# Patient Record
Sex: FEMALE | Race: WHITE | Employment: OTHER | URBAN - METROPOLITAN AREA
[De-identification: names, ages, dates, MRNs, and addresses within clinical notes are randomized per-mention and may not be internally consistent; named-entity substitution may affect disease eponyms.]

---

## 2018-09-25 ENCOUNTER — HOSPITAL ENCOUNTER (EMERGENCY)
Age: 78
Discharge: HOME | End: 2018-09-25
Attending: EMERGENCY MEDICINE
Payer: MEDICARE

## 2018-09-25 ENCOUNTER — APPOINTMENT (OUTPATIENT)
Dept: GENERAL RADIOLOGY | Age: 78
End: 2018-09-25
Payer: MEDICARE

## 2018-09-25 VITALS
HEART RATE: 81 BPM | DIASTOLIC BLOOD PRESSURE: 86 MMHG | OXYGEN SATURATION: 98 % | TEMPERATURE: 97.9 F | SYSTOLIC BLOOD PRESSURE: 164 MMHG | HEIGHT: 57 IN | BODY MASS INDEX: 18.34 KG/M2 | RESPIRATION RATE: 18 BRPM | WEIGHT: 85 LBS

## 2018-09-25 DIAGNOSIS — J06.9 ACUTE UPPER RESPIRATORY INFECTION: Primary | ICD-10-CM

## 2018-09-25 PROCEDURE — 71046 X-RAY EXAM CHEST 2 VIEWS: CPT

## 2018-09-25 PROCEDURE — 99283 EMERGENCY DEPT VISIT LOW MDM: CPT

## 2018-09-25 PROCEDURE — 94640 AIRWAY INHALATION TREATMENT: CPT

## 2018-09-25 PROCEDURE — 6360000002 HC RX W HCPCS: Performed by: EMERGENCY MEDICINE

## 2018-09-25 RX ORDER — BUTALBITAL, ACETAMINOPHEN AND CAFFEINE 50; 325; 40 MG/1; MG/1; MG/1
1 TABLET ORAL EVERY 4 HOURS PRN
COMMUNITY

## 2018-09-25 RX ORDER — LEVOFLOXACIN 500 MG/1
500 TABLET, FILM COATED ORAL DAILY
COMMUNITY
Start: 2018-09-24 | End: 2018-10-03

## 2018-09-25 RX ORDER — ALBUTEROL SULFATE 2.5 MG/3ML
2.5 SOLUTION RESPIRATORY (INHALATION) ONCE
Status: COMPLETED | OUTPATIENT
Start: 2018-09-25 | End: 2018-09-25

## 2018-09-25 RX ADMIN — ALBUTEROL SULFATE 2.5 MG: 2.5 SOLUTION RESPIRATORY (INHALATION) at 08:33

## 2018-09-25 ASSESSMENT — ENCOUNTER SYMPTOMS
EYE PAIN: 0
SHORTNESS OF BREATH: 0
COUGH: 1
SORE THROAT: 0
HEMOPTYSIS: 0
SPUTUM PRODUCTION: 0
NAUSEA: 0
VOMITING: 0
BACK PAIN: 0
ABDOMINAL PAIN: 0
EYE REDNESS: 0

## 2018-09-25 ASSESSMENT — PAIN DESCRIPTION - DESCRIPTORS: DESCRIPTORS: ACHING

## 2018-09-25 ASSESSMENT — PAIN DESCRIPTION - LOCATION: LOCATION: BACK;CHEST

## 2018-09-25 ASSESSMENT — PAIN DESCRIPTION - PAIN TYPE: TYPE: ACUTE PAIN

## 2018-09-25 NOTE — ED PROVIDER NOTES
HPI:  9/25/18,   Time: 8:32 AM         Frederick Caruso is a 68 y.o. female presenting to the ED for evaluation of URI symptoms kissing of cough, congestion, and runny nose, beginning several days ago. The complaint has been persistent, moderate in severity, and worsened by nothing. Patient was seen by PCP yesterday and prescribed antibiotics. She is not sure what it is. States she started to take one daily. Denies chest pain or shortness of breath. Cough is nonproductive. Subjective fever and chills. Patient has only taken one dose of her antibiotic. ROS: Review of Systems   Constitutional: Positive for chills, fever (subjective) and malaise/fatigue. Negative for diaphoresis. HENT: Positive for congestion. Negative for ear pain and sore throat. Eyes: Negative for pain and redness. Respiratory: Positive for cough. Negative for hemoptysis, sputum production and shortness of breath. Cardiovascular: Negative for chest pain. Gastrointestinal: Negative for abdominal pain, nausea and vomiting. Genitourinary: Negative for dysuria, frequency and urgency. Musculoskeletal: Negative for back pain and neck pain. Skin: Negative for rash. Neurological: Negative for dizziness and headaches. Endo/Heme/Allergies: Does not bruise/bleed easily. All other systems reviewed and are negative. Pertinent positives and negatives are stated within HPI, all other systems reviewed and are negative.  --------------------------------------------- PAST HISTORY ---------------------------------------------  Past Medical History:  has a past medical history of Arthritis; Back problem; CAD (coronary artery disease); Hyperlipidemia; Hypertension; Nausea & vomiting; Reflux; RLD (ruptured lumbar disc); Scoliosis; and Spinal stenosis. Past Surgical History:  has a past surgical history that includes Coronary angioplasty with stent; Cataract removal; fracture surgery; Shoulder arthroscopy (08 25 2011);  Diagnostic

## 2018-10-12 ENCOUNTER — OFFICE VISIT (OUTPATIENT)
Dept: CARDIOLOGY CLINIC | Age: 78
End: 2018-10-12
Payer: MEDICARE

## 2018-10-12 VITALS
WEIGHT: 86 LBS | HEIGHT: 58 IN | SYSTOLIC BLOOD PRESSURE: 130 MMHG | DIASTOLIC BLOOD PRESSURE: 80 MMHG | BODY MASS INDEX: 18.05 KG/M2 | HEART RATE: 74 BPM

## 2018-10-12 DIAGNOSIS — I10 ESSENTIAL HYPERTENSION: ICD-10-CM

## 2018-10-12 DIAGNOSIS — E78.00 PURE HYPERCHOLESTEROLEMIA: ICD-10-CM

## 2018-10-12 DIAGNOSIS — I25.10 CORONARY ARTERY DISEASE INVOLVING NATIVE CORONARY ARTERY OF NATIVE HEART WITHOUT ANGINA PECTORIS: Primary | ICD-10-CM

## 2018-10-12 PROCEDURE — 99214 OFFICE O/P EST MOD 30 MIN: CPT | Performed by: INTERNAL MEDICINE

## 2018-10-12 PROCEDURE — G8400 PT W/DXA NO RESULTS DOC: HCPCS | Performed by: INTERNAL MEDICINE

## 2018-10-12 PROCEDURE — 1090F PRES/ABSN URINE INCON ASSESS: CPT | Performed by: INTERNAL MEDICINE

## 2018-10-12 PROCEDURE — 93000 ELECTROCARDIOGRAM COMPLETE: CPT | Performed by: INTERNAL MEDICINE

## 2018-10-12 PROCEDURE — 1036F TOBACCO NON-USER: CPT | Performed by: INTERNAL MEDICINE

## 2018-10-12 PROCEDURE — G8427 DOCREV CUR MEDS BY ELIG CLIN: HCPCS | Performed by: INTERNAL MEDICINE

## 2018-10-12 PROCEDURE — G8598 ASA/ANTIPLAT THER USED: HCPCS | Performed by: INTERNAL MEDICINE

## 2018-10-12 PROCEDURE — 1123F ACP DISCUSS/DSCN MKR DOCD: CPT | Performed by: INTERNAL MEDICINE

## 2018-10-12 PROCEDURE — 1101F PT FALLS ASSESS-DOCD LE1/YR: CPT | Performed by: INTERNAL MEDICINE

## 2018-10-12 PROCEDURE — G8419 CALC BMI OUT NRM PARAM NOF/U: HCPCS | Performed by: INTERNAL MEDICINE

## 2018-10-12 PROCEDURE — G8484 FLU IMMUNIZE NO ADMIN: HCPCS | Performed by: INTERNAL MEDICINE

## 2018-10-12 PROCEDURE — 4040F PNEUMOC VAC/ADMIN/RCVD: CPT | Performed by: INTERNAL MEDICINE

## 2018-10-12 NOTE — PROGRESS NOTES
kg)   BMI 17.97 kg/m²   Wt Readings from Last 3 Encounters:   10/12/18 86 lb (39 kg)   09/25/18 85 lb (38.6 kg)   12/05/17 80 lb (36.3 kg)     The patient is awake, alert and in no discomfort or distress. No gross musculoskeletal deformity is present. No significant skin or nail changes are present. Gross examination of head, eyes, nose and throat are negative with the exception of poor dentition. Jugular venous pressure is normal and no carotid bruits are present. Normal respiratory effort is noted with no accessory muscle usage present. Lung fields are clear to ascultation. Cardiac examination is notable for a regular rate and rhythm with no palpable thrill. No gallop rhythm or cardiac murmur are identified. A benign abdominal examination is present with no masses or organomegaly. Intact pulses are present throughout all extremities and no peripheral edema is present. No focal neurologic deficits are present. Laboratory Tests:  Lab Results   Component Value Date    CREATININE 0.6 12/05/2017    BUN 12 12/05/2017     12/05/2017    K 4.3 12/05/2017    CL 98 12/05/2017    CO2 27 12/05/2017     No results found for: BNP  Lab Results   Component Value Date    WBC 7.8 12/05/2017    RBC 4.19 12/05/2017    HGB 13.1 12/05/2017    HCT 39.3 12/05/2017    MCV 93.8 12/05/2017    MCH 31.3 12/05/2017    MCHC 33.3 12/05/2017    RDW 12.3 12/05/2017     12/05/2017    MPV 8.7 12/05/2017     No results for input(s): ALKPHOS, ALT, AST, PROT, BILITOT, BILIDIR, LABALBU in the last 72 hours.   No results found for: MG  No results found for: PROTIME, INR  No results found for: TSH  No components found for: CHLPL  No results found for: TRIG  No results found for: HDL  No results found for: [address]    Cardiac Tests:  ECG: A resting electrocardiogram demonstrates evidence of sinus rhythm with nonspecific ST changes      ASSESSMENT / PLAN:  On a clinical basis, the patient appears stable from a cardiovascular standpoint in the face of her known coronary atherosclerosis. I have not altered her existing medical regimen will recommend consideration review of the contraindications of the use of either atorvastatin with reported hepatic transaminase abnormalities listed and her allergy with that of rosuvastatin for that of her present combinations simvastatin dosage based on its excess of recommended 40 mg daily dosage,especially in conjunction with ezetimibe. I presently plan her clinical reevaluation in one year and would happily reassess her in the interim should additional cardiovascular difficulties or concerns arise. The patient's current medication list, allergies, problem list and results of all previously ordered testing were reviewed at today's visit. Follow-up office visit in 1 year      Note: This report was completed using computerized voice recognition software. Every effort has been made to ensure accuracy, however; and invert and computerized transcription errors may be present. Kylie Bone.  Nuah Crawford, [address] [place] [address]    An electronic copy of this follow-up progress note was forwarded to Dr. Petr Meza

## 2019-05-24 ENCOUNTER — APPOINTMENT (OUTPATIENT)
Dept: MRI IMAGING | Age: 79
DRG: 313 | End: 2019-05-24
Payer: MEDICARE

## 2019-05-24 ENCOUNTER — APPOINTMENT (OUTPATIENT)
Dept: GENERAL RADIOLOGY | Age: 79
DRG: 313 | End: 2019-05-24
Payer: MEDICARE

## 2019-05-24 ENCOUNTER — APPOINTMENT (OUTPATIENT)
Dept: NUCLEAR MEDICINE | Age: 79
DRG: 313 | End: 2019-05-24
Payer: MEDICARE

## 2019-05-24 ENCOUNTER — HOSPITAL ENCOUNTER (INPATIENT)
Age: 79
LOS: 3 days | Discharge: NURSING, REHAB OR LONG-TERM CARE | DRG: 065 | End: 2019-05-28
Attending: STUDENT IN AN ORGANIZED HEALTH CARE EDUCATION/TRAINING PROGRAM | Admitting: HOSPITALIST
Payer: MEDICARE

## 2019-05-24 ENCOUNTER — HOSPITAL ENCOUNTER (OUTPATIENT)
Age: 79
Discharge: HOME | End: 2019-05-24
Payer: MEDICARE

## 2019-05-24 ENCOUNTER — APPOINTMENT (OUTPATIENT)
Dept: CT IMAGING | Age: 79
DRG: 313 | End: 2019-05-24
Payer: MEDICARE

## 2019-05-24 ENCOUNTER — HOSPITAL ENCOUNTER (EMERGENCY)
Age: 79
Discharge: ANOTHER HOSPITAL | DRG: 313 | End: 2019-05-24
Attending: EMERGENCY MEDICINE
Payer: MEDICARE

## 2019-05-24 VITALS
DIASTOLIC BLOOD PRESSURE: 80 MMHG | OXYGEN SATURATION: 97 % | HEIGHT: 57 IN | TEMPERATURE: 98.2 F | WEIGHT: 87 LBS | SYSTOLIC BLOOD PRESSURE: 169 MMHG | BODY MASS INDEX: 18.77 KG/M2 | HEART RATE: 75 BPM | RESPIRATION RATE: 18 BRPM

## 2019-05-24 DIAGNOSIS — R07.9 CHEST PAIN, UNSPECIFIED TYPE: Primary | ICD-10-CM

## 2019-05-24 PROBLEM — I63.9 STROKE (HCC): Status: ACTIVE | Noted: 2019-05-24

## 2019-05-24 LAB
ALBUMIN SERPL-MCNC: 4.3 G/DL (ref 3.5–5.2)
ALP BLD-CCNC: 99 U/L (ref 35–104)
ALT SERPL-CCNC: 18 U/L (ref 0–32)
ANION GAP SERPL CALCULATED.3IONS-SCNC: 13 MMOL/L (ref 7–16)
AST SERPL-CCNC: 31 U/L (ref 0–31)
BASOPHILS ABSOLUTE: 0.02 E9/L (ref 0–0.2)
BASOPHILS RELATIVE PERCENT: 0.2 % (ref 0–2)
BILIRUB SERPL-MCNC: 0.2 MG/DL (ref 0–1.2)
BUN BLDV-MCNC: 9 MG/DL (ref 8–23)
CALCIUM SERPL-MCNC: 9.2 MG/DL (ref 8.6–10.2)
CHLORIDE BLD-SCNC: 100 MMOL/L (ref 98–107)
CO2: 24 MMOL/L (ref 22–29)
CREAT SERPL-MCNC: 0.6 MG/DL (ref 0.5–1)
EKG ATRIAL RATE: 71 BPM
EKG P AXIS: 63 DEGREES
EKG P-R INTERVAL: 150 MS
EKG Q-T INTERVAL: 402 MS
EKG QRS DURATION: 74 MS
EKG QTC CALCULATION (BAZETT): 436 MS
EKG R AXIS: 36 DEGREES
EKG T AXIS: 49 DEGREES
EKG VENTRICULAR RATE: 71 BPM
EOSINOPHILS ABSOLUTE: 0.26 E9/L (ref 0.05–0.5)
EOSINOPHILS RELATIVE PERCENT: 2.8 % (ref 0–6)
GFR AFRICAN AMERICAN: >60
GFR NON-AFRICAN AMERICAN: >60 ML/MIN/1.73
GLUCOSE BLD-MCNC: 182 MG/DL (ref 74–99)
HBA1C MFR BLD: 7.1 % (ref 4–5.6)
HCT VFR BLD CALC: 37.6 % (ref 34–48)
HEMOGLOBIN: 12.5 G/DL (ref 11.5–15.5)
IMMATURE GRANULOCYTES #: 0.08 E9/L
IMMATURE GRANULOCYTES %: 0.9 % (ref 0–5)
LYMPHOCYTES ABSOLUTE: 2.43 E9/L (ref 1.5–4)
LYMPHOCYTES RELATIVE PERCENT: 26.5 % (ref 20–42)
MCH RBC QN AUTO: 31.6 PG (ref 26–35)
MCHC RBC AUTO-ENTMCNC: 33.2 % (ref 32–34.5)
MCV RBC AUTO: 95.2 FL (ref 80–99.9)
MONOCYTES ABSOLUTE: 0.89 E9/L (ref 0.1–0.95)
MONOCYTES RELATIVE PERCENT: 9.7 % (ref 2–12)
NEUTROPHILS ABSOLUTE: 5.49 E9/L (ref 1.8–7.3)
NEUTROPHILS RELATIVE PERCENT: 59.9 % (ref 43–80)
PDW BLD-RTO: 11.8 FL (ref 11.5–15)
PLATELET # BLD: 230 E9/L (ref 130–450)
PMV BLD AUTO: 9.2 FL (ref 7–12)
POTASSIUM SERPL-SCNC: 4 MMOL/L (ref 3.5–5)
RBC # BLD: 3.95 E12/L (ref 3.5–5.5)
SODIUM BLD-SCNC: 137 MMOL/L (ref 132–146)
TOTAL PROTEIN: 7.1 G/DL (ref 6.4–8.3)
TROPONIN: <0.01 NG/ML (ref 0–0.03)
WBC # BLD: 9.2 E9/L (ref 4.5–11.5)

## 2019-05-24 PROCEDURE — 85025 COMPLETE CBC W/AUTO DIFF WBC: CPT

## 2019-05-24 PROCEDURE — 70450 CT HEAD/BRAIN W/O DYE: CPT

## 2019-05-24 PROCEDURE — 84484 ASSAY OF TROPONIN QUANT: CPT

## 2019-05-24 PROCEDURE — A0426 ALS 1: HCPCS

## 2019-05-24 PROCEDURE — 99219 PR INITIAL OBSERVATION CARE/DAY 50 MINUTES: CPT | Performed by: INTERNAL MEDICINE

## 2019-05-24 PROCEDURE — 99285 EMERGENCY DEPT VISIT HI MDM: CPT

## 2019-05-24 PROCEDURE — 36415 COLL VENOUS BLD VENIPUNCTURE: CPT

## 2019-05-24 PROCEDURE — 96372 THER/PROPH/DIAG INJ SC/IM: CPT

## 2019-05-24 PROCEDURE — 70551 MRI BRAIN STEM W/O DYE: CPT

## 2019-05-24 PROCEDURE — 71045 X-RAY EXAM CHEST 1 VIEW: CPT

## 2019-05-24 PROCEDURE — 6360000002 HC RX W HCPCS: Performed by: CLINICAL NURSE SPECIALIST

## 2019-05-24 PROCEDURE — A0425 GROUND MILEAGE: HCPCS

## 2019-05-24 PROCEDURE — 93010 ELECTROCARDIOGRAM REPORT: CPT | Performed by: INTERNAL MEDICINE

## 2019-05-24 PROCEDURE — 6370000000 HC RX 637 (ALT 250 FOR IP): Performed by: INTERNAL MEDICINE

## 2019-05-24 PROCEDURE — 6370000000 HC RX 637 (ALT 250 FOR IP): Performed by: CLINICAL NURSE SPECIALIST

## 2019-05-24 PROCEDURE — 6370000000 HC RX 637 (ALT 250 FOR IP): Performed by: HOSPITALIST

## 2019-05-24 PROCEDURE — G0378 HOSPITAL OBSERVATION PER HR: HCPCS

## 2019-05-24 PROCEDURE — 6370000000 HC RX 637 (ALT 250 FOR IP): Performed by: FAMILY MEDICINE

## 2019-05-24 PROCEDURE — 93005 ELECTROCARDIOGRAM TRACING: CPT | Performed by: PHYSICIAN ASSISTANT

## 2019-05-24 PROCEDURE — 2580000003 HC RX 258: Performed by: CLINICAL NURSE SPECIALIST

## 2019-05-24 PROCEDURE — 83036 HEMOGLOBIN GLYCOSYLATED A1C: CPT

## 2019-05-24 PROCEDURE — 80053 COMPREHEN METABOLIC PANEL: CPT

## 2019-05-24 RX ORDER — ACETAMINOPHEN 325 MG/1
650 TABLET ORAL EVERY 4 HOURS PRN
Status: DISCONTINUED | OUTPATIENT
Start: 2019-05-24 | End: 2019-05-28 | Stop reason: HOSPADM

## 2019-05-24 RX ORDER — SIMVASTATIN 40 MG
40 TABLET ORAL NIGHTLY
Status: DISCONTINUED | OUTPATIENT
Start: 2019-05-24 | End: 2019-05-24 | Stop reason: HOSPADM

## 2019-05-24 RX ORDER — ONDANSETRON 2 MG/ML
4 INJECTION INTRAMUSCULAR; INTRAVENOUS EVERY 6 HOURS PRN
Status: DISCONTINUED | OUTPATIENT
Start: 2019-05-24 | End: 2019-05-28 | Stop reason: HOSPADM

## 2019-05-24 RX ORDER — SODIUM CHLORIDE 0.9 % (FLUSH) 0.9 %
10 SYRINGE (ML) INJECTION PRN
Status: DISCONTINUED | OUTPATIENT
Start: 2019-05-24 | End: 2019-05-28 | Stop reason: HOSPADM

## 2019-05-24 RX ORDER — SODIUM CHLORIDE 0.9 % (FLUSH) 0.9 %
10 SYRINGE (ML) INJECTION EVERY 12 HOURS SCHEDULED
Status: DISCONTINUED | OUTPATIENT
Start: 2019-05-24 | End: 2019-05-28 | Stop reason: HOSPADM

## 2019-05-24 RX ORDER — CARVEDILOL 25 MG/1
25 TABLET ORAL 2 TIMES DAILY WITH MEALS
Status: DISCONTINUED | OUTPATIENT
Start: 2019-05-24 | End: 2019-05-24 | Stop reason: HOSPADM

## 2019-05-24 RX ORDER — NITROGLYCERIN 0.4 MG/1
0.4 TABLET SUBLINGUAL EVERY 5 MIN PRN
Status: DISCONTINUED | OUTPATIENT
Start: 2019-05-24 | End: 2019-05-24 | Stop reason: HOSPADM

## 2019-05-24 RX ORDER — SIMVASTATIN 40 MG
40 TABLET ORAL NIGHTLY
Status: DISCONTINUED | OUTPATIENT
Start: 2019-05-24 | End: 2019-05-24

## 2019-05-24 RX ORDER — CARVEDILOL 25 MG/1
25 TABLET ORAL 2 TIMES DAILY WITH MEALS
Status: DISCONTINUED | OUTPATIENT
Start: 2019-05-24 | End: 2019-05-28 | Stop reason: HOSPADM

## 2019-05-24 RX ORDER — LISINOPRIL 10 MG/1
TABLET ORAL
Status: DISCONTINUED
Start: 2019-05-24 | End: 2019-05-24 | Stop reason: HOSPADM

## 2019-05-24 RX ORDER — CLOPIDOGREL BISULFATE 75 MG/1
75 TABLET ORAL DAILY
Status: DISCONTINUED | OUTPATIENT
Start: 2019-05-24 | End: 2019-05-24 | Stop reason: HOSPADM

## 2019-05-24 RX ORDER — ASPIRIN 325 MG
325 TABLET ORAL DAILY
Status: ON HOLD | COMMUNITY
End: 2019-05-26 | Stop reason: HOSPADM

## 2019-05-24 RX ORDER — LISINOPRIL 10 MG/1
40 TABLET ORAL DAILY
Status: DISCONTINUED | OUTPATIENT
Start: 2019-05-24 | End: 2019-05-24 | Stop reason: HOSPADM

## 2019-05-24 RX ORDER — SIMVASTATIN 40 MG
40 TABLET ORAL NIGHTLY
Status: DISCONTINUED | OUTPATIENT
Start: 2019-05-24 | End: 2019-05-28 | Stop reason: HOSPADM

## 2019-05-24 RX ORDER — ASPIRIN 325 MG
325 TABLET ORAL DAILY
Status: DISCONTINUED | OUTPATIENT
Start: 2019-05-24 | End: 2019-05-25

## 2019-05-24 RX ORDER — LISINOPRIL 20 MG/1
40 TABLET ORAL DAILY
Status: DISCONTINUED | OUTPATIENT
Start: 2019-05-24 | End: 2019-05-28 | Stop reason: HOSPADM

## 2019-05-24 RX ADMIN — CARVEDILOL 25 MG: 25 TABLET, FILM COATED ORAL at 17:47

## 2019-05-24 RX ADMIN — SIMVASTATIN 40 MG: 40 TABLET, FILM COATED ORAL at 20:57

## 2019-05-24 RX ADMIN — Medication 10 ML: at 20:57

## 2019-05-24 RX ADMIN — ACETAMINOPHEN 650 MG: 325 TABLET, FILM COATED ORAL at 17:52

## 2019-05-24 RX ADMIN — CLOPIDOGREL BISULFATE 75 MG: 75 TABLET ORAL at 10:35

## 2019-05-24 RX ADMIN — ENOXAPARIN SODIUM 40 MG: 40 INJECTION SUBCUTANEOUS at 17:47

## 2019-05-24 RX ADMIN — CARVEDILOL 25 MG: 25 TABLET, FILM COATED ORAL at 10:35

## 2019-05-24 RX ADMIN — LISINOPRIL 40 MG: 10 TABLET ORAL at 09:11

## 2019-05-24 ASSESSMENT — PAIN DESCRIPTION - ORIENTATION: ORIENTATION: RIGHT;LEFT

## 2019-05-24 ASSESSMENT — PAIN - FUNCTIONAL ASSESSMENT: PAIN_FUNCTIONAL_ASSESSMENT: ACTIVITIES ARE NOT PREVENTED

## 2019-05-24 ASSESSMENT — PAIN SCALES - GENERAL
PAINLEVEL_OUTOF10: 0
PAINLEVEL_OUTOF10: 3
PAINLEVEL_OUTOF10: 0

## 2019-05-24 ASSESSMENT — PAIN DESCRIPTION - DESCRIPTORS: DESCRIPTORS: ACHING;DULL;HEADACHE

## 2019-05-24 ASSESSMENT — PAIN DESCRIPTION - PAIN TYPE: TYPE: ACUTE PAIN

## 2019-05-24 ASSESSMENT — PAIN DESCRIPTION - ONSET: ONSET: ON-GOING

## 2019-05-24 ASSESSMENT — PAIN DESCRIPTION - PROGRESSION: CLINICAL_PROGRESSION: NOT CHANGED

## 2019-05-24 ASSESSMENT — PAIN DESCRIPTION - FREQUENCY: FREQUENCY: INTERMITTENT

## 2019-05-24 ASSESSMENT — PAIN DESCRIPTION - LOCATION: LOCATION: HEAD

## 2019-05-24 NOTE — ED NOTES
While giving the pt a medication, she was unable to hold the cup in her left hand and when she attempted to grab the pill with her left hand, her hand shook badly but once she got the pill she was able to lift her arm up and put it in her mouth.      Mary Jane Randhawa RN  05/24/19 [id number]

## 2019-05-24 NOTE — ED PROVIDER NOTES
ED Attending  CC: Juanita       Department of Emergency Medicine   ED  Provider Note  Admit Date/RoomTime: 5/24/2019  6:54 AM  ED Room: 04/04  MRN: [id number]  Chief Complaint:   Chest Pain (Pressure. Started around 0500) and Extremity Weakness (Left.)       History of Present Illness   Source of history provided by:  patient. History/Exam Limitations: none. Varun Mijares is a 66 y.o. female who has a past medical history of:   Past Medical History:   Diagnosis Date    Arthritis     Back problem     CAD (coronary artery disease) 2010    PTCA with DAMON to prox LAD, PTCA ADMON to ostial lesion of RCA and BMS to prox lesion RCA DAMON to RCA ostial     Hyperlipidemia     Hypertension     Nausea & vomiting     Reflux     RLD (ruptured lumbar disc)     Scoliosis     Spinal stenosis     presents to the ED by ambulance for chest pain and left sided extremity weakness. Patient states that her chest pain started at 0500 this morning. Patient states this woke her up out of sleep. She describes it as a left sided chest pressure. It does not radiate. It was a 9/10 this morning when she woke up. She took 325 mg of aspirin at 5:30 this morning. She states that her pain is now down to a 6 out of 10. She denies feeling short of breath. She denies any coughing or fevers. She reports that the pain made her feel diaphoretic and nauseated. She has not been vomiting. She states that she has been having intermittent chest pain like this for the past 2 weeks but today was the worst. She has a history of 3 cardiac stents in 2010, she has not had a cardiac cath since then. She did have a stress test in October 2017 and she follows with Dr. Jose Potts. Patient also reports that she noticed that her left upper extremity has been feeling weaker than the right side upon waking this morning as well. She's never had anything like this before. She denies any fall or head injury. She denies being on any blood thinners.  Patient reports that both of her lower extremities feel weak as well. She took 325mg of ASA PTA. ROS   Pertinent positives and negatives are stated within HPI, all other systems reviewed and are negative. Past Surgical History:   Procedure Laterality Date    CATARACT REMOVAL      bilateral    CORONARY ANGIOPLASTY  2/25/2010    PTCA with DAMON in the proximal left anterior descending    CORONARY ANGIOPLASTY  3/11/2010    PTCA and deployment of 2 stents in the ostium and the proximal segment of RCA    CORONARY ANGIOPLASTY  5/19/2010    PTCA DAMON to ostial RCA patent LAD and RCA BMS  restenosis of RCA    CORONARY ANGIOPLASTY WITH STENT PLACEMENT      times 3    DIAGNOSTIC CARDIAC CATH LAB PROCEDURE  2/25/2010    Dr. Jonathan Veronica: Cardiac cath with angioplasty follow up    [address] CATH LAB PROCEDURE  3/11/2010    Cardiac cath and stent placement    DIAGNOSTIC CARDIAC CATH LAB PROCEDURE  5/19/2010    Cardiac cath heart lab and subsequent angiopilsaty    FRACTURE SURGERY      tight wrist    SHOULDER ARTHROSCOPY  08 25 2011    left shoulder arthroscopy ,acromioplasty,busectomy, release of adhesions   Social History:  reports that she has never smoked. She has never used smokeless tobacco. She reports that she does not drink alcohol or use drugs. Family History: family history includes Cancer (age of onset: 79) in her sister; Heart Attack (age of onset: 72) in her father; Heart Attack (age of onset: 68) in her mother. Allergies: Aspirin and Lipitor    Physical Exam Section   Oxygen Saturation Interpretation: Normal.   ED Triage Vitals   BP Temp Temp src Pulse Resp SpO2 Height Weight   -- -- -- -- -- -- -- --       Physical Exam  · Constitutional/General: Alert and oriented x3, well appearing, non toxic. · HEENT:  NC/NT. PERRLA,  Airway patent.   · Neck: Supple, full ROM, non tender to palpation in the midline, no stridor, no crepitus, no meningeal signs  · Respiratory: Lungs clear to auscultation bilaterally, no wheezes, rales, or rhonchi. Not in respiratory distress  · CV:  Regular rate. Regular rhythm. No murmurs, gallops, or rubs. 2+ distal pulses  · Chest: No chest wall tenderness  · GI:  Abdomen Soft, Non tender, Non distended. +BS. No rebound, guarding, or rigidity. No pulsatile masses. · Musculoskeletal: Moves all extremities x 4. Warm and well perfused, no clubbing, cyanosis, or edema. Capillary refill <3 seconds  · Integument: skin warm and dry. No rashes. · Lymphatic: no lymphadenopathy noted  · Neurologic: GCS 15. NIH Stroke Scale at time of initial evaluation:  1A: Level of Consciousness 0 - alert; keenly responsive   1B: Ask Month and Age 0 - answers both questions correctly   1C:  Tell Patient To Open and Close Eyes, then Hand  Squeeze 0 - performs both tasks correctly   2: Test Horizontal Extraocular Movements 0 - normal   3: Test Visual Fields 0 - no visual loss   4: Test Facial Palsy 0 - normal symmetric movement   5A: Test Left Arm Motor Drift 0 - no drift, limb holds 90 (or 45) degrees for full 10 seconds   5B: Test Right Arm Motor Drift 0 - no drift, limb holds 90 (or 45) degrees for full 10 seconds   6A: Test Left Leg Motor Drift 0 - no drift; leg holds 30 degree position for full 5 seconds   6B: Test Right Leg Motor Drift 0 - no drift; leg holds 30 degree position for full 5 seconds   7: Test Limb Ataxia   (FNF/Heel-Shin) 1 - present in one limb   8: Test Sensation 0 - normal; no sensory loss   9: Test Language/Aphasia 0 - no aphasia, normal   10: Test Dysarthria 0 - normal   11: Test Extinction/Inattention 0 - no abnormality   Total 1     · Psychiatric: Normal Affect      Lab / Imaging Results   (All laboratory and radiology results have been personally reviewed by myself)  Labs:  Results for orders placed or performed during the hospital encounter of 05/24/19   CBC Auto Differential   Result Value Ref Range    WBC 9.2 4.5 - 11.5 E9/L    RBC 3.95 3.50 - 5.50 E12/L    Hemoglobin 12.5 11.5 - 15.5 g/dL    Hematocrit 37.6 34.0 - 48.0 %    MCV 95.2 80.0 - 99.9 fL    MCH 31.6 26.0 - 35.0 pg    MCHC 33.2 32.0 - 34.5 %    RDW 11.8 11.5 - 15.0 fL    Platelets 470 545 - 756 E9/L    MPV 9.2 7.0 - 12.0 fL    Neutrophils % 59.9 43.0 - 80.0 %    Immature Granulocytes % 0.9 0.0 - 5.0 %    Lymphocytes % 26.5 20.0 - 42.0 %    Monocytes % 9.7 2.0 - 12.0 %    Eosinophils % 2.8 0.0 - 6.0 %    Basophils % 0.2 0.0 - 2.0 %    Neutrophils # 5.49 1.80 - 7.30 E9/L    Immature Granulocytes # 0.08 E9/L    Lymphocytes # 2.43 1.50 - 4.00 E9/L    Monocytes # 0.89 0.10 - 0.95 E9/L    Eosinophils # 0.26 0.05 - 0.50 E9/L    Basophils # 0.02 0.00 - 0.20 E9/L   Comprehensive Metabolic Panel   Result Value Ref Range    Sodium 137 132 - 146 mmol/L    Potassium 4.0 3.5 - 5.0 mmol/L    Chloride 100 98 - 107 mmol/L    CO2 24 22 - 29 mmol/L    Anion Gap 13 7 - 16 mmol/L    Glucose 182 (H) 74 - 99 mg/dL    BUN 9 8 - 23 mg/dL    CREATININE 0.6 0.5 - 1.0 mg/dL    GFR Non-African American >60 >=60 mL/min/1.73    GFR African American >60     Calcium 9.2 8.6 - 10.2 mg/dL    Total Protein 7.1 6.4 - 8.3 g/dL    Alb 4.3 3.5 - 5.2 g/dL    Total Bilirubin 0.2 0.0 - 1.2 mg/dL    Alkaline Phosphatase 99 35 - 104 U/L    ALT 18 0 - 32 U/L    AST 31 0 - 31 U/L   Troponin   Result Value Ref Range    Troponin <0.01 0.00 - 0.03 ng/mL   EKG 12 Lead   Result Value Ref Range    Ventricular Rate 71 BPM    Atrial Rate 71 BPM    P-R Interval 150 ms    QRS Duration 74 ms    Q-T Interval 402 ms    QTc Calculation (Bazett) 436 ms    P Axis 63 degrees    R Axis 36 degrees    T Axis 49 degrees     Imaging: All Radiology results interpreted by Radiologist unless otherwise noted. 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 CT Head WO Contrast   Final Result      1. No acute findings. XR CHEST PORTABLE   Final Result   1.  Negative portable chest.        NM Cardiac weakness or numbness. Abdomen soft with no pulsatile mass. Heart rate regular. Lungs are clear and equal. Abdomen nontender. No pretibial edema or calf pain. [NC]   4362 Case discussed with Dr. Annmarie Rutherford, detailed overview given, MRI result discussed, he would like the patient transferred to [facility], there is no neurology coverage at [facility] today. [NC]   1876 Case discussed with Cullen Marshall for [facility] the medicine as [place], detailed overview given, they will accept the patient on transfer to their service. [NC]      ED Course User Index  [NC] Enrigue Grade, DO     Re-Evaluations:  5/24/19      Time: 0825    Patients symptoms are improving. She reports no current chest pain. She is updated on results and is agreeable to admission at this time. Time: 1000   It is noted that patient has several areas of infarct on MRI of the brain. Patient is informed of the results and is agreeable to transfer to [facility] at this time. Consultations:             Dr. Annmarie Rutherford: Discussed case. He is agreeable to admission. (1100): updated on findings and updated that we will transfer to [facility]. Dr. Gabi Haji spoke w/attending at [facility] for transfer. Procedures:   none    MDM:  Patient with left-sided chest pain, resolved while here the ER. Patient already had aspirin prior to arrival today. We'll admit at this time for chest pain. Patient able to admission at this time. It is found that patient has multiple areas of infarct on MRI, as our neurology is not here for coverage this weekend, we will transfer to [facility] for further evaluation and management at this time. Counseling:   I have spoken with the patient and discussed todays results, in addition to providing specific details for the plan of care and counseling regarding the diagnosis and prognosis and are agreeable with the plan. Assessment      1.  Chest pain, unspecified type         Plan Transferred to [facility]. Patient condition is fair. New Medications     New Prescriptions    No medications on file     Electronically signed by NAFISA Keys   DD: 5/24/19  **This report was transcribed using voice recognition software. Every effort was made to ensure accuracy; however, inadvertent computerized transcription errors may be present.   END OF PROVIDER NOTE           Ollie Keys  05/24/19 [id number]

## 2019-05-24 NOTE — ED NOTES
Pt states she is not having any chest pain at the present time. Just states she feels weird all over like she has no pep. No sl nitro given at the present time.  Dr derrick Evans RN  05/24/19 0382

## 2019-05-24 NOTE — PROGRESS NOTES
Message sent to Dr. Cristina Ortiz regarding need for admission orders at this time. Per Dr. Cristina Ortiz, Dr. Miranda Ranks to place orders. Message sent to Dr. Miranda Ranks for admission orders at this time.

## 2019-05-24 NOTE — PROGRESS NOTES
ADVANCED CARE PLANNING    Patient Name: Trice Mejia       YOB: 1940              MRN:    [id number]  Admission Date:  5/24/2019  1:11 PM    Active Diagnoses:  Principal Problem:    Chest pain  Active Problems:    Coronary artery disease involving native coronary artery of native heart without angina pectoris    Hypertension    Hyperlipidemia    Stroke [facility])  Resolved Problems:    * No resolved hospital problems. *      These active diagnoses are of sufficient risk that focused discussion on advanced care planning is indicated in order to allow the patient to thoughtfully consider personal goals of care; and, if situations arise that prevent the patient to personally give input, to ensure appropriate representation of their personal desire for different levels and levels of care. Persons present in discussion: RANDY Romero, Family members:  none. Discussion: I reviewed her admission for chest pain and stroke and her desires for ongoing aggressive care, including potential intubation and mechanical ventilation; also discussed who would speak on her behalf should she be unable to do so and discussed what conversations she has had with her family so they understand her desires if such a situation occurred now or in the future. I presented and explained the availability of our palliative care team to her, including the availability of advanced directives forms which she can review with her family and then fill out if they so wish. She wishes to remain a full code at the present time. Time Spent on Advanced Planning Documents: 30 minutes    Electronically signed by RANDY Hamm on 5/24/2019 at 4:00 PM    NOTE: This report was transcribed using voice recognition software. Every effort was made to ensure accuracy; however, inadvertent computerized transcription errors may be present.

## 2019-05-24 NOTE — ED NOTES
Nuclear medicine called for the pt to go over and start her stress test.  Explained to staff that pt is being transferred to another facility and had a stroke. They stated they would not do the stress test at the present time.      Alesha Spencer RN  05/24/19 8734

## 2019-05-24 NOTE — PROGRESS NOTES
5 over 5 strength in hip knee and foot/ankle use. Lung auscultation is clear, respirations are nonlabored. Heart rhythm is regular without S3 or S4, heart rate is 76/m. Abdomen is soft without distention or localized tenderness. Assessment/Plan:   1. Chest pain/heaviness, history coronary artery disease and hypertension  2.  left upper extremity weakness with rule out for stroke  3. hypercholesterolemia    Discussed with the patient,  obtain MRI in face of negative CT with left upper extremity weakness, neurologic and cardiology consultation, cycle cardiac enzymes, institute preadmission hypertensive program and follow blood pressure trending  Electronically by Bailey Albrecht DO  on 5/24/19 at 8:54 AM

## 2019-05-24 NOTE — H&P
SpO2 97%   BMI 18.83 kg/m²     General appearance:  No apparent distress, appears stated age and cooperative. HEENT:  Normal cephalic, atraumatic without obvious deformity. Pupils equal, round, and reactive to light. Extra ocular muscles intact. Conjunctivae/corneas clear. Neck: Supple, with full range of motion. No jugular venous distention. Trachea midline. Respiratory:  Normal respiratory effort. Clear to auscultation, bilaterally without Rales/Wheezes/Rhonchi. Cardiovascular:  Regular rate and rhythm with normal S1/S2 without murmurs, rubs or gallops. Abdomen: Soft, non-tender, non-distended with normal bowel sounds. Musculoskeletal:  No clubbing, cyanosis or edema bilaterally. Full range of motion without deformity. Skin: Skin color, texture, turgor normal.  No rashes or lesions. Neurologic:  Neurovascularly intact without any focal sensory/motor deficits. LLE weaker than right. Psychiatric:  Alert and oriented, thought content appropriate, normal insight  Capillary Refill: Brisk,< 3 seconds   Peripheral Pulses: +2 palpable, equal bilaterally       Labs:     Recent Labs     05/24/19  0743   WBC 9.2   HGB 12.5   HCT 37.6        Recent Labs     05/24/19  0743      K 4.0      CO2 24   BUN 9   CREATININE 0.6   CALCIUM 9.2     Recent Labs     05/24/19  0743   AST 31   ALT 18   BILITOT 0.2   ALKPHOS 99     No results for input(s): INR in the last 72 hours.   Recent Labs     05/24/19  0743   TROPONINI <0.01       Urinalysis:      Lab Results   Component Value Date    NITRU Negative 12/05/2017    BLOODU Negative 12/05/2017    SPECGRAV 1.015 12/05/2017    GLUCOSEU Negative 12/05/2017         ASSESSMENT:    Active Hospital Problems    Diagnosis Date Noted    Chest pain [R07.9] 05/24/2019    Stroke [facility]) [I63.9] 05/24/2019    Hypertension [I10]     Hyperlipidemia [E78.5]     Coronary artery disease involving native coronary artery of native heart without angina pectoris [I25.10] PLAN:  Consult neurology  Neurovascular checks  Monitor BP  Stress test  Resume home medications  Monitor labs  Daily weights  I/Os      DVT Prophylaxis: lovenox  Diet: DIET CARDIAC; Diet NPO, After Midnight  Code Status: Full Code    PT/OT Eval Status: ordered    Dispo - admit telemetry       RANDY Angeles - CNS    Thank you Caryle Beams, DO for the opportunity to be involved in this patient's care. If you have any questions or concerns please feel free to contact me at [phone].

## 2019-05-24 NOTE — ED NOTES
Mobile here to  pt. All questions and concerns answered.      Mary Jane Randhawa, ALBERTO  05/24/19 8801

## 2019-05-24 NOTE — CONSULTS
CHIEF COMPLAINT: Chest pain/Left arm weakness    HISTORY OF PRESENT ILLNESS: Patient is a 66 y.o. female seen at the request of Humza Pedraza DO and followed at our office by Dr. Venus Huynh. Patient complains of chest pain. Onset was several hours ago, with improving course since that time. The patient describes the pain as constant, precordial and pressure like in nature, radiates to the left arm. Patient rates pain as a severe in intensity. Associated symptoms are chest pain, chest pressure/discomfort and dyspnea. Aggravating factors are none. Alleviating factors are: none. Patient also with left arm weakness that is persisting. Medical history:  1. Hypertension. 2. GERD. 3. Spinal stenosis. 4. Hyperlipidemia. 5. Left heart catheterization 02/25/2010. Dr. Paulino Douglas. Left main 0%, LAD proximal 80-85%, D1 0%, OM2 no significant disease, ramus intermedius no disease, RCA dominant vessel, ostial 70% stenosis and proximal 70-75%. EF 60%. EDP 17 mmHg. PCI of LAD with 2.5 x 15 Xience drug-eluting stent deployed at 2.78 mm. 6. PCI of RCA with 3.0 x 20 mm Taxus drug-eluting stent to ostium with 3.0 x 12 mm Vision bare-metal stent in the proximal RCA 03/11/2010.  7. Left heart catheterization 05/19/2010. Dr. Pam Stone. Patent stents in the LAD. No disease in the Cx, RCA (ostial 70%) InStent stenosis, patent stent in the mid RCA, EF normal.  8. PCI of InStent restenosis with 3.0 x 8 mm Xience drug-eluting stent.     Past Medical History:   Diagnosis Date    Arthritis     Back problem     CAD (coronary artery disease) 2010    PTCA with DAMON to prox LAD, PTCA DAMON to ostial lesion of RCA and BMS to prox lesion RCA DAMON to RCA ostial     Hyperlipidemia     Hypertension     Nausea & vomiting     Reflux     RLD (ruptured lumbar disc)     Scoliosis     Spinal stenosis        Patient Active Problem List   Diagnosis    Adhesive capsulitis of shoulder    Bursitis of left shoulder    Impingement syndrome of shoulder    Coronary artery disease involving native coronary artery of native heart without angina pectoris    Spinal stenosis    Scoliosis    Hypertension    Hyperlipidemia    Chest pain       Allergies   Allergen Reactions    Aspirin      Caused bleeding ulcers    Lipitor      Caused elevated liver enzymes       Current Facility-Administered Medications   Medication Dose Route Frequency Provider Last Rate Last Dose    nitroGLYCERIN (NITROSTAT) SL tablet 0.4 mg  0.4 mg Sublingual Q5 Min PRN NAFISA Devine        lisinopril (PRINIVIL;ZESTRIL) tablet 40 mg  40 mg Oral Daily Melva Snowden DO   40 mg at 05/24/19 0911    carvedilol (COREG) tablet 25 mg  25 mg Oral BID  Jac Stevenson DO        simvastatin (ZOCOR) tablet 40 mg  40 mg Oral Nightly Melva Snowden DO        perflutren lipid microspheres (DEFINITY) injection 1.65 mg  1.5 mL Intravenous ONCE PRN Melva Snowden DO        lisinopril (PRINIVIL;ZESTRIL) 10 MG tablet              Current Outpatient Medications   Medication Sig Dispense Refill    aspirin 325 MG tablet Take 325 mg by mouth daily      butalbital-acetaminophen-caffeine (FIORICET, ESGIC) -40 MG per tablet Take 1 tablet by mouth every 4 hours as needed for Migraine      simvastatin (ZOCOR) 20 MG tablet Take 20 mg by mouth nightly       ezetimibe-simvastatin (VYTORIN) 10-40 MG per tablet Take 1 tablet by mouth nightly.  Multiple Vitamins-Minerals (CENTRUM SILVER PO) Take 1 tablet by mouth daily       lisinopril (PRINIVIL;ZESTRIL) 40 MG tablet Take 40 mg by mouth daily.  carvedilol (COREG) 25 MG tablet Take 25 mg by mouth 2 times daily (with meals).  nitroGLYCERIN (NITROSTAT) 0.4 MG SL tablet Place 0.4 mg under the tongue every 5 minutes as needed.            Social History     Socioeconomic History    Marital status: Single     Spouse name: Not on file    Number of children: Not on file    Years of education: Not on file    Highest education level: Not on file   Occupational History    Not on file   Social Needs    Financial resource strain: Not on file    Food insecurity:     Worry: Not on file     Inability: Not on file    Transportation needs:     Medical: Not on file     Non-medical: Not on file   Tobacco Use    Smoking status: Never Smoker    Smokeless tobacco: Never Used   Substance and Sexual Activity    Alcohol use: No     Alcohol/week: 0.0 oz     Comment: denies caffeine    Drug use: No    Sexual activity: Not on file   Lifestyle    Physical activity:     Days per week: Not on file     Minutes per session: Not on file    Stress: Not on file   Relationships    Social connections:     Talks on phone: Not on file     Gets together: Not on file     Attends Mandaeism service: Not on file     Active member of club or organization: Not on file     Attends meetings of clubs or organizations: Not on file     Relationship status: Not on file    Intimate partner violence:     Fear of current or ex partner: Not on file     Emotionally abused: Not on file     Physically abused: Not on file     Forced sexual activity: Not on file   Other Topics Concern    Not on file   Social History Narrative    Not on file       Family History   Problem Relation Age of Onset    Heart Attack Mother 68    Heart Attack Father 72    Cancer Sister 79        pancreatic       Review of Systems:   Heart: as above   Lungs: as above   Eyes: denies changes in vision or discharge. Ears: denies changes in hearing or pain. Nose: denies epistaxis or masses   Throat: denies sore throat or trouble swallowing. Neuro: denies numbness, tingling, tremors. Skin: denies rashes or itching. : denies hematuria, dysuria   GI: denies vomiting, diarrhea   Psych: denies mood changed, anxiety, depression. all others negative.     Physical Exam   BP (!) 187/69   Pulse 75   Temp 98.2 °F (36.8 °C) (Oral)   Resp 18   Ht 4' 9\" (1.448 m)   Wt 87 lb (39.5 kg)   SpO2 98% BMI 18.83 kg/m²   Constitutional: Oriented to person, place, and time. Well-developed and well-nourished. No distress. Head: Normocephalic and atraumatic. Eyes: EOM are normal. Pupils are equal, round, and reactive to light. Neck: Normal range of motion. Neck supple. No hepatojugular reflux and no JVD present. Carotid bruit is not present. No tracheal deviation present. No thyromegaly present. Cardiovascular: Normal rate, regular rhythm, normal heart sounds and intact distal pulses. Exam reveals no gallop and no friction rub. No murmur heard. Pulmonary/Chest: Effort normal and breath sounds normal. No respiratory distress. No wheezes. No rales. No tenderness. Abdominal: Soft. Bowel sounds are normal. No distension and no mass. No tenderness. No rebound and no guarding. Musculoskeletal: Normal range of motion. No edema and no tenderness. Lymphadenopathy:   No cervical adenopathy. No groin adenopathy. Neurological: Alert and oriented to person, place, and time. Skin: Skin is warm and dry. No rash noted. Not diaphoretic. No erythema. Psychiatric: Normal mood and affect. Behavior is normal.       CBC:   Lab Results   Component Value Date    WBC 9.2 05/24/2019    RBC 3.95 05/24/2019    HGB 12.5 05/24/2019    HCT 37.6 05/24/2019    MCV 95.2 05/24/2019    MCH 31.6 05/24/2019    MCHC 33.2 05/24/2019    RDW 11.8 05/24/2019     05/24/2019    MPV 9.2 05/24/2019     BMP:   Lab Results   Component Value Date     05/24/2019    K 4.0 05/24/2019     05/24/2019    CO2 24 05/24/2019    BUN 9 05/24/2019    LABALBU 4.3 05/24/2019    CREATININE 0.6 05/24/2019    CALCIUM 9.2 05/24/2019    GFRAA >60 05/24/2019    LABGLOM >60 05/24/2019     Magnesium:  No results found for: MG  Cardiac Enzymes:   Lab Results   Component Value Date    TROPONINI <0.01 05/24/2019      PT/INR:  No results found for: PROTIME, INR  TSH:  No results found for: TSH    Rhythm Strip: normal sinus rhythm.     EKG:  normal EKG, normal sinus rhythm. ASSESSMENT AND PLAN:  Patient Active Problem List   Diagnosis    Adhesive capsulitis of shoulder    Bursitis of left shoulder    Impingement syndrome of shoulder    Coronary artery disease involving native coronary artery of native heart without angina pectoris    Spinal stenosis    Scoliosis    Hypertension    Hyperlipidemia    Chest pain     1. Chest pain/Hx of CAD:     CE x 1 negative. EKG normal.    Echo ordered. Serial CE. If negative then pharm stress in am.     Plavix(ASA allergy)BB/statin. 2. Left arm weakness: Neurology consulted. 3. HTN: Observe. 4. Lipids: Statin. Neela Millard D.O.   Cardiologist  Cardiology, [facility]

## 2019-05-25 ENCOUNTER — APPOINTMENT (OUTPATIENT)
Dept: NUCLEAR MEDICINE | Age: 79
DRG: 065 | End: 2019-05-25
Attending: STUDENT IN AN ORGANIZED HEALTH CARE EDUCATION/TRAINING PROGRAM
Payer: MEDICARE

## 2019-05-25 ENCOUNTER — APPOINTMENT (OUTPATIENT)
Dept: ULTRASOUND IMAGING | Age: 79
DRG: 065 | End: 2019-05-25
Attending: STUDENT IN AN ORGANIZED HEALTH CARE EDUCATION/TRAINING PROGRAM
Payer: MEDICARE

## 2019-05-25 PROBLEM — I63.9 ACUTE CVA (CEREBROVASCULAR ACCIDENT) (HCC): Status: ACTIVE | Noted: 2019-05-25

## 2019-05-25 LAB
ANION GAP SERPL CALCULATED.3IONS-SCNC: 15 MMOL/L (ref 7–16)
BUN BLDV-MCNC: 13 MG/DL (ref 8–23)
CALCIUM SERPL-MCNC: 8.9 MG/DL (ref 8.6–10.2)
CHLORIDE BLD-SCNC: 100 MMOL/L (ref 98–107)
CHOLESTEROL, TOTAL: 173 MG/DL (ref 0–199)
CO2: 22 MMOL/L (ref 22–29)
CREAT SERPL-MCNC: 0.6 MG/DL (ref 0.5–1)
GFR AFRICAN AMERICAN: >60
GFR NON-AFRICAN AMERICAN: >60 ML/MIN/1.73
GLUCOSE BLD-MCNC: 150 MG/DL (ref 74–99)
HCT VFR BLD CALC: 35.5 % (ref 34–48)
HDLC SERPL-MCNC: 41 MG/DL
HEMOGLOBIN: 11.8 G/DL (ref 11.5–15.5)
LDL CHOLESTEROL CALCULATED: 79 MG/DL (ref 0–99)
LV EF: 71 %
LVEF MODALITY: NORMAL
MAGNESIUM: 1.9 MG/DL (ref 1.6–2.6)
MCH RBC QN AUTO: 31.1 PG (ref 26–35)
MCHC RBC AUTO-ENTMCNC: 33.2 % (ref 32–34.5)
MCV RBC AUTO: 93.7 FL (ref 80–99.9)
PDW BLD-RTO: 11.9 FL (ref 11.5–15)
PHOSPHORUS: 3.8 MG/DL (ref 2.5–4.5)
PLATELET # BLD: 224 E9/L (ref 130–450)
PMV BLD AUTO: 9.3 FL (ref 7–12)
POTASSIUM REFLEX MAGNESIUM: 3.4 MMOL/L (ref 3.5–5)
POTASSIUM SERPL-SCNC: 3.4 MMOL/L (ref 3.5–5)
RBC # BLD: 3.79 E12/L (ref 3.5–5.5)
SODIUM BLD-SCNC: 137 MMOL/L (ref 132–146)
TRIGL SERPL-MCNC: 265 MG/DL (ref 0–149)
VLDLC SERPL CALC-MCNC: 53 MG/DL
WBC # BLD: 6.4 E9/L (ref 4.5–11.5)

## 2019-05-25 PROCEDURE — 6360000002 HC RX W HCPCS: Performed by: CLINICAL NURSE SPECIALIST

## 2019-05-25 PROCEDURE — 2140000000 HC CCU INTERMEDIATE R&B

## 2019-05-25 PROCEDURE — 36415 COLL VENOUS BLD VENIPUNCTURE: CPT

## 2019-05-25 PROCEDURE — 6370000000 HC RX 637 (ALT 250 FOR IP): Performed by: CLINICAL NURSE SPECIALIST

## 2019-05-25 PROCEDURE — A9500 TC99M SESTAMIBI: HCPCS | Performed by: RADIOLOGY

## 2019-05-25 PROCEDURE — 96374 THER/PROPH/DIAG INJ IV PUSH: CPT

## 2019-05-25 PROCEDURE — 3430000000 HC RX DIAGNOSTIC RADIOPHARMACEUTICAL: Performed by: RADIOLOGY

## 2019-05-25 PROCEDURE — 83735 ASSAY OF MAGNESIUM: CPT

## 2019-05-25 PROCEDURE — 2580000003 HC RX 258: Performed by: CLINICAL NURSE SPECIALIST

## 2019-05-25 PROCEDURE — 80061 LIPID PANEL: CPT

## 2019-05-25 PROCEDURE — 6370000000 HC RX 637 (ALT 250 FOR IP): Performed by: HOSPITALIST

## 2019-05-25 PROCEDURE — 84100 ASSAY OF PHOSPHORUS: CPT

## 2019-05-25 PROCEDURE — 6370000000 HC RX 637 (ALT 250 FOR IP): Performed by: PSYCHIATRY & NEUROLOGY

## 2019-05-25 PROCEDURE — 80048 BASIC METABOLIC PNL TOTAL CA: CPT

## 2019-05-25 PROCEDURE — 93880 EXTRACRANIAL BILAT STUDY: CPT

## 2019-05-25 PROCEDURE — 93017 CV STRESS TEST TRACING ONLY: CPT

## 2019-05-25 PROCEDURE — 78452 HT MUSCLE IMAGE SPECT MULT: CPT

## 2019-05-25 PROCEDURE — 93018 CV STRESS TEST I&R ONLY: CPT | Performed by: INTERNAL MEDICINE

## 2019-05-25 PROCEDURE — 85027 COMPLETE CBC AUTOMATED: CPT

## 2019-05-25 RX ORDER — ASPIRIN 81 MG/1
81 TABLET ORAL DAILY
Status: DISCONTINUED | OUTPATIENT
Start: 2019-05-26 | End: 2019-05-28 | Stop reason: HOSPADM

## 2019-05-25 RX ORDER — CLOPIDOGREL BISULFATE 75 MG/1
75 TABLET ORAL DAILY
Status: DISCONTINUED | OUTPATIENT
Start: 2019-05-25 | End: 2019-05-28 | Stop reason: HOSPADM

## 2019-05-25 RX ADMIN — CARVEDILOL 25 MG: 25 TABLET, FILM COATED ORAL at 11:50

## 2019-05-25 RX ADMIN — LISINOPRIL 40 MG: 20 TABLET ORAL at 11:50

## 2019-05-25 RX ADMIN — ASPIRIN 325 MG: 325 TABLET ORAL at 11:50

## 2019-05-25 RX ADMIN — CARVEDILOL 25 MG: 25 TABLET, FILM COATED ORAL at 16:58

## 2019-05-25 RX ADMIN — SIMVASTATIN 40 MG: 40 TABLET, FILM COATED ORAL at 20:05

## 2019-05-25 RX ADMIN — REGADENOSON 0.4 MG: 0.08 INJECTION, SOLUTION INTRAVENOUS at 09:50

## 2019-05-25 RX ADMIN — ACETAMINOPHEN 650 MG: 325 TABLET, FILM COATED ORAL at 11:50

## 2019-05-25 RX ADMIN — Medication 11.8 MILLICURIE: at 07:58

## 2019-05-25 RX ADMIN — ONDANSETRON 4 MG: 2 INJECTION INTRAMUSCULAR; INTRAVENOUS at 11:48

## 2019-05-25 RX ADMIN — CLOPIDOGREL 75 MG: 75 TABLET, FILM COATED ORAL at 16:58

## 2019-05-25 RX ADMIN — Medication 10 ML: at 11:48

## 2019-05-25 RX ADMIN — Medication 31.6 MILLICURIE: at 09:50

## 2019-05-25 RX ADMIN — Medication 10 ML: at 20:05

## 2019-05-25 ASSESSMENT — PAIN SCALES - GENERAL
PAINLEVEL_OUTOF10: 0
PAINLEVEL_OUTOF10: 7
PAINLEVEL_OUTOF10: 0

## 2019-05-25 ASSESSMENT — PAIN DESCRIPTION - LOCATION: LOCATION: HEAD

## 2019-05-25 ASSESSMENT — PAIN DESCRIPTION - PAIN TYPE: TYPE: ACUTE PAIN

## 2019-05-25 ASSESSMENT — PAIN - FUNCTIONAL ASSESSMENT: PAIN_FUNCTIONAL_ASSESSMENT: ACTIVITIES ARE NOT PREVENTED

## 2019-05-25 NOTE — PROCEDURES
Pharmacological Stress Test with Myocardial Perfusion Imaging      Normal pharmacological stress test with regadenason 0.4 mg IV x 1. Baseline ECG: normal sinus rhythm  Normal baseline HR. normal baseline BP.  increase in heart rate with Lexiscan. Lexiscan induced symptoms: nausea, chest pain, shortness of breath. These symptoms did not readily improve therefore aminophylline 100 mg IV x 1 was administered. Lexiscan induced ECG changes: none  Myocardial perfusion imaging pending at this time. Nawaf Jung M.D.   Advanced Heart Failure and Pulmonary Hypertension  [place] [phone]

## 2019-05-25 NOTE — PLAN OF CARE
Problem: Falls - Risk of:  Goal: Will remain free from falls  Description  Will remain free from falls  5/25/2019 1623 by Colin Herndon RN  Outcome: Met This Shift     Problem: Daily Care:  Goal: Daily care needs are met  Description  Daily care needs are met  Outcome: Met This Shift

## 2019-05-25 NOTE — DISCHARGE SUMMARY
Hospital Medicine Discharge Summary    Patient ID: Rola Alas      Patient's PCP: Jennifer Amaro DO    Admit Date: 5/24/2019     Discharge Date:    05/25/19    Admitting Physician: Sohail Newman MD     Discharge Physician: Leigh Palumbo MD     Discharge Diagnoses: Active Hospital Problems    Diagnosis Date Noted    Chest pain [R07.9] 05/24/2019    Stroke [facility]) [I63.9] 05/24/2019    Hypertension [I10]     Hyperlipidemia [E78.5]     Coronary artery disease involving native coronary artery of native heart without angina pectoris [I25.10]        The patient was seen and examined on day of discharge and this discharge summary is in conjunction with any daily progress note from day of discharge. Admission HPI: 66 y.o. female who presented to [facility] with chest pain. History obtained from the patient. She states she woke this morning with diffuse chest pain across her chest that was non-radiating but associated with diaphoresis, nausea and shortness of breath. She states she went into the kitchen to take an aspirin and dropped the cup d/t left hand weakness; she states she also had left leg weakness. She went to [facility] ED for evaluation. A head CT was negative, but head MRI revealed infarcts. She was transferred to [facility] for further workup. She has PMH of CAD, HLD, HTN, scoliosis. She denies previous stroke  Upon exam, her LUE is weaker than the right. Lower extremity strength is equal.         Hospital Course:   Ms. Rola Alas, a 66y.o. year old female  who  has a past medical history of Arthritis, Back problem, CAD (coronary artery disease), Hyperlipidemia, Hypertension, Nausea & vomiting, Reflux, RLD (ruptured lumbar disc), Scoliosis, and Spinal stenosis. During the course the patient's hospital stay admitted for chest pain and LUE weakness. Cardio and neurology were consulted. NM stress test was ordered as well. clinically improved and stable at time of discharge. Consults:     IP CONSULT TO NEUROLOGY  IP CONSULT TO CARDIOLOGY    Significant Diagnostic Studies:  As above      Discharge Instructions/Follow-up:  As above       Activity: activity as tolerated    Physical Exam:  Vitals:    05/25/19 0445   BP: (!) 136/58   Pulse: 92   Resp: 18   Temp: 99 °F (37.2 °C)   SpO2: 97%       General appearance: No apparent distress, appears stated age and cooperative. HEENT: Conjunctivae/corneas clear. Pupils equal and round. No injections noted. Neck: Supple. No lesions, scars or masses. Trachea midline. Respiratory:  Normal respiratory effort. Clear to auscultation, bilaterally without Rales/Wheezes/Rhonchi. Cardiovascular: Regular rate and rhythm with normal S1/S2 without murmurs, rubs or gallops. Abdomen: Soft, non-tender, non-distended with normal bowel sounds. Musculoskeletal: No clubbing, cyanosis or edema bilaterally. Brisk capillary refill. 2+ lower extremity pulses (dorsalis pedis). Skin:  No rashes    Neurologic: awake, alert and following commands     Labs: For convenience and continuity at follow-up the following most recent labs are provided:      CBC:    Lab Results   Component Value Date    WBC 6.4 05/25/2019    HGB 11.8 05/25/2019    HCT 35.5 05/25/2019     05/25/2019       Renal:    Lab Results   Component Value Date     05/25/2019    K 3.4 05/25/2019    K 3.4 05/25/2019     05/25/2019    CO2 22 05/25/2019    BUN 13 05/25/2019    CREATININE 0.6 05/25/2019    CALCIUM 8.9 05/25/2019    PHOS 3.8 05/25/2019       Imaging:  NM Cardiac Stress Test Nuclear Imaging    (Results Pending)         Discharge Medications:     Current Discharge Medication List           Details   aspirin 325 MG tablet Take 325 mg by mouth daily      butalbital-acetaminophen-caffeine (FIORICET, ESGIC) -40 MG per tablet Take 1 tablet by mouth every 4 hours as needed for Migraine      ezetimibe-simvastatin (VYTORIN) 10-40 MG per tablet Take 1 tablet by mouth nightly. Multiple Vitamins-Minerals (CENTRUM SILVER PO) Take 1 tablet by mouth daily       lisinopril (PRINIVIL;ZESTRIL) 40 MG tablet Take 40 mg by mouth daily. carvedilol (COREG) 25 MG tablet Take 25 mg by mouth 2 times daily (with meals). nitroGLYCERIN (NITROSTAT) 0.4 MG SL tablet Place 0.4 mg under the tongue every 5 minutes as needed. Time Spent on discharge is more than 31 min in the examination, evaluation, counseling and review of medications and discharge plan. Dre Sanchez MD   5/25/2019      Thank you Krystian Aleman DO for the opportunity to be involved in this patient's care. If you have any questions or concerns please feel free to contact me. NOTE: This report was transcribed using voice recognition software. Every effort was made to ensure accuracy; however, inadvertent computerized transcription errors may be present.

## 2019-05-25 NOTE — CONSULTS
Jean Castro is a 66 y.o. female       No chief complaint on file. CC: arm weakness and chest pain  HPI:  66year old woman reports sudden onset of chest pain associated with left arm weakness. Chest pain improved but weakness persisted. She had an MRI of the brain that a scattering of infarcts in the [facility] distribution along the distribution of a branch artery. She has history of CAD and has multiple stents in place she takes aspirin daily. Initial blood pressures in the 180s SBP. HPI  Prior to Visit Medications    Medication Sig Taking? Authorizing Provider   aspirin 325 MG tablet Take 325 mg by mouth daily  Historical Provider, MD   butalbital-acetaminophen-caffeine (FIORICET, ESGIC) -40 MG per tablet Take 1 tablet by mouth every 4 hours as needed for Migraine  Historical Provider, MD   ezetimibe-simvastatin (VYTORIN) 10-40 MG per tablet Take 1 tablet by mouth nightly. Historical Provider, MD   Multiple Vitamins-Minerals (CENTRUM SILVER PO) Take 1 tablet by mouth daily   Historical Provider, MD   lisinopril (PRINIVIL;ZESTRIL) 40 MG tablet Take 40 mg by mouth daily. Historical Provider, MD   carvedilol (COREG) 25 MG tablet Take 25 mg by mouth 2 times daily (with meals). Historical Provider, MD   nitroGLYCERIN (NITROSTAT) 0.4 MG SL tablet Place 0.4 mg under the tongue every 5 minutes as needed.     Historical Provider, MD     Social History     Tobacco Use    Smoking status: Never Smoker    Smokeless tobacco: Never Used   Substance Use Topics    Alcohol use: No     Alcohol/week: 0.0 oz     Comment: denies caffeine    Drug use: No     Family History   Problem Relation Age of Onset    Heart Attack Mother 68    Heart Attack Father 72    Cancer Sister 79        pancreatic     Past Surgical History:   Procedure Laterality Date    CATARACT REMOVAL      bilateral    CORONARY ANGIOPLASTY  2/25/2010    PTCA with DAMON in the proximal left anterior descending    CORONARY ANGIOPLASTY  3/11/2010    PTCA and deployment of 2 stents in the ostium and the proximal segment of RCA    CORONARY ANGIOPLASTY  5/19/2010    PTCA DAMON to ostial RCA patent LAD and RCA BMS  restenosis of RCA    CORONARY ANGIOPLASTY WITH STENT PLACEMENT      times 3    DIAGNOSTIC CARDIAC CATH LAB PROCEDURE  2/25/2010    Dr. Aicha Blackman: Cardiac cath with angioplasty follow up    [address] CATH LAB PROCEDURE  3/11/2010    Cardiac cath and stent placement    DIAGNOSTIC CARDIAC CATH LAB PROCEDURE  5/19/2010    Cardiac cath heart lab and subsequent angiopilsaty    FRACTURE SURGERY      tight wrist    SHOULDER ARTHROSCOPY  08 25 2011    left shoulder arthroscopy ,acromioplasty,busectomy, release of adhesions     Past Medical History:   Diagnosis Date    Arthritis     Back problem     CAD (coronary artery disease) 2010    PTCA with DAMON to prox LAD, PTCA DAMON to ostial lesion of RCA and BMS to prox lesion RCA DAMON to RCA ostial     Hyperlipidemia     Hypertension     Nausea & vomiting     Reflux     RLD (ruptured lumbar disc)     Scoliosis     Spinal stenosis      Review of Systems   Cardiovascular: Positive for chest pain. Neurological: Positive for weakness. All other systems reviewed and are negative. Objective:   BP (!) 153/62   Pulse 84   Temp 99.2 °F (37.3 °C) (Temporal)   Resp 18   Ht 4' 9\" (1.448 m)   Wt 77 lb 6.4 oz (35.1 kg)   SpO2 98%   BMI 16.75 kg/m²     Physical Exam   Constitutional: She appears well-nourished. No distress. HENT:   Head: Normocephalic and atraumatic. Eyes: Pupils are equal, round, and reactive to light. Conjunctivae and lids are normal.   Neck: Normal range of motion. Neck supple. Cardiovascular: Normal rate and regular rhythm. Pulmonary/Chest: Effort normal and breath sounds normal.   Abdominal: Soft. Musculoskeletal: She exhibits no edema or tenderness. Neurological:   Reflex Scores:       Tricep reflexes are Tr on the right side and Tr on the left side. Bicep reflexes are Tr on the right side and Tr on the left side. Patellar reflexes are 2+ on the right side and 2+ on the left side. Achilles reflexes are Tr on the right side and Tr on the left side. Skin: Skin is warm and dry. Psychiatric: She has a normal mood and affect. Her speech is normal and behavior is normal.               Neurological Exam  Mental Status   Oriented to person, place, time and situation. Recent and remote memory are intact. Clock drawing is normal. Speech is normal. Language is fluent with no aphasia. Attention and concentration are normal. Fund of knowledge is appropriate for level of education. Apraxia absent. Cranial Nerves  CN II: Visual fields full to confrontation. Right funduscopic exam: not visualized. Left funduscopic exam: not visualized. CN III, IV, VI: Extraocular movements intact bilaterally. Normal lids and orbits bilaterally. Pupils equal round and reactive to light bilaterally. CN V: Facial sensation is normal.  CN VII: Full and symmetric facial movement. CN VIII: Hearing is normal.  CN IX, X: Palate elevates symmetrically. Normal gag reflex. CN XI: Shoulder shrug strength is normal.  CN XII: Tongue midline without atrophy or fasciculations. Motor  Normal muscle bulk throughout. Normal muscle tone. No abnormal involuntary movements. Left pronator drift. Drift in left hand 4/5 strength left arm. 5-/5 bilateral legs. .    Sensory  Light touch is normal in upper and lower extremities. Pinprick is normal in upper and lower extremities.      Reflexes                                           Right                      Left  Biceps                                 Tr                         Tr  Triceps                                Tr                         Tr  Patellar                                2+                         2+  Achilles                                Tr                         Tr  Plantar                           Downgoing Downgoing    Coordination  Right: Finger-to-nose normal. Rapid alternating movement normal. Heel-to-shin abnormality:  Left: Finger-to-nose abnormality: Rapid alternating movement abnormality: Heel-to-shin abnormality:  Trouble with HTS bilaterally. Ataxic FNF on left . Gait  Differed pt comfort/requesrt. Laboratory/Radiology:     CBC with Differential:    Lab Results   Component Value Date    WBC 6.4 05/25/2019    RBC 3.79 05/25/2019    HGB 11.8 05/25/2019    HCT 35.5 05/25/2019     05/25/2019    MCV 93.7 05/25/2019    MCH 31.1 05/25/2019    MCHC 33.2 05/25/2019    RDW 11.9 05/25/2019    LYMPHOPCT 26.5 05/24/2019    MONOPCT 9.7 05/24/2019    BASOPCT 0.2 05/24/2019    MONOSABS 0.89 05/24/2019    LYMPHSABS 2.43 05/24/2019    EOSABS 0.26 05/24/2019    BASOSABS 0.02 05/24/2019     CMP:    Lab Results   Component Value Date     05/25/2019    K 3.4 05/25/2019    K 3.4 05/25/2019     05/25/2019    CO2 22 05/25/2019    BUN 13 05/25/2019    CREATININE 0.6 05/25/2019    GFRAA >60 05/25/2019    LABGLOM >60 05/25/2019    GLUCOSE 150 05/25/2019    GLUCOSE 116 08/15/2011    PROT 7.1 05/24/2019    LABALBU 4.3 05/24/2019    CALCIUM 8.9 05/25/2019    BILITOT 0.2 05/24/2019    ALKPHOS 99 05/24/2019    AST 31 05/24/2019    ALT 18 05/24/2019     HgBA1c:    Lab Results   Component Value Date    LABA1C 7.1 05/24/2019     FLP:    Lab Results   Component Value Date    TRIG 265 05/25/2019    HDL 41 05/25/2019    LDLCALC 79 05/25/2019    LABVLDL 53 05/25/2019       MRI brain as above    I independently reviewed the labs and imaging studies at today's appointment. 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 Embolic appearing but could potentially be occlusion of a branch of MCA.      Patient Active Problem List   Diagnosis    Adhesive capsulitis of shoulder    Bursitis of left shoulder    Impingement syndrome of shoulder    Coronary artery disease involving native coronary artery of native heart without angina pectoris    Spinal stenosis    Scoliosis    Hypertension    Hyperlipidemia    Chest pain    Stroke [facility])       Plan:     DAPT   High intensity statin  Echocardiogram.  Echo. If initial testing is unrevealing pt may need further vascular imaging ANJU and extended monitoring for occult afib.      Jen Blackwell  12:49 PM  5/25/2019

## 2019-05-26 ENCOUNTER — APPOINTMENT (OUTPATIENT)
Dept: CT IMAGING | Age: 79
DRG: 065 | End: 2019-05-26
Attending: STUDENT IN AN ORGANIZED HEALTH CARE EDUCATION/TRAINING PROGRAM
Payer: MEDICARE

## 2019-05-26 LAB
ANION GAP SERPL CALCULATED.3IONS-SCNC: 14 MMOL/L (ref 7–16)
BUN BLDV-MCNC: 13 MG/DL (ref 8–23)
CALCIUM SERPL-MCNC: 8.8 MG/DL (ref 8.6–10.2)
CHLORIDE BLD-SCNC: 103 MMOL/L (ref 98–107)
CO2: 22 MMOL/L (ref 22–29)
CREAT SERPL-MCNC: 0.6 MG/DL (ref 0.5–1)
GFR AFRICAN AMERICAN: >60
GFR NON-AFRICAN AMERICAN: >60 ML/MIN/1.73
GLUCOSE BLD-MCNC: 279 MG/DL (ref 74–99)
HCT VFR BLD CALC: 35.5 % (ref 34–48)
HEMOGLOBIN: 11.7 G/DL (ref 11.5–15.5)
MAGNESIUM: 1.9 MG/DL (ref 1.6–2.6)
MCH RBC QN AUTO: 31.4 PG (ref 26–35)
MCHC RBC AUTO-ENTMCNC: 33 % (ref 32–34.5)
MCV RBC AUTO: 95.2 FL (ref 80–99.9)
PDW BLD-RTO: 12 FL (ref 11.5–15)
PHOSPHORUS: 3.7 MG/DL (ref 2.5–4.5)
PLATELET # BLD: 230 E9/L (ref 130–450)
PMV BLD AUTO: 9.2 FL (ref 7–12)
POTASSIUM SERPL-SCNC: 3.4 MMOL/L (ref 3.5–5)
RBC # BLD: 3.73 E12/L (ref 3.5–5.5)
SODIUM BLD-SCNC: 139 MMOL/L (ref 132–146)
WBC # BLD: 5.8 E9/L (ref 4.5–11.5)

## 2019-05-26 PROCEDURE — 97530 THERAPEUTIC ACTIVITIES: CPT

## 2019-05-26 PROCEDURE — 70496 CT ANGIOGRAPHY HEAD: CPT

## 2019-05-26 PROCEDURE — 36415 COLL VENOUS BLD VENIPUNCTURE: CPT

## 2019-05-26 PROCEDURE — 97161 PT EVAL LOW COMPLEX 20 MIN: CPT

## 2019-05-26 PROCEDURE — 85027 COMPLETE CBC AUTOMATED: CPT

## 2019-05-26 PROCEDURE — 97165 OT EVAL LOW COMPLEX 30 MIN: CPT | Performed by: OCCUPATIONAL THERAPIST

## 2019-05-26 PROCEDURE — 80048 BASIC METABOLIC PNL TOTAL CA: CPT

## 2019-05-26 PROCEDURE — 6360000004 HC RX CONTRAST MEDICATION: Performed by: RADIOLOGY

## 2019-05-26 PROCEDURE — 6360000002 HC RX W HCPCS: Performed by: CLINICAL NURSE SPECIALIST

## 2019-05-26 PROCEDURE — 6370000000 HC RX 637 (ALT 250 FOR IP): Performed by: HOSPITALIST

## 2019-05-26 PROCEDURE — 83735 ASSAY OF MAGNESIUM: CPT

## 2019-05-26 PROCEDURE — 84100 ASSAY OF PHOSPHORUS: CPT

## 2019-05-26 PROCEDURE — 97112 NEUROMUSCULAR REEDUCATION: CPT | Performed by: OCCUPATIONAL THERAPIST

## 2019-05-26 PROCEDURE — 70498 CT ANGIOGRAPHY NECK: CPT

## 2019-05-26 PROCEDURE — 99232 SBSQ HOSP IP/OBS MODERATE 35: CPT | Performed by: PHYSICIAN ASSISTANT

## 2019-05-26 PROCEDURE — 92523 SPEECH SOUND LANG COMPREHEN: CPT

## 2019-05-26 PROCEDURE — 2140000000 HC CCU INTERMEDIATE R&B

## 2019-05-26 PROCEDURE — 2580000003 HC RX 258: Performed by: CLINICAL NURSE SPECIALIST

## 2019-05-26 PROCEDURE — 6370000000 HC RX 637 (ALT 250 FOR IP): Performed by: CLINICAL NURSE SPECIALIST

## 2019-05-26 PROCEDURE — 6370000000 HC RX 637 (ALT 250 FOR IP): Performed by: STUDENT IN AN ORGANIZED HEALTH CARE EDUCATION/TRAINING PROGRAM

## 2019-05-26 PROCEDURE — 6370000000 HC RX 637 (ALT 250 FOR IP): Performed by: PSYCHIATRY & NEUROLOGY

## 2019-05-26 RX ORDER — ASPIRIN 81 MG/1
81 TABLET ORAL DAILY
Qty: 30 TABLET | Refills: 0 | Status: SHIPPED | OUTPATIENT
Start: 2019-05-26 | End: 2019-06-11

## 2019-05-26 RX ORDER — POTASSIUM CHLORIDE 20MEQ/15ML
40 LIQUID (ML) ORAL ONCE
Status: DISCONTINUED | OUTPATIENT
Start: 2019-05-26 | End: 2019-05-26 | Stop reason: CLARIF

## 2019-05-26 RX ORDER — CLOPIDOGREL BISULFATE 75 MG/1
75 TABLET ORAL DAILY
Qty: 30 TABLET | Refills: 0 | Status: ON HOLD | OUTPATIENT
Start: 2019-05-26 | End: 2021-02-21 | Stop reason: HOSPADM

## 2019-05-26 RX ORDER — SODIUM CHLORIDE 0.9 % (FLUSH) 0.9 %
10 SYRINGE (ML) INJECTION
Status: ACTIVE | OUTPATIENT
Start: 2019-05-26 | End: 2019-05-26

## 2019-05-26 RX ADMIN — CLOPIDOGREL 75 MG: 75 TABLET, FILM COATED ORAL at 09:17

## 2019-05-26 RX ADMIN — Medication 10 ML: at 00:50

## 2019-05-26 RX ADMIN — Medication 10 ML: at 12:52

## 2019-05-26 RX ADMIN — ONDANSETRON 4 MG: 2 INJECTION INTRAMUSCULAR; INTRAVENOUS at 00:50

## 2019-05-26 RX ADMIN — ENOXAPARIN SODIUM 40 MG: 40 INJECTION SUBCUTANEOUS at 09:17

## 2019-05-26 RX ADMIN — SIMVASTATIN 40 MG: 40 TABLET, FILM COATED ORAL at 19:57

## 2019-05-26 RX ADMIN — ASPIRIN 81 MG: 81 TABLET ORAL at 09:17

## 2019-05-26 RX ADMIN — Medication 10 ML: at 19:57

## 2019-05-26 RX ADMIN — LISINOPRIL 40 MG: 20 TABLET ORAL at 09:17

## 2019-05-26 RX ADMIN — CARVEDILOL 25 MG: 25 TABLET, FILM COATED ORAL at 17:04

## 2019-05-26 RX ADMIN — POTASSIUM BICARBONATE 40 MEQ: 782 TABLET, EFFERVESCENT ORAL at 09:17

## 2019-05-26 RX ADMIN — IOPAMIDOL 60 ML: 755 INJECTION, SOLUTION INTRAVENOUS at 12:52

## 2019-05-26 RX ADMIN — Medication 10 ML: at 09:18

## 2019-05-26 RX ADMIN — CARVEDILOL 25 MG: 25 TABLET, FILM COATED ORAL at 09:17

## 2019-05-26 ASSESSMENT — PAIN SCALES - GENERAL
PAINLEVEL_OUTOF10: 0

## 2019-05-26 NOTE — PROGRESS NOTES
Hospitalist Progress Note      PCP: Ghassan Disla DO    Date of Admission: 5/24/2019  Days in the hospital: 1    Chief Complaint: chest pain and left ext weakness     Hospital Course:     admitted for chest pain and Lt ext weakness. Cardio and neurology were consulted. NM stress test was ordered as well. clinically improved and stable at time of discharge. TTE - pending      Subjective  Patient seen and examined at bedside. NAD, feels a little better, has no complaints and no overnight events. Patient denies any fevers, chills, chest pain, shortness of breath, nausea, vomiting. Medications:  Reviewed    Infusion Medications   Scheduled Medications    potassium chloride  40 mEq Oral Once    aspirin  81 mg Oral Daily    clopidogrel  75 mg Oral Daily    carvedilol  25 mg Oral BID WC    lisinopril  40 mg Oral Daily    sodium chloride flush  10 mL Intravenous 2 times per day    enoxaparin  40 mg Subcutaneous Daily    simvastatin  40 mg Oral Nightly     PRN Meds: sodium chloride flush, magnesium hydroxide, ondansetron, acetaminophen      Intake/Output Summary (Last 24 hours) at 5/26/2019 0731  Last data filed at 5/26/2019 0601  Gross per 24 hour   Intake 420 ml   Output 700 ml   Net -280 ml       Exam:    /60   Pulse 77   Temp 97.1 °F (36.2 °C)   Resp 16   Ht 4' 9\" (1.448 m)   Wt 76 lb 12.8 oz (34.8 kg)   SpO2 96%   BMI 16.62 kg/m²     General appearance:  No apparent distress, appears stated age and cooperative. HEENT:  Normal cephalic, atraumatic without obvious deformity. Pupils equal, round, and reactive to light. Extra ocular muscles intact. Conjunctivae/corneas clear. Neck: Supple, with full range of motion. No jugular venous distention. Trachea midline. Respiratory:  Normal respiratory effort. Clear to auscultation, bilaterally without Rales/Wheezes/Rhonchi. Cardiovascular:  Regular rate and rhythm with normal S1/S2 without murmurs, rubs or gallops.   Abdomen: Soft, non-tender, non-distended with normal bowel sounds. Musculoskeletal:  No clubbing, cyanosis or edema bilaterally. Full range of motion without deformity. Skin: Skin color, texture, turgor normal.  No rashes or lesions. Neurologic:  Neurovascularly intact without any focal sensory/motor deficits. Lt ext weaker  Psychiatric:  Alert and oriented, thought content appropriate, normal insight  Capillary Refill: Brisk,< 3 seconds   Peripheral Pulses: +2 palpable, equal bilaterally           Labs:   Recent Labs     05/24/19  0743 05/25/19  0440 05/26/19  0530   WBC 9.2 6.4 5.8   HGB 12.5 11.8 11.7   HCT 37.6 35.5 35.5    224 230     Recent Labs     05/24/19  0743 05/25/19  0440 05/26/19  0530    137 139   K 4.0 3.4*  3.4* 3.4*    100 103   CO2 24 22 22   BUN 9 13 13   CREATININE 0.6 0.6 0.6   CALCIUM 9.2 8.9 8.8   PHOS  --  3.8 3.7     Recent Labs     05/24/19  0743   AST 31   ALT 18   BILITOT 0.2   ALKPHOS 99     No results for input(s): INR in the last 72 hours. Recent Labs     05/24/19  0743 05/24/19  1707 05/24/19  2245   TROPONINI <0.01 <0.01 <0.01       Imaging:  US CAROTID ARTERY BILATERAL   Final Result      Velocities are in range of 50-69% stenosis involving proximal right   internal carotid artery and mid left internal carotid artery. CTA neck   could be helpful for further evaluation. NM Cardiac Stress Test Nuclear Imaging   Final Result      1. No evidence of ischemia or infarct. 2. Normal wall motion. 3. Normal calculated left ventricular ejection fraction of 71%.                                           Assessment/Plan:  Active Hospital Problems    Diagnosis Date Noted    Acute CVA (cerebrovascular accident) ([facility] [address] [I63.9] 05/25/2019    Chest pain [R07.9] 05/24/2019    Stroke ([facility] [address] [I63.9] 05/24/2019    Hypertension [I10]     Hyperlipidemia [E78.5]     Coronary artery disease involving native coronary artery of native heart without angina pectoris [I25.10] Neuro input noted  Neurovascular checks  Monitor BP  Stress test performed  Cardio f/u  TTE - pending   Resume home medications  Monitor labs  Daily weights  I/Os  PT/OT        DVT Prophylaxis: lovenox  Diet: DIET CARDIAC; Diet NPO, After Midnight  Code Status: Full Code     PT/OT Eval Status: ordered     Dispo - admit telemetry           Aleksey Maravilla MD  [employer]  Please contact me through perfect serve    NOTE: This report was transcribed using voice recognition software. Every effort was made to ensure accuracy; however, inadvertent computerized transcription errors may be present.

## 2019-05-26 NOTE — PROGRESS NOTES
Physical Therapy    Facility/Department: [facility]  Initial Assessment    NAME: Nithin Casey  : 1940  MRN: [id number]    Date of Service: 2019    Evaluating Therapist: Larry Arroyo. Kat Luevano, P.T. Room #: 0193/5916-X  DIAGNOSIS: Stroke, chest pain  PRECAUTIONS: falls    Social:  Pt lives alone in a 1 floor apt with 3 steps and 2 rails to enter. Prior to admission pt walked with no AD, but uses quad cane PRN     Initial Evaluation  Date: 19 Treatment      Short Term/ Long Term   Goals   Was pt agreeable to Eval/treatment? yes     Does pt have pain? No c/o pain     Bed Mobility  NA, pt was found and left sitting up in chair    Independent   Transfers Sit to stand: MIN A  Stand to sit: MIN A  Stand pivot: MIN A  Independent   Ambulation    10 feet with no AD with MIN A/MOD A, due to LLE weakness and impaired balance. 400 feet with AAD if needed Independent   Stair negotiation: ascended and descended NA, pt fatigued with amb  4 steps with 1 rail Independent   AM-PAC Raw Score  16/24       BLE ROM is WFL. LLE strength is grossly 4-/5 and RLE is 4/5 to 4+/5. Balance: standing with ww MIN A/MOD A  Sensation: No c/o numbness or tingling. Edema: none noted  Endurance: fair     ASSESSMENT  Pt displays functional ability as noted in the objective portion of this evaluation. Comments/Treatment:  Pt walked with no AD initially and due to weakness in LLE has impaired balance. Pt was reaching for walls and furniture for support. Pt was then educated in using ww for assistance and to decrease her risk of falling and allow her to walk farther. Pt walked 200 feet with ww and SBA/MIN A. Pt was more steady on her feet with ww, but at times did have mild LOB. She is a high risk for falls at this time. Pt was left sitting up in chair with call light in reach.      Patient education  Pt educated on gait with ww    Patient response to education:   Pt verbalized understanding Pt demonstrated skill Pt requires further education in this area   yes yes yes     Rehab potential is Good for reaching above PT goals. Pts/ family goals   1. To get stronger and go home. Patient and or family understand(s) diagnosis, prognosis, and plan of care. PLAN  PT care will be provided in accordance with the objectives noted above. Whenever appropriate, clear delegation orders will be provided for nursing staff. Exercises and functional mobility practice will be used as well as appropriate assistive devices or modalities to obtain goals. Patient and family education will also be administered as needed. Frequency of treatments will be 2-5x/week x 3-5 days. Time in: 09:30  Time out: 09:55    Demarco Hernandez., P.T.    License number:  [id number]

## 2019-05-26 NOTE — PLAN OF CARE
Problem: Falls - Risk of:  Goal: Will remain free from falls  Description  Will remain free from falls  5/25/2019 2202 by Saadia Rose RN  Outcome: Met This Shift  5/25/2019 1623 by Yuli Ward RN  Outcome: Met This Shift  5/25/2019 1208 by Joseph Nieves RN  Outcome: Met This Shift     Problem: Infection:  Goal: Will remain free from infection  Description  Will remain free from infection  5/25/2019 2202 by Saadia Rose RN  Outcome: Met This Shift  5/25/2019 1208 by Joseph Nieves RN  Outcome: Met This Shift     Problem: Daily Care:  Goal: Daily care needs are met  Description  Daily care needs are met  5/25/2019 2202 by Saadia Rose RN  Outcome: Met This Shift  5/25/2019 1623 by Yuli Ward RN  Outcome: Met This Shift     Problem: Pain:  Goal: Control of acute pain  Description  Control of acute pain  Outcome: Met This Shift

## 2019-05-26 NOTE — PROGRESS NOTES
Occupational Therapy  OCCUPATIONAL THERAPY INITIAL EVALUATION      Date:2019  Patient Name: Elena Carrero  MRN: [id number]  : 1940  Room: [address] [place] [address]A    [facility]    Evaluating OT: Bryan Landrum OTR/L; [id number]    AM-PAC Daily Activity Raw Score: 15/24    Recommended Adaptive Equipment:  TBD     Diagnosis: Chest Pain; Neurology Consult - MCA distribution CVA     Pertinent Medical History: Arthritis, CAD, PTCA, Ruptured lumbar disc, Scoliosis, Spinal Stenosis, L Shoulder Bursitis      Precautions:  Falls, Incoordination L hand     Home Living: Pt lives alone in a 1 story Apt w/ 3 steps to enter. Bathroom setup: Tub/shower - takes sitting baths on bottom of tub, std commode   Equipment owned: quad cane    Prior Level of Function: Independent with ADLs , Independent with IADLs- takes laundry to I-Stand; ambulated Independently  Driving: yes    Pain Level: Chronic back pain  Cognition: A&O: 4/4; Follows 2 step directions   Memory:  good   Sequencing:  good   Problem solving:  Fair, cues to initiate strategies   Judgement/safety:  Good     Functional Assessment:   Initial Eval Status  Date: 19 Treatment Status  Date: Short Term Goals  Treatment frequency: PRN   Feeding Supervision ; setup to open packages/cut items  Independent   Grooming Minimal Assist- standing at sink.   Unable to hold/coordinate  hair brush w/  L hand  Supervision standing at sink   UB Dressing Moderate Assist - sitting side of bed  Supervision    LB Dressing Moderate Assist - Assist with L sock and assist to mange clothin over hips  Contact Guard Assist    Bathing Moderate Assist  Contact Guard Assist    Toileting Moderate Assist   Contact Guard Assist    Bed Mobility  Supine to sit: Minimal Assist   Sit to supine: NT patient remained in bedside chair following session   Supine to sit: Supervision   Sit to supine: Supervision    Functional Transfers Minimal Assist sit to stand from bed surface and high commode; Juan Alberto pivot to bedside chair  Stand by Assist    Functional Mobility Minimal/Moderate Assist hand held assist to/from bathroom  Contact Guard Assist w/ device PRN   Balance Sitting:     Static:  CGA    Dynamic:Juan Alberto during B ADL tasks  Standing: Juan Alberto     Activity Tolerance Fair+  Good for completion of ADL tasks   Visual/  Perceptual Glasses: yes   No acute visual changes                   Strength ROM Additional Info:    RUE  5/5   R hand dominant WFL Good-  and wfl FMC/dexterity noted during ADL tasks       LUE 3/5  AAROM - WFL Fair+  and Poor FMC/dexterity noted during ADL tasks; Gross and fine motor incoordination of hand         Hearing: [facility] WFL  Sensation:  No c/o numbness or tingling   Tone: WFL   Edema:                             Comments/Treatment: Upon arrival, patient supine in bed. Facilitation of LUE coordination/sitting balance completed. Patient instructed on adapted/safe techniques for completion of ADL/functional transfer tasks. At end of session, patient remained in bedside chair with call light and phone within reach, all lines and tubes intact. Pt would benefit from continued skilled OT to increase safety and independence with completion of ADL/IADL tasks for functional independence and quality of life.     Eval Complexity: Low    Assessment of current deficits   Functional mobility [x]  ADLs [x] Strength [x]  Cognition []  Functional transfers  [x] IADLs [x] Safety Awareness [x]  Endurance [x]  Fine Motor Coordination [x] Balance [x] Vision/perception [] Sensation []   Gross Motor Coordination [x] ROM [] Delirium []                  Motor Control [x]    Plan of Care:   ADL retraining [x]   Equipment needs [x]   Neuromuscular re-education [x] Energy Conservation Techniques [x]  Functional Transfer training [x] Patient and/or Family Education [x]  Functional Mobility training [x]  Environmental Modifications [x]  Cognitive re-training []   Compensatory techniques for ADLs [x]  Splinting Needs []   Positioning to improve overall function [x]   Therapeutic Activity [x]  Therapeutic Exercise  [x]  Visual/Perceptual: []    Delirium prevention/treatment  []   Other:  []    Rehab Potential: Good for established goals     Patient / Family Goal: Increase strength L UE and legs      Patient instructed on functional diagnosis, prognosis/goals and OT plan of care. Demonstrated good understanding.         low Evaluation + 20 timed treatment minutes  Tx Time in: 9a-920a    Evaluation time includes thorough review of current medical information, gathering information on past medical history/social history and prior level of function, completion of standardized testing/informal observation of tasks, assessment of data, and development of POC/Goals      Remi Meza OTR/L; [id number]

## 2019-05-26 NOTE — PROGRESS NOTES
Jonathan Maharaj is a 66 y.o. female     Neurology is following for stroke     PMH significant for HTN, HLD, CAD s/p stents     She presented after an episode of sudden onset chest pain associated with left arm weakness. Her chest pain improved, but weakness persisted. Initial SBP in the 180s     MRI proved scattering of infarcts in the RMCA distribution. She was on a daily asa PTA    Her L sided weakness feels the same today.  No new complaints     For CTAs and echo today     No vertigo, lightheadedness or loss of consciousness  No falls, tripping or stumbling  No incontinence of bowels or bladder  No itching or bruising appreciated  No numbness, tingling     ROS otherwise negative     No family at bedside     Current Facility-Administered Medications   Medication Dose Route Frequency Provider Last Rate Last Dose    iopamidol (ISOVUE-370) 76 % injection 60 mL  60 mL Intravenous ONCE PRN Sherryn Medico, DO        sodium chloride flush 0.9 % injection 10 mL  10 mL Intravenous Once PRN Rod Medico, DO        aspirin EC tablet 81 mg  81 mg Oral Daily True Mcardle, MD   81 mg at 05/26/19 0917    clopidogrel (PLAVIX) tablet 75 mg  75 mg Oral Daily True Mcardle, MD   75 mg at 05/26/19 5211    carvedilol (COREG) tablet 25 mg  25 mg Oral BID  Afshin Castro, APRN - CNS   25 mg at 05/26/19 0917    lisinopril (PRINIVIL;ZESTRIL) tablet 40 mg  40 mg Oral Daily Afshin Castro, APRN - CNS   40 mg at 05/26/19 5713    sodium chloride flush 0.9 % injection 10 mL  10 mL Intravenous 2 times per day Afshin Castro, APRN - CNS   10 mL at 05/26/19 3113    sodium chloride flush 0.9 % injection 10 mL  10 mL Intravenous PRN Afshin Castro, APRN - CNS   10 mL at 05/26/19 0050    magnesium hydroxide (MILK OF MAGNESIA) 400 MG/5ML suspension 30 mL  30 mL Oral Daily PRN Afshin Castro, APRN - CNS        ondansetron (ZOFRAN) injection 4 mg  4 mg Intravenous Q6H PRN Afshin Castro, APRN - CNS   4 mg at 05/26/19 0050    enoxaparin (LOVENOX) injection 40 mg  40 mg Subcutaneous Daily Hoa Ojeda APRN - CNS   40 mg at 05/26/19 0917    simvastatin (ZOCOR) tablet 40 mg  40 mg Oral Nightly Hussein Ervin MD   40 mg at 05/25/19 2005    acetaminophen (TYLENOL) tablet 650 mg  650 mg Oral Q4H PRN Hussein Ervin MD   650 mg at 05/25/19 1150     Objective:       BP (!) 117/56   Pulse 78   Temp 98.6 °F (37 °C) (Temporal)   Resp 16   Ht 4' 9\" (1.448 m)   Wt 76 lb 12.8 oz (34.8 kg)   SpO2 97%   BMI 16.62 kg/m²        General appearance: alert, appears stated age and cooperative-- elderly and frail   Head: Normocephalic, without obvious abnormality, atraumatic  Eyes: conjunctivae/corneas clear.    Neck: no adenopathy, no carotid bruit, no JVD, supple, symmetrical, trachea midline and thyroid not enlarged, symmetric, no tenderness/mass/nodules  Lungs: clear to auscultation bilaterally  Heart: regular rate and rhythm, S1, S2 normal, no murmur, click, rub or gallop  Extremities: extremities normal, atraumatic, no cyanosis or edema  Pulses: 2+ and symmetric  Skin: Skin color, texture, turgor normal. No rashes or lesions       Mental Status: Alert, oriented, thought content appropriate     Appropriate attention/concentration  Intact fundus of knowledge  Repetition intact  Intact memories    Speech: no dysarthria   Language: no aphasias    Cranial Nerves:  I: smell    II: visual acuity     II: visual fields Full to confrontation   II: pupils MEG   III,VII: ptosis None   III,IV,VI: extraocular muscles  Full ROM   V: mastication Normal   V: facial light touch sensation  Normal   V,VII: corneal reflex     VII: facial muscle function - upper  Normal   VII: facial muscle function - lower Normal   VIII: hearing Normal   IX: soft palate elevation  Normal   IX,X: gag reflex    XI: trapezius strength  5/5   XI: sternocleidomastoid strength 5/5   XI: neck extension strength  5/5   XII: tongue strength  Normal     Motor:  L hemiparesis arm 4/5; leg 5-/5  Full strength on R  Decreased bulk   Normal tone   + drift L arm   No abnormal movements     Sensory:  LT normal     Coordination:   FNF ataxic on L; normal R  Difficulty with HTS b/l       DTR:   BE throughout     No Babinskis    Laboratory/Radiology:     CBC with Differential:    Lab Results   Component Value Date    WBC 5.8 05/26/2019    RBC 3.73 05/26/2019    HGB 11.7 05/26/2019    HCT 35.5 05/26/2019     05/26/2019    MCV 95.2 05/26/2019    MCH 31.4 05/26/2019    MCHC 33.0 05/26/2019    RDW 12.0 05/26/2019    LYMPHOPCT 26.5 05/24/2019    MONOPCT 9.7 05/24/2019    BASOPCT 0.2 05/24/2019    MONOSABS 0.89 05/24/2019    LYMPHSABS 2.43 05/24/2019    EOSABS 0.26 05/24/2019    BASOSABS 0.02 05/24/2019     CMP:    Lab Results   Component Value Date     05/26/2019    K 3.4 05/26/2019    K 3.4 05/25/2019     05/26/2019    CO2 22 05/26/2019    BUN 13 05/26/2019    CREATININE 0.6 05/26/2019    GFRAA >60 05/26/2019    LABGLOM >60 05/26/2019    GLUCOSE 279 05/26/2019    GLUCOSE 116 08/15/2011    PROT 7.1 05/24/2019    LABALBU 4.3 05/24/2019    CALCIUM 8.8 05/26/2019    BILITOT 0.2 05/24/2019    ALKPHOS 99 05/24/2019    AST 31 05/24/2019    ALT 18 05/24/2019     HgBA1c:    Lab Results   Component Value Date    LABA1C 7.1 05/24/2019     FLP:    Lab Results   Component Value Date    TRIG 265 05/25/2019    HDL 41 05/25/2019    LDLCALC 79 05/25/2019    LABVLDL 53 05/25/2019     MRI brain   Several cortical and deep white matter infarcts involving right  posterior frontal lobe as detailed above. Carotid US  Velocities are in range of 50-69% stenosis involving proximal right  internal carotid artery and mid left internal carotid artery. CTA neck  could be helpful for further evaluation.     I personally reviewed all labs and imaging today     Assessment:     66year old woman with embolic appearing R MCA territory infract    Cardioembolic vs LVD must be investigated     Echo with bubble pending     CTAs head and neck pending      May need further embolic workup pending the    above results     Exam with L hemiparesis arm > leg and ataxia with LUE    She is now on DAPT and high intensity statin for secondary stroke prevention     Plan:     Continue DAPT and high intensity statin   Echo with bubble   CTAs   PT/OT  Stroke education        Aster Sharma PA-C  12:24 PM  5/26/2019

## 2019-05-26 NOTE — PLAN OF CARE
Problem: Falls - Risk of:  Goal: Will remain free from falls  Description  Will remain free from falls  Outcome: Met This Shift     Problem: Infection:  Goal: Will remain free from infection  Description  Will remain free from infection  Outcome: Met This Shift     Problem: Daily Care:  Goal: Daily care needs are met  Description  Daily care needs are met  Outcome: Met This Shift     Problem: Pain:  Goal: Pain level will decrease  Description  Pain level will decrease  5/26/2019 1236 by Dago Loya RN  Outcome: Met This Shift

## 2019-05-26 NOTE — PROGRESS NOTES
SPEECH/LANGUAGE PATHOLOGY  SPEECH/LANGUAGE/COGNITIVE EVALUATION      PATIENT NAME:  Fito Herman      :  1940      TODAY'S DATE:  2019      SPEECH PATHOLOGY DIAGNOSIS:   Within Functional Limits    THERAPY RECOMMENDATIONS:   Speech Pathology intervention is not warranted at this time. MOTOR SPEECH          Oral Peripheral Examination   Adequate lingual/labial strength       Parameters of Speech Production  Respiration:  Within Function Limits  Articulation:  Within Function Limits  Resonance:  Within Function Limits  Quality:   Within Function Limits  Pitch: Within Function Limits  Intensity: Within Function Limits  Fluency:  Intact  Prosody Intact      RECEPTIVE LANGUAGE       Comprehension of Yes/No Questions: Within Function Limits    Process  Simple Verbal Commands: Within Function Limits  Process Intermediate Verbal Commands: Within Function Limits  Process Complex Verbal Commands: Within Function Limits    Comprehension of Conversation: Within Function Limits      EXPRESSIVE LANGUAGE       Serials: Functional    Imitation:  Words   Functional  Sentences Functional    Naming:  (Modality used: Verbal )   Confrontation Naming Functional  Functional Description Functional  Response Naming: Functional    Conversation:     Grammatical form:  Intact       COGNITION     Attention/Orientation  Attention: Sustained attention  Oriented to:  Person, Place, Date, Reason  for hospitalization    Memory   Biographical: information. Recalled  Address,  Birthdate, Age,   Family   Delayed recall: 2/3 words    Organization/Problem Solving/Reasoning   Sequencing:   Verbal : Functional      Problem solving:    Verbal task:   Functional        CLINICAL OBSERVATIONS NOTED DURING THE EVALUATION    Within Functional Limits                        Malnutrition indicators have been identified and nursing has been notified to ensure a dietary consult is ordered.     [x]The admitting diagnosis and active problem list, as listed below have been reviewed prior to initiation of this evaluation.      ADMITTING DIAGNOSIS: Chest pain [R07.9]  Acute CVA (cerebrovascular accident) ([facility] [address] [I63.9]     ACTIVE PROBLEM LIST:   Patient Active Problem List   Diagnosis    Adhesive capsulitis of shoulder    Bursitis of left shoulder    Impingement syndrome of shoulder    Coronary artery disease involving native coronary artery of native heart without angina pectoris    Spinal stenosis    Scoliosis    Hypertension    Hyperlipidemia    Chest pain    Stroke [facility])    Acute CVA (cerebrovascular accident) ([facility] [address]

## 2019-05-26 NOTE — PROGRESS NOTES
Occupational Therapy      Patient will benefit from PM&R consult for Acute Rehab.   Mauro Berrios OTR/L, [id number]

## 2019-05-27 LAB
ANION GAP SERPL CALCULATED.3IONS-SCNC: 13 MMOL/L (ref 7–16)
BUN BLDV-MCNC: 13 MG/DL (ref 8–23)
CALCIUM SERPL-MCNC: 8.9 MG/DL (ref 8.6–10.2)
CHLORIDE BLD-SCNC: 105 MMOL/L (ref 98–107)
CO2: 24 MMOL/L (ref 22–29)
CREAT SERPL-MCNC: 0.5 MG/DL (ref 0.5–1)
GFR AFRICAN AMERICAN: >60
GFR NON-AFRICAN AMERICAN: >60 ML/MIN/1.73
GLUCOSE BLD-MCNC: 175 MG/DL (ref 74–99)
HBA1C MFR BLD: 7.3 % (ref 4–5.6)
HCT VFR BLD CALC: 34.4 % (ref 34–48)
HEMOGLOBIN: 11.5 G/DL (ref 11.5–15.5)
LV EF: 65 %
LVEF MODALITY: NORMAL
MAGNESIUM: 2 MG/DL (ref 1.6–2.6)
MCH RBC QN AUTO: 31.6 PG (ref 26–35)
MCHC RBC AUTO-ENTMCNC: 33.4 % (ref 32–34.5)
MCV RBC AUTO: 94.5 FL (ref 80–99.9)
PDW BLD-RTO: 11.9 FL (ref 11.5–15)
PHOSPHORUS: 3 MG/DL (ref 2.5–4.5)
PLATELET # BLD: 237 E9/L (ref 130–450)
PMV BLD AUTO: 9.1 FL (ref 7–12)
POTASSIUM SERPL-SCNC: 3.9 MMOL/L (ref 3.5–5)
RBC # BLD: 3.64 E12/L (ref 3.5–5.5)
SODIUM BLD-SCNC: 142 MMOL/L (ref 132–146)
WBC # BLD: 6 E9/L (ref 4.5–11.5)

## 2019-05-27 PROCEDURE — 36415 COLL VENOUS BLD VENIPUNCTURE: CPT

## 2019-05-27 PROCEDURE — 6370000000 HC RX 637 (ALT 250 FOR IP): Performed by: PSYCHIATRY & NEUROLOGY

## 2019-05-27 PROCEDURE — 83036 HEMOGLOBIN GLYCOSYLATED A1C: CPT

## 2019-05-27 PROCEDURE — 93308 TTE F-UP OR LMTD: CPT

## 2019-05-27 PROCEDURE — 2140000000 HC CCU INTERMEDIATE R&B

## 2019-05-27 PROCEDURE — 6370000000 HC RX 637 (ALT 250 FOR IP): Performed by: CLINICAL NURSE SPECIALIST

## 2019-05-27 PROCEDURE — 2580000003 HC RX 258: Performed by: CLINICAL NURSE SPECIALIST

## 2019-05-27 PROCEDURE — 84100 ASSAY OF PHOSPHORUS: CPT

## 2019-05-27 PROCEDURE — 6370000000 HC RX 637 (ALT 250 FOR IP): Performed by: HOSPITALIST

## 2019-05-27 PROCEDURE — 6360000002 HC RX W HCPCS: Performed by: CLINICAL NURSE SPECIALIST

## 2019-05-27 PROCEDURE — 93306 TTE W/DOPPLER COMPLETE: CPT

## 2019-05-27 PROCEDURE — 83735 ASSAY OF MAGNESIUM: CPT

## 2019-05-27 PROCEDURE — 80048 BASIC METABOLIC PNL TOTAL CA: CPT

## 2019-05-27 PROCEDURE — 85027 COMPLETE CBC AUTOMATED: CPT

## 2019-05-27 RX ADMIN — ACETAMINOPHEN 650 MG: 325 TABLET, FILM COATED ORAL at 20:33

## 2019-05-27 RX ADMIN — CARVEDILOL 25 MG: 25 TABLET, FILM COATED ORAL at 17:08

## 2019-05-27 RX ADMIN — Medication 10 ML: at 20:34

## 2019-05-27 RX ADMIN — Medication 10 ML: at 09:10

## 2019-05-27 RX ADMIN — CARVEDILOL 25 MG: 25 TABLET, FILM COATED ORAL at 09:10

## 2019-05-27 RX ADMIN — ACETAMINOPHEN 650 MG: 325 TABLET, FILM COATED ORAL at 05:18

## 2019-05-27 RX ADMIN — ENOXAPARIN SODIUM 40 MG: 40 INJECTION SUBCUTANEOUS at 09:10

## 2019-05-27 RX ADMIN — CLOPIDOGREL 75 MG: 75 TABLET, FILM COATED ORAL at 09:10

## 2019-05-27 RX ADMIN — ASPIRIN 81 MG: 81 TABLET ORAL at 09:10

## 2019-05-27 RX ADMIN — SIMVASTATIN 40 MG: 40 TABLET, FILM COATED ORAL at 20:33

## 2019-05-27 RX ADMIN — LISINOPRIL 40 MG: 20 TABLET ORAL at 09:10

## 2019-05-27 ASSESSMENT — PAIN DESCRIPTION - ONSET: ONSET: SUDDEN

## 2019-05-27 ASSESSMENT — PAIN SCALES - GENERAL
PAINLEVEL_OUTOF10: 0
PAINLEVEL_OUTOF10: 0
PAINLEVEL_OUTOF10: 8
PAINLEVEL_OUTOF10: 0
PAINLEVEL_OUTOF10: 0
PAINLEVEL_OUTOF10: 3

## 2019-05-27 ASSESSMENT — PAIN DESCRIPTION - ORIENTATION: ORIENTATION: RIGHT;LEFT

## 2019-05-27 ASSESSMENT — PAIN DESCRIPTION - PAIN TYPE: TYPE: CHRONIC PAIN

## 2019-05-27 ASSESSMENT — PAIN DESCRIPTION - FREQUENCY: FREQUENCY: INTERMITTENT

## 2019-05-27 ASSESSMENT — PAIN DESCRIPTION - DESCRIPTORS: DESCRIPTORS: ACHING;DULL;DISCOMFORT

## 2019-05-27 ASSESSMENT — PAIN DESCRIPTION - LOCATION: LOCATION: LEG

## 2019-05-27 NOTE — PLAN OF CARE
Problem: Falls - Risk of:  Goal: Will remain free from falls  Description  Will remain free from falls  Outcome: Met This Shift     Problem: Infection:  Goal: Will remain free from infection  Description  Will remain free from infection  Outcome: Met This Shift     Problem: ACTIVITY INTOLERANCE/IMPAIRED MOBILITY  Goal: Mobility/activity is maintained at optimum level for patient  5/27/2019 1634 by Ángel Mike RN  Outcome: Met This Shift

## 2019-05-27 NOTE — PROGRESS NOTES
normal bowel sounds. Musculoskeletal:  No clubbing, cyanosis or edema bilaterally. Full range of motion without deformity. Skin: Skin color, texture, turgor normal.  No rashes or lesions. Neurologic:  Neurovascularly intact without any focal sensory/motor deficits. Lt ext weaker  Psychiatric:  Alert and oriented, thought content appropriate, normal insight  Capillary Refill: Brisk,< 3 seconds   Peripheral Pulses: +2 palpable, equal bilaterally           Labs:   Recent Labs     05/25/19  0440 05/26/19  0530 05/27/19  0605   WBC 6.4 5.8 6.0   HGB 11.8 11.7 11.5   HCT 35.5 35.5 34.4    230 237     Recent Labs     05/24/19  0743 05/25/19  0440 05/26/19  0530    137 139   K 4.0 3.4*  3.4* 3.4*    100 103   CO2 24 22 22   BUN 9 13 13   CREATININE 0.6 0.6 0.6   CALCIUM 9.2 8.9 8.8   PHOS  --  3.8 3.7     Recent Labs     05/24/19  0743   AST 31   ALT 18   BILITOT 0.2   ALKPHOS 99     No results for input(s): INR in the last 72 hours. Recent Labs     05/24/19  0743 05/24/19  1707 05/24/19  2245   TROPONINI <0.01 <0.01 <0.01       Imaging:  CTA HEAD W CONTRAST   Final Result      1. Evolving areas of stroke seen in posterior right frontal white   matter. No evidence of acute intracranial hemorrhage. 2. Atherosclerotic calcifications are associated with the carotid   bulbs and proximal right and left cervical internal carotid arteries   with approximately 50% stenosis involving proximal right cervical   internal carotid artery. 3. No evidence of arterial stenosis at level of the [place]. CTA NECK W CONTRAST   Final Result      1. Evolving areas of stroke seen in posterior right frontal white   matter. No evidence of acute intracranial hemorrhage.       2. Atherosclerotic calcifications are associated with the carotid   bulbs and proximal right and left cervical internal carotid arteries   with approximately 50% stenosis involving proximal right cervical   internal carotid artery. 3. No evidence of arterial stenosis at level of the [place]. US CAROTID ARTERY BILATERAL   Final Result      Velocities are in range of 50-69% stenosis involving proximal right   internal carotid artery and mid left internal carotid artery. CTA neck   could be helpful for further evaluation. NM Cardiac Stress Test Nuclear Imaging   Final Result      1. No evidence of ischemia or infarct. 2. Normal wall motion. 3. Normal calculated left ventricular ejection fraction of 71%. Assessment/Plan:  Active Hospital Problems    Diagnosis Date Noted    Acute CVA (cerebrovascular accident) ([facility] [address] [I63.9] 05/25/2019    Chest pain [R07.9] 05/24/2019    Stroke ([facility] [address] [I63.9] 05/24/2019    Hypertension [I10]     Hyperlipidemia [E78.5]     Coronary artery disease involving native coronary artery of native heart without angina pectoris [I25.10]      Neuro input noted  Neurovascular checks  Monitor BP  Stress test performed  Cardio f/u  TTE - still pending   Resume home medications  Monitor labs  Daily weights  I/Os  PT/OT - 16/24 - SW for placement         DVT Prophylaxis: lovenox  Diet: DIET CARDIAC; Diet NPO, After Midnight  Code Status: Full Code     PT/OT Eval Status: ordered     Dispo - admit telemetry           Moni Remy MD  [employer]  Please contact me through perfect serve    NOTE: This report was transcribed using voice recognition software. Every effort was made to ensure accuracy; however, inadvertent computerized transcription errors may be present.

## 2019-05-27 NOTE — PLAN OF CARE
Problem: Falls - Risk of:  Goal: Will remain free from falls  Description  Will remain free from falls  5/26/2019 1635 by Boris Simon RN  Outcome: Met This Shift     Problem: Daily Care:  Goal: Daily care needs are met  Description  Daily care needs are met  5/26/2019 1635 by Boris Simon RN  Outcome: Met This Shift     Problem: HEMODYNAMIC STATUS  Goal: Patient has stable vital signs and fluid balance  Outcome: Met This Shift     Problem: ACTIVITY INTOLERANCE/IMPAIRED MOBILITY  Goal: Mobility/activity is maintained at optimum level for patient  Outcome: Met This Shift     Problem: COMMUNICATION IMPAIRMENT  Goal: Ability to express needs and understand communication  Outcome: Met This Shift

## 2019-05-28 ENCOUNTER — HOSPITAL ENCOUNTER (INPATIENT)
Age: 79
LOS: 10 days | Discharge: HOME HEALTH | DRG: 093 | End: 2019-06-07
Attending: PHYSICAL MEDICINE & REHABILITATION | Admitting: PHYSICAL MEDICINE & REHABILITATION
Payer: MEDICARE

## 2019-05-28 VITALS
WEIGHT: 75.8 LBS | TEMPERATURE: 98.4 F | HEIGHT: 57 IN | SYSTOLIC BLOOD PRESSURE: 138 MMHG | RESPIRATION RATE: 16 BRPM | HEART RATE: 82 BPM | OXYGEN SATURATION: 98 % | DIASTOLIC BLOOD PRESSURE: 86 MMHG | BODY MASS INDEX: 16.35 KG/M2

## 2019-05-28 PROBLEM — I63.9 ISCHEMIC STROKE (HCC): Status: ACTIVE | Noted: 2019-05-28

## 2019-05-28 LAB
ANION GAP SERPL CALCULATED.3IONS-SCNC: 15 MMOL/L (ref 7–16)
BUN BLDV-MCNC: 10 MG/DL (ref 8–23)
CALCIUM SERPL-MCNC: 8.8 MG/DL (ref 8.6–10.2)
CHLORIDE BLD-SCNC: 103 MMOL/L (ref 98–107)
CO2: 23 MMOL/L (ref 22–29)
CREAT SERPL-MCNC: 0.5 MG/DL (ref 0.5–1)
GFR AFRICAN AMERICAN: >60
GFR NON-AFRICAN AMERICAN: >60 ML/MIN/1.73
GLUCOSE BLD-MCNC: 172 MG/DL (ref 74–99)
HCT VFR BLD CALC: 35.1 % (ref 34–48)
HEMOGLOBIN: 11.8 G/DL (ref 11.5–15.5)
MAGNESIUM: 2 MG/DL (ref 1.6–2.6)
MCH RBC QN AUTO: 31.5 PG (ref 26–35)
MCHC RBC AUTO-ENTMCNC: 33.6 % (ref 32–34.5)
MCV RBC AUTO: 93.6 FL (ref 80–99.9)
PDW BLD-RTO: 11.9 FL (ref 11.5–15)
PHOSPHORUS: 3.1 MG/DL (ref 2.5–4.5)
PLATELET # BLD: 243 E9/L (ref 130–450)
PMV BLD AUTO: 9.2 FL (ref 7–12)
POTASSIUM SERPL-SCNC: 3.6 MMOL/L (ref 3.5–5)
RBC # BLD: 3.75 E12/L (ref 3.5–5.5)
SODIUM BLD-SCNC: 141 MMOL/L (ref 132–146)
WBC # BLD: 7 E9/L (ref 4.5–11.5)

## 2019-05-28 PROCEDURE — 80048 BASIC METABOLIC PNL TOTAL CA: CPT

## 2019-05-28 PROCEDURE — 36415 COLL VENOUS BLD VENIPUNCTURE: CPT

## 2019-05-28 PROCEDURE — 1280000000 HC REHAB R&B

## 2019-05-28 PROCEDURE — 6360000002 HC RX W HCPCS: Performed by: CLINICAL NURSE SPECIALIST

## 2019-05-28 PROCEDURE — 85027 COMPLETE CBC AUTOMATED: CPT

## 2019-05-28 PROCEDURE — 2580000003 HC RX 258: Performed by: CLINICAL NURSE SPECIALIST

## 2019-05-28 PROCEDURE — 97535 SELF CARE MNGMENT TRAINING: CPT

## 2019-05-28 PROCEDURE — 6370000000 HC RX 637 (ALT 250 FOR IP): Performed by: PSYCHIATRY & NEUROLOGY

## 2019-05-28 PROCEDURE — 6370000000 HC RX 637 (ALT 250 FOR IP): Performed by: FAMILY MEDICINE

## 2019-05-28 PROCEDURE — 97530 THERAPEUTIC ACTIVITIES: CPT

## 2019-05-28 PROCEDURE — 6370000000 HC RX 637 (ALT 250 FOR IP): Performed by: CLINICAL NURSE SPECIALIST

## 2019-05-28 PROCEDURE — 83735 ASSAY OF MAGNESIUM: CPT

## 2019-05-28 PROCEDURE — 99232 SBSQ HOSP IP/OBS MODERATE 35: CPT | Performed by: NURSE PRACTITIONER

## 2019-05-28 PROCEDURE — 6370000000 HC RX 637 (ALT 250 FOR IP): Performed by: HOSPITALIST

## 2019-05-28 PROCEDURE — 84100 ASSAY OF PHOSPHORUS: CPT

## 2019-05-28 RX ORDER — CLOPIDOGREL BISULFATE 75 MG/1
75 TABLET ORAL DAILY
Status: CANCELLED | OUTPATIENT
Start: 2019-05-29

## 2019-05-28 RX ORDER — CARVEDILOL 25 MG/1
25 TABLET ORAL 2 TIMES DAILY WITH MEALS
Status: CANCELLED | OUTPATIENT
Start: 2019-05-28

## 2019-05-28 RX ORDER — LISINOPRIL 20 MG/1
40 TABLET ORAL DAILY
Status: CANCELLED | OUTPATIENT
Start: 2019-05-29

## 2019-05-28 RX ORDER — SIMVASTATIN 40 MG
40 TABLET ORAL NIGHTLY
Status: CANCELLED | OUTPATIENT
Start: 2019-05-28

## 2019-05-28 RX ORDER — ACETAMINOPHEN 325 MG/1
650 TABLET ORAL EVERY 4 HOURS PRN
Status: DISCONTINUED | OUTPATIENT
Start: 2019-05-28 | End: 2019-06-07 | Stop reason: HOSPADM

## 2019-05-28 RX ORDER — SENNA PLUS 8.6 MG/1
1 TABLET ORAL DAILY PRN
Status: DISCONTINUED | OUTPATIENT
Start: 2019-05-28 | End: 2019-06-07 | Stop reason: HOSPADM

## 2019-05-28 RX ORDER — SIMVASTATIN 40 MG
40 TABLET ORAL NIGHTLY
Status: DISCONTINUED | OUTPATIENT
Start: 2019-05-28 | End: 2019-06-07 | Stop reason: HOSPADM

## 2019-05-28 RX ORDER — CARVEDILOL 25 MG/1
25 TABLET ORAL 2 TIMES DAILY WITH MEALS
Status: DISCONTINUED | OUTPATIENT
Start: 2019-05-29 | End: 2019-06-07 | Stop reason: HOSPADM

## 2019-05-28 RX ORDER — SIMVASTATIN 20 MG
20 TABLET ORAL NIGHTLY
Status: ON HOLD | COMMUNITY
End: 2019-06-07 | Stop reason: HOSPADM

## 2019-05-28 RX ORDER — LISINOPRIL 20 MG/1
40 TABLET ORAL DAILY
Status: DISCONTINUED | OUTPATIENT
Start: 2019-05-29 | End: 2019-06-07 | Stop reason: HOSPADM

## 2019-05-28 RX ORDER — ASPIRIN 81 MG/1
81 TABLET ORAL DAILY
Status: CANCELLED | OUTPATIENT
Start: 2019-05-29

## 2019-05-28 RX ORDER — ASPIRIN 81 MG/1
81 TABLET ORAL DAILY
Status: DISCONTINUED | OUTPATIENT
Start: 2019-05-29 | End: 2019-06-07 | Stop reason: HOSPADM

## 2019-05-28 RX ORDER — CLOPIDOGREL BISULFATE 75 MG/1
75 TABLET ORAL DAILY
Status: DISCONTINUED | OUTPATIENT
Start: 2019-05-29 | End: 2019-06-07 | Stop reason: HOSPADM

## 2019-05-28 RX ADMIN — SIMVASTATIN 40 MG: 40 TABLET, FILM COATED ORAL at 20:39

## 2019-05-28 RX ADMIN — LISINOPRIL 40 MG: 20 TABLET ORAL at 08:35

## 2019-05-28 RX ADMIN — ENOXAPARIN SODIUM 40 MG: 40 INJECTION SUBCUTANEOUS at 08:36

## 2019-05-28 RX ADMIN — Medication 10 ML: at 08:35

## 2019-05-28 RX ADMIN — CARVEDILOL 25 MG: 25 TABLET, FILM COATED ORAL at 16:53

## 2019-05-28 RX ADMIN — ASPIRIN 81 MG: 81 TABLET ORAL at 08:35

## 2019-05-28 RX ADMIN — METFORMIN HYDROCHLORIDE 500 MG: 500 TABLET ORAL at 20:39

## 2019-05-28 RX ADMIN — ACETAMINOPHEN 650 MG: 325 TABLET, FILM COATED ORAL at 16:53

## 2019-05-28 RX ADMIN — CARVEDILOL 25 MG: 25 TABLET, FILM COATED ORAL at 08:36

## 2019-05-28 RX ADMIN — CLOPIDOGREL 75 MG: 75 TABLET, FILM COATED ORAL at 08:35

## 2019-05-28 ASSESSMENT — PAIN SCALES - GENERAL
PAINLEVEL_OUTOF10: 0
PAINLEVEL_OUTOF10: 7
PAINLEVEL_OUTOF10: 0

## 2019-05-28 NOTE — PROGRESS NOTES
Discussed with Dr. Jordon Mas and patient is appropriate for ARU. Met with patient and attempted to review ARU program. Patient very anxious about paying her bills, banking and general situation at home. Will ask PT and OT to see and update and then will discuss rehab options.

## 2019-05-28 NOTE — PROGRESS NOTES
step to go up steps. Patient cued to use 2 rails, and cued for step-to gait pattern for safety. Pt not safe to go home alone. Pt was left inclined in bed with call light left by patient. Chair/bed alarm: yes    Time in: 1320  Time out: 1350    Pt is making good progress toward established Physical Therapy goals. Continue with physical therapy current plan of care. BROOKLYN Fry, P.T. A.   License Number: [id number]

## 2019-05-28 NOTE — CARE COORDINATION
SOCIAL WORK/CASEMANAGEMENT TRANSITION OF CARE PLANNING: pt lives alone in a apt with 3 steps to enter and rails. She has no children and 2 nephews local that work. They are available as needed per pt but do not regularly check on pt. Pt has a quad cane and fww. No hhc pta. She said pta she was independent. PT and OT rec: aru. Pm&r order in chart. Met with pt this a.m. To go over recommendation of aru. Provided her with options of here or [facility]. Pt wants to go home. I went over the benefits and need for rehab and she agreed for aru to assess her before making a decision. If declineds rehab then she did agree to [facility].  Clem Rodriguez  5/28/2019

## 2019-05-28 NOTE — CARE COORDINATION
SOCIAL WORK/CASEMANAGEMENT TRANSITION OF CARE PLANNING: Pt in agreement to go to [facility] aru. Called juliana the admission rn and she will check to see if there doctor is accepting pt. rn notified and said pt is ready today. If accepted they will take pt today. precert not needed. n17 completed by this sw.  Anupama Montilla  5/28/2019

## 2019-05-28 NOTE — DISCHARGE INSTR - COC
angiopilsaty    FRACTURE SURGERY      tight wrist    SHOULDER ARTHROSCOPY  08 25 2011    left shoulder arthroscopy ,acromioplasty,busectomy, release of adhesions       Immunization History: There is no immunization history on file for this patient. 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 [address] I63.9    Acute CVA (cerebrovascular accident) ([facility] [address] I63.9       Isolation/Infection:   Isolation          No Isolation            Nurse Assessment:  Last Vital Signs: BP (!) 168/68   Pulse 76   Temp 97.7 °F (36.5 °C) (Temporal)   Resp 16   Ht 4' 9\" (1.448 m)   Wt 75 lb 12.8 oz (34.4 kg)   SpO2 97%   BMI 16.40 kg/m²     Last documented pain score (0-10 scale): Pain Level: 0  Last Weight:   Wt Readings from Last 1 Encounters:   05/28/19 75 lb 12.8 oz (34.4 kg)     Mental Status:  oriented, alert and logical    IV Access:  - Peripheral IV - site  R Basilic, insertion date: 5/26/2019    Nursing Mobility/ADLs:  Walking   Assisted  Transfer  Independent  Bathing  Independent  Dressing  Independent  Toileting  Independent  Feeding  [address]  Independent  Med Delivery   none    Wound Care Documentation and Therapy:        Elimination:  Continence:   · Bowel: Yes  · Bladder: Yes  Urinary Catheter: None   Colostomy/Ileostomy/Ileal Conduit: No       Date of Last BM: 5/27/2019    Intake/Output Summary (Last 24 hours) at 5/28/2019 1543  Last data filed at 5/28/2019 1408  Gross per 24 hour   Intake 240 ml   Output 625 ml   Net -385 ml     I/O last 3 completed shifts: In: 240 [P.O.:240]  Out: 625 [Urine:625]    Safety Concerns:      At Risk for Falls    Impairments/Disabilities:      None    Nutrition Therapy:  Current Nutrition Therapy:   - Oral Diet:  General    Routes of Feeding: Oral  Liquids: No Restrictions  Daily Fluid Restriction: no  Last Modified Barium Swallow with Video (Video Swallowing Test): not done    Treatments at the Time of Hospital Discharge:   Respiratory Treatments: n/a  Oxygen Therapy:  is not on home oxygen therapy. Ventilator:    - No ventilator support    Rehab Therapies: PT and OT to eval and treat   Weight Bearing Status/Restrictions: No weight bearing restirctions  Other Medical Equipment (for information only, NOT a DME order):  Front wheel walker  Other Treatments: n/a    Patient's personal belongings (please select all that are sent with patient):  None    RN SIGNATURE:  Electronically signed by Paula Rosales RN on 5/28/19 at 5:54 PM    CASE MANAGEMENT/SOCIAL WORK SECTION    Inpatient Status Date: ***    Readmission Risk Assessment Score:  Readmission Risk              Risk of Unplanned Readmission:        11           Discharging to Facility/ Agency   · Name: [facility]   · Address:  · Phone:  · Fax:    Dialysis Facility (if applicable)   · Name:  · Address:  · Dialysis Schedule:  · Phone:  · Fax:    / signature: Electronically signed by LESA Bryant on 5/28/2019 at 3:43 PM      PHYSICIAN SECTION    Prognosis: {Prognosis:6730731366}    Condition at Discharge: Charli8 Lety Lakhani Patient Condition:[id number]}    Rehab Potential (if transferring to Rehab): {Prognosis:9158521838}    Recommended Labs or Other Treatments After Discharge: ***    Physician Certification: I certify the above information and transfer of Crow Bobo  is necessary for the continuing treatment of the diagnosis listed and that she requires LTAC for less 30 days.      Update Admission H&P: {CHP DME Changes in X:314103974}    PHYSICIAN SIGNATURE:  {Esignature:[id number]}

## 2019-05-28 NOTE — PROGRESS NOTES
Patient oriented to room and new admission folder given.  Patient Guide reviewed and patient given an explanation of Rights And Responsibilities Important message from medicare signed and copy given to patient  Kirk Masters 5/28/2019 7:06 PM

## 2019-05-28 NOTE — PROGRESS NOTES
Hospitalist Progress Note      PCP: Toan Mendiola DO    Date of Admission: 5/24/2019  Days in the hospital: 3    Chief Complaint: chest pain and left ext weakness     Hospital Course:     admitted for chest pain and Lt ext weakness. Cardio and neurology were consulted. NM stress test was ordered as well. clinically improved and stable at time of discharge. Subjective    Patient seen and examined at bedside. NAD, feels a little better, has no complaints and no overnight events. Patient denies any fevers, chills, chest pain, shortness of breath, nausea, vomiting. Address many questions about studies during hospitalization      Medications:  Reviewed    Infusion Medications   Scheduled Medications    aspirin  81 mg Oral Daily    clopidogrel  75 mg Oral Daily    carvedilol  25 mg Oral BID WC    lisinopril  40 mg Oral Daily    sodium chloride flush  10 mL Intravenous 2 times per day    enoxaparin  40 mg Subcutaneous Daily    simvastatin  40 mg Oral Nightly     PRN Meds: sodium chloride flush, magnesium hydroxide, ondansetron, acetaminophen      Intake/Output Summary (Last 24 hours) at 5/28/2019 1544  Last data filed at 5/28/2019 1408  Gross per 24 hour   Intake 240 ml   Output 625 ml   Net -385 ml       Exam:    BP (!) 168/68   Pulse 76   Temp 97.7 °F (36.5 °C) (Temporal)   Resp 16   Ht 4' 9\" (1.448 m)   Wt 75 lb 12.8 oz (34.4 kg)   SpO2 97%   BMI 16.40 kg/m²     General appearance:  No apparent distress, appears stated age and cooperative. HEENT:  Normal cephalic, atraumatic without obvious deformity. Pupils equal, round, and reactive to light. Extra ocular muscles intact. Conjunctivae/corneas clear. Neck: Supple, with full range of motion. No jugular venous distention. Trachea midline. Respiratory:  Normal respiratory effort. Clear to auscultation, bilaterally without Rales/Wheezes/Rhonchi.   Cardiovascular:  Regular rate and rhythm with normal S1/S2 without murmurs, rubs or gallops. Abdomen: Soft, non-tender, non-distended with normal bowel sounds. Musculoskeletal:  No clubbing, cyanosis or edema bilaterally. Full range of motion without deformity. Skin: Skin color, texture, turgor normal.  No rashes or lesions. Neurologic:  Neurovascularly intact without any focal sensory/motor deficits. Lt ext weaker  Psychiatric:  Alert and oriented, thought content appropriate, normal insight  Capillary Refill: Brisk,< 3 seconds   Peripheral Pulses: +2 palpable, equal bilaterally           Labs:   Recent Labs     05/26/19 0530 05/27/19 0605 05/28/19  0455   WBC 5.8 6.0 7.0   HGB 11.7 11.5 11.8   HCT 35.5 34.4 35.1    237 243     Recent Labs     05/26/19 0530 05/27/19 0605 05/28/19  0455    142 141   K 3.4* 3.9 3.6    105 103   CO2 22 24 23   BUN 13 13 10   CREATININE 0.6 0.5 0.5   CALCIUM 8.8 8.9 8.8   PHOS 3.7 3.0 3.1     No results for input(s): AST, ALT, BILIDIR, BILITOT, ALKPHOS in the last 72 hours. No results for input(s): INR in the last 72 hours. No results for input(s): Ellender Mooney in the last 72 hours. Imaging:  CTA HEAD W CONTRAST   Final Result      1. Evolving areas of stroke seen in posterior right frontal white   matter. No evidence of acute intracranial hemorrhage. 2. Atherosclerotic calcifications are associated with the carotid   bulbs and proximal right and left cervical internal carotid arteries   with approximately 50% stenosis involving proximal right cervical   internal carotid artery. 3. No evidence of arterial stenosis at level of the [place]. CTA NECK W CONTRAST   Final Result      1. Evolving areas of stroke seen in posterior right frontal white   matter. No evidence of acute intracranial hemorrhage.       2. Atherosclerotic calcifications are associated with the carotid   bulbs and proximal right and left cervical internal carotid arteries   with approximately 50% stenosis involving proximal right cervical internal carotid artery. 3. No evidence of arterial stenosis at level of the [place]. US CAROTID ARTERY BILATERAL   Final Result      Velocities are in range of 50-69% stenosis involving proximal right   internal carotid artery and mid left internal carotid artery. CTA neck   could be helpful for further evaluation. NM Cardiac Stress Test Nuclear Imaging   Final Result      1. No evidence of ischemia or infarct. 2. Normal wall motion. 3. Normal calculated left ventricular ejection fraction of 71%. Assessment/Plan:  Active Hospital Problems    Diagnosis Date Noted    Acute CVA (cerebrovascular accident) ([facility] [address] [I63.9] 05/25/2019    Chest pain [R07.9] 05/24/2019    Stroke ([facility] [address] [I63.9] 05/24/2019    Hypertension [I10]     Hyperlipidemia [E78.5]     Coronary artery disease involving native coronary artery of native heart without angina pectoris [I25.10]    A1c 7.3  LDL 79      Continue neurovascular checks  Control blood pressure  Daily weights and I&O's  TTE negative  PTOT reevaluation  Acute rehab consulted      DVT Prophylaxis: lovenox  Diet: DIET CARDIAC; Diet NPO, After Midnight  Code Status: Full Code     PT/OT Eval Status: ordered     Dispo - ARU vs GINGER        NOTE: This report was transcribed using voice recognition software. Every effort was made to ensure accuracy; however, inadvertent computerized transcription errors may be present.

## 2019-05-28 NOTE — LETTER
Date: 6/7/2019    Dear Nevin Fernando:    Recently you completed a stay in the Acute [facility]. We hope that you are continuing to make progress following your stroke. Someone mentioned our Stroke Survivors Support Group to you while you were in the Rehab Unit, but we realize that that is a busy and stressful time and you may have forgotten about this or felt that you wouldnt need a support group once you went home. Now that you are back in your home, questions may arise and you may appreciate the opportunity to speak with either one of our health professionals or other stroke survivors and their families. The [facility] [place] Stroke Survivors Support group meets monthly at  [facility] [address]  from 6:00-7:00PM on the first Tuesday of the month. 2019 Meeting Dates:    January/Feb/March- NO Meetings  April 2nd  May 7th  Kimberly 4th  July- NO Meeting  August 6th September 3rd October 8th November 5th December 3rd- [place] Gathering    The meeting is a good opportunity for stroke survivors and their families to mix and mingle with other stroke survivors and family members. Light refreshments are served. We would love to have you attend, if not this meeting, then a future one. Please call me at [phone] to confirm your attendance and for any further questions. Sincerely,    Kayode Cervantes MSN, APRN, FNP-C  [address]   [facility], [address] [place] [address]

## 2019-05-28 NOTE — PROGRESS NOTES
MD Aziza   650 mg at 05/27/19 2033     Objective:     BP (!) 168/68   Pulse 76   Temp 97.7 °F (36.5 °C) (Temporal)   Resp 16   Ht 4' 9\" (1.448 m)   Wt 75 lb 12.8 oz (34.4 kg)   SpO2 97%   BMI 16.40 kg/m²        General appearance: alert, appears stated age and cooperative-- elderly and frail   Head: Normocephalic, without obvious abnormality, atraumatic  Eyes: conjunctivae/corneas clear.    Neck: no adenopathy, no carotid bruit, no JVD, supple, symmetrical, trachea midline and thyroid not enlarged, symmetric, no tenderness/mass/nodules  Lungs: clear to auscultation bilaterally  Heart: regular rate and rhythm, S1, S2 normal, no murmur, click, rub or gallop  Extremities: extremities normal, atraumatic, no cyanosis or edema  Pulses: 2+ and symmetric  Skin: Skin color, texture, turgor normal. No rashes or lesions       Mental Status: Alert, oriented x4, thought content appropriate     Appropriate attention/concentration  Intact fundus of knowledge  Repetition intact  Intact memories    Speech: no dysarthria   Language: no aphasias    Cranial Nerves:  I: smell    II: visual acuity     II: visual fields Full to confrontation   II: pupils PERRL   III,VII: ptosis None   III,IV,VI: extraocular muscles  EOMI without nystagmus   V: mastication Normal   V: facial light touch sensation  Normal   V,VII: corneal reflex     VII: facial muscle function - upper  Normal   VII: facial muscle function - lower Normal   VIII: hearing Normal   IX: soft palate elevation  Normal   IX,X: gag reflex    XI: trapezius strength  5/5   XI: sternocleidomastoid strength 5/5   XI: neck extension strength  5/5   XII: tongue strength  Normal     Motor:  L hemiparesis arm 4/5; leg 5-/5  5/5 RUE, RLE  Decreased bulk   Normal tone   + drift L arm   No abnormal movements     Sensory:  LT normal     Coordination:   FNF ataxic on L; normal R  Difficulty with HTS b/l   FFM and JAVI impaired to L due to weakness    DTR:   BE throughout     No Babinskis    Laboratory/Radiology:     CBC with Differential:    Lab Results   Component Value Date    WBC 7.0 05/28/2019    RBC 3.75 05/28/2019    HGB 11.8 05/28/2019    HCT 35.1 05/28/2019     05/28/2019    MCV 93.6 05/28/2019    MCH 31.5 05/28/2019    MCHC 33.6 05/28/2019    RDW 11.9 05/28/2019    LYMPHOPCT 26.5 05/24/2019    MONOPCT 9.7 05/24/2019    BASOPCT 0.2 05/24/2019    MONOSABS 0.89 05/24/2019    LYMPHSABS 2.43 05/24/2019    EOSABS 0.26 05/24/2019    BASOSABS 0.02 05/24/2019     CMP:    Lab Results   Component Value Date     05/28/2019    K 3.6 05/28/2019    K 3.4 05/25/2019     05/28/2019    CO2 23 05/28/2019    BUN 10 05/28/2019    CREATININE 0.5 05/28/2019    GFRAA >60 05/28/2019    LABGLOM >60 05/28/2019    GLUCOSE 172 05/28/2019    GLUCOSE 116 08/15/2011    PROT 7.1 05/24/2019    LABALBU 4.3 05/24/2019    CALCIUM 8.8 05/28/2019    BILITOT 0.2 05/24/2019    ALKPHOS 99 05/24/2019    AST 31 05/24/2019    ALT 18 05/24/2019     HgBA1c:    Lab Results   Component Value Date    LABA1C 7.3 05/27/2019     FLP:    Lab Results   Component Value Date    TRIG 265 05/25/2019    HDL 41 05/25/2019    LDLCALC 79 05/25/2019    LABVLDL 53 05/25/2019     CTA HEAD W CONTRAST   Final Result      1. Evolving areas of stroke seen in posterior right frontal white   matter. No evidence of acute intracranial hemorrhage. 2. Atherosclerotic calcifications are associated with the carotid   bulbs and proximal right and left cervical internal carotid arteries   with approximately 50% stenosis involving proximal right cervical   internal carotid artery. 3. No evidence of arterial stenosis at level of the [place]. CTA NECK W CONTRAST   Final Result      1. Evolving areas of stroke seen in posterior right frontal white   matter. No evidence of acute intracranial hemorrhage.       2. Atherosclerotic calcifications are associated with the carotid   bulbs and proximal right and left cervical internal carotid arteries   with approximately 50% stenosis involving proximal right cervical   internal carotid artery. 3. No evidence of arterial stenosis at level of the [place]. US CAROTID ARTERY BILATERAL   Final Result      Velocities are in range of 50-69% stenosis involving proximal right   internal carotid artery and mid left internal carotid artery. CTA neck   could be helpful for further evaluation. NM Cardiac Stress Test Nuclear Imaging   Final Result      1. No evidence of ischemia or infarct. 2. Normal wall motion. 3. Normal calculated left ventricular ejection fraction of 71%. MRI brain   Several cortical and deep white matter infarcts involving right  posterior frontal lobe as detailed above. Carotid US  Velocities are in range of 50-69% stenosis involving proximal right  internal carotid artery and mid left internal carotid artery. CTA neck  could be helpful for further evaluation. Echo 5/27:   Atrial septum: small septal aneurysm. No right to left shunting with    agitated saline contrast. No left to right shunting with color doppler.   Normal left ventricular size and function. LVEF is 65 %.   Normal diastolic function.   Normal right ventricle.   No hemodynamically significant valvular disease.     I personally reviewed all labs and imaging today     Assessment:     66year old woman with embolic appearing R MCA territory infract    Cardioembolic vs LVD must be investigated     Echo with bubble showed small septal aneurysm with no shunting     CTAs head and neck proved evolving stroke to posterior R frontal with atherosclerotic calcifications     Exam with L hemiparesis arm > leg and ataxia noted     She is now on DAPT and high intensity statin for secondary stroke prevention     Plan:     Continue DAPT and high intensity statin   A1C 7.3, LDL 79  PT/OT  Stroke education      Ok to discharge to rehab once medically cleared. Neuro will sign off. Pt should be seen in stroke clinic or outpt neuro once discharged from rehab. Discussed the patients personal risk factors for Stroke /TIA with patient/family, and ways to reduce the risk for a recurrent stroke. Patient's personal risk factors which were identified are:        [x] Hypertension       [x] High cholesterol       [] Smoking       [] Overweight       [] Lack of Exercise       [] Atrial fibrillation       [x] Diabetes       [] Excessive alcohol use       [] Use of illicit drugs       [] Personal history of previous TIA or stroke       [x] Personal history of heart disease       [] Sleep apnea       [] Family history of stroke or heart disease     Advised patient that you can reduce your risk for stroke/TIA by modifying/controlling the risk factors that you have. Patient advised to take the medications as prescribed, which will be detailed in the discharge instructions, and to not stop taking them without consulting their physician. In addition, pt. advised to maintain a healthy diet, exercise regularly and to not smoke.       Alison Cifuentes PA-C  12:23 PM  5/28/2019

## 2019-05-28 NOTE — DISCHARGE SUMMARY
Hospitalist Discharge Summary    Patient ID: Silverio Bedoya   Patient : 1940  Patient's PCP: Lai Layton DO    Admit Date: 2019   Admitting Physician: Tai Blackwell MD    Discharge Date:  2019  Discharge Physician: Stanford Blair MD   Discharge Condition: Stable  Discharge Disposition: Acute Rehab    History of presenting illness:    66 y.o. female who presented to [address] [place] [address] with chest pain. History obtained from the patient. She states she woke this morning with diffuse chest pain across her chest that was non-radiating but associated with diaphoresis, nausea and shortness of breath. She states she went into the kitchen to take an aspirin and dropped the cup d/t left hand weakness; she states she also had left leg weakness. She went to [facility] ED for evaluation. A head CT was negative, but head MRI revealed infarcts. She was transferred to [facility] for further workup. She has PMH of CAD, HLD, HTN, scoliosis. She denies previous stroke  Upon exam, her LUE is weaker than the right. Lower extremity strength is equal.     Hospital course in brief:  (Please refer to daily progress notes for a comprehensive review of the hospitalization by requesting medical records)    Per stroke work up patient suffered a cardio embolic vs LVD R MCA territory stroke. 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 CTA head/neck showed evolving posterior R frontal stroke with atherosclerotic calcifications. Discharged to ARU on DAPT, metformin and high intensity statin. Consults:   IP CONSULT TO NEUROLOGY  IP CONSULT TO CARDIOLOGY  IP CONSULT TO PHYSICAL MEDICINE REHAB    Discharge Diagnoses:    Acute R MCA territory cardio embolic stroke  Diabetes mellitus, controlled  Hyperlipidemia  Hypertension  CAD    Discharge Instructions / Follow up:  None.     Activity: activity as tolerated    Significant labs:  CBC:   Recent Labs     19  0530 19  0605 19  0455   WBC 5.8 6.0 7.0 RBC 3.73 3.64 3.75   HGB 11.7 11.5 11.8   HCT 35.5 34.4 35.1   MCV 95.2 94.5 93.6   RDW 12.0 11.9 11.9    237 243     BMP:   Recent Labs     05/26/19  0530 05/27/19  0605 05/28/19  0455    142 141   K 3.4* 3.9 3.6    105 103   CO2 22 24 23   BUN 13 13 10   CREATININE 0.6 0.5 0.5   MG 1.9 2.0 2.0   PHOS 3.7 3.0 3.1     LFT:  No results for input(s): PROT, ALB, ALKPHOS, ALT, AST, BILITOT, AMYLASE, LIPASE in the last 72 hours. PT/INR: No results for input(s): INR, APTT in the last 72 hours. BNP: No results for input(s): BNP in the last 72 hours. Hgb A1C:   Lab Results   Component Value Date    LABA1C 7.3 (H) 05/27/2019     Folate and B12: No results found for: [place]pp[place] Brisk, No results found for: FOLATE  Thyroid Studies: No results found for: TSH, F7UUWNW, V1WBFHK, THYROIDAB    Urinalysis:    Lab Results   Component Value Date    NITRU Negative 12/05/2017    BLOODU Negative 12/05/2017    SPECGRAV 1.015 12/05/2017    GLUCOSEU Negative 12/05/2017       Imaging:  Ct Head Wo Contrast    Result Date: 5/24/2019  LOCATION: 200 EXAM: CT HEAD WO CONTRAST COMPARISON: None HISTORY: Left-sided arm weakness TECHNIQUE: Noncontrast helical CT images were performed of the head. Coronal and sagittal reconstructions also obtained. Automated dose control was used for this exam. CONTRAST: No intravenous contrast administered. FINDINGS: HEAD: The ventricles, sulci, and cisterns are normal in size and configuration. The brain parenchyma is normal in density. There is no evidence of intracranial hemorrhage or mass. No extra-axial fluid is seen. The paranasal sinuses are clear. The globes and orbits are normal.  No abnormalities of the calvarium are identified. 1. No acute findings. Xr Chest Portable    Result Date: 5/24/2019  LOCATION: 200 EXAM: XR CHEST PORTABLE COMPARISON: None HISTORY: Chest pain TECHNIQUE: Single frontal view of the chest was obtained. FINDINGS:  SUPPORT DEVICES: None.  LUNGS: Clear with no areas of consolidation. PLEURA: No effusions or pneumothorax. LUNG VOLUMES: Satisfactory inspirator effort. MEDIASTINAL STRUCTURES: No lymphadenopathy. Normal aortic contour. HEART SIZE: Normal. BONES AND SOFT TISSUES: No fracture or soft tissue abnormality. 1. Negative portable chest.      Cta Neck W Contrast    Result Date: 2019  Patient MRN:  [id number] : 1940 Age: 66 years Gender: Female Order Date:  2019 8:30 AM EXAM: CTA NECK W CONTRAST COMPARISON: May 24, 2019 INDICATION:  STROKE  TECHNIQUE: Unenhanced CT performed, then contiguous axial images through the head and neck were obtained following intravenous contrast using standard CT angiographic protocol. Sagittal and coronal MIPS images were reconstructed from the axial acquisition. Additional 3-D reconstructions were obtained to aid in interpretation of this examination. Low-dose CT acquisition technique including one of the following options: 1. Automated exposure control. 2. Adjustment of mA and/or KV according to the patient's size. 3. Use of iterative reconstruction. FINDINGS: CT HEAD: Subtle areas of hypoattenuation are seen involving the right posterior frontal white matter correspond to areas of recent stroke on prior MRI. No evidence of intracranial hemorrhage. Ventricles are nonenlarged. There are prominent CSF spaces involving posterior cranial fossa appearing similar since previous examination. CTA HEAD: Anterior cerebral arteries, middle cerebral arteries, and posterior cerebral arteries are patent without evidence of stenosis. There is normal vertebral basilar junction. Basilar artery is patent without evidence of stenosis. No evidence of intracranial aneurysm or AVM. CTA NECK: Calcified and noncalcified atherosclerotic plaque is associated right and left carotid bulbs and proximal right and left cervical internal carotid arteries.  There is approximately 50% lumen stenosis involving proximal right cervical internal carotid artery. No hemodynamically significant stenosis associated with left cervical internal carotid artery. Both vertebral arteries are patent without evidence of stenosis. 1. Evolving areas of stroke seen in posterior right frontal white matter. No evidence of acute intracranial hemorrhage. 2. Atherosclerotic calcifications are associated with the carotid bulbs and proximal right and left cervical internal carotid arteries with approximately 50% stenosis involving proximal right cervical internal carotid artery. 3. No evidence of arterial stenosis at level of the [place]. Cta Head W Contrast    Result Date: 2019  Patient MRN:  [id number] : 1940 Age: 66 years Gender: Female Order Date:  2019 8:30 AM EXAM: CTA HEAD W CONTRAST COMPARISON: May 24, 2019 INDICATION:  STROKE  TECHNIQUE: Unenhanced CT performed, then contiguous axial images through the head and neck were obtained following intravenous contrast using standard CT angiographic protocol. Sagittal and coronal MIPS images were reconstructed from the axial acquisition. Additional 3-D reconstructions were obtained to aid in interpretation of this examination. Low-dose CT acquisition technique including one of the following options: 1. Automated exposure control. 2. Adjustment of mA and/or KV according to the patient's size. 3. Use of iterative reconstruction. FINDINGS: CT HEAD: Subtle areas of hypoattenuation are seen involving the right posterior frontal white matter correspond to areas of recent stroke on prior MRI. No evidence of intracranial hemorrhage. Ventricles are nonenlarged. There are prominent CSF spaces involving posterior cranial fossa appearing similar since previous examination. CTA HEAD: Anterior cerebral arteries, middle cerebral arteries, and posterior cerebral arteries are patent without evidence of stenosis. There is normal vertebral basilar junction. Basilar artery is patent without evidence of stenosis.  No evidence mastoid air cells are clear. Several cortical and deep white matter infarcts involving right posterior frontal lobe as detailed above. Nm Cardiac Stress Test Nuclear Imaging    Result Date: 2019  Patient MRN:  [id number] : 1940 Age: 66 years Gender: Female Order Date:  2019 3:30 PM EXAM: NM MYOCARDIAL SPECT REST EXERCISE OR RX NUMBER OF IMAGES:    8 INDICATION:  Chest pain Reason for Exam?->Chest pain Procedure Type->Rx COMPARISON: None TECHNIQUE:  The resting myocardial SPECT images were acquired after injecting 11.8 mCi of technetium 99m sestamibi. Using Lexiscan stress, post-stress myocardial SPECT images were acquired after injecting 31.6 mCi of Tc-99m sestamibi. Maximum heart rate was 129 bpm and blood pressure was 160/82. No chest pain was reported. FINDINGS: The technical quality of the study is acceptable. No fixed or reversible defects identified. The gated SPECT images reveal normal wall motion. The calculated left ventricular ejection fraction is 71%. 1. No evidence of ischemia or infarct. 2. Normal wall motion. 3. Normal calculated left ventricular ejection fraction of 71%. Us Carotid Artery Bilateral    Result Date: 2019  Patient MRN:  [id number] : 1940 Age: 66 years Gender: Female Order Date:  2019 11:15 AM EXAM: US CAROTID ARTERY BILATERAL COMPARISON: None INDICATION:  stroke  FINDINGS: On the right: Common carotid artery velocity: 78cm/sec. ICA proximal: 189 cm/sec. ICA mid: 120 cm/sec. ICA distal: 74 cm/sec. The right vertebral artery is antegrade. Right ICA/CCA ratio: 1.8 On the left: Common carotid artery velocity: 90 cm/sec. ICA proximal: 88.2 cm/sec. ICA mid: 133 cm/sec. ICA distal: 124 cm/sec. The left vertebral artery is antegrade. Left ICA/CCA ratio: 1.5 Significant burden of atherosclerotic plaque present. Velocities are in range of 50-69% stenosis involving proximal right internal carotid artery and mid left internal carotid artery.  CTA neck could be helpful for further evaluation. Discharge Medications:      Medication List      START taking these medications    aspirin 81 MG EC tablet  Take 1 tablet by mouth daily  Replaces:  aspirin 325 MG tablet     clopidogrel 75 MG tablet  Commonly known as:  PLAVIX  Take 1 tablet by mouth daily        CONTINUE taking these medications    butalbital-acetaminophen-caffeine -40 MG per tablet  Commonly known as:  FIORICET, ESGIC     carvedilol 25 MG tablet  Commonly known as:  COREG     CENTRUM SILVER PO     lisinopril 40 MG tablet  Commonly known as:  PRINIVIL;ZESTRIL     nitroGLYCERIN 0.4 MG SL tablet  Commonly known as:  NITROSTAT     VYTORIN 10-40 MG per tablet  Generic drug:  ezetimibe-simvastatin        STOP taking these medications    aspirin 325 MG tablet  Replaced by:  aspirin 81 MG EC tablet     simvastatin 20 MG tablet  Commonly known as:  ZOCOR           Where to Get Your Medications      These medications were sent to [address] [place] [address] “” [place], [place] - [address] [phone] Suki Alejandro [phone]  [address] [address]    Phone:  [phone]   · aspirin 81 MG EC tablet  · clopidogrel 75 MG tablet         Time Spent on discharge is more than 35 minutes in the examination, evaluation, counseling and review of medications and discharge plan.    +++++++++++++++++++++++++++++++++++++++++++++++++  Mary Charles MD, Hospitalist  +++++++++++++++++++++++++++++++++++++++++++++++++  NOTE: This report was transcribed using voice recognition software. Every effort was made to ensure accuracy; however, inadvertent computerized transcription errors may be present.

## 2019-05-28 NOTE — PROGRESS NOTES
Acute Rehab Pre-Admission Screen      Referral date: 5/28/19  Onset/Hospital Admit Date: 5/24/2019  1:11 PM  Current Location: [address] [place] [address]  Admitting Diagnosis: Chest pain   Surgery /  Date:    Name: Aiyana Wilder: 1940  Age: 66 y.o. Address: [address] [place], [address]  Home Phone: [phone] (home)  Mason De La Torre #:     Sex: female  Race:   Marital Status: single   Ethnic/Cultural/Anabaptism Considerations: Ji    Advanced Directives: [x] Full Code  [] [facility] [] Medications only       [] Living Will  [x] DPOA      []Organ donor      [] No mechanical breathing or ventilation     [] no tube feeding, nutrition or hydration      [] Patient does not have advanced directives or living will     Copies in Chart: no    COVERAGE INFORMATION   Primary Insurer: medicare Medicare #: [id number]    Verified coverage: [] Patient  [] Family/caregiver    [x] financial department [] insurance carrier    PHYSICIAN / Olivia Patton INFORMATION    Referring Physician: LAVON Collier  Attending Physician: Brigida Cuevas MD  Primary Care Physician: Ling Bell DO  Consultants/Opinions (see full consult notes on chart): Kyle Dominguez (cardiology) - see consult  KARIN Schulz ( neurology) - [place] territory stroke    SOCIAL INFORMATION    Primary  Contact: Geoff Desir  Relationship: other  Primary Phone: [phone]  Secondary  Contact: Martha Chen  Relationship: other  Primary Phone: [phone]      Previous Community Services: none noted  Caregiver available: [] Yes [x] No Hours per day available:   Patient previously employed:  [] Yes [] Part Time [] Full Time [x] No [x] Retired  Occupation/Profession:   Prior living arrangements: [x] Home  [] Assisted living  [] SNF [] Other  Lived with:  [x] Alone  [] Spouse  [] Family  [] Other    Home:  1 story apartment  3 entry steps  Rails: 0     Bedroom: [x] 1st floor  [] 2nd floor    Bathroom:  [x] 1st floor  [] 2nd floor    Prior Functional Level:  Independent for: gait,transfers, ADL's, IADL's, drove    Dominant hand: [x] Right  [] Left    Previous Home Equipment:  [x] Cane [] Grab bars [] Orthotic / prosthetic   [] Shower chair [] Tub bench  [] 3-in-1 Commode [] Long handle sponge   [] Oxygen [] Sock aide  [] Wheelchair  [] motorized wc/scooter  [] Wheelchair cushion   [] Crutches [] Long handle shoehorn  [] Reachers [] Toilet seat elevator  [] Walker(wheeled)   [] Walker(standard) [] Mechanical lift    [] None of the above    MEDICAL UPDATE:  History of present admission: presents 5/23/19 to [facility] -for chest pain and left sided extremity weakness. Patient states that her chest pain started at 0500 this morning. Patient states this woke her up out of sleep. She describes it as a left sided chest pressure. It does not radiate. It was a 9/10 this morning when she woke up. She took 325 mg of aspirin at 5:30 this morning. She states that her pain is now down to a 6 out of 10. She denies feeling short of breath. She denies any coughing or fevers. She reports that the pain made her feel diaphoretic and nauseated. She has not been vomiting. She states that she has been having intermittent chest pain like this for the past 2 weeks but today was the worst.  Transferred to [facility] on 5/24/19 - MRI reveals   Several cortical and deep white matter infarcts involving right   posterior frontal lobe as detailed above     Past Medical:  Past Medical History:   Diagnosis Date    Arthritis     Back problem     CAD (coronary artery disease) 2010    PTCA with DAMON to prox LAD, PTCA DAMON to ostial lesion of RCA and BMS to prox lesion RCA DAMON to RCA ostial     Hyperlipidemia     Hypertension     Nausea & vomiting     Reflux     RLD (ruptured lumbar disc)     Scoliosis     Spinal stenosis         Influenza vaccine given:  [] yes   [] no Date:  [x] n/a (out of season)    Has patient had 2 or more falls in the past year?  [] yes   [x] no Date  Has patient had any fall with injury in the past year? [] yes   [x] no  [] unknown  Has patient had major surgery during past 100 days prior to admission?    [] yes   [x] no Type/ Date:       Surgical History:  Past Surgical History:   Procedure Laterality Date    CATARACT REMOVAL      bilateral    CORONARY ANGIOPLASTY  2/25/2010    PTCA with DAMON in the proximal left anterior descending    CORONARY ANGIOPLASTY  3/11/2010    PTCA and deployment of 2 stents in the ostium and the proximal segment of RCA    CORONARY ANGIOPLASTY  5/19/2010    PTCA DAMON to ostial RCA patent LAD and RCA BMS  restenosis of RCA    CORONARY ANGIOPLASTY WITH STENT PLACEMENT      times 3    DIAGNOSTIC CARDIAC CATH LAB PROCEDURE  2/25/2010    Dr. Gaviria Silence: Cardiac cath with angioplasty follow up    [address] CATH LAB PROCEDURE  3/11/2010    Cardiac cath and stent placement    DIAGNOSTIC CARDIAC CATH LAB PROCEDURE  5/19/2010    Cardiac cath heart lab and subsequent angiopilsaty    FRACTURE SURGERY      tight wrist    SHOULDER ARTHROSCOPY  08 25 2011    left shoulder arthroscopy ,acromioplasty,busectomy, release of adhesions         Current Co-morbidities:  [] Alzheimer's   [] Dysphasia     [] Parkinsonism  [] Amputation   [] Edema of larynx  [] Peripheral artery disease   [] Anemia      [] Encephalopathy  [] Peripheral vascular disease  [] Anxiety   [] Gangrene   [] Pneumonia  [] Aphasia   [] Gout    [] Polyneuropathy  [] Asthma   [] Heart Failure (diastolic) [] Post-polio syndrome  [] Atrial fibrillation  [] Heart Failure (left-sided) [] Pseudomonas enteritis   [] Blind    [] Heart Failure (right-sided) [] Pulmonary embolism  [] Cellulitis     [] Heart Failure (systolic) [] Renal dialysis  [] Clostridium difficile  [] Hemiparesis   [] Renal failure  [] Congestive heart failure [x] Hypertension   [] Rheumatoid arthritis  [] COPD   [x] Hyperlipidemia   [] Seizure disorder   [x] Coronary Artery Disease [] Hypothyroidism  [] Septicemia   [] Deaf    [] Malnutrition   [] Sleep apnea  [] Depression   [] Morbid obesity  [] Spinal cord injury  [] Diabetes   [] MRSA   [x] Stroke  [] Diabetic nephropathy  [] Myocardial infarction  [] Tracheostomy  [] Diabetic neuropathy  [] Osteoarthritis  [] Traumatic brain injury   [] Diabetic retinopathy  [] Osteoporosis   [] Urinary tract infection  [] DVT    [] Pancytopenia  [] Vocal cord paralysis  []     []     [] VRE          Medical/Functional Conditions, Risk for Clinical Complications/Interventions Required:    Medical/Functional Conditions: stroke ( monitor for neuro changes), Hypertension ( monitor BP), CAD ( monitor for chest pain),    Risk for Medical/Clinical Complications: hypo/hypertension, falls, injuries, decreased mobility    CLINICAL DATA:     Height : 4'9\"     Weight:  75#   BMI: 16.4         Date: 5/28/19 Date:  Date:    temperature 98.4     pulse 82     respirations 16     Blood pressure 138/86     Pulse oximeter 98% room air          ALLERGIES: Aspirin and Lipitor    DIET : DIET CARDIAC;    Current Lab and Diagnostic Tests:   Recent Results (from the past 24 hour(s))   Basic Metabolic Panel    Collection Time: 05/28/19  4:55 AM   Result Value Ref Range    Sodium 141 132 - 146 mmol/L    Potassium 3.6 3.5 - 5.0 mmol/L    Chloride 103 98 - 107 mmol/L    CO2 23 22 - 29 mmol/L    Anion Gap 15 7 - 16 mmol/L    Glucose 172 (H) 74 - 99 mg/dL    BUN 10 8 - 23 mg/dL    CREATININE 0.5 0.5 - 1.0 mg/dL    GFR Non-African American >60 >=60 mL/min/1.73    GFR African American >60     Calcium 8.8 8.6 - 10.2 mg/dL   CBC    Collection Time: 05/28/19  4:55 AM   Result Value Ref Range    WBC 7.0 4.5 - 11.5 E9/L    RBC 3.75 3.50 - 5.50 E12/L    Hemoglobin 11.8 11.5 - 15.5 g/dL    Hematocrit 35.1 34.0 - 48.0 %    MCV 93.6 80.0 - 99.9 fL    MCH 31.5 26.0 - 35.0 pg    MCHC 33.6 32.0 - 34.5 %    RDW 11.9 11.5 - 15.0 fL    Platelets 450 347 - 365 E9/L    MPV 9.2 7.0 - 12.0 fL   Magnesium    Collection Time: 05/28/19  4:55 AM   Result Value Ref Range    Magnesium 2.0 1.6 - 2.6 mg/dL   Phosphorus    Collection Time: 05/28/19  4:55 AM   Result Value Ref Range    Phosphorus 3.1 2.5 - 4.5 mg/dL     Ct Head Wo Contrast  Result Date: 5/24/2019  1. No acute findings. Xr Chest Portable  Result Date: 5/24/2019  1. Negative portable chest.      Mri Brain Wo Contrast  Result Date: 5/24/2019  Several cortical and deep white matter infarcts involving right posterior frontal lobe as detailed above. Nm Cardiac Stress Test Nuclear Imaging  Result Date: 5/25/2019  1. No evidence of ischemia or infarct. 2. Normal wall motion. 3. Normal calculated left ventricular ejection fraction of 71%. Us Carotid Artery Bilateral  Result Date: 5/25/2019  Velocities are in range of 50-69% stenosis involving proximal right internal carotid artery and mid left internal carotid artery. CTA neck could be helpful for further evaluation.      Additional labs or diagnostic studies needed before admission to rehabilitation unit:  None noted    Medications:   aspirin  81 mg Oral Daily    clopidogrel  75 mg Oral Daily    carvedilol  25 mg Oral BID WC    lisinopril  40 mg Oral Daily    sodium chloride flush  10 mL Intravenous 2 times per day    enoxaparin  40 mg Subcutaneous Daily    simvastatin  40 mg Oral Nightly       sodium chloride flush, magnesium hydroxide, ondansetron, acetaminophen    SPECIAL PRECAUTIONS: [x] No current precautions  [] Cardiac  [] Renal [] Sternal [] Respiratory      [] Neurological           [] Hip  [] Spinal [] Seizure  [] Aspiration  [] Isolation precautions:    [] Contact   [] Respiratory   [] Protective     [] Droplet    [x] Weight Bearing precautions:         [] Non Weight Bearing        [] Toe Touch Weight Bearing        [] Partial Weight Bearing        [x] Weight Bearing as Tolerated        [x] Fall Risk:   [] Recent history of falls [x] Falls risk level Leellen Bibles Scale):high      [x] Bed Alarm    [] Do not leave alone in the bathroom    [] Chair Alarm    [] Cognitive impairment      [] One to One supervision  [] Sitter / Tele sitter   [] Safety enclosure bed  [] Decreased balance     SPECIAL REHABILITATION NEEDS:   [] IV Therapy: [x] PRN Adapter  [] Midline  [] PICC      [] Central Line    [] TPN       [] Oxygen: [] Trach [] Bi-PAP [] CPAP  [] Nasal cannula  [] Liters:     [] Wound Care:   [] Pressure ulcers(stage and location)    [] Wound vac   [] Wound or incision care    [x] Pain Management (level of pain, meds): chronic leg pain ranges 3-0.     [] Incontinence Bladder [] Garcia  Insertion date:   []Hemodialysis and  Frequency:   [] Incontinence Bowel    [x] Last bowel movement :5/28/19    [] Substance use history:  [] Tobacco  [] Alcohol  [] Other     [] Ethnic  [] Cultural  [] Spiritual  [] Language [] Needs  [] Other than English  [] Hearing Impaired  [] Visually Impaired  [] Speaking Impaired  [] Blind    [] Special equipment:  [] Devices/Splints  [] Type   [] Brace   [] Type  [] Bariatric bed  [] Extra wide commode  [] Extra wide wheelchair [] Extra wide walker  [] Ry walker  [] Ry wheelchair  [] Transfer lift    [] Other equipment     FUNCTIONAL STATUS PT / [place] / Pastor Boogie:  FIM / EVAL Discipline Initial: 5/26/19 Follow Up: 5/28/19 Current:    Eating OT Supervision  Set up     Grooming OT Minimum assistance  Minimum assistance     Bathing OT Moderate Assist  Minimum assistance     Dressing Upper Extremity OT Moderate Assist  Minimum assistance     Dressing Lower Extremity OT Moderate Assist  Minimum assistance     Toileting OT Moderate Assist  Minimum assistance     Toilet Transfers OT Minimum assistance  Minimum assistance     Tub/Shower Transfers OT nt nt    Homemaking OT nt nt    Bed Mobility PT nt Supervision     Bed/Wheelchair Transfers PT Minimum assistance  Minimum assistance     Locomotion Walk / Wheelchair  Device:  Distance: PT 10 no device Moderate Assist  75' Wheeled walker Minimum assistance     Endurance PT Expression SP Within  functional limits     Social Interaction SP      Problem Solving SP      Memory SP      Comprehension SP      Swallowing SP      Bowel Management NSG      Bladder Management NSG        Comments on Functional Status: left lower extremity is weak, has loss of balance.  Will benefit from 3 hours of acute rehab therapy daily , improve self care and mobility to modified independent    [x] Able to participate a minimum of 3 hours per day of therapy intervention    Required treatments/services: [x] Rehabilitation nursing [] Dietitian / nurtition                 [x] Case management  [] Respiratory Therapy      [x] Social work   [] Other     Required Therapy:  Therapy Hours per Day Days per Week Therapeutic Interventions Required   [x] Physical Therapy 1.5 5-7 Gait,transfers, Safety, strength, education, endurance    [x] Occupational Therapy 1.5 5-7 ADL's, Safety, strength, education, endurance    [] Speech Pathology      [] Prosthetics / Orthotics       []         Anticipated Discharge Plan:   Anticipated DME Needs:  [x] Home     [] Commode   [x] Alone    [] Wheelchair   [] Supervised    [] Emmy Concepcion   [] Assist    [] Oxygen  [] LTAC     [] Hospital Bed  [] Assisted Living    [] [facility]  [] [facility]   [x] To Be Determined      Anticipated Home Health Services:  Anticipated Outpatient Services:  [] PT       [] PT  [] OT      [] OT  [] Speech     [] Speech  [] Nursing     [] Dialysis  [] Aide      [x] To Be Determined  [x] To Be Determined    Anticipated support group:  [] Amputation  [] Multiple Sclerosis  [x] Stroke  [] Brain Injury  [] Spinal cord injury  [] Other     Barriers to discharge: lives alone    Discharge Support: [x] Patient lives alone and does not have a caregiver available, has some supportive nieces and nephews     [] Patient has a caregiver available     [] Discharge plan has been verified with patient's caregiver    [] Caregiver is in agreement with the discharge plan     Expected functional status for safe discharge: modified independent    Patient/support person goals: return home    Expected length of stay: 7-10 days    Discussed expected length of stay and agreeable to IRF plan: [x] Yes   [] No    Impairment Group Category: 1.1    Etiological Diagnosis: CVA    Primary Rehabilitation Diagnosis: CVA      Electronically signed by Javi Cifuentes RN on 5/28/2019 at 3:53 PM    Prescreen completed __________________________________ (signature of prescreener)    Date:   5/28/19 Time:  1615      JUSTIFICATION FOR ADMISSION TO ACUTE REHABILITATION:  Patient has suffered decline in functional abilities for gait, transfers, ADL's and IADL's as well as endurance. Patient has functional deficits requiring intensive therapy across multiple disciplines in order to return home safely. Patient will need physician oversight for respiratory issues, abnormal vital signs, nutritional and hydration status, safety issues, medications and therapy modalities. PT, OT  will work on deficits as noted in evaluations. Case management and social work will provide services for DME and management of a safe discharge home.          RECOMMEND LEVEL OF CARE  Recommend inpatient rehabilitation: [x] Yes   [] No  If no indicate reason:    [] Functional level too high  [] Unmotivated  [] No insurance carrier approval [] Unlikely to return to community  [] No medical necessity  [] Patient or family chose other facility  [] Too medically complex  [] Inadequate discharge plan  [] Rehabilitation bed unavailable [] Functional level too low  [] patient or family refused ARU    If patient not accepted for IRF admission, recommended level of care:  [] [address]  [] [facility]  [] [place]   [] Home Care  [] Other      [] LTAC       Physician Assigned:  [] Dr. Zeina Sylvester [] Dr. Barrera Friday  [] Dr. Perry Isaac     [x] Dr. Aristides Vasquez [] Dr. Charlee Mahmood  [] Dr. Aris Garza DETERMINATION AND REVIEW:    ____________________________________________________________________  ____________________________________________________________________  ____________________________________________________________________  ____________________________________________________________________  ____________________________________________________________________      Physician Signature:_____________________________________    Print Signature:_________________________________________    Date:   5/28/19  Time:    9410

## 2019-05-28 NOTE — LETTER
PORTABLE PATIENT PROFILE  Aracely Griffiths  [id number]    MEDICAL DIAGNOSIS/CONDITION:   Patient Active Problem List   Diagnosis    Adhesive capsulitis of shoulder    Bursitis of left shoulder    Impingement syndrome of shoulder    Coronary artery disease involving native coronary artery of native heart without angina pectoris    Spinal stenosis    Scoliosis    Hypertension    Hyperlipidemia    Chest pain    Stroke [facility])    [facility]bro[facility]    Ischemic stroke ([facility]       INSURANCE INFORMATION:  Payor: MEDICARE /  /  /     ADVANCED DIRECTIVES:   Advance Directives (For Healthcare)  Pre-existing DNR Comfort Care/DNR Arrest/DNI Order: No  Healthcare Directive: Yes, patient has an advance directive for healthcare treatment  Type of Healthcare Directive: Living will  Copy in Chart: No, copy requested from family  Healthcare Agent Appointed: Next of kin(Brother in L: Zack David:  [phone])  Healthcare Agent's Name: (Kathy Kerr)  Healthcare Agent's Phone Number: [phone]  If you are unable to speak for yourself, does your Healthcare Agent or Legal Spokesperson know your healthcare wishes?: Yes  [unfilled]     EMERGENCY CONTACT:       RISK FACTORS:   Social History     Tobacco Use    Smoking status: Never Smoker    Smokeless tobacco: Never Used   Substance Use Topics    Alcohol use: No     Alcohol/week: 0.0 oz     Comment: denies caffeine       ALLERGIES:  Allergies   Allergen Reactions    Aspirin      Caused bleeding ulcers    Lipitor      Caused elevated liver enzymes       IMMUNIZATIONS:    There is no immunization history on file for this patient. SWALLOWING:   Difficulty Chewing or Swallowing Food: No    VISION AND HEARING:        PHYSICIANS INVOLVED WITH CARE:    Hamlet Sanchez MD  No ref.  provider found  DO Hamlet Jaime MD

## 2019-05-29 LAB
ALBUMIN SERPL-MCNC: 4.1 G/DL (ref 3.5–5.2)
ALP BLD-CCNC: 84 U/L (ref 35–104)
ALT SERPL-CCNC: 17 U/L (ref 0–32)
ANION GAP SERPL CALCULATED.3IONS-SCNC: 13 MMOL/L (ref 7–16)
AST SERPL-CCNC: 23 U/L (ref 0–31)
BASOPHILS ABSOLUTE: 0.02 E9/L (ref 0–0.2)
BASOPHILS RELATIVE PERCENT: 0.3 % (ref 0–2)
BILIRUB SERPL-MCNC: 0.2 MG/DL (ref 0–1.2)
BUN BLDV-MCNC: 13 MG/DL (ref 8–23)
CALCIUM SERPL-MCNC: 8.9 MG/DL (ref 8.6–10.2)
CHLORIDE BLD-SCNC: 101 MMOL/L (ref 98–107)
CO2: 24 MMOL/L (ref 22–29)
CREAT SERPL-MCNC: 0.5 MG/DL (ref 0.5–1)
EOSINOPHILS ABSOLUTE: 0.23 E9/L (ref 0.05–0.5)
EOSINOPHILS RELATIVE PERCENT: 3.2 % (ref 0–6)
GFR AFRICAN AMERICAN: >60
GFR NON-AFRICAN AMERICAN: >60 ML/MIN/1.73
GLUCOSE BLD-MCNC: 163 MG/DL (ref 74–99)
HCT VFR BLD CALC: 35.2 % (ref 34–48)
HEMOGLOBIN: 11.6 G/DL (ref 11.5–15.5)
IMMATURE GRANULOCYTES #: 0.05 E9/L
IMMATURE GRANULOCYTES %: 0.7 % (ref 0–5)
INR BLD: 1.1
LYMPHOCYTES ABSOLUTE: 2.47 E9/L (ref 1.5–4)
LYMPHOCYTES RELATIVE PERCENT: 34.5 % (ref 20–42)
MAGNESIUM: 2.1 MG/DL (ref 1.6–2.6)
MCH RBC QN AUTO: 31.1 PG (ref 26–35)
MCHC RBC AUTO-ENTMCNC: 33 % (ref 32–34.5)
MCV RBC AUTO: 94.4 FL (ref 80–99.9)
METER GLUCOSE: 133 MG/DL (ref 74–99)
MONOCYTES ABSOLUTE: 1.17 E9/L (ref 0.1–0.95)
MONOCYTES RELATIVE PERCENT: 16.3 % (ref 2–12)
NEUTROPHILS ABSOLUTE: 3.22 E9/L (ref 1.8–7.3)
NEUTROPHILS RELATIVE PERCENT: 45 % (ref 43–80)
PDW BLD-RTO: 11.9 FL (ref 11.5–15)
PLATELET # BLD: 261 E9/L (ref 130–450)
PMV BLD AUTO: 9 FL (ref 7–12)
POTASSIUM REFLEX MAGNESIUM: 3.5 MMOL/L (ref 3.5–5)
PROTHROMBIN TIME: 12.8 SEC (ref 9.3–12.4)
RBC # BLD: 3.73 E12/L (ref 3.5–5.5)
SODIUM BLD-SCNC: 138 MMOL/L (ref 132–146)
TOTAL PROTEIN: 6.4 G/DL (ref 6.4–8.3)
WBC # BLD: 7.2 E9/L (ref 4.5–11.5)

## 2019-05-29 PROCEDURE — 97535 SELF CARE MNGMENT TRAINING: CPT

## 2019-05-29 PROCEDURE — 6370000000 HC RX 637 (ALT 250 FOR IP): Performed by: PHYSICAL MEDICINE & REHABILITATION

## 2019-05-29 PROCEDURE — 92523 SPEECH SOUND LANG COMPREHEN: CPT | Performed by: SPEECH-LANGUAGE PATHOLOGIST

## 2019-05-29 PROCEDURE — 97165 OT EVAL LOW COMPLEX 30 MIN: CPT

## 2019-05-29 PROCEDURE — 82962 GLUCOSE BLOOD TEST: CPT

## 2019-05-29 PROCEDURE — 80053 COMPREHEN METABOLIC PANEL: CPT

## 2019-05-29 PROCEDURE — 97530 THERAPEUTIC ACTIVITIES: CPT

## 2019-05-29 PROCEDURE — 6360000002 HC RX W HCPCS: Performed by: PHYSICAL MEDICINE & REHABILITATION

## 2019-05-29 PROCEDURE — 83735 ASSAY OF MAGNESIUM: CPT

## 2019-05-29 PROCEDURE — 36415 COLL VENOUS BLD VENIPUNCTURE: CPT

## 2019-05-29 PROCEDURE — 85610 PROTHROMBIN TIME: CPT

## 2019-05-29 PROCEDURE — 97112 NEUROMUSCULAR REEDUCATION: CPT

## 2019-05-29 PROCEDURE — 97161 PT EVAL LOW COMPLEX 20 MIN: CPT

## 2019-05-29 PROCEDURE — 6370000000 HC RX 637 (ALT 250 FOR IP): Performed by: FAMILY MEDICINE

## 2019-05-29 PROCEDURE — 85025 COMPLETE CBC W/AUTO DIFF WBC: CPT

## 2019-05-29 PROCEDURE — 1280000000 HC REHAB R&B

## 2019-05-29 RX ORDER — ONDANSETRON 4 MG/1
4 TABLET, ORALLY DISINTEGRATING ORAL EVERY 8 HOURS PRN
Status: DISCONTINUED | OUTPATIENT
Start: 2019-05-29 | End: 2019-06-07 | Stop reason: HOSPADM

## 2019-05-29 RX ADMIN — SIMVASTATIN 40 MG: 40 TABLET, FILM COATED ORAL at 21:35

## 2019-05-29 RX ADMIN — ASPIRIN 81 MG: 81 TABLET ORAL at 08:50

## 2019-05-29 RX ADMIN — CLOPIDOGREL 75 MG: 75 TABLET, FILM COATED ORAL at 08:50

## 2019-05-29 RX ADMIN — ONDANSETRON 4 MG: 4 TABLET, ORALLY DISINTEGRATING ORAL at 18:07

## 2019-05-29 RX ADMIN — LISINOPRIL 40 MG: 20 TABLET ORAL at 08:49

## 2019-05-29 RX ADMIN — CARVEDILOL 25 MG: 25 TABLET, FILM COATED ORAL at 08:50

## 2019-05-29 RX ADMIN — METFORMIN HYDROCHLORIDE 500 MG: 500 TABLET ORAL at 18:07

## 2019-05-29 RX ADMIN — ENOXAPARIN SODIUM 40 MG: 40 INJECTION, SOLUTION INTRAVENOUS; SUBCUTANEOUS at 08:50

## 2019-05-29 RX ADMIN — METFORMIN HYDROCHLORIDE 500 MG: 500 TABLET ORAL at 08:49

## 2019-05-29 RX ADMIN — CARVEDILOL 25 MG: 25 TABLET, FILM COATED ORAL at 18:07

## 2019-05-29 ASSESSMENT — PAIN SCALES - GENERAL
PAINLEVEL_OUTOF10: 0
PAINLEVEL_OUTOF10: 0

## 2019-05-29 NOTE — PROGRESS NOTES
Occupational Therapy  OCCUPATIONAL THERAPY DAILY NOTE    Date:2019  Patient Name: Aaliyah Queen  MRN: [id number]  : 1940  [facility]     Diagnosis: ischemic CVA  Precautions: Fall Risk & L inattention noted    Functional Assessment:   Date Status AE  Comments   Feeding 19 Set up      Grooming 19 Min A     Bathing 19 UB: set up  LB: Min A     UB Dressing 19 Supervision     LB Dressing 19 Set up      Homemaking         Functional Transfers / Balance:   Date Status DME  Comments   Sit Balance 19 SBA     Stand Balance 19 CGA ww    [] Tub  [] Shower   Transfer 19 TBA     Commode   Transfer 19 Min A ww    Functional   Mobility 19 Min A ww    Other:  WC<>Bed   19   CGA    SPT     Functional Exercises / Activity:  Visual scanning activity matching cards across large surface. Pt required X 1 VC to clarify instructions. Pt completed activity accurately with increased time to complete. Pipe tree design activity with focus on B hand coordination, problem solving and sequencing. Pt completed with fair+ skill and accuracy. Sensory / Neuromuscular Re-Education:      Cognitive Skills:   Status Comments   Problem   Solving fair     Memory fair  Decreased STM   Sequencing fair     Safety fair       Visual Perception:    Education:  Pt educated on hand placement and safety during SPT. [] Family teach completed on:    Pain Level: 0/10    Additional Notes:       Patient has made good  progress during treatment sessions toward set goals. Therapy emphasis to obtain goals: Strengthening, Endurance Training, Neuromuscular Re-education, Patient/Caregiver Education & Training, Equipment Evaluation, Education, & procurement, Self-Care / ADL, Balance Training, Gait Training, Home Management Training, Functional Mobility Training, Safety Education & Training        [x] Continue with current OT Plan of care.   [] Prepare for Discharge     DISCHARGE

## 2019-05-29 NOTE — PROGRESS NOTES
Occupational Therapy   Occupational Therapy Initial Assessment  Date: 2019   Patient Name: Paola Macedo  MRN: [id number]     : 1940  Room: [facility]    Referring Practitioner: Jayesh Del Cid MD  Diagnosis: ischemic CVA  Additional Pertinent Hx: CAD, HTN, HLD, RLD, scoliosis, spinal stenosis    Date of Service: 2019    Discharge Recommendations:  Home with assist PRN       Restrictions   Fall Risk & L inattention noted    Subjective   Chart Reviewed: Yes  Family / Caregiver Present: No  Subjective: Pt presents supine in bed & was agreeable to OT intervention  Comments: Pt did not c/o pain during OT session     Social/Functional History  Lives With: Alone  Type of Home: Apartment  Home Layout: One level  Home Access: Stairs to enter with rails  Entrance Stairs - Number of Steps: 3 [facility]  Bathroom Shower/Tub: Tub only  Bathroom Toilet: Standard  ADL Assistance: Independent  Homemaking Assistance: Independent  Homemaking Responsibilities: Yes  Meal Prep Responsibility: Primary  Laundry Responsibility: Primary(drives to a laudromat)  Cleaning Responsibility: Primary  Bill Paying/Finance Responsibility: Primary  Shopping Responsibility: Primary  Ambulation Assistance: Independent  Transfer Assistance: Independent  Active : Yes  Mode of Transportation: Car  Occupation: Retired  Type of occupation:   IADL Comments: PLOF pt independen tin all areas including ADLs, transfers, mobility, home mangement & driving     Objective   Vision: Impaired  Vision Exceptions: Wears glasses at all times  Hearing: Within functional limits      Orientation  Overall Orientation Status: Within Functional Limits     Observation/Palpation  Posture: Good     Balance  Sitting Balance: Stand by assistance  Standing Balance: Contact guard assistance  Functional Mobility  Functional - Mobility Device: Rolling Walker  Activity: To/from bathroom  Assist Level: Contact guard assistance  Functional Mobility Comments: min vc's for hand placement & safety when turning    Toilet Transfers  Toilet - Technique: Ambulating  Equipment Used: Standard toilet  Toilet Transfer: Minimal assistance  Toilet Transfers Comments: min vc's for walker managemetn & safety    ADL  Feeding: Setup(assist to open small packages.)  Grooming: Minimal assistance  UE Bathing: Supervision; Increased time to complete;Setup  LE Bathing: Setup;Verbal cueing; Increased time to complete;Minimal assistance  UE Dressing: Supervision; Increased time to complete;Verbal cueing;Setup  LE Dressing: Setup;Verbal cueing;Minimal assistance; Increased time to complete  Toileting: Minimal assistance     Coordination  Movements Are Fluid And Coordinated: No  Coordination and Movement description: Fine motor impairments; Left UE     Bed mobility  Supine to Sit: Stand by assistance  Scooting: Stand by assistance  Transfers  Stand Pivot Transfers: Contact guard assistance  Sit to stand: Stand by assistance  Stand to sit: Stand by assistance     Vision - Basic Assessment  Prior Vision: Wears glasses all the time  Visual History: No significant visual history  Patient Visual Report: No visual complaint reported. Cognition  Overall Cognitive Status: Exceptions  Memory: Decreased short term memory  Problem Solving: Assistance required to identify errors made;Assistance required to generate solutions      Sensation  Overall Sensation Status:grossly intact      LUE AROM: LUE lag slightly behind RUE but AROM >3/4 in all pklanes with increased time to reach end range  Left Hand General AROM: Pt has a gross grasp & release.  Pt was also able to oppose her thumb to each digit  RUE AROM : WFL  Right Hand AROM: WFL    LUE Strength  L Hand Grasp: 3+/5  L Hand Release: 3+/5  LUE Strength Comment: 4-/5 strength   RUE Strength  R Hand Grasp: 4+/5  R Hand Release: 4+/5  RUE Strength Comment: 4+/5 strength throughout     Hand Assessment:   Strength:  R hand 20# & norm for pt age & gender is 43#   L hand 10# & norm for pt age & gender is 38#  9-hole peg test:  R hand 26.3 seconds & norm for pt age & gender is 22.5 seconds   L hand 47.4 seconds & norm for pt age & gender is 24.1 seconds     Hand Dominance: Right      Plan   Times per week: OT twice a day for 7-10 days to address above problem areas  Times per day: Twice a day  Current Treatment Recommendations: Strengthening, Endurance Training, Neuromuscular Re-education, Patient/Caregiver Education & Training, Equipment Evaluation, Education, & procurement, Self-Care / ADL, Balance Training, Gait Training, Home Management Training, Functional Mobility Training, Safety Education & Training    Assessment   Performance deficits / Impairments: Decreased functional mobility ; Decreased balance;Decreased coordination;Decreased ADL status; Decreased vision/visual deficit; Decreased endurance;Decreased high-level IADLs;Decreased fine motor control  Prognosis: Good  Decision Making: Low Complexity  Patient Education: Pt educated with regards to OT process. Pt participated in OT goal [address]. Pt pleasant & cooperative throughout the OT process  REQUIRES OT FOLLOW UP: Yes  Activity Tolerance: Patient Tolerated treatment well  Safety Devices in place: Yes  Type of devices: Call light within reach      Treatment Initiated: Pt educated with regards to dressing strategies to promote pt functional performance.  Pt pleasant & cooperative throughout the OT process    Goals  Long term goals  Time Frame for Long term goals : 7-10 days  Long term goal 1: Pt demo independent to eat all meals  Long term goal 2: Pt demo Mod I grooming seated or standing  Long term goal 3: Pt demo Sup bathing @ shower level both seated & standing  Long term goal 4: Pt demo I UE & Mod I LE dress & demo G- afety &insight  Long term goal 5: Pt demo Mod I commode trf with appropriate DME to ensure pt safety  Long term goals 6: Pt demo Sup tub trf with appropriate DME to ensure pt safety  Long term goal 7: Pt demo Mod I light homemaking activity & demo G- safety  Long term goal 8: Pt demo G- endurance for a 20-30 minute functional activity  Long term goal 9: Pt demo improved coordination as evidenced by 9-hole peg test   Patient Goals   Patient goals : \"I want to be able to dust.\"     Therapy Time   Individual Concurrent Group Co-treatment   Time In 700-OT eval  715-OT rx         Time Out 715-OT eval  800-OT rx         Minutes 60 Min OT total  15 Min TO eval  45 MIn OT rx          Rusty Rosas, OT

## 2019-05-29 NOTE — PLAN OF CARE
Jaylon Pila fully cooperative w admission interview. Sl wobbliness when walking; did well w the distance she walked ( 125 ft) w steadying / hands on assist.    C/o residual sl L arm heaviness discomfort but declining tyl.   sl very L leg heaviness. Articulate, sweet, alert, conversant re many details re her hw fr/fam on ph.      States she has living will. Not requesting dnr status.

## 2019-05-29 NOTE — H&P
Post Admission Physician Evaluation      Patient Identification  Jonathan Maharaj is a 66 y.o. female. :  1940  Admit Date:  2019  Attending Provider: Jeannie Joshi MD                                  Primary Care Physician:  Adele Sanchez DO   Consultants:  None  Admitting Diagnosis: Ischemic stroke [facility]) [I63.9]    Indication for Rehabilitation Admission:  Stroke:  01.1  Left Body Involvement (Right Brain)    The main medical problem(s) being actively managed by the physicians and requiring 24 hour rehabilitation nursing care during this stay include close neuro follow up, cardiac monitoring, dvt prophylaxis, BP control and comprehensive inpatient rehab program.    This patient has rehabilitation potential for functional improvement. The domains of functional impairment present in this patient which will require an intensive and interdisciplinary rehabilitation environment include self care, mobility, motor dysfunction and safety. History of present illness: The patient is a 66 y.o. female with significant past medical history of CAD s/p stent who presented to [facility] with chest pain, sweats, nausea and vomiting and (L) sided weakness, numbness and tingling. MRI showed multiple infarcts of questionable acuity and workup was negative for cardiac etiology. She was seen by neurology and diagnosed with MCA territory stroke. She was seen by cardiology and pharmacologic stress test was negative. TTE was negative for thrombus. She now presents to the inpatient rehab unit to increase her level of function, independence, safety and mobility. She denies pain complaints. Still reports difficulty controlling the (L) and tingling at times.  She is agreeable to an inpatient rehab program.     Past Medical History:   Past Medical History:   Diagnosis Date    Arthritis     Back problem     CAD (coronary artery disease)     PTCA with DAMON to prox LAD, PTCA DAMON to ostial lesion of RCA and BMS to prox lesion RCA DAMON to RCA ostial     Hyperlipidemia     Hypertension     Nausea & vomiting     Reflux     RLD (ruptured lumbar disc)     Scoliosis     Spinal stenosis       Past Surgical History:   Procedure Laterality Date    CATARACT REMOVAL      bilateral    CORONARY ANGIOPLASTY  2/25/2010    PTCA with DAMON in the proximal left anterior descending    CORONARY ANGIOPLASTY  3/11/2010    PTCA and deployment of 2 stents in the ostium and the proximal segment of RCA    CORONARY ANGIOPLASTY  5/19/2010    PTCA DAMON to ostial RCA patent LAD and RCA BMS  restenosis of RCA    CORONARY ANGIOPLASTY WITH STENT PLACEMENT      times 3    DIAGNOSTIC CARDIAC CATH LAB PROCEDURE  2/25/2010    Dr. Salvatore Sanchez: Cardiac cath with angioplasty follow up    Rodolfo Mcnair CATH LAB PROCEDURE  3/11/2010    Cardiac cath and stent placement    DIAGNOSTIC CARDIAC CATH LAB PROCEDURE  5/19/2010    Cardiac cath heart lab and subsequent angiopilsaty    FRACTURE SURGERY      tight wrist    SHOULDER ARTHROSCOPY  08 25 2011    left shoulder arthroscopy ,acromioplasty,busectomy, release of adhesions       Allergies: Aspirin and Lipitor    Medications:   Prior to Admission medications    Medication Sig Start Date End Date Taking? Authorizing Provider   simvastatin (ZOCOR) 20 MG tablet Take 20 mg by mouth nightly   Yes Historical Provider, MD   aspirin 81 MG EC tablet Take 1 tablet by mouth daily 5/26/19  Yes Grace Gamboa MD   clopidogrel (PLAVIX) 75 MG tablet Take 1 tablet by mouth daily 5/26/19  Yes Grace Gamboa MD   butalbital-acetaminophen-caffeine (FIORICET, ESGIC) -40 MG per tablet Take 1 tablet by mouth every 4 hours as needed for Migraine   Yes Historical Provider, MD   ezetimibe-simvastatin (VYTORIN) 10-40 MG per tablet Take 1 tablet by mouth nightly. Yes Historical Provider, MD   lisinopril (PRINIVIL;ZESTRIL) 40 MG tablet Take 40 mg by mouth daily.      Yes Historical Provider, MD   bimatoprost (LUMIGAN) 0.01 % Motor:  Right   5/5              Left   4-/5               Right Bicep  5/5           Left Bicep  4/5              Right Triceps   5/5       Left Trceps  4+/5          Right Deltoid  4+/5     Left Deltoid  4-/5         Right IPS  4+/5            Left IPS  4/5               Right Quadriceps  5/5          Left Quadriceps    4/5           Right Gastrocnemius    2/5    Left Gastrocnemius   2/5  Right Ant Tibialis   5/5  Left Ant Tibialis   5-/5        Sensory:  normal to light touch (B) UE and LE  and no extinction to DSS        Gait: not tested due to safety concerns      Coordination:   Decreased on (L) side      DTR:   Right Brachioradialis reflex 1+  Left Brachioradialis reflex 2+  Right Biceps reflex 1+  Left Biceps reflex 2+  Right Triceps reflex 1+  Left Triceps reflex 1+  Right Quadriceps reflex 2+  Left Quadriceps reflex 3+  Right Achilles reflex 1+  Left Achilles reflex 1+      ASSESSMENT AND PLAN      Patient Active Problem List   Diagnosis    Adhesive capsulitis of shoulder    Bursitis of left shoulder    Impingement syndrome of shoulder    Coronary artery disease involving native coronary artery of native heart without angina pectoris    Spinal stenosis    Scoliosis    Hypertension    Hyperlipidemia    Chest pain    Stroke [facility])    [facility] [address]    Ischemic stroke [address] [address]       1:  Primary indication for inpatient rehabilitation admission over other settings:  Stroke:  01.1  Left Body Involvement (Right Brain)  2:  Comorbidities:  Comorbid Conditions in addition to those listed above None  3. Estimated length of stay:  2 weeks  4. Anticipated disposition:  Home. The potential to achieve that is very good.   5:  Precautions:  falls, infections and skin      Patient Active Problem List   Diagnosis    Adhesive capsulitis of shoulder    Bursitis of left shoulder    Impingement syndrome of shoulder    Coronary artery disease involving native coronary artery of native heart without angina pectoris    Spinal stenosis    Scoliosis    Hypertension    Hyperlipidemia    Chest pain    Stroke [facility])    [facility] [address]    Ischemic stroke [address] [address]         Admitted for comprehensive inpatient rehabilitation including PT, OT, RT, SLP, and supportive services to improve functional outcome of impaired mobility, ADL's, transfers, and self-care. Rehabilitation Potential: good      IOPOC: PT 90 minutes per day 5-7 days per week with focus on strengthening, balance, transfers, ambulation and elevations; OT 90 minutes per day 5-7 days per week with focus on functional transfers, balance, fine motor, UE strengthening, equipment needs, sensory motor skills; SLP 30 minutes per day 5-7 days per week for cognitive assessment; SS for discharge planning and rehab nursing 24/7 for skin care, bowel and bladder management, education and carryover. Need for rehab in this setting as increased risk of poor outcome at lower level of care. Close monitoring of dvt prophylaxis and neuro status and secondary stroke prevention. Current Functional Status:   Balance  Sitting Balance: Stand by assistance  Standing Balance: Contact guard assistance  Functional Mobility  Functional - Mobility Device: Rolling Walker  Activity: To/from bathroom  Assist Level: Contact guard assistance  Functional Mobility Comments: min vc's for hand placement & safety when turning     Toilet Transfers  Toilet - Technique: Ambulating  Equipment Used: Standard toilet  Toilet Transfer: Minimal assistance  Toilet Transfers Comments: min vc's for walker managemetn & safety     ADL  Feeding: Setup(assist to open small packages.)  Grooming: Minimal assistance  UE Bathing: Supervision; Increased time to complete;Setup  LE Bathing: Setup;Verbal cueing; Increased time to complete;Minimal assistance  UE Dressing: Supervision; Increased time to complete;Verbal cueing;Setup  LE Dressing: Setup;Verbal cueing;Minimal assistance; Increased time to complete  Toileting: Minimal assistance      Coordination  Movements Are Fluid And Coordinated: No  Coordination and Movement description: Fine motor impairments; Left UE  Bed mobility  Supine to Sit: Stand by assistance  Scooting: Stand by assistance  Transfers  Stand Pivot Transfers: Contact guard assistance  Sit to stand: Stand by assistance  Stand to sit: Stand by assistance      Vision - Basic Assessment  Prior Vision: Wears glasses all the time  Visual History: No significant visual history  Patient Visual Report: No visual complaint reported.      Cognition  Overall Cognitive Status: Exceptions  Memory: Decreased short term memory  Problem Solving: Assistance required to identify errors made;Assistance required to generate solutions   Sensation  Overall Sensation Status:grossly intact      LUE AROM: LUE lag slightly behind RUE but AROM >3/4 in all pklanes with increased time to reach end range  Left Hand General AROM: Pt has a gross grasp & release. Pt was also able to oppose her thumb to each digit  RUE AROM : WFL  Right Hand AROM: WFL     LUE Strength  L Hand Grasp: 3+/5  L Hand Release: 3+/5  LUE Strength Comment: 4-/5 strength   RUE Strength  R Hand Grasp: 4+/5  R Hand Release: 4+/5  RUE Strength Comment: 4+/5 strength throughout  Hand Dominance: Right        Initial Evaluation  5/29/19       Short Term Goals Long Term Goals    Was pt agreeable to Eval/treatment? yes           Does pt have pain?  No pain            Bed Mobility  Rolling: sba  Supine to sit: sba  Sit to supine: sba  Scooting: sba     Sup Mod I   Transfers Sit to stand: sba  Stand to sit: sba  Stand pivot: cg/sbA with ww     Sup with AAD Mod I with AAD   Ambulation    200 feet with ww with cg/sba     300 feet with AAD with sup >500 feet with AAD with mod I   Walking 10 feet on uneven surface 10 feet with ww with cg/sba           Wheel Chair Mobility n/a           Car Transfers sba     Sup Mod I   Stair negotiation: ascended and descended  12 steps with bilateral rail with cg/sba     12 steps with one rail with sup >12 steps with one rail with mod I   Curb Step:   ascended and descended 4 inch step with ww and cgA      7.5 inch step with AAD and sup 7.5 inch step with AAD and mod I   Picking up object off the floor Picked up 2# with SBA            BLE ROM WFL           BLE Strength Right LE: 4+/5  Left LE: 4-/5 hip, 4-/5 knee, 3+/5 ankle           Balance  Sitting: sup  Standing: cgA with ww                   Additional comments:I reviewed the patient's new clinical lab test results. Results for Tr Hicks (MRN [id number]) as of 5/29/2019 13:45   Ref.  Range 5/29/2019 06:45   Sodium Latest Ref Range: 132 - 146 mmol/L 138   Potassium Latest Ref Range: 3.5 - 5.0 mmol/L 3.5   Chloride Latest Ref Range: 98 - 107 mmol/L 101   CO2 Latest Ref Range: 22 - 29 mmol/L 24   BUN Latest Ref Range: 8 - 23 mg/dL 13   Creatinine Latest Ref Range: 0.5 - 1.0 mg/dL 0.5   Anion Gap Latest Ref Range: 7 - 16 mmol/L 13   GFR Non- Latest Ref Range: >=60 mL/min/1.73 >60   GFR African American Unknown >60   Magnesium Latest Ref Range: 1.6 - 2.6 mg/dL 2.1   Glucose Latest Ref Range: 74 - 99 mg/dL 163 (H)   Calcium Latest Ref Range: 8.6 - 10.2 mg/dL 8.9   Total Protein Latest Ref Range: 6.4 - 8.3 g/dL 6.4   Albumin Latest Ref Range: 3.5 - 5.2 g/dL 4.1   Alk Phos Latest Ref Range: 35 - 104 U/L 84   ALT Latest Ref Range: 0 - 32 U/L 17   AST Latest Ref Range: 0 - 31 U/L 23   Bilirubin Latest Ref Range: 0.0 - 1.2 mg/dL 0.2   WBC Latest Ref Range: 4.5 - 11.5 E9/L 7.2   RBC Latest Ref Range: 3.50 - 5.50 E12/L 3.73   Hemoglobin Quant Latest Ref Range: 11.5 - 15.5 g/dL 11.6   Hematocrit Latest Ref Range: 34.0 - 48.0 % 35.2   MCV Latest Ref Range: 80.0 - 99.9 fL 94.4   MCH Latest Ref Range: 26.0 - 35.0 pg 31.1   MCHC Latest Ref Range: 32.0 - 34.5 % 33.0   MPV Latest Ref Range: 7.0 - 12.0 fL 9.0   RDW Latest Ref Range: 11.5 - 15.0 fL 11.9   Platelet Count Latest Ref Range: 130 - 450 E9/L 261   Neutrophils % Latest Ref Range: 43.0 - 80.0 % 45.0   Immature Granulocytes % Latest Ref Range: 0.0 - 5.0 % 0.7   Lymphocyte % Latest Ref Range: 20.0 - 42.0 % 34.5   Monocytes % Latest Ref Range: 2.0 - 12.0 % 16.3 (H)   Eosinophils % Latest Ref Range: 0.0 - 6.0 % 3.2   Basophils % Latest Ref Range: 0.0 - 2.0 % 0.3   Neutrophils # Latest Ref Range: 1.80 - 7.30 E9/L 3.22   Immature Granulocytes # Latest Units: E9/L 0.05   Lymphocytes # Latest Ref Range: 1.50 - 4.00 E9/L 2.47   Monocytes # Latest Ref Range: 0.10 - 0.95 E9/L 1.17 (H)   Eosinophils # Latest Ref Range: 0.05 - 0.50 E9/L 0.23   Basophils # Latest Ref Range: 0.00 - 0.20 E9/L 0.02   Prothrombin Time Latest Ref Range: 9.3 - 12.4 sec 12.8 (H)   INR Unknown 1.1

## 2019-05-30 LAB — METER GLUCOSE: 252 MG/DL (ref 74–99)

## 2019-05-30 PROCEDURE — 97530 THERAPEUTIC ACTIVITIES: CPT

## 2019-05-30 PROCEDURE — 97535 SELF CARE MNGMENT TRAINING: CPT

## 2019-05-30 PROCEDURE — 97110 THERAPEUTIC EXERCISES: CPT

## 2019-05-30 PROCEDURE — 82962 GLUCOSE BLOOD TEST: CPT

## 2019-05-30 PROCEDURE — 97127 HC SP THER IVNTJ W/FOCUS COG FUNCJ: CPT | Performed by: SPEECH-LANGUAGE PATHOLOGIST

## 2019-05-30 PROCEDURE — 1280000000 HC REHAB R&B

## 2019-05-30 PROCEDURE — 6370000000 HC RX 637 (ALT 250 FOR IP): Performed by: FAMILY MEDICINE

## 2019-05-30 PROCEDURE — 6360000002 HC RX W HCPCS: Performed by: PHYSICAL MEDICINE & REHABILITATION

## 2019-05-30 RX ADMIN — CLOPIDOGREL 75 MG: 75 TABLET, FILM COATED ORAL at 08:08

## 2019-05-30 RX ADMIN — ASPIRIN 81 MG: 81 TABLET ORAL at 08:08

## 2019-05-30 RX ADMIN — CARVEDILOL 25 MG: 25 TABLET, FILM COATED ORAL at 16:24

## 2019-05-30 RX ADMIN — METFORMIN HYDROCHLORIDE 500 MG: 500 TABLET ORAL at 16:24

## 2019-05-30 RX ADMIN — LISINOPRIL 40 MG: 20 TABLET ORAL at 08:08

## 2019-05-30 RX ADMIN — SIMVASTATIN 40 MG: 40 TABLET, FILM COATED ORAL at 21:06

## 2019-05-30 RX ADMIN — ENOXAPARIN SODIUM 40 MG: 40 INJECTION, SOLUTION INTRAVENOUS; SUBCUTANEOUS at 08:07

## 2019-05-30 RX ADMIN — METFORMIN HYDROCHLORIDE 500 MG: 500 TABLET ORAL at 08:08

## 2019-05-30 RX ADMIN — CARVEDILOL 25 MG: 25 TABLET, FILM COATED ORAL at 08:08

## 2019-05-30 ASSESSMENT — PAIN DESCRIPTION - FREQUENCY: FREQUENCY: CONTINUOUS

## 2019-05-30 ASSESSMENT — PAIN DESCRIPTION - PROGRESSION: CLINICAL_PROGRESSION: NOT CHANGED

## 2019-05-30 ASSESSMENT — PAIN DESCRIPTION - ONSET: ONSET: ON-GOING

## 2019-05-30 ASSESSMENT — PAIN SCALES - GENERAL
PAINLEVEL_OUTOF10: 6
PAINLEVEL_OUTOF10: 0

## 2019-05-30 ASSESSMENT — PAIN DESCRIPTION - PAIN TYPE: TYPE: CHRONIC PAIN

## 2019-05-30 ASSESSMENT — PAIN DESCRIPTION - DESCRIPTORS: DESCRIPTORS: ACHING;DULL;DISCOMFORT

## 2019-05-30 ASSESSMENT — PAIN DESCRIPTION - LOCATION: LOCATION: GENERALIZED

## 2019-05-30 NOTE — PROGRESS NOTES
Speech Language Pathology  DAILY PROGRESS NOTE  Speech Language Pathology  ACUTE REHABILITATION--DAILY PROGRESS NOTE      PATIENT NAME:  Patric Hummel      :  1940      TODAY'S DATE:  2019         SWALLOWING        CURRENT LEVEL 7    Diet: regular, thin as tolerated     LANGUAGE     Receptive  Current Status  6      Short Term Goal 6       Long Term Goal 6     Expressive  Current Status  5      Short Term Goal 6       Long Term Goal 6            INTERVENTION:  Pt trained on comp strats for word finding. COGNITION:      Problem Solving Current Status  5      Short Term Goal 6       Long Term Goal 6       Memory  Current Status  5      Short Term Goal 6       Long Term Goal 6      INTERVENTION: Pt completed mod level money management task at 90%. Pt completed visual safety task with fair-good ability. SPEECH:       INTERVENTION: functioanl      Cognitive/linguistic supervision recommendations      24 hour supervision    Close supervision   Intermittent supervision    No supervision                              x                                       Oral feeding recommendations               Close supervision Remote  supervision  No supervision necessary    Not applicable (Pt NPO)                                                 x                                                                                                                                                                   Will continue SP intervention as per previously established POC.     Three Hour Rule Tracking:    Individual therapy:    minutes  Concurrent therapy:  45 minutes  Group therapy:    minutes  Co-treatment therapy:   minutes    Total minutes for 2019: 45 minutes

## 2019-05-30 NOTE — PROGRESS NOTES
Training, Equipment Evaluation, Education, & procurement, Self-Care / ADL, Balance Training, Gait Training, Home Management Training, Functional Mobility Training, Safety Education & Training        [x] Continue with current OT Plan of care. [] Prepare for Discharge     DISCHARGE RECOMMENDATIONS  Recommended DME:    Post Discharge Care:   []Home Independently  []Home with 24hr Care / Supervision []Home with Partial Supervision []Home with Home Health OT []Home with Out Pt OT []Other: ___   Comments:         Time in Time out Tx Time Breakdown  Variance:   First Session   [id number] [x] Individual Tx- 45 min  [] Concurrent Tx -  [] Co-Tx -   [] Group Tx -   [] Time Missed -     Second Session 1:00 1:45 [] Individual Tx-   [x] Concurrent Tx -45 Mins  [] Co-Tx -   [] Group Tx -   [] Time Missed -     Third Session    [] Individual Tx-   [] Concurrent Tx -  [] Co-Tx -   [] Group Tx -   [] Time Missed -         Total Tx Time  90 Mins   [phone] [id number]  I have read the above note and agree with the documentation.   Dinah Zhu OTR/L [id number]

## 2019-05-30 NOTE — PROGRESS NOTES
Physical Therapy    Facility/Department: [address] REHAB  Weekly Progress Note    NAME: Silverio Bedoya  : 1940  MRN: [id number]    Date of Service: 2019    Evaluating Therapist: Frank Martin DPT    ROOM: [address] [place] [address]  DIAGNOSIS: Ischemic CVA  PMH: To ED on 19 for chest pain/left sided weakness. MRI of the brain that a scattering of infarcts in the [facility] distribution along the distribution of a branch artery. Echo with bubble showed small septal aneurysm with no shunting. PMH includes cataract removal, hx of 3 cardiac stents in , CAD, HLD, HTN, scoliosis    PRECAUTIONS: falls,      Social:  Pt lives alone in a 1 floor apt with 3 steps and 2 rails to enter. Prior to admission pt walked with no AD, but uses quad cane PRN in community.       Initial Evaluation  19   Comments Short Term Goals Long Term Goals    Bed Mobility  Rolling: sba  Supine to sit: sba  Sit to supine: sba  Scooting: sba Rolling: sup  Supine to sit: sup  Sit to supine: sup  Scooting: sup  Sup Mod I   Transfers Sit to stand: sba  Stand to sit: sba  Stand pivot: cg/sbA with ww Sit to stand: sba/sup  Stand to sit: sba/sup  Stand pivot: sbA with ww Occasional cues for hand placement Sup with AAD Mod I with AAD   Ambulation    200 feet with ww with cg/sba 300' sba with ww     250' without AD cg/sba Ataxia noted with L LE, especially without AD.  300 feet with AAD with sup >500 feet with AAD with mod I   Wheel Chair Mobility n/a n/a      Car Transfers sba sba  Sup Mod I   Stair negotiation: ascended and descended  12 steps with bilateral rail with cg/sba 12 steps with bilateral HR NR pattern sba  12 steps with one rail with sup >12 steps with one rail with mod I   Curb Step:   ascended and descended 4 inch step with ww and cgA  4 in curb step with ww sba  7.5 inch step with AAD and sup 7.5 inch step with AAD and mod I   BLE ROM WFL WFL      BLE Strength Right LE: 4+/5  Left LE: 4-/5 hip, 4-/5 knee, 3+/5 ankle Right LE: 4+/5  Left LE: 4-/5 hip, 4-/5 knee, 3+/5 ankle      Balance  Sitting: sup  Standing: cgA with ww Sitting: Independent   Standing: cg/sba with ww      Date Family Teach Completed TBA TBA      Is additional Family Teaching Needed? Y or N Y Y      Hindering Progress Balance, safety, left LE weakness Balance, safety, ataxia, L LE weakness      PT recommended ELOS 10 days 9-10 days      Team's Discharge Plan  7-10 days      Therapist at [facility], Dilcia Noriega PT, DPT [id number]          Date:  5/30/19  Supporting factors:  High PLOF, motivated  Barriers to discharge:  Ataxia, balance, L LE weakness, occasional safety verbal cues  Additional comments: Improving with all aspects of mobility. Ataxia noted with L LE, especially more prominent without AD. Occasional safety cues such as hand placement/approximation of ww. Limited support system at home noted.    DME:  TBD (anticipate ww)  After Care:  TBD (anticipate OPPT)    Sweta Truong DPT   [id number]

## 2019-05-30 NOTE — PROGRESS NOTES
Physical Therapy    Facility/Department: [address] REHAB  Daily Treatment Note    NAME: Thomas Brown  : 1940  MRN: [id number]    Date of Service: 2019    Evaluating Therapist: Robbie Anglin DPT    ROOM: [address] [place] [address]  DIAGNOSIS: Ischemic CVA  PMH: To ED on 19 for chest pain/left sided weakness. MRI of the brain that a scattering of infarcts in the [facility] distribution along the distribution of a branch artery. Echo with bubble showed small septal aneurysm with no shunting. PMH includes cataract removal, hx of 3 cardiac stents in , CAD, HLD, HTN, scoliosis    PRECAUTIONS: falls,      Social:  Pt lives alone in a 1 floor apt with 3 steps and 2 rails to enter. Prior to admission pt walked with no AD, but uses quad cane PRN in community. Initial Evaluation  19  AM PM    Short Term Goals Long Term Goals    Was pt agreeable to Eval/treatment? yes yes yes     Does pt have pain?  No pain  No c/o pain  No c/o pain      Bed Mobility  Rolling: sba  Supine to sit: sba  Sit to supine: sba  Scooting: sba  Rolling: sup  Supine to sit: sup  Sit to supine: sup  Scooting: sup Sup Mod I   Transfers Sit to stand: sba  Stand to sit: sba  Stand pivot: cg/sbA with ww Sit to stand: sba/sup  Stand to sit: sba/sup  Stand pivot: sbA with ww Sit to stand: sba/sup   Stand to sit: sba/sup  Stand pivot: sbA with ww Sup with AAD Mod I with AAD   Ambulation    200 feet with ww with cg/sba 300' sba with ww    250' without AD cg/sba 300 feet x2 with ww with sba/sup    200' without AD cg/sba 300 feet with AAD with sup >500 feet with AAD with mod I   Walking 10 feet on uneven surface 10 feet with ww with cg/sba       Wheel Chair Mobility n/a       Car Transfers sba sba NT Sup Mod I   Stair negotiation: ascended and descended  12 steps with bilateral rail with cg/sba 12 steps with bilateral HR NR pattern sba 12 steps bilateral HR with sba  12 steps with one rail with sup >12 steps with one rail with mod I   Curb Step:   ascended and descended 4 inch step with ww and cgA  4 in curb step with ww sba 4 in curb step with ww sbA 7.5 inch step with AAD and sup 7.5 inch step with AAD and mod I   Picking up object off the floor Picked up 2# with SBA        BLE ROM WFL       BLE Strength Right LE: 4+/5  Left LE: 4-/5 hip, 4-/5 knee, 3+/5 ankle       Balance  Sitting: sup  Standing: cgA with ww       Date Family Teach Completed TBA       Is additional Family Teaching Needed? Y or N Y       Hindering Progress Balance, safety, left LE weakness       PT recommended ELOS 10 days       Team's Discharge Plan        Therapist at Team Meeting          Therapeutic Exercise:   AM: 5x STS transfer at w/c, occasional cues for hand placement  Alternating toe taps to various height/texture objects in // bars without UE support, Jaime for balance t/o, ataxia noted with L LE and difficulty maintaining balance with SLS on L LE  Alternating toe taps without UE support x15 2 in curb step with no LOB for coordination/balance    PM: 5x STS transfer with purple tband to increase hip abduction strength x2 reps   Standing romberg sequence NBOS EO/EC with increased sway but no LOB, tandem stance with increase sway/balance loss noted with EO and L LE stance leg  Ambulation with ww with 2.5# on L LE to reduce ataxia with improvement in foot placement     Patient education  Pt educated on safety with mobility, foot placement with ambulation, seqeuncing with curb/stair negotiation    Patient response to education:   Pt verbalized understanding Pt demonstrated skill Pt requires further education in this area   yes Yes with occasional cues yes     Additional Comments: Reviewed all mobility. Improvement in approximation of ww t/o session. Occasional cues required for hand placement with transfers, as well as safety with SPT with ww as pt tending to move quick at times to surfaces.  Continued to trial ambulation without AD with occasional stepping outside [facility] to recover balance due

## 2019-05-31 PROCEDURE — 1280000000 HC REHAB R&B

## 2019-05-31 PROCEDURE — 97127 HC SP THER IVNTJ W/FOCUS COG FUNCJ: CPT | Performed by: SPEECH-LANGUAGE PATHOLOGIST

## 2019-05-31 PROCEDURE — 6360000002 HC RX W HCPCS: Performed by: PHYSICAL MEDICINE & REHABILITATION

## 2019-05-31 PROCEDURE — 6370000000 HC RX 637 (ALT 250 FOR IP): Performed by: FAMILY MEDICINE

## 2019-05-31 PROCEDURE — 97110 THERAPEUTIC EXERCISES: CPT

## 2019-05-31 PROCEDURE — 97530 THERAPEUTIC ACTIVITIES: CPT

## 2019-05-31 PROCEDURE — 6370000000 HC RX 637 (ALT 250 FOR IP): Performed by: PHYSICAL MEDICINE & REHABILITATION

## 2019-05-31 PROCEDURE — 97535 SELF CARE MNGMENT TRAINING: CPT

## 2019-05-31 RX ADMIN — METFORMIN HYDROCHLORIDE 500 MG: 500 TABLET ORAL at 15:54

## 2019-05-31 RX ADMIN — CLOPIDOGREL 75 MG: 75 TABLET, FILM COATED ORAL at 09:05

## 2019-05-31 RX ADMIN — METFORMIN HYDROCHLORIDE 500 MG: 500 TABLET ORAL at 09:06

## 2019-05-31 RX ADMIN — CARVEDILOL 25 MG: 25 TABLET, FILM COATED ORAL at 09:05

## 2019-05-31 RX ADMIN — LISINOPRIL 40 MG: 20 TABLET ORAL at 09:05

## 2019-05-31 RX ADMIN — SIMVASTATIN 40 MG: 40 TABLET, FILM COATED ORAL at 21:55

## 2019-05-31 RX ADMIN — ACETAMINOPHEN 650 MG: 325 TABLET, FILM COATED ORAL at 05:33

## 2019-05-31 RX ADMIN — ACETAMINOPHEN 650 MG: 325 TABLET, FILM COATED ORAL at 23:04

## 2019-05-31 RX ADMIN — CARVEDILOL 25 MG: 25 TABLET, FILM COATED ORAL at 15:54

## 2019-05-31 RX ADMIN — ENOXAPARIN SODIUM 40 MG: 40 INJECTION, SOLUTION INTRAVENOUS; SUBCUTANEOUS at 09:05

## 2019-05-31 RX ADMIN — ASPIRIN 81 MG: 81 TABLET ORAL at 09:05

## 2019-05-31 ASSESSMENT — PAIN SCALES - GENERAL
PAINLEVEL_OUTOF10: 0
PAINLEVEL_OUTOF10: 0
PAINLEVEL_OUTOF10: 3
PAINLEVEL_OUTOF10: 0
PAINLEVEL_OUTOF10: 8
PAINLEVEL_OUTOF10: 5

## 2019-05-31 NOTE — PROGRESS NOTES
Occupational Therapy  OCCUPATIONAL THERAPY DAILY NOTE    Date:2019  Patient Name: Aaliyah Queen  MRN: [id number]  : 1940  Room: [address] [place], [place] [address]     Diagnosis: ischemic CVA  Precautions: Fall Risk & L inattention noted    Functional Assessment:   Date Status AE  Comments   Feeding 19 Set up      Grooming 19 SBA ww Standing at sink pt styled hair and brushed teeth. Seated in shower pt washed face and hair. Bathing 19 SBA Grab bar, handheld shower UB and LB bathing completed seated and standing. SBA for stand balance using grab bar to stabilize. UB Dressing 19 Mod I  Don bra and button up shirt seated. LB Dressing 19 Set up   Don underwear, pants, socks and shoes. Homemaking         Functional Transfers / Balance:   Date Status DME  Comments   Sit Balance 19 SBA     Stand Balance 19 SBA ww    [] Tub  [x] Shower   Transfer 19 SBA     Commode   Transfer 19 Min A ww    Functional   Mobility 19 SBA ww    Other:  WC<>Bed   19   CGA    SPT     Functional Exercises / Activity:  FMC nuts and bolts activity with focus on B hand and [address] for increased independence with functional tasks and transfers. Pt completed with fair skill, occasionally dropping pieces. Min resistive eduardo bar X 25 reps on 4 planes to increase BUE forearm, wrist and  strength for independence with functional tasks. Standing at table top pt completed arm bike 2 X 5 Mins forward/backward to increase BUE strength, endurance and balance for independence with functional tasks and transfers. S for stand balance. Sensory / Neuromuscular Re-Education:      Cognitive Skills:   Status Comments   Problem   Solving fair     Memory fair  Decreased STM   Sequencing fair     Safety fair       Visual Perception:    Education:  Pt educated on hand placement and safety during SPT.      [] Family teach completed on:    Pain Level: 0/10    Additional Notes:       Patient has made good progress during treatment sessions toward set goals. Therapy emphasis to obtain goals: Strengthening, Endurance Training, Neuromuscular Re-education, Patient/Caregiver Education & Training, Equipment Evaluation, Education, & procurement, Self-Care / ADL, Balance Training, Gait Training, Home Management Training, Functional Mobility Training, Safety Education & Training        [x] Continue with current OT Plan of care. [] Prepare for Discharge     DISCHARGE RECOMMENDATIONS  Recommended DME:    Post Discharge Care:   []Home Independently  []Home with 24hr Care / Supervision []Home with Partial Supervision []Home with Home Health OT []Home with Out Pt OT []Other: ___   Comments:         Time in Time out Tx Time Breakdown  Variance:   First Session  8:20 8:55 [x] Individual Tx- 35 min  [] Concurrent Tx -  [] Co-Tx -   [] Group Tx -   [] Time Missed -     Second Session 1:00 1:45 [x] Individual Tx- 45 Mins  [] Concurrent Tx -  [] Co-Tx -   [] Group Tx -   [] Time Missed -     Third Session    [] Individual Tx-   [] Concurrent Tx -  [] Co-Tx -   [] Group Tx -   [] Time Missed -         Total Tx Time  80 Mins   Elliott De Leon   GRAMAJO/L [id number]  I have read the above note and agree with the documentation.   Susan Weathers OTR/L [id number]

## 2019-05-31 NOTE — PROGRESS NOTES
Progress Note    Subjective/   66y.o. year old female on the rehab unit for stroke. She denies chest pain or palpitations. No SOB. Tolerating therapy program.            Objective/   VITALS:  BP (!) 130/47   Pulse 86   Temp 98.7 °F (37.1 °C) (Temporal)   Resp 16   Ht 4' 9\" (1.448 m)   Wt 80 lb 3.2 oz (36.4 kg)   SpO2 97%   BMI 17.36 kg/m²   24HR INTAKE/OUTPUT:      Intake/Output Summary (Last 24 hours) at 5/30/2019 2250  Last data filed at 5/30/2019 0800  Gross per 24 hour   Intake 120 ml   Output --   Net 120 ml     Constitutional:  Alert, awake, no apparent distress   Cardiovascular:  S1, S2 without m/r/g   Respiratory:  CTA B without w/r/r   Abdomen: +BS  Ext: no pitting LE edema, no calf tenderness  Neuro: awake and alert, no tremor. (L) weakness. Good sitting balance    Functional Level        Date   Status   AE    Comments     Feeding   5/29/19   Set up              Grooming   5/29/19   Min A             Bathing   5/29/19   UB: set up    LB: Min A           UB Dressing   5/29/19   Supervision             LB Dressing   5/30/19   Set up        To fausto/doff socks, shoes, pants using cross over method      Homemaking                          Functional Transfers / Balance:      Date Status DME  Comments   Sit Balance 5/29/19   SBA       Stand Balance 5/29/19   CGA ww     [] Tub  [] Shower   Transfer 5/29/19   TBA       Commode   Transfer 5/29/19   Min A ww     Functional   Mobility 5/29/19   Min A ww     Other:  WC<>Bed    5/29/19    CGA      SPT      Functional Exercises / Activity:  Pt seated completed B UE ex's focusing on UE strength to increase independence with transfers, and ADL tasks;   MIN resistant can do bar 2 x 20 reps 3 planes;   3# shoulder ladder up/down 2 x 5 reps;   5# weighted box 2 x 20 reps;    Power gripper 2 x 25 reps B  UE      Pt completed FMC/GMC activities with L UE  to increase independence with ADL tasks;  Prehension clips yellow-green;   Stacking cones with L UE wearing secondary stroke prevention  5. Continue dvt prophylaxis  6.  For team conference in am          Javi Dan MD

## 2019-05-31 NOTE — PROGRESS NOTES
Speech Language Pathology  DAILY PROGRESS NOTE  Speech Language Pathology  ACUTE REHABILITATION--DAILY PROGRESS NOTE      PATIENT NAME:  Rola Alas      :  1940      TODAY'S DATE:  2019         SWALLOWING        CURRENT LEVEL 7    Diet: regular, thin as tolerated     LANGUAGE     Receptive  Current Status  6      Short Term Goal 6       Long Term Goal 6     Expressive  Current Status  5      Short Term Goal 6       Long Term Goal 6            INTERVENTION:  Pt trained on comp strats for word finding. Good follow through noted. No word finding difficulties noted during session. COGNITION:      Problem Solving Current Status  5      Short Term Goal 6       Long Term Goal 6       Memory  Current Status  5      Short Term Goal 6       Long Term Goal 6      INTERVENTION: Pt completed simple money management task at 100%. Pt completed category sorting visual task at 100% this date. SPEECH:       INTERVENTION: Functional       Cognitive/linguistic supervision recommendations      24 hour supervision    Close supervision   Intermittent supervision    No supervision                              x                                       Oral feeding recommendations               Close supervision Remote  supervision  No supervision necessary    Not applicable (Pt NPO)                                                 x                                                                                                                                                                   Will continue SP intervention as per previously established POC.     Three Hour Rule Tracking:    Individual therapy:   30 minutes  Concurrent therapy:  minutes  Group therapy:    minutes  Co-treatment therapy:   minutes    Total minutes for 2019: 30 minutes

## 2019-05-31 NOTE — PROGRESS NOTES
Progress Note    Subjective/   66y.o. year old female on the rehab unit for stroke. She denies SOB or chest pain. Mild stomach upset. No dizziness. No chills or sweats. Objective/   VITALS:  /61   Pulse 77   Temp 97.5 °F (36.4 °C) (Temporal)   Resp 16   Ht 4' 9\" (1.448 m)   Wt 80 lb 3.2 oz (36.4 kg)   SpO2 98%   BMI 17.36 kg/m²   24HR INTAKE/OUTPUT:      Intake/Output Summary (Last 24 hours) at 5/31/2019 0448  Last data filed at 5/30/2019 2315  Gross per 24 hour   Intake 230 ml   Output --   Net 230 ml     Constitutional:  Alert, awake, no apparent distress   Cardiovascular:  S1, S2 without m/r/g   Respiratory:  CTA B without w/r/r   Abdomen: +BS  Ext: no pitting LE edema  Neuro: awake and alert, good sitting balance    Functional Level      Initial Evaluation  5/29/19  AM PM    Short Term Goals Long Term Goals    Was pt agreeable to Eval/treatment? yes yes yes       Does pt have pain?  No pain  No c/o pain  No c/o pain        Bed Mobility  Rolling: sba  Supine to sit: sba  Sit to supine: sba  Scooting: sba Rolling: mod I  Supine to sit: mod I  Sit to supine: mod I  Scooting: mod I Rolling: mod I  Supine to sit: mod I  Sit to supine: mod I  Scooting: mod I Sup Mod I   Transfers Sit to stand: sba  Stand to sit: sba  Stand pivot: cg/sbA with ww Sit to stand: sba/sup  Stand to sit: sba/sup  Stand pivot: sbA with ww Sit to stand: sba/sup   Stand to sit: sba/sup  Stand pivot: sbA with ww Sup with AAD Mod I with AAD   Ambulation    200 feet with ww with cg/sba 325' sba/sup with ww     300' without AD cg/sba 300 feet x2 without AD sba       300 feet with AAD with sup >500 feet with AAD with mod I   Walking 10 feet on uneven surface 10 feet with ww with cg/sba           Wheel Chair Mobility n/a           Car Transfers sba sba/sup sup Sup Mod I   Stair negotiation: ascended and descended  12 steps with bilateral rail with cg/sba 12 steps with bilateral HR NR pattern sba 12 steps one HR with sba     12 steps with bilateral HR sup  12 steps with one rail with sup >12 steps with one rail with mod I   Curb Step:   ascended and descended 4 inch step with ww and cgA  7.5 in curb step with ww sba 4 in curb step without AD sbA     7.5 in curb step without AD cg/Jaime 7.5 inch step with AAD and sup 7.5 inch step with AAD and mod I   Picking up object off the floor Picked up 2# with SBA            BLE ROM WFL           BLE Strength Right LE: 4+/5  Left LE: 4-/5 hip, 4-/5 knee, 3+/5 ankle           Balance  Sitting: sup  Standing: cgA with ww                   Scheduled Meds:   aspirin  81 mg Oral Daily    carvedilol  25 mg Oral BID WC    clopidogrel  75 mg Oral Daily    lisinopril  40 mg Oral Daily    metFORMIN  500 mg Oral BID WC    simvastatin  40 mg Oral Nightly    enoxaparin  40 mg Subcutaneous Daily     Continuous Infusions:  PRN Meds:ondansetron, magnesium hydroxide, acetaminophen, senna  I/O last 3 completed shifts: In: 350 [P.O.:350]  Out: -   No intake/output data recorded. Labs reviewed  CBC:   Recent Labs     05/29/19  0645   WBC 7.2   HGB 11.6        BMP:    Recent Labs     05/29/19  0645      K 3.5      CO2 24   BUN 13   CREATININE 0.5   GLUCOSE 163*     Hepatic:   Recent Labs     05/29/19  0645   AST 23   ALT 17   BILITOT 0.2   ALKPHOS 84     BNP: No results for input(s): BNP in the last 72 hours. Lipids: No results for input(s): CHOL, HDL in the last 72 hours.     Invalid input(s): LDLCALCU  INR:   Recent Labs     05/29/19  0645   INR 1.1       Assessment/  Patient Active Problem List:     Adhesive capsulitis of shoulder     Bursitis of left shoulder     Impingement syndrome of shoulder     Coronary artery disease involving native coronary artery of native heart without angina pectoris     Spinal stenosis     Scoliosis     Hypertension     Hyperlipidemia     Chest pain     Stroke [facility])     Acute CVA (cerebrovascular accident) ([facility] [address]     Ischemic stroke ([facility]      Plan/  1. See team conference note - ELOS 7-10 days with intermittent supervision  2. Continue rehab program -focus on strength, balance, mobility and reduced care burden  3. Continue current medications  4.  Increase activity as able          Mary Katz MD

## 2019-05-31 NOTE — PROGRESS NOTES
Physical Therapy    Facility/Department: [address] REHAB  Daily Treatment Note    NAME: Evan Schirmer  : 1940  MRN: [id number]    Date of Service: 2019    Evaluating Therapist: Daphnie Christian DPT    [id number]  DIAGNOSIS: Ischemic CVA  PMH: To ED on 19 for chest pain/left sided weakness. MRI of the brain that a scattering of infarcts in the [facility] distribution along the distribution of a branch artery. Echo with bubble showed small septal aneurysm with no shunting. PMH includes cataract removal, hx of 3 cardiac stents in , CAD, HLD, HTN, scoliosis    PRECAUTIONS: falls,      Social:  Pt lives alone in a 1 floor apt with 3 steps and 2 rails to enter. Prior to admission pt walked with no AD, but uses quad cane PRN in community. Initial Evaluation  19  AM PM    Short Term Goals Long Term Goals    Was pt agreeable to Eval/treatment? yes yes yes     Does pt have pain?  No pain  No c/o pain  No c/o pain      Bed Mobility  Rolling: sba  Supine to sit: sba  Sit to supine: sba  Scooting: sba Rolling: mod I  Supine to sit: mod I  Sit to supine: mod I  Scooting: mod I Rolling: mod I  Supine to sit: mod I  Sit to supine: mod I  Scooting: mod I Sup Mod I   Transfers Sit to stand: sba  Stand to sit: sba  Stand pivot: cg/sbA with ww Sit to stand: sba/sup  Stand to sit: sba/sup  Stand pivot: sbA with ww Sit to stand: sba/sup   Stand to sit: sba/sup  Stand pivot: sbA with ww Sup with AAD Mod I with AAD   Ambulation    200 feet with ww with cg/sba 325' sba/sup with ww    300' without AD cg/sba 300 feet x2 without AD sba     300 feet with AAD with sup >500 feet with AAD with mod I   Walking 10 feet on uneven surface 10 feet with ww with cg/sba       Wheel Chair Mobility n/a       Car Transfers sba sba/sup sup Sup Mod I   Stair negotiation: ascended and descended  12 steps with bilateral rail with cg/sba 12 steps with bilateral HR NR pattern sba 12 steps one HR with sba    12 steps with bilateral HR sup 12 steps with one rail with sup >12 steps with one rail with mod I   Curb Step:   ascended and descended 4 inch step with ww and cgA  7.5 in curb step with ww sba 4 in curb step without AD sbA    7.5 in curb step without AD cg/Jaime 7.5 inch step with AAD and sup 7.5 inch step with AAD and mod I   Picking up object off the floor Picked up 2# with SBA        BLE ROM WFL       BLE Strength Right LE: 4+/5  Left LE: 4-/5 hip, 4-/5 knee, 3+/5 ankle       Balance  Sitting: sup  Standing: cgA with ww       Date Family Teach Completed TBA       Is additional Family Teaching Needed?   Y or N Y       Hindering Progress Balance, safety, left LE weakness       PT recommended ELOS 10 days       Team's Discharge Plan        Therapist at Team Meeting          Therapeutic Exercise:   AM: 5x STS transfer with purple tband to increase hip abduction strength x2 reps   Alternating toe taps without UE support x15 2 in curb step with occasional LOB for coordination/balance  Supine bridging 2x10 with purple t-band to increase hip abd/ext/pelvic stability   Ambulation with ww and without AD with 1# weight on left LE to reduce ataxia    PM: 5x STS transfer with purple tband to increase hip abduction strength x2 reps   Alternating toe taps 2 in curb step x15 reps alternating without UE support, occasional retropulsion noted  Curb step 4 in curb step without AD x6 reps, sba consistently with NR pattern  Curb step 7.5 in curb step without AD x4 reps, Jaime initially improving to cgA  Standing romberg sequence NBOS EO/EC with on airex foam pad with increased LOB/sway noted with EC (retropulsion)  Ambulation without AD with 1# on L LE to reduce ataxia with improvement in foot placement   Coordination activity in // bars: no UE support tapping various height objects- ataxia with L LE placement noted and difficulty reaching midline with L SLS     Patient education  Pt educated on safety with mobility, foot placement with ambulation, seqeuncing with curb/stair negotiation    Patient response to education:   Pt verbalized understanding Pt demonstrated skill Pt requires further education in this area   yes Yes with occasional cues yes     Additional Comments: Reviewed all mobility as per above. Occasional cues required for hand placement with transfers as well as safety t/o session (ex. completing stair negotiation with NR pattern however pt. Completing with reciprocal pattern at times when descending). Reduced weight on left LE to 1# with all mobility with improvement in foot placement/reduced ataxia. Ataxia more prominent without AD, as well as occasional episodes of left toes catching during swing through with cues to correct. In PM, emphasis on mobility without AD to challenge balance t/o session, including curb step. Increased difficulty with weight shifting/foot placement with 7.5 in curb step initially that improved with repetition. Occasional cues for heel strike on L LE improving ataxia and foot placement with heel strike, leading to more steady gait. Will continue to challenge balance/coordination with all aspects of mobility. AM  Time in: 1000  Time out: 1045    PM  Time in: 1430  Time out: 1515    Pt is making fair+ progress toward established Physical Therapy goals. Continue with physical therapy current plan of care.     Frank Martin, JOEL  [id number]

## 2019-05-31 NOTE — PATIENT CARE CONFERENCE
[address] [place] [address]  ACUTE REHABILITATION  TEAM CONFERENCE NOTE/PATIENT PLAN OF CARE    Date: 2019  Admission date: 2019  Patient Name: Jonathan Maharaj        MRN: [id number]    : 1940  (74 y.o.)  Gender: female   Rehab diagnosis/surgery with date: Right MCA territory CVA  Impairment Group Code:  1.1    MEDICAL/FUNCTIONAL HISTORY/STATUS:   Admitted with chest pain, left sided tingling and found to have CVA. Pt still has left sided weakness and dysmetria    Consultations/Labs/X-rays: Blood Sugars variable, bp controlled, CBC and chemistry normal    MEDICATION UPDATE:  Added zofran for intermittent nausea, no emesis      NURSING FIMS:    Bowel:   Current level: Independent  Short term bowel goal:  Independent  Long term bowel goal: Independent    Bladder:   Current level:  Independent  Short term bladder goal: Independent  Long term bladder goal: Independent    Toilet Hygiene:   Current level : Minimum assistance  Short term Toilet hygiene goal: Modified Independent  Long term toilet hygiene goal:  Modified Independent    Skin integrity: intact  Pain: generalized, tylenol is effective    NUTRITION    Diet  cardiac  Liquid consistency   thin    SOCIAL INFORMATION:  Lives with: alone  Prior community services:  none  Home Architecture:  1st floor apartment, 3 entry steps, 2 rails  Prior Level of function:  Used quad cane, independent  DME:  owns quad cane and wheeled walker    FAMILY / PATIENT EDUCATION:  Safety and education are ongoing    PHYSICAL THERAPY    Bed mobility:   Current level: Supervision   Short term bed mobility goal: met  Long term bed mobility goal: Modified Independent    Chair/bed transfers:  Current level: Supervision with wheeled walker and verbal cues  Short term Chair/bed transfers goal: Supervision  Long term Chair/bed transfers goal: Modified Independent    Ambulation:   Current level: 300ft with wheeled walker at Stand by Assist  Ataxia, coordiation issues  Short term ambulation goal: 300 feet with Supervision  Long term ambulation goal: 500 feet with Modified Independent    Car transfers:   Current level: Stand by Assist  Short term car transfers goal: Supervision  Long term car transfers goal:Modified Independent    Stairs:   Current level : 12 steps with 2 handrails at contact guard assist to Stand by Assist  Short term stairs goal: 12 steps with 1 rail with Supervision  Long term stairs goal: 12 steps with 1 rail with Modified Independent      Lower Extremity Strength Issues:  RLE 4+/5, left hip 4-/5, left knee 4-/5, left ankle 3+/5      OCCUPATIONAL THERAPY:    Tub/shower:   Current level: To be assessed    Feeding:  Current level: setup  Short term feeding goal: Independent  Long term feeding goal: Independent      Grooming:   Current level: Minimum assistance  Short term grooming goal: Supervision  Long term grooming goal: Modified Independent    Bathing:  Current level: Minimum assistance  Short term bathing goal: Stand by Assist  Long term bathing goal: Supervision      Homemaking:   Current level: To be assessed    Upper body dressing:  Current level: Supervision  Short term upper body dressing goal: Independent  Long term upper body dressing goal: Independent    Lower body dressing:  Current level: Stand by Assist  Short term lower body dressing goal: Modified Independent  Long term lower body dressing goal: Modified Independent      Toilet transfer:   Current level: contact guard assist  Short term toilet transfer goal: Stand by Assist  Long term toilet transfer goal: Modified Independent    Upper body strength issues: left hand grasp 3+/5, LUE strength 4-/5, right hand grasp 4+/5    Other comments: left side inattention      SPEECH THERAPY  Swallowing:  Current level:   Independent, no issues    Comprehension:   Current level: Modified Independent  Short term comprehension goal: Modified Independent  Long term comprehension goal: Modified Independent    Expression:   Current level: Supervision  Short term expression goal: Modified Independent  Long term expression goal: Modified Independent      Problem solving:   Current level: Supervision  Short term problem solving goal: Modified Independent  Long term problem solving goal: Modified Independent    Memory:  Current level: Supervision  Short term memory goal: Modified Independent  Long term memory goal: Modified Independent      Social interaction:  Modified Independent    Safety awareness: fair    Patient/family's personal goals: Have as much of a normal life as possible  Factors supporting goal achievement:  Independent prior to event  Factors hindering goal achievement:  Impaired vision, balance and safety issues    Discharge Plan   Estimated Length of Stay: 7-10 days            Destination: home with intermittent supervision  Services at Discharge: to be determined  Equipment at Discharge: to be determined      INTERDISCIPLINARY TEAM/PHYSICIAN [address]:  Social work planning family teaching, continue to work toward goals          I approve the established interdisciplinary plan of care as documented within the medical record of Patric Hummel. Please see team conference signature page for those in attendance.     Electronically signed by Rony Pugh RN on 5/31/2019 at 8:00 AM

## 2019-05-31 NOTE — CARE COORDINATION
Per Team: ELOS: 7-10days. Updated patient, her Brother-in law Linda Denny and her step-sister [employer]. Goals: MOD I/Independent. Patient has a left inattention and has word finding issues. Recommending intermittent supervision post dc. DME & Aftercare: TBD    FT: Lexa Mora will call and confirm date/time when she finds out which nephew is driving her. Linda Denny states that while he will be in sometime next week, and is closest to the patient, he does live in [place] and requested that we call patients Step-sister [employer] for FT.       [facility] Intern  Jefe Gilman  5/31/2019

## 2019-06-01 PROCEDURE — 97530 THERAPEUTIC ACTIVITIES: CPT

## 2019-06-01 PROCEDURE — 97127 HC SP THER IVNTJ W/FOCUS COG FUNCJ: CPT | Performed by: SPEECH-LANGUAGE PATHOLOGIST

## 2019-06-01 PROCEDURE — 6360000002 HC RX W HCPCS: Performed by: PHYSICAL MEDICINE & REHABILITATION

## 2019-06-01 PROCEDURE — 1280000000 HC REHAB R&B

## 2019-06-01 PROCEDURE — 6370000000 HC RX 637 (ALT 250 FOR IP): Performed by: FAMILY MEDICINE

## 2019-06-01 RX ADMIN — CARVEDILOL 25 MG: 25 TABLET, FILM COATED ORAL at 17:20

## 2019-06-01 RX ADMIN — CARVEDILOL 25 MG: 25 TABLET, FILM COATED ORAL at 09:34

## 2019-06-01 RX ADMIN — ASPIRIN 81 MG: 81 TABLET ORAL at 09:34

## 2019-06-01 RX ADMIN — METFORMIN HYDROCHLORIDE 500 MG: 500 TABLET ORAL at 17:20

## 2019-06-01 RX ADMIN — LISINOPRIL 40 MG: 20 TABLET ORAL at 09:34

## 2019-06-01 RX ADMIN — SIMVASTATIN 40 MG: 40 TABLET, FILM COATED ORAL at 20:39

## 2019-06-01 RX ADMIN — ENOXAPARIN SODIUM 40 MG: 40 INJECTION, SOLUTION INTRAVENOUS; SUBCUTANEOUS at 09:34

## 2019-06-01 RX ADMIN — CLOPIDOGREL 75 MG: 75 TABLET, FILM COATED ORAL at 09:34

## 2019-06-01 RX ADMIN — METFORMIN HYDROCHLORIDE 500 MG: 500 TABLET ORAL at 09:34

## 2019-06-01 ASSESSMENT — PAIN SCALES - GENERAL
PAINLEVEL_OUTOF10: 0

## 2019-06-01 NOTE — PROGRESS NOTES
Occupational Therapy  OCCUPATIONAL THERAPY DAILY NOTE    Date:2019  Patient Name: Paola Macedo  MRN: [id number]  : 1940  Room: [address] [place], [place] [address]     Diagnosis: ischemic CVA  Precautions: Fall Risk & L inattention noted    Functional Assessment:   Date Status AE  Comments   Feeding 19 Set up      Grooming 19 SBA ww     Bathing 19 SBA Grab bar, handheld shower    UB Dressing 19 Mod I     LB Dressing 19 Set up   To don shoes while seated EOB. Homemaking 19 Set up  Pt folded clothes while seated wearing 2# wrist weights. Functional Transfers / Balance:   Date Status DME  Comments   Sit Balance 19 Supervision     Stand Balance 19 SBA ww    [x] Tub  [x] Shower   Transfer 19 SBA  SBA W/w, grab bar    Commode   Transfer 19 Min A ww    Functional   Mobility 19 SBA ww    Other:  WC<>Bed    Various surfaces in apartment   19   CGA    SBA    SPT     Functional Exercises / Activity:  Sensory / Neuromuscular Re-Education:      Cognitive Skills:   Status Comments   Problem   Solving fair     Memory fair  Decreased STM   Sequencing fair     Safety fair       Visual Perception:    Education:  Pt educated on safety during sit<->stand transfers. [] Family teach completed on:    Pain Level: 0/10    Additional Notes:       Patient has made good  progress during treatment sessions toward set goals. Therapy emphasis to obtain goals: Strengthening, Endurance Training, Neuromuscular Re-education, Patient/Caregiver Education & Training, Equipment Evaluation, Education, & procurement, Self-Care / ADL, Balance Training, Gait Training, Home Management Training, Functional Mobility Training, Safety Education & Training        [x] Continue with current OT Plan of care.   [] Prepare for Discharge     DISCHARGE RECOMMENDATIONS  Recommended DME:    Post Discharge Care:   []Home Independently  []Home with 24hr Care / Supervision []Home with Partial Supervision

## 2019-06-01 NOTE — PROGRESS NOTES
Speech Language Pathology  DAILY PROGRESS NOTE  Speech Language Pathology  ACUTE REHABILITATION--DAILY PROGRESS NOTE      PATIENT NAME:  Edgardo Deleon      :  1940      TODAY'S DATE:  2019         SWALLOWING        CURRENT LEVEL 7    Diet: regular, thin as tolerated     LANGUAGE     Receptive  Current Status  6      Short Term Goal 6       Long Term Goal 6     Expressive  Current Status  5      Short Term Goal 6       Long Term Goal 6            INTERVENTION:  Pt trained on comp strats for word finding. Good follow through noted. No word finding difficulties noted during session. COGNITION:      Problem Solving Current Status  5      Short Term Goal 6       Long Term Goal 6       Memory  Current Status  5      Short Term Goal 6       Long Term Goal 6      INTERVENTION: Good sequencing and problem solving noted during adls this date. 6-step written sequencing task completed at 75%. SPEECH:       INTERVENTION: Functional       Cognitive/linguistic supervision recommendations      24 hour supervision    Close supervision   Intermittent supervision    No supervision                              x                                       Oral feeding recommendations               Close supervision Remote  supervision  No supervision necessary    Not applicable (Pt NPO)                                                 x                                                                                                                                                                   Will continue SP intervention as per previously established POC.     Three Hour Rule Tracking:    Individual therapy:    minutes  Concurrent therapy:  30 minutes  Group therapy:    minutes  Co-treatment therapy:   minutes    Total minutes for 2019: 30 minutes

## 2019-06-02 PROCEDURE — 6360000002 HC RX W HCPCS: Performed by: PHYSICAL MEDICINE & REHABILITATION

## 2019-06-02 PROCEDURE — 6370000000 HC RX 637 (ALT 250 FOR IP): Performed by: FAMILY MEDICINE

## 2019-06-02 PROCEDURE — 1280000000 HC REHAB R&B

## 2019-06-02 PROCEDURE — 97530 THERAPEUTIC ACTIVITIES: CPT

## 2019-06-02 RX ORDER — BUTALBITAL, ACETAMINOPHEN AND CAFFEINE 50; 325; 40 MG/1; MG/1; MG/1
1 TABLET ORAL EVERY 4 HOURS PRN
Status: DISCONTINUED | OUTPATIENT
Start: 2019-06-02 | End: 2019-06-07 | Stop reason: HOSPADM

## 2019-06-02 RX ADMIN — ENOXAPARIN SODIUM 40 MG: 40 INJECTION, SOLUTION INTRAVENOUS; SUBCUTANEOUS at 09:39

## 2019-06-02 RX ADMIN — ASPIRIN 81 MG: 81 TABLET ORAL at 09:41

## 2019-06-02 RX ADMIN — CARVEDILOL 25 MG: 25 TABLET, FILM COATED ORAL at 09:41

## 2019-06-02 RX ADMIN — CARVEDILOL 25 MG: 25 TABLET, FILM COATED ORAL at 17:34

## 2019-06-02 RX ADMIN — CLOPIDOGREL 75 MG: 75 TABLET, FILM COATED ORAL at 09:41

## 2019-06-02 RX ADMIN — METFORMIN HYDROCHLORIDE 500 MG: 500 TABLET ORAL at 17:34

## 2019-06-02 RX ADMIN — SIMVASTATIN 40 MG: 40 TABLET, FILM COATED ORAL at 20:44

## 2019-06-02 RX ADMIN — LISINOPRIL 40 MG: 20 TABLET ORAL at 09:41

## 2019-06-02 RX ADMIN — METFORMIN HYDROCHLORIDE 500 MG: 500 TABLET ORAL at 09:40

## 2019-06-02 ASSESSMENT — PAIN SCALES - GENERAL
PAINLEVEL_OUTOF10: 0

## 2019-06-02 NOTE — PROGRESS NOTES
Physical Therapy    Facility/Department: [address] REHAB  Daily Treatment Note    NAME: Makenna Mendez  : 1940  MRN: [id number]    Date of Service: 2019    Evaluating Therapist: Dl Snow DPT    ROOM: [address] [place] [address]  DIAGNOSIS: Ischemic CVA  PMH: To ED on 19 for chest pain/left sided weakness. MRI of the brain that a scattering of infarcts in the [facility] distribution along the distribution of a branch artery. Echo with bubble showed small septal aneurysm with no shunting. PMH includes cataract removal, hx of 3 cardiac stents in , CAD, HLD, HTN, scoliosis    PRECAUTIONS: falls,      Social:  Pt lives alone in a 1 floor apt with 3 steps and 2 rails to enter. Prior to admission pt walked with no AD, but uses quad cane PRN in community. Initial Evaluation  19  AM Short Term Goals Long Term Goals    Was pt agreeable to Eval/treatment? yes yes     Does pt have pain?  No pain  No c/o pain      Bed Mobility  Rolling: sba  Supine to sit: sba  Sit to supine: sba  Scooting: sba Rolling: mod I  Supine to sit: mod I  Sit to supine: mod I  Scooting: mod I Sup Mod I   Transfers Sit to stand: sba  Stand to sit: sba  Stand pivot: cg/sbA with ww Sit to stand: sba/sup  Stand to sit: sba/sup  Stand pivot: sbA with ww Sup with AAD Mod I with AAD   Ambulation    200 feet with ww with cg/sba 300 feet x2 without AD sba 300 feet with AAD with sup >500 feet with AAD with mod I   Walking 10 feet on uneven surface 10 feet with ww with cg/sba NT     Wheel Chair Mobility n/a      Car Transfers sba sup Sup Mod I   Stair negotiation: ascended and descended  12 steps with bilateral rail with cg/sba 12 steps with bilateral HR NR pattern sup 12 steps with one rail with sup >12 steps with one rail with mod I   Curb Step:   ascended and descended 4 inch step with ww and cgA  4 in curb step without AD sbA 7.5 inch step with AAD and sup 7.5 inch step with AAD and mod I   Picking up object off the floor Picked up 2# with SBA BLE ROM WFL      BLE Strength Right LE: 4+/5  Left LE: 4-/5 hip, 4-/5 knee, 3+/5 ankle      Balance  Sitting: sup  Standing: cgA with ww      Date Family Teach Completed TBA      Is additional Family Teaching Needed? Y or N Y      Hindering Progress Balance, safety, left LE weakness      PT recommended ELOS 10 days      Team's Discharge Plan       Therapist at Team Meeting         Therapeutic Exercise:   AM:    RLE toe taps 2 in curb step 2 sets 15 reps without UE support with SBA/CGA    Patient education  Pt educated on safety with mobility, decreasing michelle to increase safety    Patient response to education:   Pt verbalized understanding Pt demonstrated skill Pt requires further education in this area   yes Yes with occasional cues yes     Additional Comments:   Pt performed all functional mobility with 1# weight on RLE. Pt required occasional cues to decrease michelle as pt's ataxia worsened with speed compromising balance. Pt had several minor LOB while performing toe tapping as detailed above. Pt required cues to cease activity and regain balance before performing additional reps.        AM  Time in: 0900  Time out: [address] PTA [id number]

## 2019-06-02 NOTE — PROGRESS NOTES
4 in curb step without AD sbA 7.5 inch step with AAD and sup 7.5 inch step with AAD and mod I   Picking up object off the floor Picked up 2# with SBA          BLE ROM WFL         BLE Strength Right LE: 4+/5  Left LE: 4-/5 hip, 4-/5 knee, 3+/5 ankle         Balance  Sitting: sup  Standing: cgA with ww                 Scheduled Meds:   aspirin  81 mg Oral Daily    carvedilol  25 mg Oral BID WC    clopidogrel  75 mg Oral Daily    lisinopril  40 mg Oral Daily    metFORMIN  500 mg Oral BID WC    simvastatin  40 mg Oral Nightly    enoxaparin  40 mg Subcutaneous Daily     Continuous Infusions:  PRN Meds:butalbital-acetaminophen-caffeine, ondansetron, magnesium hydroxide, acetaminophen, senna  I/O last 3 completed shifts: In: 540 [P.O.:540]  Out: -   I/O this shift:  In: 180 [P.O.:180]  Out: -     Labs reviewed  CBC: No results for input(s): WBC, HGB, PLT in the last 72 hours. BMP:  No results for input(s): NA, K, CL, CO2, BUN, CREATININE, GLUCOSE in the last 72 hours. Hepatic: No results for input(s): AST, ALT, ALB, BILITOT, ALKPHOS in the last 72 hours. BNP: No results for input(s): BNP in the last 72 hours. Lipids: No results for input(s): CHOL, HDL in the last 72 hours. Invalid input(s): LDLCALCU  INR: No results for input(s): INR in the last 72 hours. Assessment/  Patient Active Problem List:     Adhesive capsulitis of shoulder     Bursitis of left shoulder     Impingement syndrome of shoulder     Coronary artery disease involving native coronary artery of native heart without angina pectoris     Spinal stenosis     Scoliosis     Hypertension     Hyperlipidemia     Chest pain     Stroke [facility])     Acute CVA (cerebrovascular accident) ([facility] [address]     Ischemic stroke ([facility] [address]      Plan/  1. Continue rehab program   2. Increase activity as tolerated  3. Continue current medications  4.  Will add Fioricet at her request            Gemini Lowery MD

## 2019-06-03 PROCEDURE — 97110 THERAPEUTIC EXERCISES: CPT

## 2019-06-03 PROCEDURE — 1280000000 HC REHAB R&B

## 2019-06-03 PROCEDURE — 97535 SELF CARE MNGMENT TRAINING: CPT

## 2019-06-03 PROCEDURE — 97127 HC SP THER IVNTJ W/FOCUS COG FUNCJ: CPT

## 2019-06-03 PROCEDURE — 97530 THERAPEUTIC ACTIVITIES: CPT

## 2019-06-03 PROCEDURE — 6370000000 HC RX 637 (ALT 250 FOR IP): Performed by: PHYSICAL MEDICINE & REHABILITATION

## 2019-06-03 PROCEDURE — 6370000000 HC RX 637 (ALT 250 FOR IP): Performed by: FAMILY MEDICINE

## 2019-06-03 PROCEDURE — 6360000002 HC RX W HCPCS: Performed by: PHYSICAL MEDICINE & REHABILITATION

## 2019-06-03 RX ORDER — PANTOPRAZOLE SODIUM 40 MG/1
40 TABLET, DELAYED RELEASE ORAL
Status: DISCONTINUED | OUTPATIENT
Start: 2019-06-03 | End: 2019-06-07 | Stop reason: HOSPADM

## 2019-06-03 RX ADMIN — LISINOPRIL 40 MG: 20 TABLET ORAL at 09:53

## 2019-06-03 RX ADMIN — CARVEDILOL 25 MG: 25 TABLET, FILM COATED ORAL at 16:05

## 2019-06-03 RX ADMIN — ASPIRIN 81 MG: 81 TABLET ORAL at 09:54

## 2019-06-03 RX ADMIN — SIMVASTATIN 40 MG: 40 TABLET, FILM COATED ORAL at 20:33

## 2019-06-03 RX ADMIN — CLOPIDOGREL 75 MG: 75 TABLET, FILM COATED ORAL at 09:54

## 2019-06-03 RX ADMIN — METFORMIN HYDROCHLORIDE 500 MG: 500 TABLET ORAL at 09:53

## 2019-06-03 RX ADMIN — METFORMIN HYDROCHLORIDE 500 MG: 500 TABLET ORAL at 16:05

## 2019-06-03 RX ADMIN — CARVEDILOL 25 MG: 25 TABLET, FILM COATED ORAL at 09:54

## 2019-06-03 RX ADMIN — ENOXAPARIN SODIUM 40 MG: 40 INJECTION, SOLUTION INTRAVENOUS; SUBCUTANEOUS at 09:55

## 2019-06-03 RX ADMIN — ONDANSETRON 4 MG: 4 TABLET, ORALLY DISINTEGRATING ORAL at 16:05

## 2019-06-03 ASSESSMENT — PAIN SCALES - GENERAL
PAINLEVEL_OUTOF10: 0

## 2019-06-03 NOTE — PROGRESS NOTES
Occupational Therapy     06/03/19 1253   Attendance   Activity Puzzles  (Wordsearch)   Participation Active participation   [place] Demonstrates ability to complete social goals   Leisure Education Demonstrates knowledge of benefits of leisure involvement  Chryl Prom stated\"It circulates my brain\" as a benefit.)   Leisure Attitude/Participation Participates in 1:1 structured activity   Time Spent With Patient   Minutes 40

## 2019-06-03 NOTE — PROGRESS NOTES
pattern 12 steps bilateral HR sup    12 steps with one HR sba NR pattern 12 steps with one rail with sup >12 steps with one rail with mod I   Curb Step:   ascended and descended 4 inch step with ww and cgA  NT 7.5 in curb step without AD cg/Jaime    7.5 in curb step with ww sba 7.5 inch step with AAD and sup 7.5 inch step with AAD and mod I   Picking up object off the floor Picked up 2# with SBA        BLE ROM WFL       BLE Strength Right LE: 4+/5  Left LE: 4-/5 hip, 4-/5 knee, 3+/5 ankle       Balance  Sitting: sup  Standing: cgA with ww       Date Family Teach Completed TBA       Is additional Family Teaching Needed? Y or N Y       Hindering Progress Balance, safety, left LE weakness       PT recommended ELOS 10 days       Team's Discharge Plan        Therapist at Team Meeting          Therapeutic Exercise:   AM: 10x STS transfer with purple tband to increase hip abduction strength x2 reps   Alternating toe taps without UE support x15 4 in curb step with occasional LOB for coordination/balance  Ambulation with ww and without AD without weight on left LE, only occasional cues for heel strike on L LE with fatigue  Coordination activity in // bars: no UE support tapping various height objects- ataxia with L LE placement noted, able to reach midline however difficulty crossing midline with SLS    PM: 10x STS transfer with purple tband to increase hip abduction strength x1 reps   Alternating toe taps 2 in curb step x15 reps alternating without UE support, occasional retropulsion noted  Agility ladder: NR pattern with bilateral LE completed 2 reps each x10', alternating NR patterning 2 reps x10- occasional cgA required for balance recover  4 in curb step alternating toe taps without UE support x10 with cgA on 1 instance for balance.    Curb step 7.5 in curb step without Ad and with ww x4 reps, with improvement in safety noted with ww  Ambulation without AD with no weight on L LE with slight ataxia noted, however improvement in foot placement       Patient education  Pt educated on safety with mobility, foot placement with ambulation, seqeuncing with curb/stair negotiation    Patient response to education:   Pt verbalized understanding Pt demonstrated skill Pt requires further education in this area   yes Yes with occasional cues yes     Additional Comments: Pt reporting still having occasional headache and generalized fatigue over the weekend. No change in function/strength noted. Slight improvement in ataxia noted, as well as reduced verbal cues for heel strike on L LE leading to improvement with mobility. Pt continues to be more steady with ww, however improving without AD with reducing ataxia noted. Will continue to challenge balance/coordination with all functional mobility. In Pm, continued to challenge balance/coordination without AD. Discussed anticipating needing ww for outside and curb step negotiation. Will plan to complete uneven ambulation at a later date when weather is better. AM  Time in: 1000  Time out: 1045    PM  Time in: 1430  Time out: 1515    Pt is making fair+ progress toward established Physical Therapy goals. Continue with physical therapy current plan of care.     Rox Pratt DPT  [id number]

## 2019-06-03 NOTE — PROGRESS NOTES
Occupational Therapy  OCCUPATIONAL THERAPY DAILY NOTE    Date:6/3/2019  Patient Name: Rusty Cordero  MRN: [id number]  : 1940  Room: [address] [place], [place] [address]     Diagnosis: ischemic CVA  Precautions: Fall Risk & L inattention noted    Functional Assessment:   Date Status AE  Comments   Feeding 19 Set up      Grooming 19 SBA ww     Bathing 19 SBA Grab bar, handheld shower    UB Dressing 19 Mod I     LB Dressing 19 Set up      Homemaking 19 Set up       Functional Transfers / Balance:   Date Status DME  Comments   Sit Balance 19 Supervision     Stand Balance 6/3/19 S ww    [x] Tub  [x] Shower   Transfer 19 SBA  SBA W/w, grab bar    Commode   Transfer 6/3/19 SBA ww    Functional   Mobility 6/3/19 S/SBA  SBA ww   No device Ambulating throughout functional living environment. Other:  WC<>Bed    Various surfaces in apartment   6/1/19    6/3/19   CGA    S/SBA       ww       With and without ww pt completed transfers in apartment with occasional cuing for technique for increased safety and independence. Functional Exercises / Activity:  Leisure task with focus on stand balance, endurance and stand tolerance. Pt completed with good stand tolerance, balance and endurance during task while standing to complete word search. Pt completed word search with fair skill/increased time to complete. 2# free weight 3 X 15 on all planes to increase BUE strength and endurance for independence with functional tasks. Min/Mod resistive power  X 50 reps to increase B hand strength for independence with functional tasks. Sensory / Neuromuscular Re-Education:      Cognitive Skills:   Status Comments   Problem   Solving fair     Memory fair  Decreased STM   Sequencing fair     Safety fair       Visual Perception:    Education:  Pt educated on safety during sit<->stand transfers.   Pt educated on safety using ww.      [] Family teach completed on:    Pain Level: 0/10    Additional Notes:

## 2019-06-03 NOTE — PROGRESS NOTES
Speech Language Pathology  ACUTE REHABILITATION--DAILY PROGRESS NOTE      PATIENT NAME:  Henrique Reddy      :  1940      TODAY'S DATE:  6/3/2019         SWALLOWING  Not addressed in this session. LANGUAGE     Receptive  Current Status  6      Short Term Goal 6       Long Term Goal 6     Expressive  Current Status  5      Short Term Goal 6       Long Term Goal 6            INTERVENTION:  Patient answered yes/no questions, followed commands and comprehended conversation with increased time. Patient expressed basic wants and needs and complex ideation with increased time. COGNITION:      Problem Solving Current Status  5      Short Term Goal 6       Long Term Goal 6       Memory  Current Status  5      Short Term Goal 6       Long Term Goal 6      INTERVENTION: Patient demonstrated 70% accuracy for temporal/spatial orientation. Recall of recent events demonstrated greater than 75% accuracy. Understanding, predicting and recalling time was targeted. Patient demonstrated 90% accuracy. Frequent repetition was needed. SPEECH:       INTERVENTION: Functional with minimal articulation errors. Cognitive/linguistic supervision recommendations      24 hour supervision    Close supervision   Intermittent supervision    No supervision                              x                                         Will continue SP intervention as per previously established POC. Three Hour Rule Tracking:    Individual therapy:    30 minutes  Concurrent therapy:   minutes  Group therapy:    minutes  Co-treatment therapy:   minutes    Total minutes for 6/3/2019: 30 minutes    Joselyn Mccarty M.S., CCC-SLP/L  Speech-Language Pathologist

## 2019-06-04 PROCEDURE — 97535 SELF CARE MNGMENT TRAINING: CPT

## 2019-06-04 PROCEDURE — 6370000000 HC RX 637 (ALT 250 FOR IP): Performed by: PHYSICAL MEDICINE & REHABILITATION

## 2019-06-04 PROCEDURE — 1280000000 HC REHAB R&B

## 2019-06-04 PROCEDURE — 97127 HC SP THER IVNTJ W/FOCUS COG FUNCJ: CPT | Performed by: SPEECH-LANGUAGE PATHOLOGIST

## 2019-06-04 PROCEDURE — 6360000002 HC RX W HCPCS: Performed by: PHYSICAL MEDICINE & REHABILITATION

## 2019-06-04 PROCEDURE — 97530 THERAPEUTIC ACTIVITIES: CPT

## 2019-06-04 PROCEDURE — 6370000000 HC RX 637 (ALT 250 FOR IP): Performed by: FAMILY MEDICINE

## 2019-06-04 RX ADMIN — CLOPIDOGREL 75 MG: 75 TABLET, FILM COATED ORAL at 08:46

## 2019-06-04 RX ADMIN — ASPIRIN 81 MG: 81 TABLET ORAL at 08:46

## 2019-06-04 RX ADMIN — METFORMIN HYDROCHLORIDE 500 MG: 500 TABLET ORAL at 18:01

## 2019-06-04 RX ADMIN — SIMVASTATIN 40 MG: 40 TABLET, FILM COATED ORAL at 20:52

## 2019-06-04 RX ADMIN — CARVEDILOL 25 MG: 25 TABLET, FILM COATED ORAL at 08:46

## 2019-06-04 RX ADMIN — PANTOPRAZOLE SODIUM 40 MG: 40 TABLET, DELAYED RELEASE ORAL at 07:39

## 2019-06-04 RX ADMIN — CARVEDILOL 25 MG: 25 TABLET, FILM COATED ORAL at 18:01

## 2019-06-04 RX ADMIN — ENOXAPARIN SODIUM 40 MG: 40 INJECTION, SOLUTION INTRAVENOUS; SUBCUTANEOUS at 08:46

## 2019-06-04 RX ADMIN — METFORMIN HYDROCHLORIDE 500 MG: 500 TABLET ORAL at 08:47

## 2019-06-04 RX ADMIN — LISINOPRIL 40 MG: 20 TABLET ORAL at 08:46

## 2019-06-04 ASSESSMENT — PAIN SCALES - GENERAL
PAINLEVEL_OUTOF10: 0

## 2019-06-04 NOTE — CARE COORDINATION
Patients sister, Yoan called stating that she was cancelling her FT 6/4/19 at 10am. She stated that she didn't have anything to do with the patients legal decisions and that we should get in Contact with Babatunde Arcos (patients brother in law). SW informed Yoan that we have been in contact with Babatunde Arcos and that FT does not have anything to do with legal decision making. SW informed her that she is the only family available locally, which is why we called to arrange FT with her. Yoan stated that she didn't agree with sending the patient home upon D/C and that she should have rehab after her hospital stay. MISHEL informed Yoan that the patient is currently at acute rehab, and that aftercare orders have no been made yet. Reiterated that the patient will only need intermittent supervision upon D/C and that her goals are MOD I/Independent. Yoan seemed surprised that she was currently in rehab and said that we should talk to Babatunde Arcos about d/c orders and possibly send her to a [facility] upon d/c. Reiterated to Yoan that no d/c orders have been made yet, and that we would discuss the patient again in Fridays team meeting. Yoan said to call Babatunde Arcos from now on.      [facility] Intern  Jose Manuel Mcdaniel  6/4/2019

## 2019-06-04 NOTE — PROGRESS NOTES
_____ community resources to include type of services, address and phone. Comments: _____     Tomas Bell

## 2019-06-04 NOTE — PROGRESS NOTES
Progress Note    Subjective/   66y.o. year old female on the rehab unit for stroke. She is complaining of nausea this morning. No SOB or chest pain. No dizziness. Tolerating therapy program. Seeing improvement in strength and mobility. Objective/   VITALS:  /71   Pulse 86   Temp 98.2 °F (36.8 °C) (Oral)   Resp 18   Ht 4' 9\" (1.448 m)   Wt 80 lb 3.2 oz (36.4 kg)   SpO2 99%   BMI 17.36 kg/m²   24HR INTAKE/OUTPUT:      Intake/Output Summary (Last 24 hours) at 6/3/2019 2326  Last data filed at 6/3/2019 2126  Gross per 24 hour   Intake 340 ml   Output --   Net 340 ml     Constitutional:  Alert, awake, no apparent distress   Cardiovascular:  S1, S2 without m/r/g   Respiratory:  CTA B without w/r/r   Abdomen: +BS  Ext: no pitting LE edema  Neuro: good sitting balance, no tremor. Tone is normal    Functional Level        Date   Status   AE    Comments     Feeding   5/29/19   Set up              Grooming   5/31/19   SBA   ww         Bathing   5/31/19   SBA Grab bar, handheld shower         UB Dressing   5/31/19   Mod I             LB Dressing   6/1/19   Set up              Homemaking   6/1/19   Set up                  Functional Transfers / Balance:      Date Status DME  Comments   Sit Balance 6/1/19   Supervision       Stand Balance 6/3/19   S ww     [x] Tub  [x] Shower   Transfer 6/1/19 5/31/19   SBA  SBA W/w, grab bar     Commode   Transfer 6/3/19   SBA ww     Functional   Mobility 6/3/19   S/SBA  SBA ww   No device Ambulating throughout functional living environment. Other:  WC<>Bed     Various surfaces in apartment    6/1/19     6/3/19    CGA     S/SBA          ww          With and without ww pt completed transfers in apartment with occasional cuing for technique for increased safety and independence.      Functional Exercises / Activity:  Leisure task with focus on stand balance, endurance and stand tolerance.  Pt completed with good stand tolerance, balance and endurance during task while standing to complete word search. Pt completed word search with fair skill/increased time to complete.      2# free weight 3 X 15 on all planes to increase BUE strength and endurance for independence with functional tasks.      Min/Mod resistive power  X 50 reps to increase B hand strength for independence with functional tasks.      Sensory / Neuromuscular Re-Education:        Cognitive Skills:    Status Comments   Problem   Solving fair      Memory fair  Decreased STM   Sequencing fair      Safety fair                 Scheduled Meds:   pantoprazole  40 mg Oral QAM AC    aspirin  81 mg Oral Daily    carvedilol  25 mg Oral BID WC    clopidogrel  75 mg Oral Daily    lisinopril  40 mg Oral Daily    metFORMIN  500 mg Oral BID WC    simvastatin  40 mg Oral Nightly    enoxaparin  40 mg Subcutaneous Daily     Continuous Infusions:  PRN Meds:butalbital-acetaminophen-caffeine, ondansetron, magnesium hydroxide, acetaminophen, senna  I/O last 3 completed shifts: In: 340 [P.O.:340]  Out: -   No intake/output data recorded. Labs reviewed  CBC: No results for input(s): WBC, HGB, PLT in the last 72 hours. BMP:  No results for input(s): NA, K, CL, CO2, BUN, CREATININE, GLUCOSE in the last 72 hours. Hepatic: No results for input(s): AST, ALT, ALB, BILITOT, ALKPHOS in the last 72 hours. BNP: No results for input(s): BNP in the last 72 hours. Lipids: No results for input(s): CHOL, HDL in the last 72 hours. Invalid input(s): LDLCALCU  INR: No results for input(s): INR in the last 72 hours.     Assessment/  Patient Active Problem List:     Adhesive capsulitis of shoulder     Bursitis of left shoulder     Impingement syndrome of shoulder     Coronary artery disease involving native coronary artery of native heart without angina pectoris     Spinal stenosis     Scoliosis     Hypertension     Hyperlipidemia     Chest pain     Stroke [facility])     Acute CVA (cerebrovascular accident) ([address]     Ischemic stroke

## 2019-06-04 NOTE — PROGRESS NOTES
Physical Therapy    Facility/Department: [address] REHAB  Daily Treatment Note    NAME: Laci Urrutia  : 1940  MRN: [id number]    Date of Service: 2019    Evaluating Therapist: Rox Pratt DPT    ROOM: [address] [place] [address]  DIAGNOSIS: Ischemic CVA  PMH: To ED on 19 for chest pain/left sided weakness. MRI of the brain that a scattering of infarcts in the [facility] distribution along the distribution of a branch artery. Echo with bubble showed small septal aneurysm with no shunting. PMH includes cataract removal, hx of 3 cardiac stents in , CAD, HLD, HTN, scoliosis    PRECAUTIONS: falls,      Social:  Pt lives alone in a 1 floor apt with 3 steps and 2 rails to enter. Prior to admission pt walked with no AD, but uses quad cane PRN in community. Initial Evaluation  19  AM PM    Short Term Goals Long Term Goals    Was pt agreeable to Eval/treatment? yes yes yes     Does pt have pain?  No pain  No c/o pain  No c/o pain      Bed Mobility  Rolling: sba  Supine to sit: sba  Sit to supine: sba  Scooting: sba Rolling: mod I  Supine to sit: mod I  Sit to supine: mod I  Scooting: mod I Rolling: mod I  Supine to sit: mod I  Sit to supine: mod I  Scooting: mod I Sup Mod I   Transfers Sit to stand: sba  Stand to sit: sba  Stand pivot: cg/sbA with ww Sit to stand: mod I  Stand to sit: mod I  Stand pivot: sbA/sup with ww vs no AD Sit to stand: mod I   Stand to sit: mod I  Stand pivot: sbA/sup with ww/ no AD Sup with AAD Mod I with AAD   Ambulation    200 feet with ww with cg/sba 375' x2 without AD sba/sup 400' without AD sba/sup     300 feet with AAD with sup >500 feet with AAD with mod I   Walking 10 feet on uneven surface 10 feet with ww with cg/sba ' with ww sup    300' without AD sba/cgA     Wheel Chair Mobility n/a       Car Transfers sba Mod I NT Sup Mod I   Stair negotiation: ascended and descended  12 steps with bilateral rail with cg/sba 16 steps with bilateral HR NR pattern sup     12 steps bilateral HR

## 2019-06-04 NOTE — PROGRESS NOTES
Occupational Therapy  OCCUPATIONAL THERAPY DAILY NOTE    Date:2019  Patient Name: Rasta Mata  MRN: [id number]  : 1940  Room: [address] [place], [place] [address]     Diagnosis: ischemic CVA  Precautions: Fall Risk & L inattention noted    Functional Assessment:   Date Status AE  Comments   Feeding 19 Set up      Grooming 19 SBA ww     Bathing 19 SBA Grab bar, handheld shower    UB Dressing 19 Mod I     LB Dressing 19 Set up      Homemaking 19 Supervision  In kitchen pt prepared grilled cheese. Pt retrieved supplies and cooked on stove top. VCs provided for safe technique. Pt completed without device and educated on item transport with ww using walker basket/butcket. Functional Transfers / Balance:   Date Status DME  Comments   Sit Balance 19 Supervision     Stand Balance 19 Mod I ww    [x] Tub  [x] Shower   Transfer 19 SBA  SBA W/w, grab bar    Commode   Transfer 19 S ww    Functional   Mobility 19 S/SBA  S/SBA ww   No device Over tiled and carpeted surfaces. Other:  WC<>Bed    Various surfaces in apartment   19   CGA    S/SBA       ww      Functional Exercises / Activity:  Leisure task with focus on stand balance, stand tolerance and endurance. Pt participated in coloring task with good stand tolerance, endurance and S for stand balance. Sensory / Neuromuscular Re-Education:      Cognitive Skills:   Status Comments   Problem   Solving fair     Memory fair  Decreased STM   Sequencing fair     Safety fair       Visual Perception:    Education:  Pt educated on safety and item transport in kitchen during homemaking task. [x] Family teach completed on: 19  Pt's nephews present for PM session this date and observed pt completing transfers throughout functional living environment with ww. They believe they have a tub seat and ww and are going to check to make sure the ww is a corrina ww and that they have the tub seat for certain.  Pt states that grab bars are going to be installed in the bathtub. Family educated on levels of assist for ADLs and that pt requires S/SBA for bathing. Family and pt state that someone could be there for bathing and that she has family that checks on her often. Pain Level: 0/10    Additional Notes:       Patient has made good  progress during treatment sessions toward set goals. Therapy emphasis to obtain goals: Strengthening, Endurance Training, Neuromuscular Re-education, Patient/Caregiver Education & Training, Equipment Evaluation, Education, & procurement, Self-Care / ADL, Balance Training, Gait Training, Home Management Training, Functional Mobility Training, Safety Education & Training        [x] Continue with current OT Plan of care. [] Prepare for Discharge     DISCHARGE RECOMMENDATIONS  Recommended DME:    Post Discharge Care:   []Home Independently  []Home with 24hr Care / Supervision []Home with Partial Supervision []Home with Home Health OT []Home with Out Pt OT []Other: ___   Comments:         Time in Time out Tx Time Breakdown  Variance:   First Session  10:45 11:30 [] Individual Tx-   [x] Concurrent Tx - 45 Mins  [] Co-Tx -   [] Group Tx -   [] Time Missed -     Second Session 1:00 1:45 [x] Individual Tx-  45 Mins  [] Concurrent Tx -  [] Co-Tx -   [] Group Tx -   [] Time Missed -     Third Session    [] Individual Tx-   [] Concurrent Tx -  [] Co-Tx -   [] Group Tx -   [] Time Missed -         Total Tx Time  90 Mins   Royal Dee, GRAMAJO/L [id number]  I have read the above note and agree with the documentation.   Landon Ponce OTR/L [id number]

## 2019-06-04 NOTE — PROGRESS NOTES
Speech Language Pathology  ACUTE REHABILITATION--DAILY PROGRESS NOTE      PATIENT NAME:  Nithin Casey      :  1940      TODAY'S DATE:  2019         SWALLOWING  Not addressed in this session. LANGUAGE     Receptive  Current Status  6      Short Term Goal 6       Long Term Goal 6     Expressive  Current Status  6      Short Term Goal 6       Long Term Goal 6            INTERVENTION:  Patient answered yes/no questions, followed commands and comprehended conversation with increased time. Patient expressed basic wants and needs and complex ideation with increased time. COGNITION:      Problem Solving Current Status  5      Short Term Goal 6       Long Term Goal 6       Memory  Current Status  5      Short Term Goal 6       Long Term Goal 6      INTERVENTION: Mod level med management task completed with fair-good ability. Min verbal cues needed for accurate completion. STM for adl's were good this date with min verbal cues. SPEECH:       INTERVENTION: Functional with minimal articulation errors. Cognitive/linguistic supervision recommendations      24 hour supervision    Close supervision   Intermittent supervision    No supervision                              x                                         Will continue SP intervention as per previously established POC.     Three Hour Rule Tracking:    Individual therapy:    30 minutes  Concurrent therapy:   minutes  Group therapy:    minutes  Co-treatment therapy:   minutes    Total minutes for 2019: 30 minutes

## 2019-06-05 PROCEDURE — 97535 SELF CARE MNGMENT TRAINING: CPT

## 2019-06-05 PROCEDURE — 6360000002 HC RX W HCPCS: Performed by: PHYSICAL MEDICINE & REHABILITATION

## 2019-06-05 PROCEDURE — 97530 THERAPEUTIC ACTIVITIES: CPT

## 2019-06-05 PROCEDURE — 97127 HC SP THER IVNTJ W/FOCUS COG FUNCJ: CPT

## 2019-06-05 PROCEDURE — 6370000000 HC RX 637 (ALT 250 FOR IP): Performed by: FAMILY MEDICINE

## 2019-06-05 PROCEDURE — 97110 THERAPEUTIC EXERCISES: CPT

## 2019-06-05 PROCEDURE — 6370000000 HC RX 637 (ALT 250 FOR IP): Performed by: PHYSICAL MEDICINE & REHABILITATION

## 2019-06-05 PROCEDURE — 1280000000 HC REHAB R&B

## 2019-06-05 RX ADMIN — LISINOPRIL 40 MG: 20 TABLET ORAL at 08:42

## 2019-06-05 RX ADMIN — BUTALBITAL, ACETAMINOPHEN, AND CAFFEINE 1 TABLET: 50; 325; 40 TABLET ORAL at 07:21

## 2019-06-05 RX ADMIN — ASPIRIN 81 MG: 81 TABLET ORAL at 08:42

## 2019-06-05 RX ADMIN — CARVEDILOL 25 MG: 25 TABLET, FILM COATED ORAL at 17:28

## 2019-06-05 RX ADMIN — SIMVASTATIN 40 MG: 40 TABLET, FILM COATED ORAL at 21:04

## 2019-06-05 RX ADMIN — ENOXAPARIN SODIUM 40 MG: 40 INJECTION, SOLUTION INTRAVENOUS; SUBCUTANEOUS at 08:41

## 2019-06-05 RX ADMIN — CLOPIDOGREL 75 MG: 75 TABLET, FILM COATED ORAL at 08:42

## 2019-06-05 RX ADMIN — METFORMIN HYDROCHLORIDE 500 MG: 500 TABLET ORAL at 17:28

## 2019-06-05 RX ADMIN — METFORMIN HYDROCHLORIDE 500 MG: 500 TABLET ORAL at 08:42

## 2019-06-05 RX ADMIN — PANTOPRAZOLE SODIUM 40 MG: 40 TABLET, DELAYED RELEASE ORAL at 06:57

## 2019-06-05 RX ADMIN — CARVEDILOL 25 MG: 25 TABLET, FILM COATED ORAL at 08:41

## 2019-06-05 ASSESSMENT — PAIN SCALES - GENERAL
PAINLEVEL_OUTOF10: 7
PAINLEVEL_OUTOF10: 0
PAINLEVEL_OUTOF10: 3
PAINLEVEL_OUTOF10: 0

## 2019-06-05 ASSESSMENT — PAIN DESCRIPTION - DESCRIPTORS: DESCRIPTORS: HEADACHE;STABBING

## 2019-06-05 ASSESSMENT — PAIN DESCRIPTION - LOCATION: LOCATION: HEAD

## 2019-06-05 ASSESSMENT — PAIN DESCRIPTION - ONSET: ONSET: GRADUAL

## 2019-06-05 ASSESSMENT — PAIN - FUNCTIONAL ASSESSMENT: PAIN_FUNCTIONAL_ASSESSMENT: ACTIVITIES ARE NOT PREVENTED

## 2019-06-05 ASSESSMENT — PAIN DESCRIPTION - FREQUENCY: FREQUENCY: INTERMITTENT

## 2019-06-05 ASSESSMENT — PAIN DESCRIPTION - PAIN TYPE: TYPE: ACUTE PAIN

## 2019-06-05 NOTE — PROGRESS NOTES
Speech Language Pathology  ACUTE REHABILITATION--DAILY PROGRESS NOTE      PATIENT NAME:  Jonathan Maharaj      :  1940      TODAY'S DATE:  2019         SWALLOWING  Not addressed in this session. LANGUAGE     Receptive  Current Status  6      Short Term Goal 6       Long Term Goal 6     Expressive  Current Status  6      Short Term Goal 6       Long Term Goal 6            INTERVENTION:  Patient answered yes/no questions, followed commands and comprehended conversation with increased time. Patient expressed basic wants and needs and complex ideation with increased time. COGNITION:      Problem Solving Current Status  5      Short Term Goal 6       Long Term Goal 6       Memory  Current Status  5      Short Term Goal 6       Long Term Goal 6      INTERVENTION: Fair+/good recount of today's events given occasional min v/c. Able to recall safety strategies provided by staff in earlier sessions. SPEECH:       INTERVENTION: Functional with minimal articulation errors. Cognitive/linguistic supervision recommendations      24 hour supervision    Close supervision   Intermittent supervision    No supervision                              x                                         Will continue SP intervention as per previously established POC.     Three Hour Rule Tracking:    Individual therapy:    30 minutes  Concurrent therapy:   minutes  Group therapy:    minutes  Co-treatment therapy:   minutes    Total minutes for 2019: 30 minutes

## 2019-06-05 NOTE — PROGRESS NOTES
Hyperlipidemia     Chest pain     Stroke [facility])     Acute CVA (cerebrovascular accident) ([facility] [address]     Ischemic stroke ([facility] [address]      Plan/  1. Continue rehab program  2. Increase activity as able  3. Continue dvt prophylaxis  4.  Continue protonix continue secondary stroke prevention          Milan Membreno MD

## 2019-06-05 NOTE — PROGRESS NOTES
Physical Therapy    Facility/Department: [address] REHAB  Daily Treatment Note    NAME: Jean Castro  : 1940  MRN: [id number]    Date of Service: 2019    Evaluating Therapist: Frandy Qiu DPT    ROOM: [address] [place] [address]  DIAGNOSIS: Ischemic CVA  PMH: To ED on 19 for chest pain/left sided weakness. MRI of the brain that a scattering of infarcts in the [facility] distribution along the distribution of a branch artery. Echo with bubble showed small septal aneurysm with no shunting. PMH includes cataract removal, hx of 3 cardiac stents in , CAD, HLD, HTN, scoliosis    PRECAUTIONS: falls,      Social:  Pt lives alone in a 1 floor apt with 3 steps and 2 rails to enter. Prior to admission pt walked with no AD, but uses quad cane PRN in community. Initial Evaluation  19  AM PM    Short Term Goals Long Term Goals    Was pt agreeable to Eval/treatment? yes yes yes     Does pt have pain?  No pain  No c/o pain  No c/o pain      Bed Mobility  Rolling: sba  Supine to sit: sba  Sit to supine: sba  Scooting: sba Rolling: mod I  Supine to sit: mod I  Sit to supine: mod I  Scooting: mod I Rolling: mod I  Supine to sit: mod I  Sit to supine: mod I  Scooting: mod I Sup Mod I   Transfers Sit to stand: sba  Stand to sit: sba  Stand pivot: cg/sbA with ww Sit to stand: mod I  Stand to sit: mod I  Stand pivot: sup with ww vs no AD Sit to stand: mod I   Stand to sit: mod I  Stand pivot: sup with no AD    Mod I with ww Sup with AAD Mod I with AAD   Ambulation    200 feet with ww with cg/sba 425' x2 without AD sba/sup 400' without AD sba/sup     300 feet with AAD with sup >500 feet with AAD with mod I   Walking 10 feet on uneven surface 10 feet with ww with cg/sba NT      Wheel Chair Mobility n/a       Car Transfers sba Mod I NT Sup Mod I   Stair negotiation: ascended and descended  12 steps with bilateral rail with cg/sba 16 steps with bilateral HR NR pattern sup     16 steps bilateral HR mod I (recip patterning)     12 steps with one rail with sup >12 steps with one rail with mod I   Curb Step:   ascended and descended 4 inch step with ww and cgA  7.5 in curb step without AD sba      7.5 in curb step without AD sbA    7.5 in curb step with ww sup 7.5 inch step with AAD and sup 7.5 inch step with AAD and mod I   Picking up object off the floor Picked up 2# with SBA        BLE ROM WFL       BLE Strength Right LE: 4+/5  Left LE: 4-/5 hip, 4-/5 knee, 3+/5 ankle       Balance  Sitting: sup  Standing: cgA with ww       Date Family Teach Completed TBA Scheduled on 6/4/19 however cancelled by family      Is additional Family Teaching Needed? Y or N Y       Hindering Progress Balance, safety, left LE weakness       PT recommended ELOS 10 days       Team's Discharge Plan        Therapist at Team Meeting          Therapeutic Exercise:   AM:   Alternating toe taps without UE support x15 reps 6 in curb step, slight retropulsion noted initially when descending/stepping down  Ambulation with without AD without weight on left LE, only occasional cues for heel strike on L LE with fatigue  Agility ladder:  alternating NR patterning 2 reps x10, side stepping bilateral directions 2 reps x10', backwards 10' x1, improved foot placement/coordination  7.5 in curb step without AD x6 reps, close sba for balance when ascending  airex foam NBOS EO/EC with retropulsion noted within 1-2 seconds on all attempts with EC.  Discussed visual/visions importance to balance at this time and recommended night lights t/o house when d/c home    PM: 20x STS transfer with purple tband to increase hip abduction strength    6 in curb step alternating toe taps without UE support x20 completed twice  Coordination activity in // bars: no UE support tapping various height objects- ataxia with L LE placement noted, able to reach midline however difficulty crossing midline with SLS   Curb step with and without ww, improved ability to complete with ww vs no AD      Patient education  Pt educated on safety with mobility, foot placement with ambulation, seqeuncing with curb/stair negotiation    Patient response to education:   Pt verbalized understanding Pt demonstrated skill Pt requires further education in this area   yes Yes with occasional cues yes     Additional Comments: Reviewed all mobility as per above. Improvement noted in coordination/weight shifting/balance with curb step, leading to sbA without toes catching curb step when ascending. Will continue to challenge balance/coordination/safety with all mobility. In PM, continued to review mobility as per above. Improving balance/coordination with ambulation without AD, however short distance ambulation with ww requiring no verbal cues and more stability. Noted stability with ww when completing curb step compared to no AD. Pt cleared and nursing notified that patient able to ambulate bed<>bathroom with ww at mod I level, demonstrating mod I level transfers and short distance walk with ww to therapist with safety noted. Will continue to progress mobility and safety as able. AM  Time in: 0915  Time out: 1000    PM  Time in: 1430  Time out: 1515    Pt is making fair+ progress toward established Physical Therapy goals. Continue with physical therapy current plan of care.     Janet Yusuf DPT  [id number]

## 2019-06-05 NOTE — CARE COORDINATION
SW called the patients brother-in-law, Saw Sales, back after receiving a message he had left. Saw Sales stated that he wanted to know whether the patient will be able to go home from [address] [place] [address]. SW stated that she currently just needed intermittent supervision and that there would be another update on Friday after team.     SW inquired about whether she had support in the area. Saw Sales stated that his sons, Halle Hurtado and Ronnie Purvis, live in the area and will help her when she's d/c. Saw Sales stated that they went to her home and installed handrails in her shower and near her toilet. Saw Salse stated that they would come visit her in the hospital sometime during the week, declined giving any phone numbers. On 6/7/19- MISHEL will NOT update her step sister, Gisell Convoy conversation on 6/4/19.     [facility] Intern  Esdras Briseno  6/5/2019

## 2019-06-05 NOTE — PROGRESS NOTES
Nutrition Assessment    Type and Reason for Visit: Initial    Nutrition Recommendations: continue current diet as ordered. WIll continue to monitor and follow. Nutrition Assessment: Patient meets criteria for moderate malnutrition w/ moderate muscle and fat losses. At increased risk of nutritional compromise d/t poor PO intake PTA to ARU, however, intake improving since admit to ARU. Will continue to monitor and follow and encourage PO intake as able. Malnutrition Assessment:  · Malnutrition Status: Meets the criteria for moderate malnutrition  · Context: Acute illness or injury  · Findings of the 6 clinical characteristics of malnutrition (Minimum of 2 out of 6 clinical characteristics is required to make the diagnosis of moderate or severe Protein Calorie Malnutrition based on AND/ASPEN Guidelines):  1. Energy Intake-Greater than 75% of estimated energy requirement, Greater than or equal to 7 days    2. Weight Loss-No significant weight loss,    3. Fat Loss-Moderate subcutaneous fat loss, Orbital  4. Muscle Loss-Moderate muscle mass loss, Temples (temporalis muscle), Clavicles (pectoralis and deltoids)  5. Fluid Accumulation-No significant fluid accumulation,    6.  Strength-Not measured    Nutrition Risk Level: Moderate    Nutrient Needs:  · Estimated Daily Total Kcal: 1306-6497(MSJ: 718 x 1.3 )  · Estimated Daily Protein (g): 50-60(1.3-1.5)  · Estimated Daily Total Fluid (ml/day): 1000ml     Nutrition Diagnosis:   · Problem:  Moderate malnutrition, In context of acute illness or injury  · Etiology: related to Cognitive or neurological impairment     Signs and symptoms:  as evidenced by Diet history of poor intake, Moderate muscle loss, Moderate loss of subcutaneous fat    Objective Information:  · Nutrition-Focused Physical Findings: +I/Os, abd WNL, no edema noted   · Wound Type: None  · Current Nutrition Therapies:  · Oral Diet Orders: Cardiac   · Oral Diet intake: %(usually- noted some

## 2019-06-05 NOTE — PROGRESS NOTES
Perception:    Education:  Pt educated on safety during functional exercise. [x] Family teach completed on: 6/4/19  Pt's nephews present for PM session this date and observed pt completing transfers throughout functional living environment with ww. They believe they have a tub seat and ww and are going to check to make sure the ww is a corrina ww and that they have the tub seat for certain. Pt states that grab bars are going to be installed in the bathtub. Family educated on levels of assist for ADLs and that pt requires S/SBA for bathing. Family and pt state that someone could be there for bathing and that she has family that checks on her often. Pain Level: 0/10    Additional Notes:       Patient has made good  progress during treatment sessions toward set goals. Therapy emphasis to obtain goals: Strengthening, Endurance Training, Neuromuscular Re-education, Patient/Caregiver Education & Training, Equipment Evaluation, Education, & procurement, Self-Care / ADL, Balance Training, Gait Training, Home Management Training, Functional Mobility Training, Safety Education & Training        [x] Continue with current OT Plan of care.   [] Prepare for Discharge     DISCHARGE RECOMMENDATIONS  Recommended DME:    Post Discharge Care:   []Home Independently  []Home with 24hr Care / Supervision []Home with Partial Supervision []Home with Home Health OT []Home with Out Pt OT []Other: ___   Comments:         Time in Time out Tx Time Breakdown  Variance:   First Session  10:45 11:30 [] Individual Tx-   [x] Concurrent Tx - 45 Mins  [] Co-Tx -   [] Group Tx -   [] Time Missed -     Second Session 1:00 1:45 [x] Individual Tx-  45 Mins  [] Concurrent Tx -  [] Co-Tx -   [] Group Tx -   [] Time Missed -     Third Session    [] Individual Tx-   [] Concurrent Tx -  [] Co-Tx -   [] Group Tx -   [] Time Missed -         Total Tx Time  90 Mins   [place] Dee, ROX/L [id number]  I have read the above note and agree with the

## 2019-06-05 NOTE — PROGRESS NOTES
Occupational Therapy     06/05/19 1335   Attendance   Activity Arts/Crafts   Participation Active participation   Therapeutic Recreation   Community Reintegration Demonstrates ability to complete social goals   Social Skills Interacts independently in social activity   Leisure Education Demonstrates knowledge of benefits of leisure involvement  (Identified enjoyment as a benefit.)   Time Spent With Patient   Minutes 40

## 2019-06-06 PROCEDURE — 6370000000 HC RX 637 (ALT 250 FOR IP): Performed by: PHYSICAL MEDICINE & REHABILITATION

## 2019-06-06 PROCEDURE — 6370000000 HC RX 637 (ALT 250 FOR IP): Performed by: FAMILY MEDICINE

## 2019-06-06 PROCEDURE — 97535 SELF CARE MNGMENT TRAINING: CPT

## 2019-06-06 PROCEDURE — 6360000002 HC RX W HCPCS: Performed by: PHYSICAL MEDICINE & REHABILITATION

## 2019-06-06 PROCEDURE — 1280000000 HC REHAB R&B

## 2019-06-06 PROCEDURE — 97127 HC SP THER IVNTJ W/FOCUS COG FUNCJ: CPT

## 2019-06-06 PROCEDURE — 97530 THERAPEUTIC ACTIVITIES: CPT

## 2019-06-06 PROCEDURE — 97110 THERAPEUTIC EXERCISES: CPT

## 2019-06-06 RX ADMIN — ASPIRIN 81 MG: 81 TABLET ORAL at 09:03

## 2019-06-06 RX ADMIN — PANTOPRAZOLE SODIUM 40 MG: 40 TABLET, DELAYED RELEASE ORAL at 06:16

## 2019-06-06 RX ADMIN — CARVEDILOL 25 MG: 25 TABLET, FILM COATED ORAL at 09:03

## 2019-06-06 RX ADMIN — METFORMIN HYDROCHLORIDE 500 MG: 500 TABLET ORAL at 09:03

## 2019-06-06 RX ADMIN — SIMVASTATIN 40 MG: 40 TABLET, FILM COATED ORAL at 20:51

## 2019-06-06 RX ADMIN — ENOXAPARIN SODIUM 40 MG: 40 INJECTION, SOLUTION INTRAVENOUS; SUBCUTANEOUS at 09:03

## 2019-06-06 RX ADMIN — METFORMIN HYDROCHLORIDE 500 MG: 500 TABLET ORAL at 16:24

## 2019-06-06 RX ADMIN — CARVEDILOL 25 MG: 25 TABLET, FILM COATED ORAL at 16:24

## 2019-06-06 RX ADMIN — CLOPIDOGREL 75 MG: 75 TABLET, FILM COATED ORAL at 09:03

## 2019-06-06 RX ADMIN — LISINOPRIL 40 MG: 20 TABLET ORAL at 09:03

## 2019-06-06 ASSESSMENT — PAIN SCALES - GENERAL: PAINLEVEL_OUTOF10: 0

## 2019-06-06 NOTE — PROGRESS NOTES
Physical Therapy    Facility/Department: [address] REHAB  Daily Treatment Note    NAME: Silverio Bedoya  : 1940  MRN: [id number]    Date of Service: 2019    Evaluating Therapist: Frank Martin DPT    ROOM: [address] [place], [place] [address]-  DIAGNOSIS: Ischemic CVA  PMH: To ED on 19 for chest pain/left sided weakness. MRI of the brain that a scattering of infarcts in the [facility] distribution along the distribution of a branch artery. Echo with bubble showed small septal aneurysm with no shunting. PMH includes cataract removal, hx of 3 cardiac stents in , CAD, HLD, HTN, scoliosis    PRECAUTIONS: falls,      Social:  Pt lives alone in a 1 floor apt with 3 steps and 2 rails to enter. Prior to admission pt walked with no AD, but uses quad cane PRN in community. Initial Evaluation  19  AM PM    Short Term Goals Long Term Goals    Was pt agreeable to Eval/treatment? yes yes yes     Does pt have pain?  No pain  No c/o pain  No c/o pain      Bed Mobility  Rolling: sba  Supine to sit: sba  Sit to supine: sba  Scooting: sba Rolling: mod I  Supine to sit: mod I  Sit to supine: mod I  Scooting: mod I Rolling: mod I  Supine to sit: mod I  Sit to supine: mod I  Scooting: mod I Sup Mod I   Transfers Sit to stand: sba  Stand to sit: sba  Stand pivot: cg/sbA with ww Sit to stand: mod I  Stand to sit: mod I  Stand pivot: sup without AD    Mod I with ww Sit to stand: mod I   Stand to sit: mod I  Stand pivot: sup without AD    Mod I with ww Sup with AAD Mod I with AAD   Ambulation    200 feet with ww with cg/sba 450' without AD sup    450' with ww mod I 450' without AD sup         300 feet with AAD with sup >500 feet with AAD with mod I   Walking 10 feet on uneven surface 10 feet with ww with cg/sba NT      Wheel Chair Mobility n/a       Car Transfers sba Mod I NT Sup Mod I   Stair negotiation: ascended and descended  12 steps with bilateral rail with cg/sba 16 steps with one HR recip patterning mod I     16 steps with one HR mod I (recip patterning)     12 steps with one rail with sup >12 steps with one rail with mod I   Curb Step:   ascended and descended 4 inch step with ww and cgA  7.5 in curb step without AD sbA    7.5 in curb step with ww sup    7.5 in curb step without AD sbA    7.5 in curb step with ww sup 7.5 inch step with AAD and sup 7.5 inch step with AAD and mod I   Picking up object off the floor Picked up 2# with SBA        BLE ROM WFL       BLE Strength Right LE: 4+/5  Left LE: 4-/5 hip, 4-/5 knee, 3+/5 ankle       Balance  Sitting: sup  Standing: cgA with ww       Date Family Teach Completed TBA Scheduled on 6/4/19 however cancelled by family      Is additional Family Teaching Needed?   Y or N Y       Hindering Progress Balance, safety, left LE weakness       PT recommended ELOS 10 days       Team's Discharge Plan        Therapist at Team Meeting          Therapeutic Exercise:   AM:   Alternating toe taps without UE support x15 reps 6 in curb step, improved balance with no LOB noted x2 reps  Ambulation with without AD without weight on left LE, only occasional cues for heel strike on L LE with fatigue without ww  7.5 in curb step without AD x2 reps, close sba for balance when ascending  7.5 in curb step without AD x2 reps with ww, improved balance noted  Balance in // bars without UE support: static standing on uneven surfaces 6 in with EO/EC 30 seconds each, increased sway with EC and L LE stance   20x STS transfer with purple tband to increase hip abduction strength     PM: 20x STS transfer with purple tband to increase hip abduction strength    6 in curb step alternating toe taps without UE support x20   Curb step with and without ww, improved ability to complete with ww vs no AD  Falls recovery: mod I after initial demonstration    Patient education  Pt educated on safety with mobility, foot placement with ambulation, seqeuncing with curb/stair negotiation    Patient response to education:   Pt verbalized understanding Pt demonstrated skill Pt requires further education in this area   yes Yes with occasional cues yes     Additional Comments: Reviewed all mobility. Continues to improve with all aspects of mobility without AD, with improving functional balance and coordination. Pt continues to be more steady with ww with functional mobility, specifically with curb step negotiation. In PM, reviewed all mobility. Trial falls recovery with patient returning demonstration after therapist demonstration without assistance. Will continue to progress as able. AM  Time in: 0915  Time out: 1000    PM  Time in: 1430  Time out: 1515    Pt is making fair+ progress toward established Physical Therapy goals. Continue with physical therapy current plan of care.     Carla Max, LINNEAT  [id number]

## 2019-06-06 NOTE — PROGRESS NOTES
Progress Note    Subjective/   66y.o. year old female on the rehab unit for stroke. Tolerating rehab program. No SOB or chest pain. No dizziness. No nausea or vomiting. No palpitations. Objective/   VITALS:  BP (!) 121/48   Pulse 86   Temp 98.2 °F (36.8 °C) (Temporal)   Resp 14   Ht 4' 9\" (1.448 m)   Wt 80 lb 3.2 oz (36.4 kg)   SpO2 100%   BMI 17.36 kg/m²   24HR INTAKE/OUTPUT:      Intake/Output Summary (Last 24 hours) at 6/5/2019 2131  Last data filed at 6/5/2019 0841  Gross per 24 hour   Intake 240 ml   Output --   Net 240 ml     Constitutional:  Alert, awake, no apparent distress   Cardiovascular:  S1, S2 without m/r/g   Respiratory:  CTA B without w/r/r   Abdomen: +BS  Ext: no pitting LE edema, no pain with ROM  Neuro: no focal weakness. Tone is normal     Functional Level        Date   Status   AE    Comments     Feeding   6/5/19   Independent              Grooming   6/5/19   Mod I   ww    Standing at sink pt washed hands. Bathing   5/31/19   SBA Grab bar, handheld shower         UB Dressing   5/31/19   Mod I             LB Dressing   6/5/19   Mod I        Don/doff pants, socks and shoes. Homemaking   6/4/19   Supervision                  Functional Transfers / Balance:      Date Status DME  Comments   Sit Balance 6/1/19   Supervision       Stand Balance 6/5/19   Mod I ww     [x] Tub  [x] Shower   Transfer 6/4/19 5/31/19   SBA  SBA W/w, grab bar     Commode   Transfer 6/5/19   Mod I ww     Functional   Mobility 6/5/19   S/Mod I  S/ I ww   No device     Other:  WC<>Bed     Various surfaces in apartment    6/1/19 6/4/19    CGA     S/SBA          ww        Functional Exercises / Activity:  Painting activity completed to increase stand balance and endurance for independence with functional tasks and transfers.  Pt completed with Mod I standing at table top.      Ergometer 2 X 5 Mins forward/backward standing with X 3 Min seated rest break to increase BUE strength, endurance Scoliosis     Hypertension     Hyperlipidemia     Chest pain     Stroke [facility])     Acute CVA (cerebrovascular accident) ([facility] [address]     Ischemic stroke ([facility] [address]      Plan/  1. Continue rehab program with focus on improved mobility and independence  2. Continue secondary stroke prevention  3.  Continue dvt prophylaxis          Jack Louie MD

## 2019-06-06 NOTE — PROGRESS NOTES
[] Group Tx -   [] Time Missed -         Total Tx Time  85 Mins   Royal Dee, GRAMAJO/L [id number]      I have read & agree with the above status.     Ada Zee OTR/L [id number]

## 2019-06-06 NOTE — PROGRESS NOTES
Speech Language Pathology  ACUTE REHABILITATION--DAILY PROGRESS NOTE      PATIENT NAME:  Silverio Bedoya      :  1940      TODAY'S DATE:  2019         SWALLOWING  Not addressed in this session. LANGUAGE     Receptive  Current Status  6      Short Term Goal 6       Long Term Goal 6     Expressive  Current Status  6      Short Term Goal 6       Long Term Goal 6            INTERVENTION:  Patient answered yes/no questions, followed commands of increasing difficulty, and comprehended conversation with increased time. Patient expressed basic wants and needs and complex ideation throughout session. COGNITION:      Problem Solving Current Status  5      Short Term Goal 6       Long Term Goal 6       Memory  Current Status  5      Short Term Goal 6       Long Term Goal 6      INTERVENTION: Pt verbally oriented with 100% and no cueing needed. Good detailed recount of today's events given occasional verbal cueing. Able to recall safety strategies provided by staff in earlier sessions. Pt demonstrated detailed memory of meeting SLP student last week. Mod difficulty problem solving task completed with fair ability and min verbal/visual cueing. SPEECH:       INTERVENTION: Functional with minimal articulation errors. Observed pt talking on phone, during which she had to repeat herself on 2 occasions due to articulation errors. Pt independently slowed rate of speech and increased volume when repetitions were needed. Reinforced pt's use of comp strategies and encouraged her use of them in the future when needed. Cognitive/linguistic supervision recommendations      24 hour supervision    Close supervision   Intermittent supervision    No supervision                              x                                         Will continue SP intervention as per previously established POC.     Three Hour Rule Tracking:    Individual therapy:    45 minutes  Concurrent therapy:   minutes  Group therapy:

## 2019-06-06 NOTE — CONSULTS
Inpatient consult to Physical Medicine Rehab  Consult performed by: Bhargav Batres MD  Consult ordered by: Marques Montero MD      Patient reviewed with inpatient rehab admissions coordinator. Please see her note for details. Patient was accepted for rehab.     Bhargav Batres MD

## 2019-06-06 NOTE — PROGRESS NOTES
Physical Therapy    Facility/Department: [facility] REHAB  Weekly Progress Note    NAME: Deysi Gross  : 1940  MRN: [id number]    Date of Service: 2019    Evaluating Therapist: Janet Yusuf DPT    ROOM: [facility]  DIAGNOSIS: Ischemic CVA  PMH: To ED on 19 for chest pain/left sided weakness. MRI of the brain that a scattering of infarcts in the [facility] distribution along the distribution of a branch artery. Echo with bubble showed small septal aneurysm with no shunting. PMH includes cataract removal, hx of 3 cardiac stents in , CAD, HLD, HTN, scoliosis    PRECAUTIONS: falls,      Social:  Pt lives alone in a 1 floor apt with 3 steps and 2 rails to enter. Prior to admission pt walked with no AD, but uses quad cane PRN in community.       Initial Evaluation  19  Comments Short Term Goals Long Term Goals    Bed Mobility  Rolling: sba  Supine to sit: sba  Sit to supine: sba  Scooting: sba Rolling: sup  Supine to sit: sup  Sit to supine: sup  Scooting: sup Rolling: mod I  Supine to sit: mod I  Sit to supine: mod I  Scooting: mod I  Sup Mod I   Transfers Sit to stand: sba  Stand to sit: sba  Stand pivot: cg/sbA with ww Sit to stand: sba/sup  Stand to sit: sba/sup  Stand pivot: sbA with ww Sit to stand: mod I   Stand to sit: mod I  Stand pivot: sup with no AD     Mod I with ww  Sup with AAD Mod I with AAD   Ambulation    200 feet with ww with cg/sba 300' sba with ww     250' without AD cg/sba 450' without AD sup    450' with ww mod I  300 feet with AAD with sup >500 feet with AAD with mod I   Wheel Chair Mobility n/a n/a n/a      Car Transfers sba sba Mod I  Sup Mod I   Stair negotiation: ascended and descended  12 steps with bilateral rail with cg/sba 12 steps with bilateral HR NR pattern sba 16 steps with one HR mod I (recip patterning)  12 steps with one rail with sup >12 steps with one rail with mod I   Curb Step:   ascended and descended 4 inch step with ww and cgA  4 in curb step with ww sba 7.5 in curb step without AD sbA     7.5 in curb step with ww sup  7.5 inch step with AAD and sup 7.5 inch step with AAD and mod I   BLE ROM [facility] WFL      BLE Strength Right LE: 4+/5  Left LE: 4-/5 hip, 4-/5 knee, 3+/5 ankle Right LE: 4+/5  Left LE: 4-/5 hip, 4-/5 knee, 3+/5 ankle Right LE: 4+/5  Left LE: 4/5 hip, 4/5 knee, 4-/5 ankle      Balance  Sitting: sup  Standing: cgA with ww Sitting: Independent   Standing: cg/sba with ww Sitting: Independent   Standing: sup without AD      Date Family Teach Completed TBA TBA Scheduled on 6/4/19 however cancelled by family      Is additional Family Teaching Needed? Y or N Y Y       Hindering Progress Balance, safety, left LE weakness Balance, safety, ataxia, L LE weakness Balance, safety, ataxia, L LE weakness      PT recommended ELOS 10 days 9-10 days 2-3 days      Team's Discharge Plan  7-10 days Discharge Today 6/7/19      Therapist at [address] [facility], PT, DPT [id number]   Esequiel Alexis, PT, DPT [id number]          Date:  6/6/19  Supporting factors: Motivated, progressing with all mobility, improving safety   Barriers to discharge:  Ataxia/balance, safety, L LE weakness  Additional comments:  Improving with all mobility. Pt will benefit from (corrina) ww for uneven surfaces/outside/curb step negotiation as safest device. FT was scheduled however cancelled by family. Falls recovery completed 6/6/19 Independent after initial demonstration by therapist.   DME:  Corrina ww, life alert button  After Care:  , Home PT transitioning to Outpatient PT     Raad Mckay, DPT  [id number]    Date:  5/30/19  Supporting factors:  High PLOF, motivated  Barriers to discharge:  Ataxia, balance, L LE weakness, occasional safety verbal cues  Additional comments: Improving with all aspects of mobility. Ataxia noted with L LE, especially more prominent without AD. Occasional safety cues such as hand placement/approximation of ww. Limited support system at home noted. DME:  TBD (anticipate ww)  After Care:  TBD (anticipate OPPT)    Sweta Truong DPT   [id number]

## 2019-06-07 VITALS
OXYGEN SATURATION: 100 % | HEART RATE: 86 BPM | WEIGHT: 80.2 LBS | RESPIRATION RATE: 15 BRPM | DIASTOLIC BLOOD PRESSURE: 54 MMHG | BODY MASS INDEX: 17.3 KG/M2 | TEMPERATURE: 98.8 F | SYSTOLIC BLOOD PRESSURE: 119 MMHG | HEIGHT: 57 IN

## 2019-06-07 PROBLEM — R07.9 CHEST PAIN: Status: RESOLVED | Noted: 2019-05-24 | Resolved: 2019-06-07

## 2019-06-07 PROCEDURE — 6370000000 HC RX 637 (ALT 250 FOR IP): Performed by: PHYSICAL MEDICINE & REHABILITATION

## 2019-06-07 PROCEDURE — 6370000000 HC RX 637 (ALT 250 FOR IP): Performed by: FAMILY MEDICINE

## 2019-06-07 PROCEDURE — 6360000002 HC RX W HCPCS: Performed by: PHYSICAL MEDICINE & REHABILITATION

## 2019-06-07 PROCEDURE — 97110 THERAPEUTIC EXERCISES: CPT

## 2019-06-07 PROCEDURE — 97530 THERAPEUTIC ACTIVITIES: CPT

## 2019-06-07 RX ORDER — LISINOPRIL 40 MG/1
40 TABLET ORAL DAILY
Qty: 30 TABLET | Refills: 3 | Status: SHIPPED | OUTPATIENT
Start: 2019-06-08 | End: 2019-10-14 | Stop reason: CLARIF

## 2019-06-07 RX ORDER — CARVEDILOL 25 MG/1
25 TABLET ORAL 2 TIMES DAILY WITH MEALS
Qty: 60 TABLET | Refills: 3 | Status: SHIPPED | OUTPATIENT
Start: 2019-06-07

## 2019-06-07 RX ORDER — SENNA PLUS 8.6 MG/1
1 TABLET ORAL DAILY PRN
Qty: 30 TABLET | Refills: 0 | Status: SHIPPED | OUTPATIENT
Start: 2019-06-07 | End: 2019-07-07

## 2019-06-07 RX ORDER — PANTOPRAZOLE SODIUM 40 MG/1
40 TABLET, DELAYED RELEASE ORAL
Qty: 30 TABLET | Refills: 3 | Status: SHIPPED | OUTPATIENT
Start: 2019-06-08 | End: 2021-02-18 | Stop reason: ALTCHOICE

## 2019-06-07 RX ADMIN — CARVEDILOL 25 MG: 25 TABLET, FILM COATED ORAL at 08:08

## 2019-06-07 RX ADMIN — PANTOPRAZOLE SODIUM 40 MG: 40 TABLET, DELAYED RELEASE ORAL at 06:13

## 2019-06-07 RX ADMIN — CLOPIDOGREL 75 MG: 75 TABLET, FILM COATED ORAL at 08:08

## 2019-06-07 RX ADMIN — ASPIRIN 81 MG: 81 TABLET ORAL at 08:08

## 2019-06-07 RX ADMIN — METFORMIN HYDROCHLORIDE 500 MG: 500 TABLET ORAL at 08:09

## 2019-06-07 RX ADMIN — LISINOPRIL 40 MG: 20 TABLET ORAL at 08:08

## 2019-06-07 RX ADMIN — ENOXAPARIN SODIUM 40 MG: 40 INJECTION, SOLUTION INTRAVENOUS; SUBCUTANEOUS at 08:08

## 2019-06-07 NOTE — CARE COORDINATION
Per Team: D/C: 6/7/19. Updated patient and brother-in-law, Nate Gaitan. Goals: Supervision/Independent. Recommending Life alert pendent. DME: recommending a corrina wheeled walker and a tub seat. Patient states that she owns both and declined the order. HHC: PT,OT,SPEECH- referral made to [facility] per patients request.     FT: Family teach for OT done, not needed for PT.      [facility] Intern  Lucy Richard  6/7/2019

## 2019-06-07 NOTE — PROGRESS NOTES
CLINICAL PHARMACY NOTE: MEDS TO [address] Select Patient?: No  Total # of Prescriptions Filled: 6   The following medications were delivered to the patient:  · CARVEDILOL 25 MG   · SENNA 8.6 MG   · LISINOPRIL 40 MG   · METFORMIN  MG   · PANTOPRAZOLE 40 MG   · CLOPIDOGREL BISULFATE 75 MG   Total # of Interventions Completed: 6  Time Spent (min): 30    Additional Documentation:

## 2019-06-07 NOTE — PROGRESS NOTES
Physical Therapy    Facility/Department: [address] REHAB  Discharge Summary     NAME: Antwon Barajas  : 1940  MRN: [id number]    Date of Service: 2019    Evaluating Therapist: Cynthia Oliver DPT    ROOM: [address] [place] [address]A  DIAGNOSIS: Ischemic CVA  PMH: To ED on 19 for chest pain/left sided weakness. MRI of the brain that a scattering of infarcts in the [facility] distribution along the distribution of a branch artery. Echo with bubble showed small septal aneurysm with no shunting. PMH includes cataract removal, hx of 3 cardiac stents in , CAD, HLD, HTN, scoliosis    PRECAUTIONS: falls,      Social:  Pt lives alone in a 1 floor apt with 3 steps and 2 rails to enter. Prior to admission pt walked with no AD, but uses quad cane PRN in community.       Initial Evaluation  19 Discharge  19 Short Term Goals Long Term Goals    Bed Mobility  Rolling: sba  Supine to sit: sba  Sit to supine: sba  Scooting: sba Rolling: mod I  Supine to sit: mod I  Sit to supine: mod I  Scooting: mod I Sup Mod I   Transfers Sit to stand: sba  Stand to sit: sba  Stand pivot: cg/sbA with ww Sit to stand: mod I   Stand to sit: mod I  Stand pivot: mod I with no AD     Mod I with ww Sup with AAD Mod I with AAD   Ambulation    200 feet with ww with cg/sba 500' without AD Independent     450' with ww mod I 300 feet with AAD with sup >500 feet with AAD with mod I   Wheel Chair Mobility n/a n/a     Car Transfers sba Mod I Sup Mod I   Stair negotiation: ascended and descended  12 steps with bilateral rail with cg/sba 16 steps with one HR mod I (recip patterning) 12 steps with one rail with sup >12 steps with one rail with mod I   Curb Step:   ascended and descended 4 inch step with ww and cgA  7.5 in curb step without AD sba/sup     7.5 in curb step with ww mod I 7.5 inch step with AAD and sup 7.5 inch step with AAD and mod I   BLE ROM WFL WFL     BLE Strength Right LE: 4+/5  Left LE: 4-/5 hip, 4-/5 knee, 3+/5 ankle Right LE: 4+/5  Left LE: 4/5 hip, 4/5 knee, 4-/5 ankle     Balance  Sitting: sup  Standing: cgA with ww Sitting: Independent   Standing: mod I without AD     Date Family Teach Completed TBA Scheduled on 6/4/19 however cancelled by family     Is additional Family Teaching Needed? Y or N Y      Hindering Progress Balance, safety, left LE weakness      PT recommended ELOS 10 days      Team's Discharge Plan       Therapist at Team Meeting         Pt made steady and consistent progress towards all LTG's while on acute rehab unit during POC. Pt mod I for short distances without AD on level surfaces, however slightly unsteady on uneven surfaces/outdoors/during curb step negotiation and aware of recommendation to utilize corrina ww outside of the home. Falls recovery completed and patient able to return to standing self and Independently. FT did not occur as family declined participation with session. Rec HHPT initially with transition to OPPT for continued rehabilitation and safe transition home. Rec Life alert for at home as pt is home alone.        Jayy Sellers DPT  [id number]

## 2019-06-07 NOTE — PROGRESS NOTES
Progress Note    Subjective/   66y.o. year old female on the rehab unit for stroke. She is eager for discharge. No new complaints. Objective/   VITALS:  BP (!) 119/54   Pulse 86   Temp 98.8 °F (37.1 °C) (Temporal)   Resp 15   Ht 4' 9\" (1.448 m)   Wt 80 lb 3.2 oz (36.4 kg)   SpO2 100%   BMI 17.36 kg/m²   24HR INTAKE/OUTPUT:      Intake/Output Summary (Last 24 hours) at 6/7/2019 4598  Last data filed at 6/6/2019 1130  Gross per 24 hour   Intake 60 ml   Output --   Net 60 ml     Constitutional:  Alert, awake, no apparent distress   Cardiovascular:  S1, S2 without m/r/g   Respiratory:  CTA B without w/r/r   Abdomen: +BS  Ext: no pitting LE edema  Neuro: awake and alert, mild (L) nena. Good sitting balance    Functional Level  See discharge summary    Scheduled Meds:   pantoprazole  40 mg Oral QAM AC    aspirin  81 mg Oral Daily    carvedilol  25 mg Oral BID WC    clopidogrel  75 mg Oral Daily    lisinopril  40 mg Oral Daily    metFORMIN  500 mg Oral BID WC    simvastatin  40 mg Oral Nightly    enoxaparin  40 mg Subcutaneous Daily     Continuous Infusions:  PRN Meds:butalbital-acetaminophen-caffeine, ondansetron, magnesium hydroxide, acetaminophen, senna  I/O last 3 completed shifts: In: 61 [P.O.:60]  Out: -   No intake/output data recorded. Labs reviewed  CBC: No results for input(s): WBC, HGB, PLT in the last 72 hours. BMP:  No results for input(s): NA, K, CL, CO2, BUN, CREATININE, GLUCOSE in the last 72 hours. Hepatic: No results for input(s): AST, ALT, ALB, BILITOT, ALKPHOS in the last 72 hours. BNP: No results for input(s): BNP in the last 72 hours. Lipids: No results for input(s): CHOL, HDL in the last 72 hours. Invalid input(s): LDLCALCU  INR: No results for input(s): INR in the last 72 hours.     Assessment/  Patient Active Problem List:     Adhesive capsulitis of shoulder     Bursitis of left shoulder     Impingement syndrome of shoulder     Coronary artery disease involving native coronary artery of native heart without angina pectoris     Spinal stenosis     Scoliosis     Hypertension     Hyperlipidemia     Chest pain     Stroke [facility])     Acute CVA (cerebrovascular accident) ([facility] [address]     Ischemic stroke ([facility] [address]      Plan/  1. See team conference note - Gage Avila is 6/7/19 with home health, SLP, PT and OT v outpatient. Tub seat and wheeled walker  2. Staff recommending life alert button at discharge  3. Home today  4.  See discharge summary          Jack Louie MD

## 2019-06-07 NOTE — PROGRESS NOTES
Progress Note    Subjective/   66y.o. year old female on the rehab unit for stroke. She is seen in the dining room with family present. No complaints. No nausea. No chest pain or SOB. Objective/   VITALS:  BP (!) 111/57   Pulse 88   Temp 98.4 °F (36.9 °C) (Temporal)   Resp 16   Ht 4' 9\" (1.448 m)   Wt 80 lb 3.2 oz (36.4 kg)   SpO2 100%   BMI 17.36 kg/m²   24HR INTAKE/OUTPUT:      Intake/Output Summary (Last 24 hours) at 6/6/2019 2220  Last data filed at 6/6/2019 1130  Gross per 24 hour   Intake 60 ml   Output --   Net 60 ml     Constitutional:  Alert, awake, no apparent distress   Cardiovascular:  S1, S2 without m/r/g   Respiratory:  CTA B without w/r/r   Abdomen: +BS  Ext: no pitting LE edema  Neuro: awake and alert, mild (L) weakness persists. Good sitting balance. Functional Level      Initial Evaluation  5/29/19  AM PM    Short Term Goals Long Term Goals    Was pt agreeable to Eval/treatment? yes yes yes       Does pt have pain?  No pain  No c/o pain  No c/o pain        Bed Mobility  Rolling: sba  Supine to sit: sba  Sit to supine: sba  Scooting: sba Rolling: mod I  Supine to sit: mod I  Sit to supine: mod I  Scooting: mod I Rolling: mod I  Supine to sit: mod I  Sit to supine: mod I  Scooting: mod I Sup Mod I   Transfers Sit to stand: sba  Stand to sit: sba  Stand pivot: cg/sbA with ww Sit to stand: mod I  Stand to sit: mod I  Stand pivot: sup without AD     Mod I with ww Sit to stand: mod I   Stand to sit: mod I  Stand pivot: sup without AD     Mod I with ww Sup with AAD Mod I with AAD   Ambulation    200 feet with ww with cg/sba 450' without AD sup     450' with ww mod I 450' without AD sup             300 feet with AAD with sup >500 feet with AAD with mod I   Walking 10 feet on uneven surface 10 feet with ww with cg/sba NT         Wheel Chair Mobility n/a           Car Transfers sba Mod I NT Sup Mod I   Stair negotiation: ascended and descended  12 steps with bilateral rail with cg/sba 16 steps with one HR recip patterning mod I       16 steps with one HR mod I (recip patterning)       12 steps with one rail with sup >12 steps with one rail with mod I   Curb Step:   ascended and descended 4 inch step with ww and cgA  7.5 in curb step without AD sbA     7.5 in curb step with ww sup    7.5 in curb step without AD sbA     7.5 in curb step with ww sup 7.5 inch step with AAD and sup 7.5 inch step with AAD and mod I   Picking up object off the floor Picked up 2# with SBA            BLE ROM WFL           BLE Strength Right LE: 4+/5  Left LE: 4-/5 hip, 4-/5 knee, 3+/5 ankle           Balance  Sitting: sup  Standing: cgA with ww                   Scheduled Meds:   pantoprazole  40 mg Oral QAM AC    aspirin  81 mg Oral Daily    carvedilol  25 mg Oral BID WC    clopidogrel  75 mg Oral Daily    lisinopril  40 mg Oral Daily    metFORMIN  500 mg Oral BID WC    simvastatin  40 mg Oral Nightly    enoxaparin  40 mg Subcutaneous Daily     Continuous Infusions:  PRN Meds:butalbital-acetaminophen-caffeine, ondansetron, magnesium hydroxide, acetaminophen, senna  I/O last 3 completed shifts: In: 61 [P.O.:60]  Out: -   No intake/output data recorded. Labs reviewed  CBC: No results for input(s): WBC, HGB, PLT in the last 72 hours. BMP:  No results for input(s): NA, K, CL, CO2, BUN, CREATININE, GLUCOSE in the last 72 hours. Hepatic: No results for input(s): AST, ALT, ALB, BILITOT, ALKPHOS in the last 72 hours. BNP: No results for input(s): BNP in the last 72 hours. Lipids: No results for input(s): CHOL, HDL in the last 72 hours. Invalid input(s): LDLCALCU  INR: No results for input(s): INR in the last 72 hours.     Assessment/  Patient Active Problem List:     Adhesive capsulitis of shoulder     Bursitis of left shoulder     Impingement syndrome of shoulder     Coronary artery disease involving native coronary artery of native heart without angina pectoris     Spinal stenosis     Scoliosis Hypertension     Hyperlipidemia     Chest pain     Stroke [facility])     Acute CVA (cerebrovascular accident) ([facility] [address]     Ischemic stroke ([facility] [address]      Plan/  1. Continue rehab program with focus on mobility  2. For team conference in am  3. Increase activity as able  4. Continue current medications  5.  Continue secondary stroke prevention  6. dvt prophylaxis          Theodora Mcclellan MD

## 2019-06-07 NOTE — PATIENT CARE CONFERENCE
[address] [place] [address]  ACUTE REHABILITATION  TEAM CONFERENCE NOTE/PATIENT PLAN OF CARE    Date: 2019  Admission date: 2019  Patient Name: Roscoe Boyd        MRN: [id number]    : 1940  (74 y.o.)  Gender: female   Rehab diagnosis/surgery with date: Right MCA territory CVA  Impairment Group Code:  1.1    MEDICAL/FUNCTIONAL HISTORY/STATUS:  C/o Nausea started on protonix with good results    Consultations/Labs/X-rays: no new labs      MEDICATION UPDATE:  fiorcet and protonix started see [facility]      NURSING FIMS:    Bowel:   Current level: Independent   Short term bowel goal:  Independent   Long term bowel goal: Independent     Bladder:   Current level: Independent   Short term bladder goal: Independent   Long term bladder goal: Independent     Toilet Hygiene:   Current level : Modified Independent   Short term Toilet hygiene goal: Modified Independent   Long term toilet hygiene goal:  Modified Independent     Skin integrity: intact with some bruising  Pain: Headache ranges 3-0, fiorcet was effective    NUTRITION    Diet  Cardiac, taking an average of 50-75% of meals  Liquid consistency   thin    SOCIAL INFORMATION:  Lives with: alone  Prior community services:  none  Home Architecture:  1st floor apartment, 3 entry steps, 2 rails  Prior Level of function:  Used quad cane, independent  DME:  owns quad cane and wheeled walker    FAMILY / PATIENT EDUCATION:  Education and safety ongoing with patient,family teach needs rescheduled for PT    PHYSICAL THERAPY    Bed mobility:   Current level: Modified Independent   Short term bed mobility goal: Modified Independent   Long term bed mobility goal: Modified Independent     Chair/bed transfers:  Current level: Modified Independent  With Wheeled walker   Short term Chair/bed transfers goal: Modified Independent   Long term Chair/bed transfers goal: Modified Independent       Ambulation:   Current level: 450' Wheeled walker corrina size. 6   Short term ambulation goal: 300' Appropriate assistive device Supervision   Long term ambulation goal: >65' Appropriate assistive device Modified Independent     Car transfers:   Current level: Modified Independent   Short term car transfers goal: [address] term car transfers goal: Modified Independent     Stairs:   Current level : 16 steps with 1 rail at [address] term stairs goal: exceeded  Long term stairs goal: exceeded    Other comments: was able to do fall recovery on mat    OCCUPATIONAL THERAPY:    Tub/shower:   Current level: Supervision   Short term tub/shower goal: Supervision   Long term tub/shower goal: Supervision     Feeding:  Current level: Independent   Short term feeding goal: Independent   Long term feeding goal: Independent     Grooming:   Current level: Modified Independent   Short term grooming goal: Modified Independent   Long term grooming goal: Modified Independent     Bathing:  Current level: Supervision   Short term bathing goal: Supervision   Long term bathing goal: Supervision     Homemaking:   Current level: Supervision   Short term homemaking goal: Modified Independent   Long term homemaking goal: Modified Independent     Upper body dressing:  Current level: Modified Independent   Short term upper body dressing goal: Modified Independent   Long term upper body dressing goal: Modified Independent     Lower body dressing:  Current level: Modified Independent   Short term lower body dressing goal: Modified Independent   Long term lower body dressing goal: Modified Independent     Toilet transfer:   Current level: Modified Independent   Short term toilet transfer goal: Modified Independent   Long term toilet transfer goal: Modified Independent     SPEECH THERAPY  Swallowing:  Current level:   Independent   Short term swallowing goal: Independent   Long term swallowing Goal: Independent     Comprehension:   Current level: Modified Independent   Short term

## 2019-06-07 NOTE — PROGRESS NOTES
Physical Therapy    Facility/Department: [address] REHAB  Daily Treatment Note    NAME: Jonathan Maharaj  : 1940  MRN: [id number]    Date of Service: 2019    Evaluating Therapist: Jeremías Fine DPT    ROOM: [id number]  DIAGNOSIS: Ischemic CVA  PMH: To ED on 19 for chest pain/left sided weakness. MRI of the brain that a scattering of infarcts in the [facility] distribution along the distribution of a branch artery. Echo with bubble showed small septal aneurysm with no shunting. PMH includes cataract removal, hx of 3 cardiac stents in , CAD, HLD, HTN, scoliosis    PRECAUTIONS: falls,      Social:  Pt lives alone in a 1 floor apt with 3 steps and 2 rails to enter. Prior to admission pt walked with no AD, but uses quad cane PRN in community. Initial Evaluation  19  AM PM    Short Term Goals Long Term Goals    Was pt agreeable to Eval/treatment? yes yes D/c     Does pt have pain?  No pain  No c/o pain       Bed Mobility  Rolling: sba  Supine to sit: sba  Sit to supine: sba  Scooting: sba Rolling: mod I  Supine to sit: mod I  Sit to supine: mod I  Scooting: mod I  Sup Mod I   Transfers Sit to stand: sba  Stand to sit: sba  Stand pivot: cg/sbA with ww Sit to stand: mod I  Stand to sit: mod I  Stand pivot: mod I without AD    Mod I with ww  Sup with AAD Mod I with AAD   Ambulation    200 feet with ww with cg/sba 500' without AD Independent     450' with ww mod I  300 feet with AAD with sup >500 feet with AAD with mod I   Walking 10 feet on uneven surface 10 feet with ww with cg/sba 10' without AD Independent     10' with ww mod I      Wheel Chair Mobility n/a n/a      Car Transfers sba Mod I  Sup Mod I   Stair negotiation: ascended and descended  12 steps with bilateral rail with cg/sba 16 steps with one HR recip patterning mod I      12 steps with one rail with sup >12 steps with one rail with mod I   Curb Step:   ascended and descended 4 inch step with ww and cgA  7.5 in curb step without AD sba/sup    7.5 in curb step with ww mod I     7.5 inch step with AAD and sup 7.5 inch step with AAD and mod I   Picking up object off the floor Picked up 2# with SBA  Picked up 2.5# Independent       BLE ROM WFL WFL      BLE Strength Right LE: 4+/5  Left LE: 4-/5 hip, 4-/5 knee, 3+/5 ankle Right LE: 4+/5  Left LE: 4/5 hip, 4/5 knee, 4-/5 ankle      Balance  Sitting: sup  Standing: cgA with ww Sitting: Independent   Standing: mod I without AD      Date Family Teach Completed TBA Scheduled on 6/4/19 however cancelled by family      Is additional Family Teaching Needed? Y or N Y       Hindering Progress Balance, safety, left LE weakness       PT recommended ELOS 10 days       Team's Discharge Plan        Therapist at Team Meeting          Therapeutic Exercise:   AM:   Alternating toe taps without UE support x15 reps 6 in curb step, improved balance with no LOB noted x2 reps  Ambulation with without AD without weight on left LE, no cues for toe clearance/heel strike  7.5 in curb step without AD x2 reps, close sba/sup for balance when ascending  7.5 in curb step without AD x2 reps with ww, improved balance noted when compared to no AD  20x STS transfer with purple tband to increase hip abduction strength  Ambulation with head turns/quick stops/varying speeds (fast/slow)   Agility ladder NR alternating pattern 2x15', side stepping bilateral directions 2x 15'    PM:     Patient education  Pt educated on safety with mobility, foot placement with ambulation, seqeuncing with curb/stair negotiation    Patient response to education:   Pt verbalized understanding Pt demonstrated skill Pt requires further education in this area   yes Yes with occasional cues yes     Additional Comments: Continued to review all aspects of mobility to prepare for d/c home. Improvement with all aspects of mobility, however high level balance deficits still persist, as well as concerns with curb step/uneven surfaces.  Reviewed recommendation for life alert at home upon d/c. Rec ww for outdoors ambulation/curb step and patient verbalizing understanding. Pt d/c prior to PM session. AM  Time in: 0830   Time out: 0915    PM  Time in: d/c  Time out: d/c    Pt is making fair+ progress toward established Physical Therapy goals. Continue with physical therapy current plan of care.     Jayy Sellers DPT  [id number]

## 2019-06-07 NOTE — PROGRESS NOTES
Speech Language Pathology      DISCHARGE SUMMARY          CURRENT LEVEL OF FUNCTION:     SWALLOWING:   Within functional limits     LANGUAGE:    Within functional limits      COGNITION:   Mild cognitive deficit     SPEECH:   Within functional limits      Speech Pathologist (SLP) completed education with the patient regarding type of cognitive impairment. Discussed compensatory strategies to assist in improving attention, STM, problem solving, abstract reasoning and safety/insight. Encouraged patient to engage SLP in structured Q&A session relative to identified deficit areas. Patient indicated understanding of all information provided via satisfactory verbal response. OUTCOME:    Good progress toward goals. Will benefit from ongoing SP intervention to target the identified deficit areas.     Jasvir El M.A., CCC-SLP/L  SP [id number]  6/7/2019

## 2019-06-07 NOTE — PROGRESS NOTES
Occupational Therapy  OCCUPATIONAL THERAPY DAILY NOTE/ Discharge Summary    Date:2019  Patient Name: Gina Fraser  MRN: [id number]  : 1940  Room: [address] [place], [place] [address]     Diagnosis: ischemic CVA  Precautions: Fall Risk & L inattention noted    Functional Assessment:   Date Status AE  Comments   Feeding 19 Independent      Grooming 19 Mod I ww    Bathing 19 SBA Grab bar, handheld shower    UB Dressing 19 Mod I     LB Dressing 19 Independent     Homemaking 19 Supervision       Functional Transfers / Balance:   Date Status DME  Comments   Sit Balance 19 Supervision     Stand Balance 19 Mod I ww    [x] Tub  [x] Shower   Transfer 19 SBA  SBA W/w, grab bar    Commode   Transfer 19 Mod I ww    Functional   Mobility 19 independent ww   No device    Other:  WC<>Bed    Various surfaces in apartment   19   CGA    S/SBA       ww      Functional Exercises / Activity:  Standing at table top pt pushed and retrieved 2# weighted ball to increase stand balance, BUE strength and endurance for independence with functional tasks and transfers. Pt demo'd Mod I for stand balance with fair+ stand tolerance and demo'd fair strength. 2# free weight 3 X 10 on all planes to increase BUE strength and endurance for independence with functional tasks. Sensory / Neuromuscular Re-Education:      Cognitive Skills:   Status Comments   Problem   Solving fair     Memory fair  Decreased STM   Sequencing fair     Safety fair       Visual Perception:    Education:  Pt educated on safety during functional exercise. [x] Family teach completed on: 19  Pt's nephews present for PM session this date and observed pt completing transfers throughout functional living environment with ww. They believe they have a tub seat and ww and are going to check to make sure the ww is a corrina ww and that they have the tub seat for certain.  Pt states that grab bars are going to be installed in the bathtub. Family educated on levels of assist for ADLs and that pt requires S/SBA for bathing. Family and pt state that someone could be there for bathing and that she has family that checks on her often. Pain Level: 0/10    Additional Notes:       Patient has made good  progress during treatment sessions toward set goals. Therapy emphasis to obtain goals: Strengthening, Endurance Training, Neuromuscular Re-education, Patient/Caregiver Education & Training, Equipment Evaluation, Education, & procurement, Self-Care / ADL, Balance Training, Gait Training, Home Management Training, Functional Mobility Training, Safety Education & Training        [] Continue with current OT Plan of care. [x] Prepare for Discharge     DISCHARGE RECOMMENDATIONS   OCCUPATIONAL THERAPY DISCHARGE SUMMARY    Discontinue Occupational Therapy intervention. Pt has:   [] met all set goals. [x] made good progress toward set goals. [] has made slow progress toward goals and would benefit from rehab setting change.  [] had a medical decline and therefore was transferred off the Rehab unit.     Long term goals  Time Frame for Long term goals : 7-10 days  Long term goal 1: Pt demo independent to eat all meals  Long term goal 2: Pt demo Mod I grooming seated or standing  Long term goal 3: Pt demo Sup bathing @ shower level both seated & standing  Long term goal 4: Pt demo I UE & Mod I LE dress & demo G- afety &insight  Long term goal 5: Pt demo Mod I commode trf with appropriate DME to ensure pt safety  Long term goals 6: Pt demo Sup tub trf with appropriate DME to ensure pt safety  Long term goal 7: Pt demo Mod I light homemaking activity & demo G- safety  Long term goal 8: Pt demo G- endurance for a 20-30 minute functional activity  Long term goal 9: Pt demo improved coordination as evidenced by 9-hole peg test     The Patient has received education on:  [x]Transfer Safety [x]Compensatory tech for ADLs [x]AE training [x]DME training [x]Energy Conservation [x]Home / Kitchen Safety  [x]Fall Prevention  []Other:    Family training was completed: yes    Recommendations: Branden Lal OT    Recommended DME:    Post Discharge Care:   []Home Independently  []Home with 24hr Care / Supervision []Home with Partial Supervision []Home with Home Health OT []Home with Out Pt OT []Other: ___   Comments:         Time in Time out Tx Time Breakdown  Variance:   First Session  10:45 11:25 [] Individual Tx-   [x] Concurrent Tx - 40 Mins  [] Co-Tx -   [] Group Tx -   [] Time Missed -     Second Session   [] Individual Tx-    [] Concurrent Tx -   [] Co-Tx -   [] Group Tx -   [] Time Missed -     Third Session    [] Individual Tx-   [] Concurrent Tx -  [] Co-Tx -   [] Group Tx -   [] Time Missed -         Total Tx Time  40 Mins   Yara Overton GRAMAJO/L [id number]  I have read the above note and agree with the documentation.   Elza Turner OTR/L [id number]

## 2019-06-11 ENCOUNTER — OFFICE VISIT (OUTPATIENT)
Dept: CARDIOLOGY CLINIC | Age: 79
End: 2019-06-11
Payer: MEDICARE

## 2019-06-11 VITALS
HEIGHT: 58 IN | HEART RATE: 78 BPM | WEIGHT: 77 LBS | RESPIRATION RATE: 16 BRPM | SYSTOLIC BLOOD PRESSURE: 124 MMHG | DIASTOLIC BLOOD PRESSURE: 60 MMHG | OXYGEN SATURATION: 98 % | BODY MASS INDEX: 16.16 KG/M2

## 2019-06-11 DIAGNOSIS — I25.10 CORONARY ARTERY DISEASE INVOLVING NATIVE CORONARY ARTERY OF NATIVE HEART WITHOUT ANGINA PECTORIS: Primary | ICD-10-CM

## 2019-06-11 PROCEDURE — G8419 CALC BMI OUT NRM PARAM NOF/U: HCPCS | Performed by: NURSE PRACTITIONER

## 2019-06-11 PROCEDURE — G8400 PT W/DXA NO RESULTS DOC: HCPCS | Performed by: NURSE PRACTITIONER

## 2019-06-11 PROCEDURE — 1090F PRES/ABSN URINE INCON ASSESS: CPT | Performed by: NURSE PRACTITIONER

## 2019-06-11 PROCEDURE — 93000 ELECTROCARDIOGRAM COMPLETE: CPT | Performed by: INTERNAL MEDICINE

## 2019-06-11 PROCEDURE — G8598 ASA/ANTIPLAT THER USED: HCPCS | Performed by: NURSE PRACTITIONER

## 2019-06-11 PROCEDURE — 4040F PNEUMOC VAC/ADMIN/RCVD: CPT | Performed by: NURSE PRACTITIONER

## 2019-06-11 PROCEDURE — 1123F ACP DISCUSS/DSCN MKR DOCD: CPT | Performed by: NURSE PRACTITIONER

## 2019-06-11 PROCEDURE — 99213 OFFICE O/P EST LOW 20 MIN: CPT | Performed by: NURSE PRACTITIONER

## 2019-06-11 PROCEDURE — 1111F DSCHRG MED/CURRENT MED MERGE: CPT | Performed by: NURSE PRACTITIONER

## 2019-06-11 PROCEDURE — G8427 DOCREV CUR MEDS BY ELIG CLIN: HCPCS | Performed by: NURSE PRACTITIONER

## 2019-06-11 PROCEDURE — 1036F TOBACCO NON-USER: CPT | Performed by: NURSE PRACTITIONER

## 2019-06-11 RX ORDER — ASPIRIN 325 MG
325 TABLET ORAL DAILY
COMMUNITY
End: 2019-10-14

## 2019-06-11 SDOH — HEALTH STABILITY: MENTAL HEALTH: HOW OFTEN DO YOU HAVE A DRINK CONTAINING ALCOHOL?: NEVER

## 2019-06-11 NOTE — PROGRESS NOTES
Occupational Therapy  DISCHARGE SUMMARY    Group interaction skills/socialization:  Nikolay Nelson displayed social interaction skills at the complete independence. Leisure participation/awareness:  Nikolay Nelson participated in 4 therapeutic recreation interventions identifying 4 benefits to leisure participation.     Other:     Outcomes: goals achieved      Electronically signed by Lorrie Gutierrez on 6/11/2019 at 7:33 AM

## 2019-06-11 NOTE — PROGRESS NOTES
[facility] PHYSICIAN CARDIOLOGY   OFFICE VISIT        PRIMARY CARE PHYSICIAN:      Stan Hampton DO         ALLERGIES / SENSITIVITIES:        Allergies   Allergen Reactions    Aspirin      Caused bleeding ulcers    Lipitor      Caused elevated liver enzymes          REVIEWED MEDICATIONS:       Current Outpatient Medications   Medication Sig Dispense Refill    aspirin 325 MG tablet Take 325 mg by mouth daily      metFORMIN (GLUCOPHAGE) 500 MG tablet Take 1 tablet by mouth 2 times daily (with meals) 60 tablet 3    lisinopril (PRINIVIL;ZESTRIL) 40 MG tablet Take 1 tablet by mouth daily 30 tablet 3    carvedilol (COREG) 25 MG tablet Take 1 tablet by mouth 2 times daily (with meals) 60 tablet 3    senna (SENOKOT) 8.6 MG tablet Take 1 tablet by mouth daily as needed (Constipation) 30 tablet 0    pantoprazole (PROTONIX) 40 MG tablet Take 1 tablet by mouth every morning (before breakfast) 30 tablet 3    bimatoprost (LUMIGAN) 0.01 % SOLN ophthalmic drops Place 1 drop into both eyes nightly      clopidogrel (PLAVIX) 75 MG tablet Take 1 tablet by mouth daily 30 tablet 0    butalbital-acetaminophen-caffeine (FIORICET, ESGIC) -40 MG per tablet Take 1 tablet by mouth every 4 hours as needed for Migraine      ezetimibe-simvastatin (VYTORIN) 10-40 MG per tablet Take 1 tablet by mouth nightly.  Multiple Vitamins-Minerals (CENTRUM SILVER PO) Take 1 tablet by mouth daily        No current facility-administered medications for this visit. S: REASON FOR VISIT:     Management of coronary artery disease. She is followed here by Margot Tillman was last evaluated in October 2018 and was doing well from a cardiac standpoint. She was admitted to [facility] on May 24 with left arm weakness that was sudden onset. CT of the head showed no acute findings. She was transferred to [facility] and underwent MRI and neurology consult.   MRI showed cortical infarcts of the right posterior frontal lobe.  She underwent stress test and 2D echocardiogram, see report below. She denies any chest pain or dyspnea, swelling of the lower extremities, orthopnea or palpitations or dizziness, presyncope or syncope. Her left arm remains a little weak. REVIEW OF SYSTEMS:       Constitutional: no fevers or chills or fatigue   HEENT: denies changes in hearing or vision, No difficulty swallowing   Endocrine: no weight changes   Musculoskeletal: no arthralgias or myalgias   Skin: denies rashes or itching or lesions   Heme/Lymph: denies bleeding   Heart: as above   Lungs: as above   GI: denies abdominal pain, nausea, vomiting, diarrhea, rectal bleeding, tarry stools   : denies hematuria, dysuria   Psych: denies mood change, anxiety, depression. Neuro: denies numbness, tingling, tremors. CARDIOVASCULAR HISTORY:       1. Hypertension. 2. GERD. 3. Spinal stenosis. 4. Hyperlipidemia. 5. Left heart catheterization 02/25/2010. Dr. Poornima Lopez.  Left main 0%, LAD proximal 80-85%, D1 0%, OM2 no significant disease, ramus intermedius no disease, RCA dominant vessel, ostial 70% stenosis and proximal 70-75%.  EF 60%.  EDP 17 mmHg. PCI of LAD with 2.5 x 15 Xience drug-eluting stent deployed at 2.78 mm.    6. PCI of RCA with 3.0 x 20 mm Taxus drug-eluting stent to ostium with 3.0 x 12 mm Vision bare-metal stent in the proximal RCA 03/11/2010.  7. Left heart catheterization 05/19/2010. Dr. Kyle Schumacher. Patent stents in the LAD.  No disease in the Cx, RCA (ostial 70%) InStent stenosis, patent stent in the mid RCA, EF normal.  8. PCI of InStent restenosis with 3.0 x 8 mm Xience drug-eluting stent. 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 2D echo May 24, 2019  Atrial septum: small septal aneurysm. No right to left shunting with   agitated saline contrast. No left to right shunting with color doppler.   Normal left ventricular size and function.  LVEF is 65 %.   Normal diastolic function.   Normal right ventricle.   No hemodynamically significant valvular disease.          11. May 24, 2019 Lexiscan stress test    No evidence of ischemia or infarct.       2. Normal wall motion.       3. Normal calculated left ventricular ejection fraction of 71%.                   12.        Past Medical History:   Diagnosis Date    Arthritis     Back problem     CAD (coronary artery disease) 2010    PTCA with DAMON to prox LAD, PTCA DAMON to ostial lesion of RCA and BMS to prox lesion RCA DAMON to RCA ostial     Hyperlipidemia     Hypertension     Nausea & vomiting     Reflux     RLD (ruptured lumbar disc)     Scoliosis     Spinal stenosis        Past Surgical History:   Procedure Laterality Date    CATARACT REMOVAL      bilateral    CORONARY ANGIOPLASTY  2/25/2010    PTCA with DAMON in the proximal left anterior descending    CORONARY ANGIOPLASTY  3/11/2010    PTCA and deployment of 2 stents in the ostium and the proximal segment of RCA    CORONARY ANGIOPLASTY  5/19/2010    PTCA DAMON to ostial RCA patent LAD and RCA BMS  restenosis of RCA    CORONARY ANGIOPLASTY WITH STENT PLACEMENT      times 3    DIAGNOSTIC CARDIAC CATH LAB PROCEDURE  2/25/2010    Dr. Maykel Tamayo: Cardiac cath with angioplasty follow up     DIAGNOSTIC CARDIAC CATH LAB PROCEDURE  3/11/2010    Cardiac cath and stent placement    DIAGNOSTIC CARDIAC CATH LAB PROCEDURE  5/19/2010    Cardiac cath heart lab and subsequent angiopilsaty    FRACTURE SURGERY      tight wrist    SHOULDER ARTHROSCOPY  08 25 2011    left shoulder arthroscopy ,acromioplasty,busectomy, release of adhesions       Family History   Problem Relation Age of Onset    Heart Attack Mother 68    Heart Attack Father 72    Cancer Sister 79        pancreatic           O:  COMPLETE PHYSICAL EXAM:       /60   Pulse 78   Resp 16   Ht 4' 10\" (1.473 m)   Wt 77 lb (34.9 kg)   SpO2 98%   BMI 16.09 kg/m²       General:   in no acute distress.   thin well-developed   Skin                  Warm and dry, no rashes   Head & Neck:  No JVD. No carotid bruits. Mucous membranes moist.   Chest:   No deformities. Symmetrical and nontender. No respiratory distress   Lungs:   Clear to auscultation bilaterally. Heart:  Normal S1 and S2. No S3 or S4. No Murmur. No gallops no rubs   Abdomen: Soft without organomegaly or masses. Nontender, Bowel sound     normoactive   Extremities:  No edema of lower extremities . No cyanosis and moves all extremities   Neuro:  Alert & orient x 3 no focal deficits     REVIEW OF DIAGNOSTIC TESTS:     EKG today sinus rhythm with low voltage in the limb leads  Lab Results   Component Value Date     05/29/2019     05/28/2019     05/27/2019    K 3.5 05/29/2019    K 3.6 05/28/2019    K 3.9 05/27/2019    K 3.4 05/26/2019    K 3.4 05/25/2019     05/29/2019     05/28/2019     05/27/2019    CO2 24 05/29/2019    CO2 23 05/28/2019    CO2 24 05/27/2019    BUN 13 05/29/2019    BUN 10 05/28/2019    BUN 13 05/27/2019    CREATININE 0.5 05/29/2019    CREATININE 0.5 05/28/2019    CREATININE 0.5 05/27/2019       No results found for: PROBNP      ASSESSMENT / DIAGNOSIS:   1. coronary artery disease is stable  2. CVA with left-handed weakness. Per neurology she was discharged on 325 of aspirin and Plavix   3. Hypertension is well controlled  4. Hyperlipidemia, on a statin and she does take Vytorin  5. Underweight  6.  spinal stenosis  7. GERD  8. History of left shoulder arthroscopic     TREATMENT PLAN:  1. Continue current medication   2. Return to the office in 1 year  3. Encouraged to drain shakes      The patient's current medication list, allergies, problem list and results of all previously ordered tests were reviewed at today's visit      RANDY Vila - CNP        [facility] CARDIOLOGY  [address]  [facility] [address]  [phone]

## 2019-08-01 NOTE — DISCHARGE SUMMARY
Rehabilitation Discharge Summary     Patient Identification  Roscoe Boyd is a 66 y.o. female. :  1940  Admit Date:  2019  Discharge date and time: 2019  2:33 PM   Attending Provider: Juanita att. providers found                                      Discharge Physician: Lani Hampton    Admission Diagnoses: Ischemic stroke [facility]) [I63.9]    Discharge Diagnoses: ischemic stroke, CAD, scoliosis, HTN, HLD, (L) nena    Admission Functional Status:    Balance  Sitting Balance: Stand by assistance  Standing Balance: Contact guard assistance  Functional Mobility  Functional - Mobility Device: Rolling Walker  Activity: To/from bathroom  Assist Level: Contact guard assistance  Functional Mobility Comments: min vc's for hand placement & safety when turning     Toilet Transfers  Toilet - Technique: Ambulating  Equipment Used: Standard toilet  Toilet Transfer: Minimal assistance  Toilet Transfers Comments: min vc's for walker managemetn & safety     ADL  Feeding: Setup(assist to open small packages.)  Grooming: Minimal assistance  UE Bathing: Supervision; Increased time to complete;Setup  LE Bathing: Setup;Verbal cueing; Increased time to complete;Minimal assistance  UE Dressing: Supervision; Increased time to complete;Verbal cueing;Setup  LE Dressing: Setup;Verbal cueing;Minimal assistance; Increased time to complete  Toileting: Minimal assistance      Coordination  Movements Are Fluid And Coordinated: No  Coordination and Movement description: Fine motor impairments; Left UE  Bed mobility  Supine to Sit: Stand by assistance  Scooting: Stand by assistance  Transfers  Stand Pivot Transfers: Contact guard assistance  Sit to stand: Stand by assistance  Stand to sit: Stand by assistance      Vision - Basic Assessment  Prior Vision: Wears glasses all the time  Visual History: No significant visual history  Patient Visual Report: No visual complaint reported.      Cognition  Overall Cognitive Status: Exceptions  Memory: descended  12 steps with bilateral rail with cg/sba 16 steps with one HR mod I (recip patterning) 12 steps with one rail with sup >12 steps with one rail with mod I   Curb Step:   ascended and descended 4 inch step with ww and cgA  7.5 in curb step without AD sba/sup     7.5 in curb step with ww mod I 7.5 inch step with AAD and sup 7.5 inch step with AAD and mod I   BLE ROM WFL WFL       BLE Strength Right LE: 4+/5  Left LE: 4-/5 hip, 4-/5 knee, 3+/5 ankle Right LE: 4+/5  Left LE: 4/5 hip, 4/5 knee, 4-/5 ankle       Balance  Sitting: sup  Standing: cgA with ww Sitting: Independent   Standing: mod I without AD       Date Family Teach Completed TBA Scheduled on 6/4/19 however cancelled by family              Indication for Admission: limited mobility and self care following stroke and need for close follow up post stroke to minimize risk of complications. Rehabilitation Course: She did participate in a comprehensinve inpatient rehab program and made gains as outlined. She had good control of her BP. She was maintained on DVT prophylaxis. She did demonstrate some neuro recovery. She remained stable from a CV standpoint. Her (L) shoulder did not slow her down. Consults: none    Significant Diagnostic Studies: labs:   Results for Mando Mccormick (MRN [id number]) as of 7/31/2019 23:33   Ref.  Range 5/29/2019 06:45 5/29/2019 21:34 5/30/2019 08:11   Sodium Latest Ref Range: 132 - 146 mmol/L 138     Potassium Latest Ref Range: 3.5 - 5.0 mmol/L 3.5     Chloride Latest Ref Range: 98 - 107 mmol/L 101     CO2 Latest Ref Range: 22 - 29 mmol/L 24     BUN Latest Ref Range: 8 - 23 mg/dL 13     Creatinine Latest Ref Range: 0.5 - 1.0 mg/dL 0.5     Anion Gap Latest Ref Range: 7 - 16 mmol/L 13     GFR Non- Latest Ref Range: >=60 mL/min/1.73 >60     GFR  Unknown >60     Magnesium Latest Ref Range: 1.6 - 2.6 mg/dL 2.1     Glucose Latest Ref Range: 74 - 99 mg/dL 163 (H)     Calcium Latest Ref Range: 8.6 - 10.2 mg/dL 8.9     Total Protein Latest Ref Range: 6.4 - 8.3 g/dL 6.4     Meter Glucose Latest Ref Range: 74 - 99 mg/dL  133 (H) 252 (H)   Albumin Latest Ref Range: 3.5 - 5.2 g/dL 4.1     Alk Phos Latest Ref Range: 35 - 104 U/L 84     ALT Latest Ref Range: 0 - 32 U/L 17     AST Latest Ref Range: 0 - 31 U/L 23     Bilirubin Latest Ref Range: 0.0 - 1.2 mg/dL 0.2     WBC Latest Ref Range: 4.5 - 11.5 E9/L 7.2     RBC Latest Ref Range: 3.50 - 5.50 E12/L 3.73     Hemoglobin Quant Latest Ref Range: 11.5 - 15.5 g/dL 11.6     Hematocrit Latest Ref Range: 34.0 - 48.0 % 35.2     MCV Latest Ref Range: 80.0 - 99.9 fL 94.4     MCH Latest Ref Range: 26.0 - 35.0 pg 31.1     MCHC Latest Ref Range: 32.0 - 34.5 % 33.0     MPV Latest Ref Range: 7.0 - 12.0 fL 9.0     RDW Latest Ref Range: 11.5 - 15.0 fL 11.9     Platelet Count Latest Ref Range: 130 - 450 E9/L 261     Neutrophils % Latest Ref Range: 43.0 - 80.0 % 45.0     Immature Granulocytes % Latest Ref Range: 0.0 - 5.0 % 0.7     Lymphocyte % Latest Ref Range: 20.0 - 42.0 % 34.5     Monocytes % Latest Ref Range: 2.0 - 12.0 % 16.3 (H)     Eosinophils % Latest Ref Range: 0.0 - 6.0 % 3.2     Basophils % Latest Ref Range: 0.0 - 2.0 % 0.3     Neutrophils # Latest Ref Range: 1.80 - 7.30 E9/L 3.22     Immature Granulocytes # Latest Units: E9/L 0.05     Lymphocytes # Latest Ref Range: 1.50 - 4.00 E9/L 2.47     Monocytes # Latest Ref Range: 0.10 - 0.95 E9/L 1.17 (H)     Eosinophils # Latest Ref Range: 0.05 - 0.50 E9/L 0.23     Basophils # Latest Ref Range: 0.00 - 0.20 E9/L 0.02     Prothrombin Time Latest Ref Range: 9.3 - 12.4 sec 12.8 (H)     INR Unknown 1.1         Treatments: therapies: PT, OT, RN and SW    Discharge Exam:  See progress note from date of discharge    Disposition: home    Condition: improved    Patient Instructions:   She was recommended for Life Alert pendent.     Eleazar Deleon   Home Medication Instructions [id number]    Printed on:07/31/19 [id number]   Medication Information                      bimatoprost (LUMIGAN) 0.01 % SOLN ophthalmic drops  Place 1 drop into both eyes nightly             butalbital-acetaminophen-caffeine (FIORICET, ESGIC) -40 MG per tablet  Take 1 tablet by mouth every 4 hours as needed for Migraine             carvedilol (COREG) 25 MG tablet  Take 1 tablet by mouth 2 times daily (with meals)             clopidogrel (PLAVIX) 75 MG tablet  Take 1 tablet by mouth daily             ezetimibe-simvastatin (VYTORIN) 10-40 MG per tablet  Take 1 tablet by mouth nightly. lisinopril (PRINIVIL;ZESTRIL) 40 MG tablet  Take 1 tablet by mouth daily             metFORMIN (GLUCOPHAGE) 500 MG tablet  Take 1 tablet by mouth 2 times daily (with meals)             Multiple Vitamins-Minerals (CENTRUM SILVER PO)  Take 1 tablet by mouth daily              pantoprazole (PROTONIX) 40 MG tablet  Take 1 tablet by mouth every morning (before breakfast)                 [unfilled]  Activity: activity as tolerated and no driving. Home Health recommended  Diet: as recommended per speech therapy  Wound Care: none needed  DME Orders after Discharge: recommended wheeled walker and tub seat. Patient states she has and declined the order of both. Follow-up with   [address], [address] Tariq MD  In 1 month    [address] [place] [address],Third Floor [address]     post hospital follow up 1 week after discharge-call for appt.   C/ Eras [address] [place] [address]  [phone]

## 2019-10-14 ENCOUNTER — OFFICE VISIT (OUTPATIENT)
Dept: CARDIOLOGY CLINIC | Age: 79
End: 2019-10-14
Payer: MEDICARE

## 2019-10-14 VITALS
HEART RATE: 65 BPM | WEIGHT: 75 LBS | BODY MASS INDEX: 16.18 KG/M2 | HEIGHT: 57 IN | DIASTOLIC BLOOD PRESSURE: 60 MMHG | SYSTOLIC BLOOD PRESSURE: 110 MMHG

## 2019-10-14 DIAGNOSIS — I63.9 CEREBROVASCULAR ACCIDENT (CVA), UNSPECIFIED MECHANISM (HCC): ICD-10-CM

## 2019-10-14 DIAGNOSIS — E78.2 MIXED HYPERLIPIDEMIA: ICD-10-CM

## 2019-10-14 DIAGNOSIS — E11.9 TYPE 2 DIABETES MELLITUS WITHOUT COMPLICATION, WITHOUT LONG-TERM CURRENT USE OF INSULIN (HCC): ICD-10-CM

## 2019-10-14 DIAGNOSIS — I25.10 CORONARY ARTERY DISEASE INVOLVING NATIVE CORONARY ARTERY OF NATIVE HEART WITHOUT ANGINA PECTORIS: Primary | ICD-10-CM

## 2019-10-14 DIAGNOSIS — I10 ESSENTIAL HYPERTENSION: ICD-10-CM

## 2019-10-14 DIAGNOSIS — I25.3 ATRIAL SEPTAL ANEURYSM: ICD-10-CM

## 2019-10-14 PROCEDURE — 1036F TOBACCO NON-USER: CPT | Performed by: INTERNAL MEDICINE

## 2019-10-14 PROCEDURE — G8419 CALC BMI OUT NRM PARAM NOF/U: HCPCS | Performed by: INTERNAL MEDICINE

## 2019-10-14 PROCEDURE — 1090F PRES/ABSN URINE INCON ASSESS: CPT | Performed by: INTERNAL MEDICINE

## 2019-10-14 PROCEDURE — 4040F PNEUMOC VAC/ADMIN/RCVD: CPT | Performed by: INTERNAL MEDICINE

## 2019-10-14 PROCEDURE — 93000 ELECTROCARDIOGRAM COMPLETE: CPT | Performed by: INTERNAL MEDICINE

## 2019-10-14 PROCEDURE — G8598 ASA/ANTIPLAT THER USED: HCPCS | Performed by: INTERNAL MEDICINE

## 2019-10-14 PROCEDURE — G8427 DOCREV CUR MEDS BY ELIG CLIN: HCPCS | Performed by: INTERNAL MEDICINE

## 2019-10-14 PROCEDURE — 1123F ACP DISCUSS/DSCN MKR DOCD: CPT | Performed by: INTERNAL MEDICINE

## 2019-10-14 PROCEDURE — G8400 PT W/DXA NO RESULTS DOC: HCPCS | Performed by: INTERNAL MEDICINE

## 2019-10-14 PROCEDURE — G8484 FLU IMMUNIZE NO ADMIN: HCPCS | Performed by: INTERNAL MEDICINE

## 2019-10-14 PROCEDURE — 99215 OFFICE O/P EST HI 40 MIN: CPT | Performed by: INTERNAL MEDICINE

## 2019-10-14 RX ORDER — HYDROCHLOROTHIAZIDE 12.5 MG/1
12.5 CAPSULE, GELATIN COATED ORAL EVERY MORNING
Refills: 0 | COMMUNITY
Start: 2019-10-03 | End: 2021-02-18 | Stop reason: ALTCHOICE

## 2019-10-14 RX ORDER — LISINOPRIL 20 MG/1
20 TABLET ORAL DAILY
Refills: 0 | Status: ON HOLD | COMMUNITY
Start: 2019-09-29 | End: 2021-02-21 | Stop reason: HOSPADM

## 2019-10-14 RX ORDER — GLIMEPIRIDE 1 MG/1
1 TABLET ORAL
Refills: 0 | Status: ON HOLD | COMMUNITY
Start: 2019-10-07 | End: 2021-02-21 | Stop reason: HOSPADM

## 2020-10-13 ENCOUNTER — OFFICE VISIT (OUTPATIENT)
Dept: CARDIOLOGY CLINIC | Age: 80
End: 2020-10-13
Payer: MEDICARE

## 2020-10-13 VITALS
SYSTOLIC BLOOD PRESSURE: 132 MMHG | HEIGHT: 57 IN | BODY MASS INDEX: 16.83 KG/M2 | DIASTOLIC BLOOD PRESSURE: 60 MMHG | HEART RATE: 81 BPM | WEIGHT: 78 LBS

## 2020-10-13 PROCEDURE — 1123F ACP DISCUSS/DSCN MKR DOCD: CPT | Performed by: INTERNAL MEDICINE

## 2020-10-13 PROCEDURE — G8400 PT W/DXA NO RESULTS DOC: HCPCS | Performed by: INTERNAL MEDICINE

## 2020-10-13 PROCEDURE — G8419 CALC BMI OUT NRM PARAM NOF/U: HCPCS | Performed by: INTERNAL MEDICINE

## 2020-10-13 PROCEDURE — 93000 ELECTROCARDIOGRAM COMPLETE: CPT | Performed by: INTERNAL MEDICINE

## 2020-10-13 PROCEDURE — G8427 DOCREV CUR MEDS BY ELIG CLIN: HCPCS | Performed by: INTERNAL MEDICINE

## 2020-10-13 PROCEDURE — 4040F PNEUMOC VAC/ADMIN/RCVD: CPT | Performed by: INTERNAL MEDICINE

## 2020-10-13 PROCEDURE — G8484 FLU IMMUNIZE NO ADMIN: HCPCS | Performed by: INTERNAL MEDICINE

## 2020-10-13 PROCEDURE — 1036F TOBACCO NON-USER: CPT | Performed by: INTERNAL MEDICINE

## 2020-10-13 PROCEDURE — 1090F PRES/ABSN URINE INCON ASSESS: CPT | Performed by: INTERNAL MEDICINE

## 2020-10-13 PROCEDURE — 99214 OFFICE O/P EST MOD 30 MIN: CPT | Performed by: INTERNAL MEDICINE

## 2020-10-13 NOTE — PROGRESS NOTES
OUTPATIENT CARDIOLOGY FOLLOW-UP    Name: Anika Kidd    Age: 78 y.o. 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 In the face of the COVID-19 pandemic she has been somewhat sedentary and has gained approximately 3 pounds which is in excess of 5% of her body mass since her most recent evaluation. At the time of her present evaluation she is normotensive with her systolic pressure at the upper limits of acceptable and the status of her diabetes is unavailable for review. She relates no additional symptoms of a focal neurologic origin and denies arrhythmia related symptomatology. She has experienced no complications in the face of present dual antiplatelet therapy. Review of Systems: The remainder of a complete multisystem review including consitutional, central nervous, respiratory, circulatory, gastrointestinal, genitourinary, endocrinologic, hematologic, musculoskeletal and psychiatric are negative.     Past Medical History:  Past Medical History:   Diagnosis Date    Arthritis     Back problem     CAD (coronary artery disease) 2010    PTCA with DAMON to prox LAD, PTCA DAMON to ostial lesion of RCA and BMS to prox lesion RCA DAMON to RCA ostial     Cerebral artery occlusion with cerebral infarction ([facility] [address] 05/2019    Hyperlipidemia     Hypertension     Nausea & vomiting     Reflux     RLD (ruptured lumbar disc)     Scoliosis     Spinal stenosis     Type 2 diabetes mellitus without complication, without long-term current use of insulin ([facility]) 10/14/2019       Past Surgical History:  Past Surgical History:   Procedure Laterality Date    CATARACT REMOVAL      bilateral    CORONARY ANGIOPLASTY  2/25/2010    PTCA with DAMON in the proximal left anterior descending    CORONARY ANGIOPLASTY  3/11/2010    PTCA and deployment of 2 stents in the ostium and the proximal segment of RCA    CORONARY ANGIOPLASTY  5/19/2010    PTCA DAMON to ostial RCA patent LAD and RCA BMS  restenosis of RCA    CORONARY ANGIOPLASTY WITH STENT PLACEMENT      times 3    DIAGNOSTIC CARDIAC CATH LAB PROCEDURE  2/25/2010    Dr. Thony Huerta: Cardiac cath with angioplasty follow up    [address] CATH LAB PROCEDURE  3/11/2010    Cardiac cath and stent placement    DIAGNOSTIC CARDIAC CATH LAB PROCEDURE  5/19/2010    Cardiac cath heart lab and subsequent angiopilsaty    FRACTURE SURGERY      tight wrist    SHOULDER ARTHROSCOPY  08 25 2011    left shoulder arthroscopy ,acromioplasty,busectomy, release of adhesions       Family History:  Family History   Problem Relation Age of Onset    Heart Attack Mother 68    Heart Attack Father 72    Cancer Sister 79        pancreatic       Social History:  Social History     Socioeconomic History    Marital status: Single     Spouse name: Not on file    Number of children: Not on file    Years of education: Not on file    Highest education level: Not on file   Occupational History    Not on file   Social Needs    Financial resource strain: Not on file    Food insecurity     Worry: Not on file     Inability: Not on file    Transportation needs     Medical: Not on file     Non-medical: Not on file   Tobacco Use    Smoking status: Never Smoker    Smokeless tobacco: Never Used   Substance and Sexual Activity    Alcohol use: Never     Alcohol/week: 0.0 standard drinks     Frequency: Never     Comment: No caffeine    Drug use: Never    Sexual activity: Not on file   Lifestyle    Physical activity Days per week: Not on file     Minutes per session: Not on file    Stress: Not on file   Relationships    Social connections     Talks on phone: Not on file     Gets together: Not on file     Attends [employer] service: Not on file     Active member of club or organization: Not on file     Attends meetings of clubs or organizations: Not on file     Relationship status: Not on file    Intimate partner violence     Fear of current or ex partner: Not on file     Emotionally abused: Not on file     Physically abused: Not on file     Forced sexual activity: Not on file   Other Topics Concern    Not on file   Social History Narrative    Denies caffeine. Allergies: Allergies   Allergen Reactions    Aspirin      Caused bleeding ulcers    Lipitor      Caused elevated liver enzymes       Current Medications:  Current Outpatient Medications   Medication Sig Dispense Refill    hydrochlorothiazide (MICROZIDE) 12.5 MG capsule Take 12.5 mg by mouth every morning   0    lisinopril (PRINIVIL;ZESTRIL) 20 MG tablet Take 20 mg by mouth daily   0    glimepiride (AMARYL) 1 MG tablet Take 1.5 mg by mouth every morning (before breakfast)   0    aspirin 81 MG tablet Take 81 mg by mouth daily      metFORMIN (GLUCOPHAGE) 500 MG tablet Take 1 tablet by mouth 2 times daily (with meals) 60 tablet 3    carvedilol (COREG) 25 MG tablet Take 1 tablet by mouth 2 times daily (with meals) 60 tablet 3    pantoprazole (PROTONIX) 40 MG tablet Take 1 tablet by mouth every morning (before breakfast) 30 tablet 3    clopidogrel (PLAVIX) 75 MG tablet Take 1 tablet by mouth daily 30 tablet 0    butalbital-acetaminophen-caffeine (FIORICET, ESGIC) -40 MG per tablet Take 1 tablet by mouth every 4 hours as needed for Migraine      ezetimibe-simvastatin (VYTORIN) 10-40 MG per tablet Take 1 tablet by mouth nightly. No current facility-administered medications for this visit.           Physical Exam:  /60 (Site: Right Upper Arm, Position: Sitting, Cuff Size: Medium Adult)   Pulse 81   Ht 4' 9\" (1.448 m)   Wt 78 lb (35.4 kg)   BMI 16.88 kg/m²   Wt Readings from Last 3 Encounters:   10/13/20 78 lb (35.4 kg)   10/14/19 75 lb (34 kg)   06/11/19 77 lb (34.9 kg)     The patient is awake, alert and in no discomfort or distress. She appears somewhat frail and cachectic. No gross musculoskeletal deformity is present. No significant skin or nail changes are present. Gross examination of head, eyes, nose and throat are negative. Jugular venous pressure is normal and no carotid bruits are present. Normal respiratory effort is noted with no accessory muscle usage present. Lung fields are clear to ascultation. Cardiac examination is notable for a regular rate and rhythm with no palpable thrill. No gallop rhythm or cardiac murmur are identified. A benign abdominal examination is present with no masses or organomegaly. Intact pulses are present throughout all extremities and no peripheral edema is present. No focal neurologic deficits are present. Laboratory Tests:  Lab Results   Component Value Date    CREATININE 0.5 05/29/2019    BUN 13 05/29/2019     05/29/2019    K 3.5 05/29/2019     05/29/2019    CO2 24 05/29/2019     No results found for: BNP  Lab Results   Component Value Date    WBC 7.2 05/29/2019    RBC 3.73 05/29/2019    HGB 11.6 05/29/2019    HCT 35.2 05/29/2019    MCV 94.4 05/29/2019    MCH 31.1 05/29/2019    MCHC 33.0 05/29/2019    RDW 11.9 05/29/2019     05/29/2019    MPV 9.0 05/29/2019     No results for input(s): ALKPHOS, ALT, AST, PROT, BILITOT, BILIDIR, LABALBU in the last 72 hours.   Lab Results   Component Value Date    MG 2.1 05/29/2019     Lab Results   Component Value Date    PROTIME 12.8 05/29/2019    INR 1.1 05/29/2019     No results found for: TSH  No components found for: CHLPL  Lab Results   Component Value Date    TRIG 265 (H) 05/25/2019     Lab Results   Component Value Date    HDL 41 05/25/2019 Lab Results   Component Value Date    LDLCALC 79 05/25/2019       Cardiac Tests:  ECG: A resting electrocardiogram demonstrates evidence of sinus rhythm with nonspecific ST changes      ASSESSMENT / PLAN: On a clinical basis, the patient appears stable from a cardiovascular standpoint with no active cardiovascular symptoms in the face of her coronary atherosclerosis and cerebrovascular accident likely in part related to her atrial septal aneurysm. At present, I have not altered her existing medical regimen and will defer to the neurology service the duration of dual antiplatelet therapy with the continuation either of low-dose aspirin or clopidogrel alone on a long-term basis from a cardiovascular standpoint. Continued careful monitoring of her nutritional status will be necessary as well as that of aggressive risk factor modification of blood pressure, diabetes and serum lipids and attempt to reduce risk of future atherosclerotic development. In regards to lipid management, I feel that alteration of her present therapy would be beneficial either that of high-dose atorvastatin or rosuvastatin to further optimize reducing her risk of atherosclerotic progression. Presently, I plan to continue annual cardiovascular reassessment and would happily reevaluate her in the interim should additional cardiovascular difficulties or concerns arise. The patient's current medication list, allergies, problem list and results of all previously ordered testing were reviewed at today's visit. Follow-up office visit in 1 year      Note: This report was completed using computerized voice recognition software. Every effort has been made to ensure accuracy, however; and invert and computerized transcription errors may be present. Sally Goldstein.  Gideon Miller, [address] [place] [address] Cardiology    An electronic copy of this follow-up progress note was forwarded to Dr. Mery Del Angel

## 2020-11-03 PROBLEM — I63.9 CEREBROVASCULAR ACCIDENT (CVA) (HCC): Status: RESOLVED | Noted: 2019-05-24 | Resolved: 2020-11-03

## 2021-02-18 ENCOUNTER — HOSPITAL ENCOUNTER (INPATIENT)
Age: 81
LOS: 3 days | Discharge: HOME | DRG: 378 | End: 2021-02-21
Attending: EMERGENCY MEDICINE | Admitting: FAMILY MEDICINE
Payer: MEDICARE

## 2021-02-18 ENCOUNTER — APPOINTMENT (OUTPATIENT)
Dept: CT IMAGING | Age: 81
DRG: 378 | End: 2021-02-18
Payer: MEDICARE

## 2021-02-18 DIAGNOSIS — R53.83 FATIGUE, UNSPECIFIED TYPE: Primary | ICD-10-CM

## 2021-02-18 DIAGNOSIS — D64.9 ANEMIA, UNSPECIFIED TYPE: ICD-10-CM

## 2021-02-18 PROBLEM — K92.2 GI BLEED: Status: ACTIVE | Noted: 2021-02-18

## 2021-02-18 LAB
ABO/RH: NORMAL
ALBUMIN SERPL-MCNC: 4 G/DL (ref 3.5–5.2)
ALP BLD-CCNC: 92 U/L (ref 35–104)
ALT SERPL-CCNC: 13 U/L (ref 0–32)
ANION GAP SERPL CALCULATED.3IONS-SCNC: 11 MMOL/L (ref 7–16)
ANTIBODY SCREEN: NORMAL
AST SERPL-CCNC: 23 U/L (ref 0–31)
BASOPHILS ABSOLUTE: 0 E9/L (ref 0–0.2)
BASOPHILS RELATIVE PERCENT: 0 % (ref 0–2)
BILIRUB SERPL-MCNC: <0.2 MG/DL (ref 0–1.2)
BILIRUBIN URINE: NEGATIVE
BLOOD, URINE: NEGATIVE
BUN BLDV-MCNC: 12 MG/DL (ref 8–23)
CALCIUM SERPL-MCNC: 8.6 MG/DL (ref 8.6–10.2)
CHLORIDE BLD-SCNC: 106 MMOL/L (ref 98–107)
CLARITY: CLEAR
CO2: 23 MMOL/L (ref 22–29)
COLOR: YELLOW
CREAT SERPL-MCNC: 0.6 MG/DL (ref 0.5–1)
EKG ATRIAL RATE: 74 BPM
EKG P AXIS: 49 DEGREES
EKG P-R INTERVAL: 146 MS
EKG Q-T INTERVAL: 408 MS
EKG QRS DURATION: 72 MS
EKG QTC CALCULATION (BAZETT): 452 MS
EKG R AXIS: 44 DEGREES
EKG T AXIS: 35 DEGREES
EKG VENTRICULAR RATE: 74 BPM
EOSINOPHILS ABSOLUTE: 0.12 E9/L (ref 0.05–0.5)
EOSINOPHILS RELATIVE PERCENT: 2.2 % (ref 0–6)
FERRITIN: 14 NG/ML
FOLATE: 10.2 NG/ML (ref 4.8–24.2)
GFR AFRICAN AMERICAN: >60
GFR NON-AFRICAN AMERICAN: >60 ML/MIN/1.73
GLUCOSE BLD-MCNC: 178 MG/DL (ref 74–99)
GLUCOSE URINE: 250 MG/DL
HCT VFR BLD CALC: 23.2 % (ref 34–48)
HEMOGLOBIN: 7.1 G/DL (ref 11.5–15.5)
IMMATURE GRANULOCYTES #: 0.02 E9/L
IMMATURE GRANULOCYTES %: 0.4 % (ref 0–5)
IRON SATURATION: 5 % (ref 15–50)
IRON: 18 MCG/DL (ref 37–145)
KETONES, URINE: NEGATIVE MG/DL
LACTIC ACID, SEPSIS: 1.4 MMOL/L (ref 0.5–1.9)
LEUKOCYTE ESTERASE, URINE: NEGATIVE
LYMPHOCYTES ABSOLUTE: 1.56 E9/L (ref 1.5–4)
LYMPHOCYTES RELATIVE PERCENT: 28.4 % (ref 20–42)
MCH RBC QN AUTO: 27.8 PG (ref 26–35)
MCHC RBC AUTO-ENTMCNC: 30.6 % (ref 32–34.5)
MCV RBC AUTO: 91 FL (ref 80–99.9)
METER GLUCOSE: 269 MG/DL (ref 74–99)
MONOCYTES ABSOLUTE: 0.89 E9/L (ref 0.1–0.95)
MONOCYTES RELATIVE PERCENT: 16.2 % (ref 2–12)
NEUTROPHILS ABSOLUTE: 2.91 E9/L (ref 1.8–7.3)
NEUTROPHILS RELATIVE PERCENT: 52.8 % (ref 43–80)
NITRITE, URINE: NEGATIVE
PDW BLD-RTO: 13.9 FL (ref 11.5–15)
PH UA: 7 (ref 5–9)
PLATELET # BLD: 265 E9/L (ref 130–450)
PMV BLD AUTO: 9.7 FL (ref 7–12)
POTASSIUM REFLEX MAGNESIUM: 3.7 MMOL/L (ref 3.5–5)
PRO-BNP: 536 PG/ML (ref 0–450)
PROTEIN UA: NEGATIVE MG/DL
RBC # BLD: 2.55 E12/L (ref 3.5–5.5)
SODIUM BLD-SCNC: 140 MMOL/L (ref 132–146)
SPECIFIC GRAVITY UA: 1.01 (ref 1–1.03)
TOTAL IRON BINDING CAPACITY: 378 MCG/DL (ref 250–450)
TOTAL PROTEIN: 6.3 G/DL (ref 6.4–8.3)
TROPONIN: <0.01 NG/ML (ref 0–0.03)
UROBILINOGEN, URINE: 0.2 E.U./DL
VITAMIN B-12: 769 PG/ML (ref 211–946)
WBC # BLD: 5.5 E9/L (ref 4.5–11.5)

## 2021-02-18 PROCEDURE — 87040 BLOOD CULTURE FOR BACTERIA: CPT

## 2021-02-18 PROCEDURE — 87088 URINE BACTERIA CULTURE: CPT

## 2021-02-18 PROCEDURE — 81003 URINALYSIS AUTO W/O SCOPE: CPT

## 2021-02-18 PROCEDURE — C9113 INJ PANTOPRAZOLE SODIUM, VIA: HCPCS | Performed by: INTERNAL MEDICINE

## 2021-02-18 PROCEDURE — 6360000002 HC RX W HCPCS: Performed by: INTERNAL MEDICINE

## 2021-02-18 PROCEDURE — 2580000003 HC RX 258: Performed by: INTERNAL MEDICINE

## 2021-02-18 PROCEDURE — 83605 ASSAY OF LACTIC ACID: CPT

## 2021-02-18 PROCEDURE — 82962 GLUCOSE BLOOD TEST: CPT

## 2021-02-18 PROCEDURE — 83550 IRON BINDING TEST: CPT

## 2021-02-18 PROCEDURE — 82607 VITAMIN B-12: CPT

## 2021-02-18 PROCEDURE — P9016 RBC LEUKOCYTES REDUCED: HCPCS

## 2021-02-18 PROCEDURE — 82728 ASSAY OF FERRITIN: CPT

## 2021-02-18 PROCEDURE — 83540 ASSAY OF IRON: CPT

## 2021-02-18 PROCEDURE — 86900 BLOOD TYPING SEROLOGIC ABO: CPT

## 2021-02-18 PROCEDURE — 86901 BLOOD TYPING SEROLOGIC RH(D): CPT

## 2021-02-18 PROCEDURE — 36430 TRANSFUSION BLD/BLD COMPNT: CPT

## 2021-02-18 PROCEDURE — 85025 COMPLETE CBC W/AUTO DIFF WBC: CPT

## 2021-02-18 PROCEDURE — 93005 ELECTROCARDIOGRAM TRACING: CPT | Performed by: EMERGENCY MEDICINE

## 2021-02-18 PROCEDURE — 86923 COMPATIBILITY TEST ELECTRIC: CPT

## 2021-02-18 PROCEDURE — 99283 EMERGENCY DEPT VISIT LOW MDM: CPT

## 2021-02-18 PROCEDURE — 70450 CT HEAD/BRAIN W/O DYE: CPT

## 2021-02-18 PROCEDURE — 83880 ASSAY OF NATRIURETIC PEPTIDE: CPT

## 2021-02-18 PROCEDURE — 80053 COMPREHEN METABOLIC PANEL: CPT

## 2021-02-18 PROCEDURE — 36415 COLL VENOUS BLD VENIPUNCTURE: CPT

## 2021-02-18 PROCEDURE — 1200000000 HC SEMI PRIVATE

## 2021-02-18 PROCEDURE — 6370000000 HC RX 637 (ALT 250 FOR IP): Performed by: FAMILY MEDICINE

## 2021-02-18 PROCEDURE — 82746 ASSAY OF FOLIC ACID SERUM: CPT

## 2021-02-18 PROCEDURE — 86850 RBC ANTIBODY SCREEN: CPT

## 2021-02-18 PROCEDURE — 84484 ASSAY OF TROPONIN QUANT: CPT

## 2021-02-18 RX ORDER — CARVEDILOL 25 MG/1
25 TABLET ORAL 2 TIMES DAILY WITH MEALS
Status: DISCONTINUED | OUTPATIENT
Start: 2021-02-18 | End: 2021-02-21 | Stop reason: HOSPADM

## 2021-02-18 RX ORDER — GLIMEPIRIDE 4 MG/1
2 TABLET ORAL
Status: DISCONTINUED | OUTPATIENT
Start: 2021-02-19 | End: 2021-02-21 | Stop reason: HOSPADM

## 2021-02-18 RX ORDER — SODIUM CHLORIDE 9 MG/ML
10 INJECTION INTRAVENOUS DAILY
Status: DISCONTINUED | OUTPATIENT
Start: 2021-02-18 | End: 2021-02-18

## 2021-02-18 RX ORDER — LISINOPRIL 20 MG/1
20 TABLET ORAL DAILY
Status: DISCONTINUED | OUTPATIENT
Start: 2021-02-18 | End: 2021-02-20

## 2021-02-18 RX ORDER — EZETIMIBE 10 MG/1
10 TABLET ORAL NIGHTLY
Status: DISCONTINUED | OUTPATIENT
Start: 2021-02-18 | End: 2021-02-21 | Stop reason: HOSPADM

## 2021-02-18 RX ORDER — SODIUM CHLORIDE 9 MG/ML
10 INJECTION INTRAVENOUS 2 TIMES DAILY
Status: DISCONTINUED | OUTPATIENT
Start: 2021-02-18 | End: 2021-02-20

## 2021-02-18 RX ORDER — ONDANSETRON 2 MG/ML
4 INJECTION INTRAMUSCULAR; INTRAVENOUS EVERY 6 HOURS PRN
Status: DISCONTINUED | OUTPATIENT
Start: 2021-02-18 | End: 2021-02-21 | Stop reason: HOSPADM

## 2021-02-18 RX ORDER — ATORVASTATIN CALCIUM 20 MG/1
20 TABLET, FILM COATED ORAL NIGHTLY
Status: DISCONTINUED | OUTPATIENT
Start: 2021-02-18 | End: 2021-02-21 | Stop reason: HOSPADM

## 2021-02-18 RX ORDER — SODIUM CHLORIDE 9 MG/ML
INJECTION, SOLUTION INTRAVENOUS CONTINUOUS
Status: DISCONTINUED | OUTPATIENT
Start: 2021-02-18 | End: 2021-02-19

## 2021-02-18 RX ORDER — PANTOPRAZOLE SODIUM 40 MG/10ML
40 INJECTION, POWDER, LYOPHILIZED, FOR SOLUTION INTRAVENOUS 2 TIMES DAILY
Status: DISCONTINUED | OUTPATIENT
Start: 2021-02-18 | End: 2021-02-20

## 2021-02-18 RX ORDER — SODIUM CHLORIDE 9 MG/ML
INJECTION, SOLUTION INTRAVENOUS PRN
Status: DISCONTINUED | OUTPATIENT
Start: 2021-02-18 | End: 2021-02-21 | Stop reason: HOSPADM

## 2021-02-18 RX ORDER — PANTOPRAZOLE SODIUM 40 MG/10ML
40 INJECTION, POWDER, LYOPHILIZED, FOR SOLUTION INTRAVENOUS DAILY
Status: DISCONTINUED | OUTPATIENT
Start: 2021-02-18 | End: 2021-02-18

## 2021-02-18 RX ADMIN — CARVEDILOL 25 MG: 25 TABLET, FILM COATED ORAL at 18:54

## 2021-02-18 RX ADMIN — SODIUM CHLORIDE, PRESERVATIVE FREE 10 ML: 5 INJECTION INTRAVENOUS at 20:06

## 2021-02-18 RX ADMIN — LISINOPRIL 20 MG: 20 TABLET ORAL at 18:54

## 2021-02-18 RX ADMIN — PANTOPRAZOLE SODIUM 40 MG: 40 INJECTION, POWDER, FOR SOLUTION INTRAVENOUS at 20:06

## 2021-02-18 RX ADMIN — ATORVASTATIN CALCIUM 20 MG: 20 TABLET, FILM COATED ORAL at 20:06

## 2021-02-18 RX ADMIN — EZETIMIBE 10 MG: 10 TABLET ORAL at 20:06

## 2021-02-18 NOTE — PROGRESS NOTES
Progress Note    History:  80-year-old female admitted through emergency room with chief complaint of fatigue/weakness of 2 to 3 days duration. Procedures weakness is greater in the lower extremities and upper. She did suffer a minor fall 2 days prior to admission striking her left shin with the exact mechanism unrecalled. She does specifically deny loss of consciousness, dizziness, or lightheadedness. This morning her weakness was so profound that she sought squad transportation to emergency room or evaluation revealed hemoglobin at 7.1. Patient denies any external signs of bleeding although she does admit to having a blackish bowel movement 3 days prior to admission. She admits to intermittent nausea without emesis, but no particular abdominal pain or cramps or aches. Urine has remained yellow. She has a known remote history of gastric ulcer with hemorrhage. For her known coronary and cerebrovascular disease she is presently taking clopidogrel and aspirin. Patient's medical and surgical history, medications and allergies were reviewed. Interval notes of consultants, nurses, therapists, and ancillary services were also reviewed, as well as the medical administration record.     Physical Exam:  Vitals:    02/18/21 1246 02/18/21 1505 02/18/21 1530 02/18/21 1533   BP: (!) 168/72 (!) 147/60 (!) 161/80    Pulse: 70 74 74    Resp: 20 20 16    Temp:  98 °F (36.7 °C) 98.8 °F (37.1 °C)    TempSrc:  Oral Oral    SpO2: 98% 98% 99%    Weight:    78 lb (35.4 kg)   Height:    4' 9\" (1.448 m) Pallor without icterus, no cyanosis. Sensorium is clear. Speech annunciation are normal as his content of conversation. HEENT is negative. Lung auscultation reveals clear breath sounds anterior and posterior, no rales or wheeze or rhonchi, respirations are nonlabored at 8/min. Heart rhythm is regular, there is no S3 or S4 appreciated, no rubs are heard, heart rate at present is 72/min. Abdomen is soft with hypoactive bowel sounds all quadrants. She denies tenderness with palpation, no rebound or rigidity, percussion is unremarkable, mild firmness in the epigastric region. Distal circulation is intact upper and lower extremities. No edema is present. Area of erythema with surrounding ecchymosis is present over the lower anterior tibial region on the left without induration or signs of infection. Assessment/Plan:   1. Anemia/weakness, potentially secondary to upper gastrointestinal bleeding  2. Essential hypertension, coronary artery disease with prior stenting on 3 occasions  3. Diabetes mellitus type 2  4. Hypercholesterolemia  5. Osteoarthritis, lumbar spinal stenosis, vertebral kyphoscoliosis, herniated intervertebral disc lumbar spine  6. Prior history of right cerebral stroke    Discussed with the patient, clear liquid diet to tolerance, as needed antiemetic, intravenous proton pump inhibition, gastroenterology consultation and potential endoscopy, serial hemoglobin evaluation. Medication regimen reviewed, orders rendered.   Electronically by Skip Joseph DO  on 2/18/21 at 5:48 PM EST

## 2021-02-18 NOTE — ED NOTES
Bed: H2  Expected date:   Expected time:   Means of arrival:   Comments:  [address] Renée Marcum RN  02/18/21 1011

## 2021-02-18 NOTE — ED PROVIDER NOTES
59-year-old female presenting with general weakness and fatigue for last few days. Feels is worsening. She is independent and lives at home. No chest pain, shortness of breath, lightheadedness. Chief complaints states dizzy but patient is not describing dizziness she is describing just generally feeling weak and fatigued. [place] at home a few days ago, has a healing bruise on her left lower leg. Is currently awake, alert, oriented x4. This is been persistent, gradual, mild to moderate severity, several days duration, nothing makes better or worse that she knows of. Family History   Problem Relation Age of Onset    Heart Attack Mother 68    Heart Attack Father 72    Cancer Sister 79        pancreatic     Past Surgical History:   Procedure Laterality Date    CATARACT REMOVAL      bilateral    CORONARY ANGIOPLASTY  2/25/2010    PTCA with DAMON in the proximal left anterior descending    CORONARY ANGIOPLASTY  3/11/2010    PTCA and deployment of 2 stents in the ostium and the proximal segment of RCA    CORONARY ANGIOPLASTY  5/19/2010    PTCA DAMON to ostial RCA patent LAD and RCA BMS  restenosis of RCA    CORONARY ANGIOPLASTY WITH STENT PLACEMENT      times 3    DIAGNOSTIC CARDIAC CATH LAB PROCEDURE  2/25/2010    Dr. Cory Lobato: Cardiac cath with angioplasty follow up    [address] CATH LAB PROCEDURE  3/11/2010    Cardiac cath and stent placement    DIAGNOSTIC CARDIAC CATH LAB PROCEDURE  5/19/2010    Cardiac cath heart lab and subsequent angiopilsaty    FRACTURE SURGERY      tight wrist    SHOULDER ARTHROSCOPY  08 25 2011    left shoulder arthroscopy ,acromioplasty,busectomy, release of adhesions       Review of Systems   Constitutional: Positive for fatigue. Negative for chills and fever. Respiratory: Negative for chest tightness and shortness of breath. Cardiovascular: Negative for chest pain. Gastrointestinal: Negative for abdominal pain. Neurological: Positive for weakness and light-headedness. All other systems reviewed and are negative. Physical Exam  Constitutional:       General: She is not in acute distress. Appearance: She is well-developed. HENT:      Head: Normocephalic and atraumatic. Eyes:      Conjunctiva/sclera: Conjunctivae normal.      Pupils: Pupils are equal, round, and reactive to light. Neck:      Musculoskeletal: Normal range of motion. Thyroid: No thyromegaly. Cardiovascular:      Rate and Rhythm: Normal rate and regular rhythm. Pulmonary:      Effort: Pulmonary effort is normal. No respiratory distress. Breath sounds: Normal breath sounds. Abdominal:      General: There is no distension. Palpations: Abdomen is soft. Tenderness: There is no abdominal tenderness. There is no guarding or rebound. Musculoskeletal: Normal range of motion. General: No tenderness. Skin:     General: Skin is warm and dry. Findings: No erythema. Neurological:      Mental Status: She is alert and oriented to person, place, and time. Cranial Nerves: No cranial nerve deficit. Coordination: Coordination normal.          Procedures     [facility]              --------------------------------------------- PAST HISTORY ---------------------------------------------  Past Medical History:  has a past medical history of Arthritis, Back problem, CAD (coronary artery disease), Cerebral artery occlusion with cerebral infarction ([facility] [address] Hyperlipidemia, Hypertension, Nausea & vomiting, Reflux, RLD (ruptured lumbar disc), Scoliosis, Spinal stenosis, and Type 2 diabetes mellitus without complication, without long-term current use of insulin ([address] Eosinophils % 2.2 0.0 - 6.0 %    Basophils % 0.0 0.0 - 2.0 %    Neutrophils Absolute 2.91 1.80 - 7.30 E9/L    Immature Granulocytes # 0.02 E9/L    Lymphocytes Absolute 1.56 1.50 - 4.00 E9/L    Monocytes Absolute 0.89 0.10 - 0.95 E9/L    Eosinophils Absolute 0.12 0.05 - 0.50 E9/L    Basophils Absolute 0.00 0.00 - 0.20 E9/L   Comprehensive Metabolic Panel w/ Reflex to MG   Result Value Ref Range    Sodium 140 132 - 146 mmol/L    Potassium reflex Magnesium 3.7 3.5 - 5.0 mmol/L    Chloride 106 98 - 107 mmol/L    CO2 23 22 - 29 mmol/L    Anion Gap 11 7 - 16 mmol/L    Glucose 178 (H) 74 - 99 mg/dL    BUN 12 8 - 23 mg/dL    CREATININE 0.6 0.5 - 1.0 mg/dL    GFR Non-African American >60 >=60 mL/min/1.73    GFR African American >60     Calcium 8.6 8.6 - 10.2 mg/dL    Total Protein 6.3 (L) 6.4 - 8.3 g/dL    Albumin 4.0 3.5 - 5.2 g/dL    Total Bilirubin <0.2 0.0 - 1.2 mg/dL    Alkaline Phosphatase 92 35 - 104 U/L    ALT 13 0 - 32 U/L    AST 23 0 - 31 U/L   Troponin   Result Value Ref Range    Troponin <0.01 0.00 - 0.03 ng/mL   Brain Natriuretic Peptide   Result Value Ref Range    Pro- (H) 0 - 450 pg/mL   Lactate, Sepsis   Result Value Ref Range    Lactic Acid, Sepsis 1.4 0.5 - 1.9 mmol/L   Urinalysis   Result Value Ref Range    Color, UA Yellow Straw/Yellow    Clarity, UA Clear Clear    Glucose, Ur 250 (A) Negative mg/dL    Bilirubin Urine Negative Negative    Ketones, Urine Negative Negative mg/dL    Specific Gravity, UA 1.010 1.005 - 1.030    Blood, Urine Negative Negative    pH, UA 7.0 5.0 - 9.0    Protein, UA Negative Negative mg/dL    Urobilinogen, Urine 0.2 <2.0 E.U./dL    Nitrite, Urine Negative Negative    Leukocyte Esterase, Urine Negative Negative   Ferritin   Result Value Ref Range    Ferritin 14 ng/mL   Iron and TIBC   Result Value Ref Range    Iron 18 (L) 37 - 145 mcg/dL    TIBC 378 250 - 450 mcg/dL    Iron Saturation 5 (L) 15 - 50 %   Vitamin B12 & folate   Result Value Ref Range Vitamin B-12 769 211 - 946 pg/mL    Folate 10.2 4.8 - 24.2 ng/mL   Basic Metabolic Panel   Result Value Ref Range    Sodium 141 132 - 146 mmol/L    Potassium 3.4 (L) 3.5 - 5.0 mmol/L    Chloride 110 (H) 98 - 107 mmol/L    CO2 21 (L) 22 - 29 mmol/L    Anion Gap 10 7 - 16 mmol/L    Glucose 95 74 - 99 mg/dL    BUN 10 8 - 23 mg/dL    CREATININE 0.6 0.5 - 1.0 mg/dL    GFR Non-African American >60 >=60 mL/min/1.73    GFR African American >60     Calcium 8.1 (L) 8.6 - 10.2 mg/dL   Lipid panel   Result Value Ref Range    Cholesterol, Total 128 0 - 199 mg/dL    Triglycerides 179 (H) 0 - 149 mg/dL    HDL 34 >40 mg/dL    LDL Calculated 58 0 - 99 mg/dL    VLDL Cholesterol Calculated 36 mg/dL   Hemoglobin A1c   Result Value Ref Range    Hemoglobin A1C 6.5 (H) 4.0 - 5.6 %   Hemoglobin and Hematocrit, Blood   Result Value Ref Range    Hemoglobin 9.4 (L) 11.5 - 15.5 g/dL    Hematocrit 30.3 (L) 34.0 - 48.0 %   Hemoglobin and hematocrit, blood   Result Value Ref Range    Hemoglobin 9.3 (L) 11.5 - 15.5 g/dL    Hematocrit 29.0 (L) 34.0 - 48.0 %   POCT Glucose   Result Value Ref Range    Meter Glucose 269 (H) 74 - 99 mg/dL   POCT Glucose   Result Value Ref Range    Meter Glucose 114 (H) 74 - 99 mg/dL   POCT Glucose   Result Value Ref Range    Meter Glucose 140 (H) 74 - 99 mg/dL   EKG 12 Lead   Result Value Ref Range    Ventricular Rate 74 BPM    Atrial Rate 74 BPM    P-R Interval 146 ms    QRS Duration 72 ms    Q-T Interval 408 ms    QTc Calculation (Bazett) 452 ms    P Axis 49 degrees    R Axis 44 degrees    T Axis 35 degrees   TYPE AND SCREEN   Result Value Ref Range    ABO/Rh O POS     Antibody Screen NEG    PREPARE RBC (CROSSMATCH), 2 Units   Result Value Ref Range    Product Code Blood Bank [id number]     Description Blood Bank Red Blood Cells, Leuko-reduced     Unit Number [id number]     Dispense Status Blood Bank selected     Product Code Blood Bank [id number]

## 2021-02-19 ENCOUNTER — ANESTHESIA (OUTPATIENT)
Dept: ENDOSCOPY | Age: 81
DRG: 378 | End: 2021-02-19
Payer: MEDICARE

## 2021-02-19 ENCOUNTER — ANESTHESIA EVENT (OUTPATIENT)
Dept: ENDOSCOPY | Age: 81
DRG: 378 | End: 2021-02-19
Payer: MEDICARE

## 2021-02-19 VITALS — OXYGEN SATURATION: 84 % | SYSTOLIC BLOOD PRESSURE: 152 MMHG | DIASTOLIC BLOOD PRESSURE: 86 MMHG

## 2021-02-19 LAB
ANION GAP SERPL CALCULATED.3IONS-SCNC: 10 MMOL/L (ref 7–16)
BUN BLDV-MCNC: 10 MG/DL (ref 8–23)
CALCIUM SERPL-MCNC: 8.1 MG/DL (ref 8.6–10.2)
CHLORIDE BLD-SCNC: 110 MMOL/L (ref 98–107)
CHOLESTEROL, TOTAL: 128 MG/DL (ref 0–199)
CO2: 21 MMOL/L (ref 22–29)
CREAT SERPL-MCNC: 0.6 MG/DL (ref 0.5–1)
GFR AFRICAN AMERICAN: >60
GFR NON-AFRICAN AMERICAN: >60 ML/MIN/1.73
GLUCOSE BLD-MCNC: 95 MG/DL (ref 74–99)
HBA1C MFR BLD: 6.5 % (ref 4–5.6)
HCT VFR BLD CALC: 27.2 % (ref 34–48)
HCT VFR BLD CALC: 29 % (ref 34–48)
HCT VFR BLD CALC: 30.3 % (ref 34–48)
HDLC SERPL-MCNC: 34 MG/DL
HEMOGLOBIN: 8.7 G/DL (ref 11.5–15.5)
HEMOGLOBIN: 9.3 G/DL (ref 11.5–15.5)
HEMOGLOBIN: 9.4 G/DL (ref 11.5–15.5)
LDL CHOLESTEROL CALCULATED: 58 MG/DL (ref 0–99)
METER GLUCOSE: 114 MG/DL (ref 74–99)
METER GLUCOSE: 140 MG/DL (ref 74–99)
METER GLUCOSE: 226 MG/DL (ref 74–99)
METER GLUCOSE: 97 MG/DL (ref 74–99)
POTASSIUM SERPL-SCNC: 3.4 MMOL/L (ref 3.5–5)
SODIUM BLD-SCNC: 141 MMOL/L (ref 132–146)
TRIGL SERPL-MCNC: 179 MG/DL (ref 0–149)
VLDLC SERPL CALC-MCNC: 36 MG/DL

## 2021-02-19 PROCEDURE — 82962 GLUCOSE BLOOD TEST: CPT

## 2021-02-19 PROCEDURE — C9113 INJ PANTOPRAZOLE SODIUM, VIA: HCPCS | Performed by: INTERNAL MEDICINE

## 2021-02-19 PROCEDURE — 7100000011 HC PHASE II RECOVERY - ADDTL 15 MIN: Performed by: INTERNAL MEDICINE

## 2021-02-19 PROCEDURE — 2709999900 HC NON-CHARGEABLE SUPPLY: Performed by: INTERNAL MEDICINE

## 2021-02-19 PROCEDURE — 3700000001 HC ADD 15 MINUTES (ANESTHESIA): Performed by: INTERNAL MEDICINE

## 2021-02-19 PROCEDURE — 88342 IMHCHEM/IMCYTCHM 1ST ANTB: CPT

## 2021-02-19 PROCEDURE — 7100000010 HC PHASE II RECOVERY - FIRST 15 MIN: Performed by: INTERNAL MEDICINE

## 2021-02-19 PROCEDURE — 88305 TISSUE EXAM BY PATHOLOGIST: CPT

## 2021-02-19 PROCEDURE — 0DJ08ZZ INSPECTION OF UPPER INTESTINAL TRACT, VIA NATURAL OR ARTIFICIAL OPENING ENDOSCOPIC: ICD-10-PCS | Performed by: FAMILY MEDICINE

## 2021-02-19 PROCEDURE — 85014 HEMATOCRIT: CPT

## 2021-02-19 PROCEDURE — 6370000000 HC RX 637 (ALT 250 FOR IP): Performed by: FAMILY MEDICINE

## 2021-02-19 PROCEDURE — 80061 LIPID PANEL: CPT

## 2021-02-19 PROCEDURE — 80048 BASIC METABOLIC PNL TOTAL CA: CPT

## 2021-02-19 PROCEDURE — 1200000000 HC SEMI PRIVATE

## 2021-02-19 PROCEDURE — 3700000000 HC ANESTHESIA ATTENDED CARE: Performed by: INTERNAL MEDICINE

## 2021-02-19 PROCEDURE — 6360000002 HC RX W HCPCS: Performed by: INTERNAL MEDICINE

## 2021-02-19 PROCEDURE — 36415 COLL VENOUS BLD VENIPUNCTURE: CPT

## 2021-02-19 PROCEDURE — 2580000003 HC RX 258: Performed by: FAMILY MEDICINE

## 2021-02-19 PROCEDURE — 2580000003 HC RX 258: Performed by: INTERNAL MEDICINE

## 2021-02-19 PROCEDURE — 85018 HEMOGLOBIN: CPT

## 2021-02-19 PROCEDURE — 6360000002 HC RX W HCPCS: Performed by: FAMILY MEDICINE

## 2021-02-19 PROCEDURE — 3609012400 HC EGD TRANSORAL BIOPSY SINGLE/MULTIPLE: Performed by: INTERNAL MEDICINE

## 2021-02-19 PROCEDURE — 6370000000 HC RX 637 (ALT 250 FOR IP): Performed by: INTERNAL MEDICINE

## 2021-02-19 PROCEDURE — 6360000002 HC RX W HCPCS: Performed by: NURSE ANESTHETIST, CERTIFIED REGISTERED

## 2021-02-19 PROCEDURE — 83036 HEMOGLOBIN GLYCOSYLATED A1C: CPT

## 2021-02-19 RX ORDER — SUCRALFATE 1 G/1
1 TABLET ORAL EVERY 6 HOURS SCHEDULED
Status: DISCONTINUED | OUTPATIENT
Start: 2021-02-19 | End: 2021-02-21 | Stop reason: HOSPADM

## 2021-02-19 RX ORDER — FERROUS SULFATE 325(65) MG
325 TABLET ORAL 2 TIMES DAILY WITH MEALS
Status: DISCONTINUED | OUTPATIENT
Start: 2021-02-19 | End: 2021-02-21 | Stop reason: HOSPADM

## 2021-02-19 RX ORDER — SODIUM CHLORIDE AND POTASSIUM CHLORIDE .9; .15 G/100ML; G/100ML
SOLUTION INTRAVENOUS CONTINUOUS
Status: DISCONTINUED | OUTPATIENT
Start: 2021-02-19 | End: 2021-02-20

## 2021-02-19 RX ORDER — PROPOFOL 10 MG/ML
INJECTION, EMULSION INTRAVENOUS PRN
Status: DISCONTINUED | OUTPATIENT
Start: 2021-02-19 | End: 2021-02-19 | Stop reason: SDUPTHER

## 2021-02-19 RX ADMIN — GLIMEPIRIDE 2 MG: 4 TABLET ORAL at 08:38

## 2021-02-19 RX ADMIN — PANTOPRAZOLE SODIUM 40 MG: 40 INJECTION, POWDER, FOR SOLUTION INTRAVENOUS at 20:45

## 2021-02-19 RX ADMIN — SUCRALFATE 1 G: 1 TABLET ORAL at 18:10

## 2021-02-19 RX ADMIN — SUCRALFATE 1 G: 1 TABLET ORAL at 23:28

## 2021-02-19 RX ADMIN — PROPOFOL 10 MG: 10 INJECTION, EMULSION INTRAVENOUS at 17:05

## 2021-02-19 RX ADMIN — PROPOFOL 20 MG: 10 INJECTION, EMULSION INTRAVENOUS at 17:00

## 2021-02-19 RX ADMIN — SODIUM CHLORIDE, PRESERVATIVE FREE 10 ML: 5 INJECTION INTRAVENOUS at 08:39

## 2021-02-19 RX ADMIN — CARVEDILOL 25 MG: 25 TABLET, FILM COATED ORAL at 08:38

## 2021-02-19 RX ADMIN — FERROUS SULFATE TAB 325 MG (65 MG ELEMENTAL FE) 325 MG: 325 (65 FE) TAB at 18:10

## 2021-02-19 RX ADMIN — FERROUS SULFATE TAB 325 MG (65 MG ELEMENTAL FE) 325 MG: 325 (65 FE) TAB at 08:38

## 2021-02-19 RX ADMIN — ATORVASTATIN CALCIUM 20 MG: 20 TABLET, FILM COATED ORAL at 20:45

## 2021-02-19 RX ADMIN — EZETIMIBE 10 MG: 10 TABLET ORAL at 20:45

## 2021-02-19 RX ADMIN — SODIUM CHLORIDE, PRESERVATIVE FREE 10 ML: 5 INJECTION INTRAVENOUS at 20:45

## 2021-02-19 RX ADMIN — PROPOFOL 30 MG: 10 INJECTION, EMULSION INTRAVENOUS at 16:58

## 2021-02-19 RX ADMIN — LISINOPRIL 20 MG: 20 TABLET ORAL at 08:38

## 2021-02-19 RX ADMIN — SODIUM CHLORIDE AND POTASSIUM CHLORIDE: 9; 1.49 INJECTION, SOLUTION INTRAVENOUS at 18:11

## 2021-02-19 RX ADMIN — CARVEDILOL 25 MG: 25 TABLET, FILM COATED ORAL at 18:10

## 2021-02-19 RX ADMIN — SODIUM CHLORIDE: 9 INJECTION, SOLUTION INTRAVENOUS at 01:04

## 2021-02-19 RX ADMIN — PROPOFOL 10 MG: 10 INJECTION, EMULSION INTRAVENOUS at 17:02

## 2021-02-19 RX ADMIN — SODIUM CHLORIDE AND POTASSIUM CHLORIDE: 9; 1.49 INJECTION, SOLUTION INTRAVENOUS at 08:38

## 2021-02-19 RX ADMIN — PANTOPRAZOLE SODIUM 40 MG: 40 INJECTION, POWDER, FOR SOLUTION INTRAVENOUS at 08:38

## 2021-02-19 ASSESSMENT — ENCOUNTER SYMPTOMS
SHORTNESS OF BREATH: 0
SHORTNESS OF BREATH: 0
CHEST TIGHTNESS: 0
ABDOMINAL PAIN: 0

## 2021-02-19 ASSESSMENT — PAIN SCALES - GENERAL
PAINLEVEL_OUTOF10: 0

## 2021-02-19 NOTE — H&P
Interval H&P    H&P reviewed no changes. See consult note from same day    Vitals:    02/19/21 0630   BP: (!) 134/59   Pulse: 68   Resp: 18   Temp: 98.3 °F (36.8 °C)   SpO2: 97%     Plan: Proceed with EGD this afternoon to evaluate.      Marcelo Reza DO   GI Fellow   3:04 PM

## 2021-02-19 NOTE — CONSULTS
[facility]  Dr. Can Steen M.D., Dr. Patricia Alcantar M.D., ELDER العلي.O., Dr. Jordyn Noland M.D., Dr. Zay Gray D.O. Patient Name: Steffanie Lopez  MRN: [id number]  : 1940 ([de-identified] y.o. female)  Allergies: is allergic to aspirin and lipitor. Date of Service: 2021       Reason for Consultation: Anemia    HISTORY OF PRESENT ILLNESS:    Patient is an 54-year-old  female with a past medical history significant for coronary artery disease on chronic Plavix therapy with stent placement in , essential hypertension, diabetes mellitus type 2, history of hepatic hemangioma hyperlipidemia right carotid stenosis. Patient is in the [facility] emergency room overnight with main complaint of weakness. Patient presented to [facility] emergency room a complaint weakness and fatigue. States she had increasing fatigue over the last several days. In the emergency room patient blood pressure 160/72 heart rate of 70 temperature 97.7. Routine labs obtained patient found have a hemoglobin of 7.1 with an MCV of 91.0 WBC of 5.5 and a platelet count of 815. Patient started on IV fluid hydration with IV Protonix type and cross transfuse 2 units admitted to the third floor. Patient seen evaluated the third floor this AM.  She admits to some intermittent dark stools the last several days along with increasing fatigue. She admits to remote history of EGD approximately 15 years prior admits to possible ulcer at that time. She denies any family history of CRC she denies any previous history of colonoscopy. Patient denies any dysphagia or trouble swallowing. She denies any history use of NSAIDs ibuprofen or NSAIDs for pain at home. Patient denies any further dark stools hematochezia or hematemesis since admission. Has any abdominal pain or any previous surgical intervention. REVIEW OF SYSTEMS:   Constitutional: Denies fever, chills, or unintentional weight loss. HEENT: Denies double or blurry vision, headaches, ear pain or ringing in the ears. No drainage from the ears, nose or throat. Cardiovascular: Denies any chest pain, irregular heartbeats, or palpitations. Respiratory: Denies shortness of breath, coughing, sputum production, hemoptysis, or wheezing. Gastrointestinal: Denies nausea, vomiting, diarrhea, or constipation. Denies any abdominal pain,  Genitourinary: Denies any urinary urgency, frequency, hematuria. Voiding without difficulty. Endocrine: Denies sensitivity to heat or cold. Denies changes in hair, skin or nails. Denies excessive thirst, hunger or going to the bathroom more frequently than usual.  Extremities: Denies swelling or calf pain. Musculoskeletal: Denies muscle or joint pain, stiffness, arthritis, gout, or instability. Dermatology / Skin: Denies any rashes, ulcers, or excoriations. Denies bruising. Neurology: Denies any bowel or bladder incontinence, headache or focal neurological deficits. No weakness or paresthesia. Denies numbness or tingling in the hands or feet. Psychiatric: Denies nervousness/anxiety, depression or memory loss.       Past Medical History:  Past Medical History:   Diagnosis Date    Arthritis     Back problem     CAD (coronary artery disease) 2010    PTCA with DAMON to prox LAD, PTCA DAMON to ostial lesion of RCA and BMS to prox lesion RCA DAMON to RCA ostial     Cerebral artery occlusion with cerebral infarction ([address] 05/2019    Hyperlipidemia     Hypertension     Nausea & vomiting     Reflux     RLD (ruptured lumbar disc)     Scoliosis     Spinal stenosis     Type 2 diabetes mellitus without complication, without long-term current use of insulin ([address] 10/14/2019       Past Surgical History:  Past Surgical History:   Procedure Laterality Date    CATARACT REMOVAL      bilateral    CORONARY ANGIOPLASTY  2/25/2010    PTCA with DAMON in the proximal left anterior descending    CORONARY ANGIOPLASTY  3/11/2010 PTCA and deployment of 2 stents in the ostium and the proximal segment of RCA    CORONARY ANGIOPLASTY  5/19/2010    PTCA DAMON to ostial RCA patent LAD and RCA BMS  restenosis of RCA    CORONARY ANGIOPLASTY WITH STENT PLACEMENT      times 3    DIAGNOSTIC CARDIAC CATH LAB PROCEDURE  2/25/2010    Dr. Sue Leblanc: Cardiac cath with angioplasty follow up    [address] CATH LAB PROCEDURE  3/11/2010    Cardiac cath and stent placement    DIAGNOSTIC CARDIAC CATH LAB PROCEDURE  5/19/2010    Cardiac cath heart lab and subsequent angiopilsaty    FRACTURE SURGERY      tight wrist    SHOULDER ARTHROSCOPY  08 25 2011    left shoulder arthroscopy ,acromioplasty,busectomy, release of adhesions       Home Medications:  Prior to Admission medications    Medication Sig Start Date End Date Taking? Authorizing Provider   influenza virus trivalent vaccine (FLUZONE) injection Inject 0.5 mLs into the muscle once Given 10/2020   Yes Historical Provider, MD   lisinopril (PRINIVIL;ZESTRIL) 20 MG tablet Take 20 mg by mouth daily  9/29/19  Yes Historical Provider, MD   glimepiride (AMARYL) 1 MG tablet Take 1 mg by mouth every morning (before breakfast)  10/7/19  Yes Historical Provider, MD   aspirin 81 MG tablet Take 81 mg by mouth daily   Yes Historical Provider, MD   carvedilol (COREG) 25 MG tablet Take 1 tablet by mouth 2 times daily (with meals) 6/7/19  Yes Law Frank MD   clopidogrel (PLAVIX) 75 MG tablet Take 1 tablet by mouth daily 5/26/19  Yes Michela Matias MD   butalbital-acetaminophen-caffeine (FIORICET, ESGIC) -40 MG per tablet Take 1 tablet by mouth every 4 hours as needed for Migraine   Yes Historical Provider, MD   ezetimibe-simvastatin (VYTORIN) 10-40 MG per tablet Take 1 tablet by mouth nightly.    Yes Historical Provider, MD       Allergies: Aspirin and Lipitor    Social History:  Social History     Socioeconomic History    Marital status: Single     Spouse name: Not on file  Number of children: Not on file    Years of education: Not on file    Highest education level: Not on file   Occupational History    Not on file   Social Needs    Financial resource strain: Not on file    Food insecurity     Worry: Not on file     Inability: Not on file    Transportation needs     Medical: Not on file     Non-medical: Not on file   Tobacco Use    Smoking status: Never Smoker    Smokeless tobacco: Never Used   Substance and Sexual Activity    Alcohol use: Never     Alcohol/week: 0.0 standard drinks     Frequency: Never     Comment: No caffeine    Drug use: Never    Sexual activity: Not on file   Lifestyle    Physical activity     Days per week: Not on file     Minutes per session: Not on file    Stress: Not on file   Relationships    Social connections     Talks on phone: Not on file     Gets together: Not on file     Attends Je[employer]ah's witness service: Not on file     Active member of club or organization: Not on file     Attends meetings of clubs or organizations: Not on file     Relationship status: Not on file    Intimate partner violence     Fear of current or ex partner: Not on file     Emotionally abused: Not on file     Physically abused: Not on file     Forced sexual activity: Not on file   Other Topics Concern    Not on file   Social History Narrative    Denies caffeine.         Family History:  Family History   Problem Relation Age of Onset    Heart Attack Mother 68    Heart Attack Father 72    Cancer Sister 79        pancreatic         PHYSICAL EXAM:  Vital Signs: BP (!) 134/59   Pulse 68   Temp 98.3 °F (36.8 °C) (Oral)   Resp 18   Ht 4' 9\" (1.448 m)   Wt 78 lb (35.4 kg)   SpO2 97%   BMI 16.88 kg/m²   GENERAL APPEARANCE:  awake, alert, oriented, cooperative, and in no acute distress  EYES:  Lids and lashes normal, PERRLA, EOMI, sclera clear, conjunctiva normal HENT:  Normocephalic, without obvious abnormality, atramatic, oral pharynx with moist mucus membranes, tonsils without erythema or exudates  NECK:  Supple with no carotid bruits, JVD or thyromegaly. No cervical adenopathy  LUNGS:  Clear to auscultation bilaterally with no wheezes, rales or rhonchi. No increased work of breathing, good air exchange. CARDIOVASCULAR: Regular rate and rhythm, no murmur  ABDOMEN:  normal bowel sounds in all 4 quadrants, soft, non-distended, non-tender, no masses palpated, no hepatosplenomegally  MUSCULOSKELETAL:  There is no redness, warmth, or swelling of the joints. Full range of motion noted. Motor strength is 5 out of 5 all extremities bilaterally. Tone is normal.  EXTREMITIES: No edema, 2+ pulses bilaterally (radial and dorsalis pedis)  NEUROLOGIC:  Awake, alert, oriented to name, place and time. Cranial nerves II-XII are grossly intact. Motor is 5 out of 5 bilaterally. SKIN: Normal skin color, texture, and turgor. There is no redness, warmth, or swelling. No bruising or bleeding, no mottling. PSYCH: Affect, behavior and insight are all within normal limits.       DATA:  Results for orders placed or performed during the hospital encounter of 02/18/21   CBC auto differential   Result Value Ref Range    WBC 5.5 4.5 - 11.5 E9/L    RBC 2.55 (L) 3.50 - 5.50 E12/L    Hemoglobin 7.1 (L) 11.5 - 15.5 g/dL    Hematocrit 23.2 (L) 34.0 - 48.0 %    MCV 91.0 80.0 - 99.9 fL    MCH 27.8 26.0 - 35.0 pg    MCHC 30.6 (L) 32.0 - 34.5 %    RDW 13.9 11.5 - 15.0 fL    Platelets 353 849 - 896 E9/L    MPV 9.7 7.0 - 12.0 fL    Neutrophils % 52.8 43.0 - 80.0 %    Immature Granulocytes % 0.4 0.0 - 5.0 %    Lymphocytes % 28.4 20.0 - 42.0 %    Monocytes % 16.2 (H) 2.0 - 12.0 %    Eosinophils % 2.2 0.0 - 6.0 %    Basophils % 0.0 0.0 - 2.0 %    Neutrophils Absolute 2.91 1.80 - 7.30 E9/L    Immature Granulocytes # 0.02 E9/L    Lymphocytes Absolute 1.56 1.50 - 4.00 E9/L Monocytes Absolute 0.89 0.10 - 0.95 E9/L    Eosinophils Absolute 0.12 0.05 - 0.50 E9/L    Basophils Absolute 0.00 0.00 - 0.20 E9/L   Comprehensive Metabolic Panel w/ Reflex to MG   Result Value Ref Range    Sodium 140 132 - 146 mmol/L    Potassium reflex Magnesium 3.7 3.5 - 5.0 mmol/L    Chloride 106 98 - 107 mmol/L    CO2 23 22 - 29 mmol/L    Anion Gap 11 7 - 16 mmol/L    Glucose 178 (H) 74 - 99 mg/dL    BUN 12 8 - 23 mg/dL    CREATININE 0.6 0.5 - 1.0 mg/dL    GFR Non-African American >60 >=60 mL/min/1.73    GFR African American >60     Calcium 8.6 8.6 - 10.2 mg/dL    Total Protein 6.3 (L) 6.4 - 8.3 g/dL    Albumin 4.0 3.5 - 5.2 g/dL    Total Bilirubin <0.2 0.0 - 1.2 mg/dL    Alkaline Phosphatase 92 35 - 104 U/L    ALT 13 0 - 32 U/L    AST 23 0 - 31 U/L   Troponin   Result Value Ref Range    Troponin <0.01 0.00 - 0.03 ng/mL   Brain Natriuretic Peptide   Result Value Ref Range    Pro- (H) 0 - 450 pg/mL   Lactate, Sepsis   Result Value Ref Range    Lactic Acid, Sepsis 1.4 0.5 - 1.9 mmol/L   Urinalysis   Result Value Ref Range    Color, UA Yellow Straw/Yellow    Clarity, UA Clear Clear    Glucose, Ur 250 (A) Negative mg/dL    Bilirubin Urine Negative Negative    Ketones, Urine Negative Negative mg/dL    Specific Gravity, UA 1.010 1.005 - 1.030    Blood, Urine Negative Negative    pH, UA 7.0 5.0 - 9.0    Protein, UA Negative Negative mg/dL    Urobilinogen, Urine 0.2 <2.0 E.U./dL    Nitrite, Urine Negative Negative    Leukocyte Esterase, Urine Negative Negative   Ferritin   Result Value Ref Range    Ferritin 14 ng/mL   Iron and TIBC   Result Value Ref Range    Iron 18 (L) 37 - 145 mcg/dL    TIBC 378 250 - 450 mcg/dL    Iron Saturation 5 (L) 15 - 50 %   Vitamin B12 & folate   Result Value Ref Range    Vitamin B-12 769 211 - 946 pg/mL    Folate 10.2 4.8 - 24.2 ng/mL   Basic Metabolic Panel   Result Value Ref Range    Sodium 141 132 - 146 mmol/L    Potassium 3.4 (L) 3.5 - 5.0 mmol/L Chloride 110 (H) 98 - 107 mmol/L    CO2 21 (L) 22 - 29 mmol/L    Anion Gap 10 7 - 16 mmol/L    Glucose 95 74 - 99 mg/dL    BUN 10 8 - 23 mg/dL    CREATININE 0.6 0.5 - 1.0 mg/dL    GFR Non-African American >60 >=60 mL/min/1.73    GFR African American >60     Calcium 8.1 (L) 8.6 - 10.2 mg/dL   Lipid panel   Result Value Ref Range    Cholesterol, Total 128 0 - 199 mg/dL    Triglycerides 179 (H) 0 - 149 mg/dL    HDL 34 >40 mg/dL    LDL Calculated 58 0 - 99 mg/dL    VLDL Cholesterol Calculated 36 mg/dL   Hemoglobin A1c   Result Value Ref Range    Hemoglobin A1C 6.5 (H) 4.0 - 5.6 %   Hemoglobin and hematocrit, blood   Result Value Ref Range    Hemoglobin 9.3 (L) 11.5 - 15.5 g/dL    Hematocrit 29.0 (L) 34.0 - 48.0 %   POCT Glucose   Result Value Ref Range    Meter Glucose 269 (H) 74 - 99 mg/dL   POCT Glucose   Result Value Ref Range    Meter Glucose 114 (H) 74 - 99 mg/dL   EKG 12 Lead   Result Value Ref Range    Ventricular Rate 74 BPM    Atrial Rate 74 BPM    P-R Interval 146 ms    QRS Duration 72 ms    Q-T Interval 408 ms    QTc Calculation (Bazett) 452 ms    P Axis 49 degrees    R Axis 44 degrees    T Axis 35 degrees   TYPE AND SCREEN   Result Value Ref Range    ABO/Rh O POS     Antibody Screen NEG    PREPARE RBC (CROSSMATCH), 2 Units   Result Value Ref Range    Product Code Blood Bank [id number]     Description Blood Bank Red Blood Cells, Leuko-reduced     Unit Number [id number]     Dispense Status Blood Bank selected     Product Code Blood Bank [id number]     Description Blood Bank Red Blood Cells, Apheresis, Leuko-reduced     Unit Number [id number]     Dispense Status Blood Bank transfused          IMAGING:  Ct Head Wo Contrast    Result Date: 2/18/2021 EXAMINATION: CT OF THE HEAD WITHOUT CONTRAST  2/18/2021 12:20 pm TECHNIQUE: CT of the head was performed without the administration of intravenous contrast. Dose modulation, iterative reconstruction, and/or weight based adjustment of the mA/kV was utilized to reduce the radiation dose to as low as reasonably achievable. COMPARISON: 05/26/2019 HISTORY: ORDERING SYSTEM PROVIDED HISTORY: fatiguekeaton TECHNOLOGIST PROVIDED HISTORY: Reason for exam:->fatigue, keaton Has a \"code stroke\" or \"stroke alert\" been called? ->No Decision Support Exception->Emergency Medical Condition (MA) FINDINGS: BRAIN/VENTRICLES: There is no acute intracranial hemorrhage, mass effect or midline shift. No abnormal extra-axial fluid collection. The gray-white differentiation is maintained without evidence of an acute infarct. There is no evidence of hydrocephalus. The ventricles, cisterns and sulci are prominent consistent with atrophy. There is decreased attenuation within the periventricular white matter consistent with periventricular leukomalacia. There are findings of an old lacunar infarct within the right centrum semiovale. There is prominence of the extra-axial space seen within the posterior fossa unchanged compared to prior study of 2019. ORBITS: The visualized portion of the orbits demonstrate no acute abnormality. SINUSES: The visualized paranasal sinuses and mastoid air cells demonstrate no acute abnormality. SOFT TISSUES/SKULL:  No acute abnormality of the visualized skull or soft tissues. 1. There is no acute intracranial abnormality. Specifically, there is no intracranial hemorrhage. 2. Atrophy and periventricular leukomalacia,        ASSESSMENT/PLAN:      1.   Acute symptomatic normocytic anemia  -Signs stable no overt signs of GI bleed on exam  -Hemoglobin 11.6 in 5/2019  -Hemoglobin 7.1 on admission  -Hemoglobin 9.3 this a.m. after 2 units of packed red blood cells -Continue to hold Plavix if clinically feasible, last dose 2/19  -Continue on Protonix 40mg BID/Elevate HOB/Type and Cross 2 units on hold/q 6Hr Hgb transfuse for Hgb less than 8.0 per admitting   - Maintain NPO   -     2. Hx of CAD  -stents placed in 2010  - On Chronic Plavix   -Continue to hold Plavix if feasible        Please see orders for further plan of care. Reviewed above with Dr. Vera Melchor D.O.   GI Fellow  2/19/2021 at 8:19 AM      Pt seen and independently examined. Pertinent notes and lab work reviewed. D/w Dr. Tracy Kothari with physical exam and A&P. Discussed with patient - all questions answered - agreeable with the plan as delineated, including plan for endoscopy as described  Thank you for the opportunity to see this patient in consultation.     Asha Fernandez MD  2/19/2021  11:13 AM

## 2021-02-19 NOTE — H&P
[address]                              HISTORY AND PHYSICAL    PATIENT NAME: Melinda Hargrove                      :        1940  MED REC NO:   [id number]                            ROOM:       [id number]  ACCOUNT NO:   [de-identified]                           ADMIT DATE: 2021  PROVIDER:     Alexis Mondragon    HISTORY OF PRESENT ILLNESS:  This 80-year-old female was admitted  through emergency room following presentation with weakness and fatigue. The patient states for 2-3 days prior to admission, she has noticed  progressive and increasing fatigue associated with bilateral lower  extremity weakness greater than upper extremity weakness. Intermittent  nausea has been present without emesis. Her appetite has been  satisfactory, and there are no fevers, chills or rigors. She denies any  recent travel or exposure. There is no particular abdominal pain or  cramps. She had one bowel movement 3 days prior to admission that was  blackish in color but has not noticed any bright red hematochezia or  hematemesis. Evaluation in emergency room revealed hemoglobin at 7.1. She has known prior history of hypertension, coronary artery disease,  diabetes and is on platelet antiaggregant therapy, utilizing Plavix and  Aspirin. She has remote history of gastric ulcer with bleed. ALLERGIES:  Sarah Hedges which causes gastrointestinal symptoms,  GABAPENTIN. MEDICATIONS:  Glimepiride 2 mg daily, lisinopril 20 mg daily, Plavix 75  daily, Coreg 25 mg b.i.d., Vytorin 10/40 daily, Centrum Silver daily,  Fioricet p.r.n. for pain q.i.d.     PAST MEDICAL HISTORY:  Usual childhood diseases, essential hypertension,  coronary artery disease, right cerebral stroke, diabetes mellitus type  2, atrial septal aneurysm, osteoarthritis, lumbar stenosis and herniated  intervertebral disc, vertebral scoliosis, gastric ulcer with hemorrhage in , hepatic hemangioma, hypercholesterolemia, right carotid  stenosis. PAST SURGICAL HISTORY:  Right wrist fracture repair unrecalled year,  shoulder arthroscopy in , bilateral cataract unrecalled year,  drug-eluting stent to the LAD in , stents to the ostium and proximal  right coronary artery in , drug-eluting stent to the ostial RCA in  . FAMILY HISTORY:  Father  of heart disease at 61. Mother   at 68 of heart disease. No male siblings, three female  siblings were not blood relatives. REVIEW OF SYSTEMS:  GASTROINTESTINAL:  See chief complaint. Weight has  been stable. RESPIRATORY:  Denied cough, wheezing, hemoptysis,  shortness of breath, TB or exposure. CARDIOVASCULAR:  See prior  history, presently denies chest pain or sensation of palpitations, no  history of congenital heart disease or rheumatic fever. NEUROMUSCULAR:   Fatigue and weakness as in chief complaint, no syncope or paralysis, no  seizure disorder, fall two days prior to admission with superficial  injury to the left lower leg, no numbness, tingling or paresthesias. GENITOURINARY:  Denied frequency, urgency, change in color of urine,  hematuria or dysuria. GYNECOLOGIC:   0, para 0. Menarche at 15,  menopause in her 46s. PHYSICAL EXAMINATION:  GENERAL APPEARANCE:  An 59-year-old female. She is alert and  cooperative, well-oriented x3. Sensorium is intact. Speech and  enunciation are normal as in content of conversation. HEENT:  Head is normocephalic. Scalp is nontender. The hair is of  normal distribution and texture. Eyes are bilaterally with round pupils  at 3 mm. Sclerae are white. No icterus, conjunctive are pale. External auditory canals revealed minimal nonocclusive cerumen with  opaque tympanic membranes. Nasal passages are clear. No signs of  bleeding. Oropharynx is without plaque or exudate.   Dentition is in fair repair. Tongue and uvula are midline. No nuchal sign or meningeal  rigidity. NECK:  Thyroid examination is normal.  Carotids are bilaterally 1+. LUNGS:  Lung auscultation is clear. There are no rales, wheezes or  rhonchi. Respirations are nonlabored. HEART:  Rhythm is regular. Heart rate is 72 per minute. No S3, S4,  rubs or are appreciated. ABDOMEN:  Soft. Minimal sensation of firmness in the epigastrium. There was no admitted tenderness to palpation. No rebound or rigidity. Percussion is unremarkable. Bowel sounds are hypoactive. EXTREMITIES:  Satisfactory muscle strength and development. Noninfected  erythema over the lower portion of the pretibial region on the left with  surrounding ecchymosis, nearly circumferential.  Distal circulation,  color and warmth are normal.  Gait and range of motion are not tested by  her request.  There is no paravertebral spasm, tenderness or midline  spinal discomfort. TENTATIVE DIAGNOSES:  1. Anemia/weakness potentially due to upper gastrointestinal bleeding. 2.  Essential hypertension and coronary artery disease. 3.  Hypercholesterolemia. 4.  Diabetes mellitus type 2.  5.  Osteoarthritis and lumbar spinal stenosis with vertebral scoliosis.         Aretha Dee    D: 02/18/2021 21:32:39       T: 02/18/2021 21:42:06     [id number]  Job#: [id number]     Doc#: [id number]    CC:

## 2021-02-19 NOTE — ANESTHESIA PRE PROCEDURE
Department of Anesthesiology  Preprocedure Note       Name:  Edi Haddad   Age:  [de-identified] y.o.  :  1940                                          MRN:  [id number]         Date:  2021      Surgeon: Katie Layton):  Sherry Huff DO    Procedure: Procedure(s):  EGD ESOPHAGOGASTRODUODENOSCOPY    Medications prior to admission:   Prior to Admission medications    Medication Sig Start Date End Date Taking? Authorizing Provider   influenza virus trivalent vaccine (FLUZONE) injection Inject 0.5 mLs into the muscle once Given 10/2020   Yes Historical Provider, MD   lisinopril (PRINIVIL;ZESTRIL) 20 MG tablet Take 20 mg by mouth daily  19  Yes Historical Provider, MD   glimepiride (AMARYL) 1 MG tablet Take 1 mg by mouth every morning (before breakfast)  10/7/19  Yes Historical Provider, MD   aspirin 81 MG tablet Take 81 mg by mouth daily   Yes Historical Provider, MD   carvedilol (COREG) 25 MG tablet Take 1 tablet by mouth 2 times daily (with meals) 19  Yes Mingo Esquivel MD   clopidogrel (PLAVIX) 75 MG tablet Take 1 tablet by mouth daily 19  Yes Edy Cedeno MD   butalbital-acetaminophen-caffeine (FIORICET, ESGIC) -40 MG per tablet Take 1 tablet by mouth every 4 hours as needed for Migraine   Yes Historical Provider, MD   ezetimibe-simvastatin (VYTORIN) 10-40 MG per tablet Take 1 tablet by mouth nightly.    Yes Historical Provider, MD       Current medications:    Current Facility-Administered Medications   Medication Dose Route Frequency Provider Last Rate Last Admin    0.9% NaCl with KCl 20 mEq infusion   Intravenous Continuous Luana Levine,  mL/hr at 21 0838 New Bag at 21 0838    ferrous sulfate (IRON 325) tablet 325 mg  325 mg Oral BID SELENE Stevenson DO   325 mg at 21 0838    0.9 % sodium chloride infusion   Intravenous PRN Deneen Devries DO        pantoprazole (PROTONIX) injection 40 mg  40 mg Intravenous BID Jennifer Thomas DO   40 mg at 21 4304    And  sodium chloride (PF) 0.9 % injection 10 mL  10 mL Intravenous BID Belkys Krishnamurthy, DO   10 mL at 02/19/21 0839    lisinopril (PRINIVIL;ZESTRIL) tablet 20 mg  20 mg Oral Daily Gena Nova, DO   20 mg at 02/19/21 2380    carvedilol (COREG) tablet 25 mg  25 mg Oral BID  Jac Stevenson, DO   25 mg at 02/19/21 3194    atorvastatin (LIPITOR) tablet 20 mg  20 mg Oral Nightly McDowell Nova, DO   20 mg at 02/18/21 2006    ezetimibe (ZETIA) tablet 10 mg  10 mg Oral Nightly McDowell Nova, DO   10 mg at 02/18/21 2006    glimepiride (AMARYL) tablet 2 mg  2 mg Oral Daily with breakfast McDowell Nova, DO   2 mg at 02/19/21 0838    ondansetron (ZOFRAN) injection 4 mg  4 mg Intravenous Q6H PRN McDowell Nova, DO           Allergies:     Allergies   Allergen Reactions    Aspirin      Caused bleeding ulcers    Lipitor      Caused elevated liver enzymes       Problem List:    Patient Active Problem List   Diagnosis Code    Coronary artery disease involving native coronary artery of native heart without angina pectoris I25.10    Scoliosis M41.9    Hypertension I10    Hyperlipidemia E78.5    Acute CVA (cerebrovascular accident) ([address] I63.9    Ischemic stroke ([address] I63.9    Type 2 diabetes mellitus without complication, without long-term current use of insulin ([address] E11.9    Atrial septal aneurysm I25.3    GI bleed K92.2       Past Medical History:        Diagnosis Date    Arthritis     Back problem     CAD (coronary artery disease) 2010    PTCA with DAMON to prox LAD, PTCA DAMON to ostial lesion of RCA and BMS to prox lesion RCA DAMON to RCA ostial     Cerebral artery occlusion with cerebral infarction ([address] 05/2019    Hyperlipidemia     Hypertension     Nausea & vomiting     Reflux     RLD (ruptured lumbar disc)     Scoliosis     Spinal stenosis     Type 2 diabetes mellitus without complication, without long-term current use of insulin ([address] 10/14/2019       Past Surgical History:        Procedure Laterality Date  CATARACT REMOVAL      bilateral    CORONARY ANGIOPLASTY  2/25/2010    PTCA with DAMON in the proximal left anterior descending    CORONARY ANGIOPLASTY  3/11/2010    PTCA and deployment of 2 stents in the ostium and the proximal segment of RCA    CORONARY ANGIOPLASTY  5/19/2010    PTCA DAMON to ostial RCA patent LAD and RCA BMS  restenosis of RCA    CORONARY ANGIOPLASTY WITH STENT PLACEMENT      times 3    DIAGNOSTIC CARDIAC CATH LAB PROCEDURE  2/25/2010    Dr. Walter Bey: Cardiac cath with angioplasty follow up     DIAGNOSTIC CARDIAC CATH LAB PROCEDURE  3/11/2010    Cardiac cath and stent placement    DIAGNOSTIC CARDIAC CATH LAB PROCEDURE  5/19/2010    Cardiac cath heart lab and subsequent angiopilsaty    FRACTURE SURGERY      tight wrist    SHOULDER ARTHROSCOPY  08 25 2011    left shoulder arthroscopy ,acromioplasty,busectomy, release of adhesions       Social History:    Social History     Tobacco Use    Smoking status: Never Smoker    Smokeless tobacco: Never Used   Substance Use Topics    Alcohol use: Never     Alcohol/week: 0.0 standard drinks     Frequency: Never     Comment: No caffeine                                Counseling given: Not Answered      Vital Signs (Current):   Vitals:    02/18/21 2046 02/18/21 2128 02/19/21 0000 02/19/21 0630   BP: (!) 125/57 (!) 123/59 (!) 133/59 (!) 134/59   Pulse: 79 73 77 68   Resp: 12 12 14 18   Temp: 36.9 °C (98.4 °F) 36.8 °C (98.2 °F) 37.1 °C (98.8 °F) 36.8 °C (98.3 °F)   TempSrc:   Oral Oral   SpO2: 98% 98%  97%   Weight:       Height:                                                  BP Readings from Last 3 Encounters:   02/19/21 (!) 134/59   10/13/20 132/60   10/14/19 110/60       NPO Status:  1/18/21                                                                                BMI:   Wt Readings from Last 3 Encounters:   02/18/21 78 lb (35.4 kg)   10/13/20 78 lb (35.4 kg)   10/14/19 75 lb (34 kg)     Body mass index is 16.88 kg/m².     CBC: Lab Results   Component Value Date    WBC 5.5 02/18/2021    RBC 2.55 02/18/2021    HGB 9.4 02/19/2021    HCT 30.3 02/19/2021    MCV 91.0 02/18/2021    RDW 13.9 02/18/2021     02/18/2021       CMP:   Lab Results   Component Value Date     02/19/2021    K 3.4 02/19/2021    K 3.7 02/18/2021     02/19/2021    CO2 21 02/19/2021    BUN 10 02/19/2021    CREATININE 0.6 02/19/2021    GFRAA >60 02/19/2021    LABGLOM >60 02/19/2021    GLUCOSE 95 02/19/2021    GLUCOSE 116 08/15/2011    PROT 6.3 02/18/2021    CALCIUM 8.1 02/19/2021    BILITOT <0.2 02/18/2021    ALKPHOS 92 02/18/2021    AST 23 02/18/2021    ALT 13 02/18/2021       POC Tests: No results for input(s): POCGLU, POCNA, POCK, POCCL, POCBUN, POCHEMO, POCHCT in the last 72 hours. Coags:   Lab Results   Component Value Date    PROTIME 12.8 05/29/2019    INR 1.1 05/29/2019       HCG (If Applicable): No results found for: PREGTESTUR, PREGSERUM, HCG, HCGQUANT     ABGs: No results found for: PHART, PO2ART, MOS0BKJ, KYD5VTH, BEART, P9PPSUQH     Type & Screen (If Applicable):  No results found for: LABABO, LABRH    Drug/Infectious Status (If Applicable):  No results found for: HIV, HEPCAB    COVID-19 Screening (If Applicable): No results found for: COVID19     ECHO 5/27/19   Left Ventricle   Normal left ventricular size and function. LVEF is 65 %. No wall motion   abnormalities. Normal diastolic function. Normal left atrial pressure. Right Ventricle   Right ventricular hypertrophy. Normal chamber size and systolic function. RVSP is 35 mm Hg. NO evidence for pulmonary hypertension. Left Atrium   Normal AMANDA. Small septal aneurysm. No right to left shunting with   agitated saline contrast. No left to right shunting with color doppler. Right Atrium   Normal right atrium. Mitral Valve   Physiologic and/or trace mitral regurgitation is present. No evidence of   hemodynamically significant mitral stenosis.       Tricuspid Valve

## 2021-02-19 NOTE — PROCEDURES
Melanie Damian is a [de-identified] y.o. female patient. 1. Fatigue, unspecified type    2. Anemia, unspecified type      Past Medical History:   Diagnosis Date    Arthritis     Back problem     CAD (coronary artery disease) 2010    PTCA with DAMON to prox LAD, PTCA DAMON to ostial lesion of RCA and BMS to prox lesion RCA DAMON to RCA ostial     Cerebral artery occlusion with cerebral infarction ([facility] 05/2019    Hyperlipidemia     Hypertension     Nausea & vomiting     Reflux     RLD (ruptured lumbar disc)     Scoliosis     Spinal stenosis     Type 2 diabetes mellitus without complication, without long-term current use of insulin ([facility] 10/14/2019     Blood pressure (!) 134/59, pulse 68, temperature 98.3 °F (36.8 °C), temperature source Oral, resp. rate 18, height 4' 9\" (1.448 m), weight 78 lb (35.4 kg), SpO2 97 %. Procedures     Procedure:  Esophagogastroduodenoscopy    Indication:  Acute Normocytic Anemia     Consent:   Informed consent was obtained from the patient including and not limited to risk of perforation, aspiration of gastric contents or teeth, bleeding, infection, dental breakage, ileus, need for surgery, or worst case death. Sedation:  MAC    Estimated Blood Loss: <5cc     Endoscope was advanced easily through mouth to second portion of duodenum      Oropharynx views are limited but grossly normal.    Esophagus:   Mucosa is normal.  GEJ at 35cm diaphragmatic hiatus at 40cm a 5 cm hiatal, Lance Garcia erosion was present no active bleeding or oozing present    Stomach:   Antrum a 4 mm clean-based ulcer at the 5 o'clock position prior to the pylorus with a subsequent 2mm  clean-based ulcer at the 8 o'clock position biopsies were taken of the ulcer edge along with antral    Gastric body is normal.    Retroflexed views show normal fundus and cardia.     Duodenum: Bulb is normal.    Second portion of duodenum is normal.    No fresh or old blood present    IMPRESSION AND PLAN: 1.  5 cm hiatal hernia present Normajean Prayer erosion is present with no active oozing or bleeding present. We will add Carafate to patient's regimen. 2.  Antrum there is a 4 mm clean-based ulcer at the 5 position prior the pylorus. Subsequent 2 mm clean-based ulcer at 8 o'clock position, biopsy taken of ulcer edge along with antrum to rule out H. Pylori. Will leave on Protonix 40 mg IV twice daily overnight. Patient required Protonix 40 mg twice daily orally for 8 weeks. Patient require repeat EGD in 8 - 12 weeks to document healing. 3.  Okay to advance patient's diet. 4.  No previous colonoscopy in the past patient will require outpatient colonoscopy. 5.  Okay to restart anticoagulation from GI point of view      Pt was seen and procedure was performed with Dr. Vasquez Messer present for the entire procedure      Perez Melgar DO   GI Fellow   2/19/2021     I was present for entire duration of procedure and participated in key and non-key portions. Discussed findings with the fellow and agree with recommendations above.     Cedric Kevin DO  2/19/2021  5:18 PM

## 2021-02-19 NOTE — ANESTHESIA POSTPROCEDURE EVALUATION
Department of Anesthesiology  Postprocedure Note    Patient: Tamara Rodriges  MRN: [id number]  YOB: 1940  Date of evaluation: 2/19/2021  Time:  5:28 PM     Procedure Summary     Date: 02/19/21 Room / Location: [address] [place] [address] / [facility]    Anesthesia Start: 6607 Anesthesia Stop: 3473    Procedure: EGD BIOPSY (N/A ) Diagnosis: (ACUTE ANEMIA)    Surgeons: Kami Shin DO Responsible Provider: Melanie Mace MD    Anesthesia Type: MAC ASA Status: 3          Anesthesia Type: MAC    Zhou Phase I:      Zhou Phase II: Zhou Score: 10    Last vitals: Reviewed and per EMR flowsheets.        Anesthesia Post Evaluation    Patient location during evaluation: PACU  Patient participation: complete - patient participated  Level of consciousness: awake  Airway patency: patent  Nausea & Vomiting: no nausea and no vomiting  Complications: no  Cardiovascular status: hemodynamically stable  Respiratory status: acceptable  Hydration status: euvolemic

## 2021-02-19 NOTE — CARE COORDINATION
2-19-Cm note: ( no Covid testing) I met with pt for transition of care needs, pt is independent, she uses a 4 prong cane , she drives and does all her own shopping. Pt denies any dc needs, her family will provide transport home .  Electronically signed by Letitia Verdin RN on 2/19/2021 at 1:57 PM

## 2021-02-19 NOTE — PROGRESS NOTES
Progress Note    History:  Admits feeling hungry, no further nausea or emesis, no abdominal pain or cramps, no bowel movement. States has not urinated since admission, has not been out of bed as yet. No shortness of breath or chest pain, no sense palpitations. Has a bit of headache, no dizziness or lightheadedness while in bed, no additional neurologic symptoms. Patient's medical and surgical history, medications and allergies were reviewed. Interval notes of consultants, nurses, therapists, and ancillary services were also reviewed, as well as the medical administration record. Physical Exam:  Vitals:    02/18/21 2046 02/18/21 2128 02/19/21 0000 02/19/21 0630   BP: (!) 125/57 (!) 123/59 (!) 133/59 (!) 134/59   Pulse: 79 73 77 68   Resp: 12 12 14 18   Temp: 98.4 °F (36.9 °C) 98.2 °F (36.8 °C) 98.8 °F (37.1 °C) 98.3 °F (36.8 °C)   TempSrc:   Oral Oral   SpO2: 98% 98%  97%   Weight:       Height:         Anicteric, no cyanosis, pallor remains. Lungs clear nonlabored respirations, no rales/wheezes. Heart rhythm essentially regular, rare ectopic, no S3S4 or rubs, tones same, HR 68/min. Abd hypoactive bowel noise, denies tenderness with palpation, percussion sl tympanitic. Left leg ecchymosis evolving in healing fashion. Assessment/Plan:   1. Anemia/weakness, potentially due to GI bleed  2. Ess hypertension, CAD with prior stents X 3  3. DM type 2  4. Hypercholesterolemia   5. OA, lumbar spinal stenosis and HIVD  6. Prior right cerebral stroke  7. Hypokalemia    Discussed with the patient, presently NPO for possible GI intervention, declines analgesic for headache, Fe profile noted, TIBC still WNL, will order iron supp when PO status resumed. Follow BP trend, supplement K+ IV, watch glucose trend as day progresses. Hgb increased to 9.3 after reported one unit PRBC given. Maintain IV PPI for now, Await GI input.   Electronically by Farhana Kohli,   on 2/19/21 at 7:36 AM EST

## 2021-02-20 LAB
ANION GAP SERPL CALCULATED.3IONS-SCNC: 12 MMOL/L (ref 7–16)
BUN BLDV-MCNC: 7 MG/DL (ref 8–23)
CALCIUM SERPL-MCNC: 7.9 MG/DL (ref 8.6–10.2)
CHLORIDE BLD-SCNC: 108 MMOL/L (ref 98–107)
CO2: 18 MMOL/L (ref 22–29)
CREAT SERPL-MCNC: 0.5 MG/DL (ref 0.5–1)
GFR AFRICAN AMERICAN: >60
GFR NON-AFRICAN AMERICAN: >60 ML/MIN/1.73
GLUCOSE BLD-MCNC: 83 MG/DL (ref 74–99)
HCT VFR BLD CALC: 28.7 % (ref 34–48)
HCT VFR BLD CALC: 28.8 % (ref 34–48)
HEMOGLOBIN: 8.8 G/DL (ref 11.5–15.5)
HEMOGLOBIN: 9.1 G/DL (ref 11.5–15.5)
METER GLUCOSE: 149 MG/DL (ref 74–99)
METER GLUCOSE: 98 MG/DL (ref 74–99)
POTASSIUM SERPL-SCNC: 3.7 MMOL/L (ref 3.5–5)
SODIUM BLD-SCNC: 138 MMOL/L (ref 132–146)
URINE CULTURE, ROUTINE: NORMAL

## 2021-02-20 PROCEDURE — 85018 HEMOGLOBIN: CPT

## 2021-02-20 PROCEDURE — 80048 BASIC METABOLIC PNL TOTAL CA: CPT

## 2021-02-20 PROCEDURE — 6370000000 HC RX 637 (ALT 250 FOR IP): Performed by: INTERNAL MEDICINE

## 2021-02-20 PROCEDURE — 85014 HEMATOCRIT: CPT

## 2021-02-20 PROCEDURE — 6360000002 HC RX W HCPCS: Performed by: FAMILY MEDICINE

## 2021-02-20 PROCEDURE — 36415 COLL VENOUS BLD VENIPUNCTURE: CPT

## 2021-02-20 PROCEDURE — 2580000003 HC RX 258: Performed by: INTERNAL MEDICINE

## 2021-02-20 PROCEDURE — 6370000000 HC RX 637 (ALT 250 FOR IP): Performed by: FAMILY MEDICINE

## 2021-02-20 PROCEDURE — C9113 INJ PANTOPRAZOLE SODIUM, VIA: HCPCS | Performed by: INTERNAL MEDICINE

## 2021-02-20 PROCEDURE — 82272 OCCULT BLD FECES 1-3 TESTS: CPT

## 2021-02-20 PROCEDURE — 82962 GLUCOSE BLOOD TEST: CPT

## 2021-02-20 PROCEDURE — 6360000002 HC RX W HCPCS: Performed by: INTERNAL MEDICINE

## 2021-02-20 PROCEDURE — 1200000000 HC SEMI PRIVATE

## 2021-02-20 RX ORDER — PANTOPRAZOLE SODIUM 40 MG/1
40 TABLET, DELAYED RELEASE ORAL
Status: DISCONTINUED | OUTPATIENT
Start: 2021-02-20 | End: 2021-02-21 | Stop reason: HOSPADM

## 2021-02-20 RX ADMIN — ATORVASTATIN CALCIUM 20 MG: 20 TABLET, FILM COATED ORAL at 21:00

## 2021-02-20 RX ADMIN — SUCRALFATE 1 G: 1 TABLET ORAL at 13:05

## 2021-02-20 RX ADMIN — PANTOPRAZOLE SODIUM 40 MG: 40 INJECTION, POWDER, FOR SOLUTION INTRAVENOUS at 08:28

## 2021-02-20 RX ADMIN — CARVEDILOL 25 MG: 25 TABLET, FILM COATED ORAL at 08:32

## 2021-02-20 RX ADMIN — EZETIMIBE 10 MG: 10 TABLET ORAL at 21:00

## 2021-02-20 RX ADMIN — PANTOPRAZOLE SODIUM 40 MG: 40 TABLET, DELAYED RELEASE ORAL at 17:43

## 2021-02-20 RX ADMIN — SUCRALFATE 1 G: 1 TABLET ORAL at 18:08

## 2021-02-20 RX ADMIN — GLIMEPIRIDE 2 MG: 4 TABLET ORAL at 08:31

## 2021-02-20 RX ADMIN — SODIUM CHLORIDE, PRESERVATIVE FREE 10 ML: 5 INJECTION INTRAVENOUS at 08:26

## 2021-02-20 RX ADMIN — SUCRALFATE 1 G: 1 TABLET ORAL at 06:05

## 2021-02-20 RX ADMIN — FERROUS SULFATE TAB 325 MG (65 MG ELEMENTAL FE) 325 MG: 325 (65 FE) TAB at 08:32

## 2021-02-20 RX ADMIN — CARVEDILOL 25 MG: 25 TABLET, FILM COATED ORAL at 17:42

## 2021-02-20 RX ADMIN — ONDANSETRON 4 MG: 2 INJECTION INTRAMUSCULAR; INTRAVENOUS at 08:26

## 2021-02-20 RX ADMIN — SODIUM CHLORIDE AND POTASSIUM CHLORIDE: 9; 1.49 INJECTION, SOLUTION INTRAVENOUS at 04:48

## 2021-02-20 RX ADMIN — FERROUS SULFATE TAB 325 MG (65 MG ELEMENTAL FE) 325 MG: 325 (65 FE) TAB at 18:06

## 2021-02-20 RX ADMIN — LISINOPRIL 30 MG: 20 TABLET ORAL at 08:31

## 2021-02-20 ASSESSMENT — PAIN SCALES - GENERAL: PAINLEVEL_OUTOF10: 0

## 2021-02-20 NOTE — PROGRESS NOTES
PROGRESS NOTE    By Gilford Frater D.O GI Fellow    [facility]  Dr. Patricia Lu MD, Dr. Luis Sanford MD, Dr Dileep Mclain, Dr. Mohini Bernstein MD, Dr. Tamika Gonzalez, DO Kathy Olivia  [de-identified] y.o.  female    SUBJECTIVE:    Patient resting comfortably this a.m. patient denies any melena hematochezia or hematemesis. OBJECTIVE:    /64   Pulse 82   Temp 98.5 °F (36.9 °C) (Oral)   Resp 16   Ht 4' 9\" (1.448 m)   Wt 78 lb (35.4 kg)   SpO2 99%   BMI 16.88 kg/m²     Gen: NAD, AAO x 3  HEENT:PEERL, no icterus  Heart: RRR, no M/R/G  Lungs: CTAB  Abd.: soft, NT, ND, BS +, no G/R, no HSM  Extr.: no C/C/E, no bruising         Lab Results   Component Value Date    WBC 5.5 02/18/2021    WBC 7.2 05/29/2019    WBC 7.0 05/28/2019    HGB 9.1 02/20/2021    HGB 8.7 02/19/2021    HGB 9.4 02/19/2021    HCT 28.7 02/20/2021    MCV 91.0 02/18/2021    RDW 13.9 02/18/2021     02/18/2021     05/29/2019     05/28/2019     Lab Results   Component Value Date     02/20/2021    K 3.7 02/20/2021    K 3.7 02/18/2021     02/20/2021    CO2 18 02/20/2021    BUN 7 02/20/2021    CREATININE 0.5 02/20/2021    CALCIUM 7.9 02/20/2021    PROT 6.3 02/18/2021    LABALBU 4.0 02/18/2021    BILITOT <0.2 02/18/2021    BILITOT 0.2 05/29/2019    BILITOT 0.2 05/24/2019    ALKPHOS 92 02/18/2021    ALKPHOS 84 05/29/2019    ALKPHOS 99 05/24/2019    AST 23 02/18/2021    AST 23 05/29/2019    AST 31 05/24/2019    ALT 13 02/18/2021    ALT 17 05/29/2019    ALT 18 05/24/2019     No results found for: LIPASE  No results found for: AMYLASE      ASSESSMENT/PLAN:    1.   Acute symptomatic normocytic anemia  -Signs stable no overt signs of GI bleed on exam  -Hemoglobin 11.6 in 5/2019  -Hemoglobin 7.1 on admission  -Hemoglobin 8.7-->9.1 this am  this a.m. after 2 units of packed red blood cells -EGD 2/19 hiatal hernia present Tereasa Lino erosion no active oozing or bleeding, along with 2 clean-based ulcer in the antrum-see pertinent procedure note  -We will place on Protonix 40 mg twice daily along with Carafate 1 g 4 times a day  -Patient require repeat EGD in 8 to 12 weeks to document healing  -Also require outpatient colonoscopy for evaluation as patient around undergone CRC screening         2. Hx of CAD  -stents placed in 2010  - On Chronic Plavix   -Okay to restart Plavix from GI point of view    Pt was discussed with Dr. Boubacar Wood DO  GI Fellow   2/20/2021  9:49 AM       Patient seen and examined independently. Discussed with fellow and agree with the plan of care stated above.     Sonia Henry DO  2/20/2021  10:55 AM

## 2021-02-20 NOTE — PLAN OF CARE
Problem: Falls - Risk of:  Goal: Will remain free from falls  Description: Will remain free from falls  2/19/2021 2257 by Milka Abbott RN  Outcome: Met This Shift  2/19/2021 2233 by Milka Abbott RN  Outcome: Met This Shift  Goal: Absence of physical injury  Description: Absence of physical injury  2/19/2021 2257 by Milka Abbott RN  Outcome: Met This Shift  2/19/2021 2233 by Milka Abbott RN  Outcome: Met This Shift     Problem: Bleeding:  Goal: Will show no signs and symptoms of excessive bleeding  Description: Will show no signs and symptoms of excessive bleeding  2/19/2021 2257 by Milka Abbott RN  Outcome: Met This Shift  2/19/2021 2233 by Milka Abbott RN  Outcome: Met This Shift

## 2021-02-20 NOTE — PROGRESS NOTES
Progress Note    History:  Feels terrible this morning, nausea without emesis, appetite fair but doesn't feel like eating, no reflux or abdominal pain, states had brown bowel movement. Weak when ambulating to BRP, feels like legs wont hold her up. No shortness of breath or chest pain. Patient's medical and surgical history, medications and allergies were reviewed. Interval notes of consultants, nurses, therapists, and ancillary services were also reviewed, as well as the medical administration record. Physical Exam:  Vitals:    02/19/21 1753 02/19/21 1754 02/19/21 1815 02/20/21 0545   BP: (!) 153/56  (!) 159/59 136/61   Pulse:   82 78   Resp:   16 16   Temp:   97.6 °F (36.4 °C) 98.5 °F (36.9 °C)   TempSrc:   Oral Oral   SpO2: 99% 99% 98% 99%   Weight:       Height:         Pallor, no icterus or cyanosis. Lungs clear nonlabored. Heart regular rhythm, no S3S4 heard, no ectopy, HR 76/min. Abdominal pain denied with palpation, bowel sounds all quadrants hypoactive, no rebound or rigidity, no guarding. Left leg contusion normal evolution, no edema. Assessment/Plan:   1. Anemia due blood loss due to gastric ulcers  2. Esophageal erosion, hiatal hernia  3. Ess hypertension, CAD with remote stenting on three occasions  4. Hypercholesterolemia  5. DM type 2  6. OA/lumbar HIVD and spinal stenosis  7. Hypokalemia, resolved    Discussed with the patient, will discontinue K+ supplementation, increase ACE dosage, serial H/H stable, on PPI and carafate. If she feels improved as day proceeds allow discharge, in interim follow hgb and BP in addition to PO and activity tolerance.   Electronically by Luana Levine,   on 2/20/21 at 8:25 AM EST

## 2021-02-21 VITALS
SYSTOLIC BLOOD PRESSURE: 168 MMHG | HEIGHT: 57 IN | RESPIRATION RATE: 16 BRPM | DIASTOLIC BLOOD PRESSURE: 76 MMHG | WEIGHT: 78 LBS | BODY MASS INDEX: 16.83 KG/M2 | OXYGEN SATURATION: 97 % | HEART RATE: 88 BPM | TEMPERATURE: 98.2 F

## 2021-02-21 LAB
ANION GAP SERPL CALCULATED.3IONS-SCNC: 10 MMOL/L (ref 7–16)
BASOPHILS ABSOLUTE: 0 E9/L (ref 0–0.2)
BASOPHILS RELATIVE PERCENT: 0.1 % (ref 0–2)
BUN BLDV-MCNC: 9 MG/DL (ref 8–23)
CALCIUM SERPL-MCNC: 8.2 MG/DL (ref 8.6–10.2)
CHLORIDE BLD-SCNC: 106 MMOL/L (ref 98–107)
CO2: 23 MMOL/L (ref 22–29)
CREAT SERPL-MCNC: 0.4 MG/DL (ref 0.5–1)
EOSINOPHILS ABSOLUTE: 0.1 E9/L (ref 0.05–0.5)
EOSINOPHILS RELATIVE PERCENT: 0.9 % (ref 0–6)
GFR AFRICAN AMERICAN: >60
GFR NON-AFRICAN AMERICAN: >60 ML/MIN/1.73
GLUCOSE BLD-MCNC: 162 MG/DL (ref 74–99)
HCT VFR BLD CALC: 30.2 % (ref 34–48)
HEMOGLOBIN: 9.1 G/DL (ref 11.5–15.5)
LYMPHOCYTES ABSOLUTE: 1.61 E9/L (ref 1.5–4)
LYMPHOCYTES RELATIVE PERCENT: 15 % (ref 20–42)
MCH RBC QN AUTO: 27.7 PG (ref 26–35)
MCHC RBC AUTO-ENTMCNC: 30.1 % (ref 32–34.5)
MCV RBC AUTO: 92.1 FL (ref 80–99.9)
METER GLUCOSE: 173 MG/DL (ref 74–99)
MONOCYTES ABSOLUTE: 1.07 E9/L (ref 0.1–0.95)
MONOCYTES RELATIVE PERCENT: 9.7 % (ref 2–12)
NEUTROPHILS ABSOLUTE: 7.92 E9/L (ref 1.8–7.3)
NEUTROPHILS RELATIVE PERCENT: 74.3 % (ref 43–80)
OCCULT BLOOD DIAGNOSTIC: NORMAL
PDW BLD-RTO: 14.1 FL (ref 11.5–15)
PLATELET # BLD: 261 E9/L (ref 130–450)
PMV BLD AUTO: 9.7 FL (ref 7–12)
POTASSIUM SERPL-SCNC: 3 MMOL/L (ref 3.5–5)
RBC # BLD: 3.28 E12/L (ref 3.5–5.5)
SODIUM BLD-SCNC: 139 MMOL/L (ref 132–146)
WBC # BLD: 10.7 E9/L (ref 4.5–11.5)

## 2021-02-21 PROCEDURE — 6370000000 HC RX 637 (ALT 250 FOR IP): Performed by: INTERNAL MEDICINE

## 2021-02-21 PROCEDURE — 85025 COMPLETE CBC W/AUTO DIFF WBC: CPT

## 2021-02-21 PROCEDURE — 82962 GLUCOSE BLOOD TEST: CPT

## 2021-02-21 PROCEDURE — 6370000000 HC RX 637 (ALT 250 FOR IP): Performed by: FAMILY MEDICINE

## 2021-02-21 PROCEDURE — 80048 BASIC METABOLIC PNL TOTAL CA: CPT

## 2021-02-21 PROCEDURE — 36415 COLL VENOUS BLD VENIPUNCTURE: CPT

## 2021-02-21 RX ORDER — GLIMEPIRIDE 2 MG/1
2 TABLET ORAL
Qty: 30 TABLET | Refills: 3 | Status: SHIPPED | OUTPATIENT
Start: 2021-02-22

## 2021-02-21 RX ORDER — SUCRALFATE 1 G/1
1 TABLET ORAL 4 TIMES DAILY
Qty: 120 TABLET | Refills: 1
Start: 2021-02-21 | End: 2021-08-04

## 2021-02-21 RX ORDER — LISINOPRIL 30 MG/1
30 TABLET ORAL DAILY
Qty: 30 TABLET | Refills: 3 | Status: SHIPPED | OUTPATIENT
Start: 2021-02-22

## 2021-02-21 RX ORDER — PANTOPRAZOLE SODIUM 40 MG/1
40 TABLET, DELAYED RELEASE ORAL
Qty: 60 TABLET | Refills: 1
Start: 2021-02-21 | End: 2022-05-18

## 2021-02-21 RX ADMIN — PANTOPRAZOLE SODIUM 40 MG: 40 TABLET, DELAYED RELEASE ORAL at 06:08

## 2021-02-21 RX ADMIN — LISINOPRIL 30 MG: 20 TABLET ORAL at 08:17

## 2021-02-21 RX ADMIN — CARVEDILOL 25 MG: 25 TABLET, FILM COATED ORAL at 08:16

## 2021-02-21 RX ADMIN — GLIMEPIRIDE 2 MG: 4 TABLET ORAL at 08:17

## 2021-02-21 RX ADMIN — FERROUS SULFATE TAB 325 MG (65 MG ELEMENTAL FE) 325 MG: 325 (65 FE) TAB at 08:18

## 2021-02-21 RX ADMIN — SUCRALFATE 1 G: 1 TABLET ORAL at 00:20

## 2021-02-21 RX ADMIN — SUCRALFATE 1 G: 1 TABLET ORAL at 06:08

## 2021-02-21 ASSESSMENT — PAIN SCALES - GENERAL: PAINLEVEL_OUTOF10: 0

## 2021-02-21 NOTE — PROGRESS NOTES
PROGRESS NOTE    By Karlee Durand D.O GI Fellow    [facility]  Dr. Sai Toro MD, Dr. Kym Fitzpatrick MD, Dr Juan Cruz, Dr. Wander Michel MD, Dr. Medardo Mims, DO Torres Zapien  [de-identified] y.o.  female    SUBJECTIVE:    Patient resting comfortably this a.m. patient denies any melena hematochezia or hematemesis. OBJECTIVE:    BP (!) 168/76   Pulse 88   Temp 98.2 °F (36.8 °C) (Oral)   Resp 16   Ht 4' 9\" (1.448 m)   Wt 78 lb (35.4 kg)   SpO2 97%   BMI 16.88 kg/m²     Gen: NAD, AAO x 3  HEENT:PEERL, no icterus  Heart: RRR, no M/R/G  Lungs: CTAB  Abd.: soft, NT, ND, BS +, no G/R, no HSM  Extr.: no C/C/E, no bruising      Stool (measured) : 0 mL  Lab Results   Component Value Date    WBC 10.7 02/21/2021    WBC 5.5 02/18/2021    WBC 7.2 05/29/2019    HGB 9.1 02/21/2021    HGB 8.8 02/20/2021    HGB 9.1 02/20/2021    HCT 30.2 02/21/2021    MCV 92.1 02/21/2021    RDW 14.1 02/21/2021     02/21/2021     02/18/2021     05/29/2019     Lab Results   Component Value Date     02/21/2021    K 3.0 02/21/2021    K 3.7 02/18/2021     02/21/2021    CO2 23 02/21/2021    BUN 9 02/21/2021    CREATININE 0.4 02/21/2021    CALCIUM 8.2 02/21/2021    PROT 6.3 02/18/2021    LABALBU 4.0 02/18/2021    BILITOT <0.2 02/18/2021    BILITOT 0.2 05/29/2019    BILITOT 0.2 05/24/2019    ALKPHOS 92 02/18/2021    ALKPHOS 84 05/29/2019    ALKPHOS 99 05/24/2019    AST 23 02/18/2021    AST 23 05/29/2019    AST 31 05/24/2019    ALT 13 02/18/2021    ALT 17 05/29/2019    ALT 18 05/24/2019     No results found for: LIPASE  No results found for: AMYLASE      ASSESSMENT/PLAN:    1.   Acute symptomatic normocytic anemia  -Signs stable no overt signs of GI bleed on exam  -Hemoglobin 11.6 in 5/2019  -Hemoglobin 7.1 on admission  -Hemoglobin 8.7-->9.1-->9.1  this am  this a.m. after 2 units of packed red blood cells

## 2021-02-21 NOTE — PROGRESS NOTES
Discussed with the patient, allow discharge to home setting today, maintain general diet, recheck in outpatient office in 7 to 10 days. Plan follow-up of hemoglobin and potassium values on outpatient basis, continue blood sugar record and bring in report at time of office visit. Advise walker ambulation and activity as directed while in recuperation. Medication regimen and new prescriptions discussed with patient, at present maintain hold on aspirin, nonsteroidal anti-inflammatory agents, and clopidogrel. At discharge patient is in improved and stable medical condition.     Electronically by Claudia Crowder DO  on 2/21/21 at 8:59 AM EST

## 2021-02-22 LAB
BLOOD BANK DISPENSE STATUS: NORMAL
BLOOD BANK DISPENSE STATUS: NORMAL
BLOOD BANK PRODUCT CODE: NORMAL
BLOOD BANK PRODUCT CODE: NORMAL
BPU ID: NORMAL
BPU ID: NORMAL
DESCRIPTION BLOOD BANK: NORMAL
DESCRIPTION BLOOD BANK: NORMAL

## 2021-02-22 NOTE — DISCHARGE SUMMARY
hemoglobins improved from 7.1 into the 9.1 range by the time of  discharge. Platelets did remain satisfactory. Blood type was O  positive. Urinalysis was clear, as was urinary culture. Blood sugars  were followed during the inpatient stay due to her history of diabetes  mellitus and revealed good a.m. blood sugars with some increase in the  p.m. hours. Renal studies did remain satisfactory. Emergency room brain CT  scan had been performed and revealed no acute intracranial abnormality. She has a known prior history 2 years prior to admission of right  frontal stroke. EKG revealed normal sinus rhythm. By the day of discharge, the patient was ambulatory with walker use. As  she subjectively felt improved, arrangements were made for discharge to  home care. Recheck will be in the outpatient office in the week  following discharge. Follow up of hemoglobin values, potassium values,  and her home Glucometer sugar record will be produced at the time of  office visit. Appropriate alterations in her medical regimen will ensue  if indicated. At the time of discharge, her medications will include  Protonix 40 mg b.i.d., Carafate 1 gm q.i.d. before meals and at bedtime,  Zofran 4 mg q.8 hours p.r.n. for nausea, KCl 20 mEq daily, Vytorin 10/40  mg daily, Coreg 25 mg b.i.d., lisinopril 30 mg daily, Pro Fe 180 mg  daily, and glimepiride 2 mg daily. She will continue hold on  clopidogrel and aspirin products, with appropriate coronary risks explained,   and has been advised to avoid any  nonsteroidal anti-inflammatory use for the near future. DISCHARGE DIAGNOSES:  1. Anemia of acute blood loss. 2.  Acute gastric ulcer x2, Luis  erosion of the esophagus, hiatal  hernia. 3.  Essential hypertension, coronary artery disease with prior coronary  stenting on three occasions. 4.  Diabetes mellitus type 2.  5.  Hypercholesterolemia.   6.  Osteoarthritis/history of herniated intervertebral disk, lumbar spine, and lumbar spinal stenosis. 7.  Resolving contusion of left leg sustained prior to admission.         Love Warner    D: 02/21/2021 19:12:10       T: 02/21/2021 19:16:57     [email or web address]  Job#: [id number]     Doc#: [id number]    CC:

## 2021-02-23 LAB
BLOOD CULTURE, ROUTINE: NORMAL
CULTURE, BLOOD 2: NORMAL

## 2021-02-25 NOTE — PROGRESS NOTES
Physician Progress Note      PATIENTMallie Headings  CSN #:                  [id number]  :                       1940  ADMIT DATE:       2021 10:11 AM  DISCH DATE:        2021 10:24 AM  RESPONDING  PROVIDER #:        Kim Cassidy DO          QUERY TEXT:    Dear Dr. Ese Ambrocio,    Pt admitted with GI Bleed and has anemia documented. If possible after study,   please document in progress notes and discharge summary further specificity   regarding the acuity and type of anemia:      The medical record reflects the following:  Risk Factors: HTN, CAD with stents, Hypercholesterolemia, On Plavix and   Aspirin, DM, OA, lumbar stenosis, prior CVA  Clinical Indicators: 2019 H&H 11.6/35.2, 2021 H&H 7.1/23.2, iron 18,   iron saturation 5; per H&P: \"had one bowel movement 3 days prior to admission   that was blackish in color\"  Treatment: GI consult, iron BID, IV Protonix BID, serial  H&H, stool for   occult blood, blood transfusion 2 URBC, EGD pending    Thank You,  Bety Millan RN, BSN, CDS  Clinical Documentation Improvement Specialist  [phone]  Options provided:  -- Anemia due to acute blood loss  -- Anemia due to chronic blood loss  -- Anemia due to acute on chronic blood loss  -- Anemia due to iron deficiency  -- Other - I will add my own diagnosis  -- Disagree - Not applicable / Not valid  -- Disagree - Clinically unable to determine / Unknown  -- Refer to Clinical Documentation Reviewer    PROVIDER RESPONSE TEXT:    This patient has acute blood loss anemia.     Query created by: Garrett Issa on 2021 12:28 PM      Electronically signed by:  Kim Cassidy DO 2021 8:39 AM

## 2021-07-15 ENCOUNTER — HOSPITAL ENCOUNTER (OUTPATIENT)
Age: 81
Discharge: HOME | End: 2021-07-15
Payer: MEDICARE

## 2021-07-15 LAB
EKG ATRIAL RATE: 74 BPM
EKG P AXIS: 72 DEGREES
EKG P-R INTERVAL: 148 MS
EKG Q-T INTERVAL: 384 MS
EKG QRS DURATION: 82 MS
EKG QTC CALCULATION (BAZETT): 426 MS
EKG R AXIS: 51 DEGREES
EKG T AXIS: 67 DEGREES
EKG VENTRICULAR RATE: 74 BPM
TROPONIN, HIGH SENSITIVITY: 9 NG/L (ref 0–9)

## 2021-07-15 PROCEDURE — 84484 ASSAY OF TROPONIN QUANT: CPT

## 2021-07-15 PROCEDURE — 36415 COLL VENOUS BLD VENIPUNCTURE: CPT

## 2021-07-15 PROCEDURE — 93005 ELECTROCARDIOGRAM TRACING: CPT | Performed by: FAMILY MEDICINE

## 2021-07-21 ENCOUNTER — HOSPITAL ENCOUNTER (OUTPATIENT)
Dept: INTERVENTIONAL RADIOLOGY/VASCULAR | Age: 81
Discharge: HOME | End: 2021-07-23
Payer: MEDICARE

## 2021-07-21 DIAGNOSIS — M79.621 PAIN IN RIGHT UPPER ARM: ICD-10-CM

## 2021-07-21 DIAGNOSIS — R07.9 CHEST PAIN, UNSPECIFIED TYPE: ICD-10-CM

## 2021-07-21 PROCEDURE — 93931 UPPER EXTREMITY STUDY: CPT

## 2021-08-04 ENCOUNTER — OFFICE VISIT (OUTPATIENT)
Dept: CARDIOLOGY CLINIC | Age: 81
End: 2021-08-04
Payer: MEDICARE

## 2021-08-04 VITALS
BODY MASS INDEX: 15.74 KG/M2 | HEART RATE: 71 BPM | RESPIRATION RATE: 18 BRPM | WEIGHT: 75 LBS | OXYGEN SATURATION: 97 % | HEIGHT: 58 IN | DIASTOLIC BLOOD PRESSURE: 60 MMHG | SYSTOLIC BLOOD PRESSURE: 98 MMHG

## 2021-08-04 DIAGNOSIS — I25.10 CORONARY ARTERY DISEASE INVOLVING NATIVE CORONARY ARTERY OF NATIVE HEART WITHOUT ANGINA PECTORIS: Primary | ICD-10-CM

## 2021-08-04 DIAGNOSIS — I25.3 ATRIAL SEPTAL ANEURYSM: ICD-10-CM

## 2021-08-04 DIAGNOSIS — I63.9 CEREBROVASCULAR ACCIDENT (CVA), UNSPECIFIED MECHANISM (HCC): ICD-10-CM

## 2021-08-04 DIAGNOSIS — R07.89 ATYPICAL CHEST PAIN: ICD-10-CM

## 2021-08-04 DIAGNOSIS — E78.2 MIXED HYPERLIPIDEMIA: ICD-10-CM

## 2021-08-04 DIAGNOSIS — E11.9 TYPE 2 DIABETES MELLITUS WITHOUT COMPLICATION, WITHOUT LONG-TERM CURRENT USE OF INSULIN (HCC): ICD-10-CM

## 2021-08-04 DIAGNOSIS — I10 ESSENTIAL HYPERTENSION: ICD-10-CM

## 2021-08-04 PROCEDURE — 99214 OFFICE O/P EST MOD 30 MIN: CPT | Performed by: INTERNAL MEDICINE

## 2021-08-04 PROCEDURE — G8427 DOCREV CUR MEDS BY ELIG CLIN: HCPCS | Performed by: INTERNAL MEDICINE

## 2021-08-04 PROCEDURE — 1123F ACP DISCUSS/DSCN MKR DOCD: CPT | Performed by: INTERNAL MEDICINE

## 2021-08-04 PROCEDURE — 4040F PNEUMOC VAC/ADMIN/RCVD: CPT | Performed by: INTERNAL MEDICINE

## 2021-08-04 PROCEDURE — 1036F TOBACCO NON-USER: CPT | Performed by: INTERNAL MEDICINE

## 2021-08-04 PROCEDURE — 1090F PRES/ABSN URINE INCON ASSESS: CPT | Performed by: INTERNAL MEDICINE

## 2021-08-04 PROCEDURE — G8400 PT W/DXA NO RESULTS DOC: HCPCS | Performed by: INTERNAL MEDICINE

## 2021-08-04 PROCEDURE — 93000 ELECTROCARDIOGRAM COMPLETE: CPT | Performed by: INTERNAL MEDICINE

## 2021-08-04 PROCEDURE — G8419 CALC BMI OUT NRM PARAM NOF/U: HCPCS | Performed by: INTERNAL MEDICINE

## 2021-08-04 RX ORDER — CLOPIDOGREL BISULFATE 75 MG/1
75 TABLET ORAL DAILY
Status: ON HOLD | COMMUNITY
End: 2022-03-01 | Stop reason: HOSPADM

## 2021-08-04 NOTE — PROGRESS NOTES
OUTPATIENT CARDIOLOGY FOLLOW-UP    Name: Joelle Morejon    Age: [de-identified] y.o. Primary Care Physician: Luis Mancilla DO    Date of Service: 8/4/2021    Chief Complaint: Coronary atherosclerosis, atypical chest pain, hypertension, cerebrovascular accident, atrial septal aneurysm    Interim History: Since her most recent evaluation, the patient remains compensated from a cardiovascular standpoint with no interim symptoms of classic anginal-like chest discomfort or other ischemic equivalents. She recently has noted paresthesias of her right upper extremity initially originating at the level of her hand and proceeding proximally with somewhat vague discomfort predominately within her right precordial distribution in the absence of classic angina or other ischemic equivalents. She has subsequently undergone vascular evaluation demonstrating no evidence of extracranial carotid or upper extremity peripheral arterial disease and presently her symptomatology has largely resolved. She otherwise relates no significant changes of her functional capabilities and reports no symptoms of decompensated left ventricular systolic dysfunction or volume overload nor arrhythmia related manifestations. She presently denies additional manifestations of a focal neurologic origin. Review of Systems: The remainder of a complete multisystem review including consitutional, central nervous, respiratory, circulatory, gastrointestinal, genitourinary, endocrinologic, hematologic, musculoskeletal and psychiatric are negative.     Past Medical History:  Past Medical History:   Diagnosis Date    Arthritis     Back problem     CAD (coronary artery disease) 2010    PTCA with DAMON to prox LAD, PTCA DAMON to ostial lesion of RCA and BMS to prox lesion RCA DAMON to RCA ostial     Cerebral artery occlusion with cerebral infarction ([facility] [address] 05/2019    Hyperlipidemia     Hypertension     Nausea & vomiting     Reflux     RLD (ruptured lumbar disc)     Scoliosis     Spinal stenosis     Type 2 diabetes mellitus without complication, without long-term current use of insulin ([facility] [address] 10/14/2019       Past Surgical History:  Past Surgical History:   Procedure Laterality Date    CATARACT REMOVAL      bilateral    CORONARY ANGIOPLASTY  2/25/2010    PTCA with DAMON in the proximal left anterior descending    CORONARY ANGIOPLASTY  3/11/2010    PTCA and deployment of 2 stents in the ostium and the proximal segment of RCA    CORONARY ANGIOPLASTY  5/19/2010    PTCA DAMON to ostial RCA patent LAD and RCA BMS  restenosis of RCA    CORONARY ANGIOPLASTY WITH STENT PLACEMENT      times 3    DIAGNOSTIC CARDIAC CATH LAB PROCEDURE  2/25/2010    Dr. Gilles Lyons: Cardiac cath with angioplasty follow up    [address] CATH LAB PROCEDURE  3/11/2010    Cardiac cath and stent placement    DIAGNOSTIC CARDIAC CATH LAB PROCEDURE  5/19/2010    Cardiac cath heart lab and subsequent angiopilsaty    FRACTURE SURGERY      tight wrist    SHOULDER ARTHROSCOPY  08 25 2011    left shoulder arthroscopy ,acromioplasty,busectomy, release of adhesions    UPPER GASTROINTESTINAL ENDOSCOPY N/A 2/19/2021    EGD BIOPSY performed by Andrew Hitchcock DO at [facility] ENDOSCOPY       Family History:  Family History   Problem Relation Age of Onset    Heart Attack Mother 68    Heart Attack Father 72    Cancer Sister 79        pancreatic       Social History:  Social History     Socioeconomic History    Marital status: Single     Spouse name: Not on file    Number of children: Not on file    Years of education: Not on file    Highest education level: Not on file   Occupational History    Not on file   Tobacco Use    Smoking status: Never Smoker    Smokeless tobacco: Never Used   Vaping Use    Vaping Use: Never used   Substance and Sexual Activity    Alcohol use: Never     Alcohol/week: 0.0 standard drinks     Comment: No caffeine    Drug use: Never    Sexual activity: Not on file   Other Topics Concern    Not on file   Social History Narrative    Denies caffeine. Social Determinants of Health     Financial Resource Strain:     Difficulty of Paying Living Expenses:    Food Insecurity:     Worried About Running Out of Food in the Last Year:     [address] in the Last Year:    Transportation Needs:     Lack of Transportation (Medical):  Lack of Transportation (Non-Medical):    Physical Activity:     Days of Exercise per Week:     Minutes of Exercise per Session:    Stress:     Feeling of Stress :    Social Connections:     Frequency of Communication with Friends and Family:     Frequency of Social Gatherings with Friends and Family:     Attends [employer] Services:     Active Member of Clubs or Organizations:     Attends Club or Organization Meetings:     Marital Status:    Intimate Partner Violence:     Fear of Current or Ex-Partner:     Emotionally Abused:     Physically Abused:     Sexually Abused: Allergies: Allergies   Allergen Reactions    Aspirin      Caused bleeding ulcers    Lipitor      Caused elevated liver enzymes       Current Medications:  Current Outpatient Medications   Medication Sig Dispense Refill    clopidogrel (PLAVIX) 75 MG tablet Take 75 mg by mouth daily      glimepiride (AMARYL) 2 MG tablet Take 1 tablet by mouth daily (with breakfast) 30 tablet 3    lisinopril (PRINIVIL;ZESTRIL) 30 MG tablet Take 1 tablet by mouth daily 30 tablet 3    pantoprazole (PROTONIX) 40 MG tablet Take 1 tablet by mouth 2 times daily (before meals) 60 tablet 1    carvedilol (COREG) 25 MG tablet Take 1 tablet by mouth 2 times daily (with meals) 60 tablet 3    butalbital-acetaminophen-caffeine (FIORICET, ESGIC) -40 MG per tablet Take 1 tablet by mouth every 4 hours as needed for Migraine      ezetimibe-simvastatin (VYTORIN) 10-40 MG per tablet Take 1 tablet by mouth nightly. No current facility-administered medications for this visit. Physical Exam:  BP 98/60   Pulse 71   Resp 18   Ht 4' 10\" (1.473 m)   Wt 75 lb (34 kg)   SpO2 97%   BMI 15.68 kg/m²   Wt Readings from Last 3 Encounters:   08/04/21 75 lb (34 kg)   02/18/21 78 lb (35.4 kg)   10/13/20 78 lb (35.4 kg)     The patient is awake, alert and in no discomfort or distress. She appears frail and cachectic. No gross musculoskeletal deformity is present. No significant skin or nail changes are present. Gross examination of head, eyes, nose and throat are negative. Jugular venous pressure is normal and no carotid bruits are present. Normal respiratory effort is noted with no accessory muscle usage present. Lung fields are clear to ascultation. Cardiac examination is notable for a regular rate and rhythm with no palpable thrill. No gallop rhythm or cardiac murmur are identified. A benign abdominal examination is present with no masses or organomegaly. Intact pulses are present throughout all extremities and no peripheral edema is present. No focal neurologic deficits are present. Laboratory Tests:  Lab Results   Component Value Date    CREATININE 0.4 (L) 02/21/2021    BUN 9 02/21/2021     02/21/2021    K 3.0 (L) 02/21/2021     02/21/2021    CO2 23 02/21/2021     No results found for: BNP  Lab Results   Component Value Date    WBC 10.7 02/21/2021    RBC 3.28 02/21/2021    HGB 9.1 02/21/2021    HCT 30.2 02/21/2021    MCV 92.1 02/21/2021    MCH 27.7 02/21/2021    MCHC 30.1 02/21/2021    RDW 14.1 02/21/2021     02/21/2021    MPV 9.7 02/21/2021     No results for input(s): ALKPHOS, ALT, AST, PROT, BILITOT, BILIDIR, LABALBU in the last 72 hours.   Lab Results   Component Value Date    MG 2.1 05/29/2019     Lab Results   Component Value Date    PROTIME 12.8 05/29/2019    INR 1.1 05/29/2019     No results found for: TSH  No components found for: CHLPL  Lab Results   Component Value Date    TRIG 179 (H) 02/19/2021    TRIG 265 (H) 05/25/2019     Lab Results   Component Value Date    HDL 34 02/19/2021    HDL 41 05/25/2019     Lab Results   Component Value Date    LDLCALC 58 02/19/2021    [address] 05/25/2019       Cardiac Tests:  ECG: A resting electrocardiogram demonstrates evidence of sinus rhythm with nonspecific ST changes      ASSESSMENT / PLAN: On a clinical basis, the patient appears compensated from a cardiovascular standpoint with her above-referenced symptoms unlikely to be that of a cardiovascular origin. Presently, I have maintained her existing medical regimen and based on her risk profile and the duration since her most recent objective assessment, have recommended larger on a prognostic basis the performance of a gated vasodilator myocardial perfusion imaging study. Provide additional recommendations as appropriate following completion review and have additionally recommended ongoing aggressive risk factor modification of blood pressure, diabetes and serum lipids and attempt to reduce risk of future atherosclerotic development. Unless dictated by the findings of perfusion imaging, I plan to continue annual cardiovascular reassessment would happily reevaluate her in the interim should additional cardiovascular difficulties or concerns arise. The patient's current medication list, allergies, problem list and results of all previously ordered testing were reviewed at today's visit. Follow-up office visit in 1 year      Note: This report was completed using computerized voice recognition software. Every effort has been made to ensure accuracy, however; inadvertent computerized transcription errors may be present. Vance Wood.  Leland Sauceda, [address] [facility]    An electronic copy of this follow-up progress note was forwarded to Dr. He Koehler

## 2021-08-04 NOTE — PROGRESS NOTES
OUTPATIENT CARDIOLOGY CONSULT    Name: Joelle Morejon    Age: [de-identified] y.o. Date of Service: 8/4/2021    Reason for Consultation: ***    Referring Physician: ***    History of Present Illness: ***      Review of Systems: The remainder of a complete multisystem review including consitutional, central nervous, respiratory, circulatory, gastrointestinal, genitourinary, endocrinologic, hematologic, musculoskeletal and psychiatric are negative.     Past Medical History:  Past Medical History:   Diagnosis Date    Arthritis     Back problem     CAD (coronary artery disease) 2010    PTCA with DAMON to prox LAD, PTCA DAMON to ostial lesion of RCA and BMS to prox lesion RCA DAMON to RCA ostial     Cerebral artery occlusion with cerebral infarction ([facility] [address] 05/2019    Hyperlipidemia     Hypertension     Nausea & vomiting     Reflux     RLD (ruptured lumbar disc)     Scoliosis     Spinal stenosis     Type 2 diabetes mellitus without complication, without long-term current use of insulin ([facility] [address] 10/14/2019       Past Surgical History:  Past Surgical History:   Procedure Laterality Date    CATARACT REMOVAL      bilateral    CORONARY ANGIOPLASTY  2/25/2010    PTCA with DAMON in the proximal left anterior descending    CORONARY ANGIOPLASTY  3/11/2010    PTCA and deployment of 2 stents in the ostium and the proximal segment of RCA    CORONARY ANGIOPLASTY  5/19/2010    PTCA DAMON to ostial RCA patent LAD and RCA BMS  restenosis of RCA    CORONARY ANGIOPLASTY WITH STENT PLACEMENT      times 3    DIAGNOSTIC CARDIAC CATH LAB PROCEDURE  2/25/2010    Dr. Kolton Blood: Cardiac cath with angioplasty follow up     DIAGNOSTIC CARDIAC CATH LAB PROCEDURE  3/11/2010    Cardiac cath and stent placement    DIAGNOSTIC CARDIAC CATH LAB PROCEDURE  5/19/2010    Cardiac cath heart lab and subsequent angiopilsaty    FRACTURE SURGERY      tight wrist    SHOULDER ARTHROSCOPY  08 25 2011    left shoulder arthroscopy ,acromioplasty,busectomy, release of adhesions    UPPER GASTROINTESTINAL ENDOSCOPY N/A 2/19/2021    EGD BIOPSY performed by Zahraa Ibarra DO at [facility] ENDOSCOPY       Family History:  Family History   Problem Relation Age of Onset    Heart Attack Mother 68    Heart Attack Father 72    Cancer Sister 79        pancreatic       Social History:  Social History     Socioeconomic History    Marital status: Single     Spouse name: Not on file    Number of children: Not on file    Years of education: Not on file    Highest education level: Not on file   Occupational History    Not on file   Tobacco Use    Smoking status: Never Smoker    Smokeless tobacco: Never Used   Vaping Use    Vaping Use: Never used   Substance and Sexual Activity    Alcohol use: Never     Alcohol/week: 0.0 standard drinks     Comment: No caffeine    Drug use: Never    Sexual activity: Not on file   Other Topics Concern    Not on file   Social History Narrative    Denies caffeine. Social Determinants of Health     Financial Resource Strain:     Difficulty of Paying Living Expenses:    Food Insecurity:     Worried About Running Out of Food in the Last Year:     [address] in the Last Year:    Transportation Needs:     Lack of Transportation (Medical):  Lack of Transportation (Non-Medical):    Physical Activity:     Days of Exercise per Week:     Minutes of Exercise per Session:    Stress:     Feeling of Stress :    Social Connections:     Frequency of Communication with Friends and Family:     Frequency of Social Gatherings with Friends and Family:     Attends [employer] Services:     Active Member of Clubs or Organizations:     Attends Club or Organization Meetings:     Marital Status:    Intimate Partner Violence:     Fear of Current or Ex-Partner:     Emotionally Abused:     Physically Abused:     Sexually Abused: Allergies:   Allergies   Allergen Reactions    Aspirin      Caused bleeding ulcers    Lipitor      Caused elevated liver enzymes       Current Medications:  Current Outpatient Medications   Medication Sig Dispense Refill    clopidogrel (PLAVIX) 75 MG tablet Take 75 mg by mouth daily      glimepiride (AMARYL) 2 MG tablet Take 1 tablet by mouth daily (with breakfast) 30 tablet 3    lisinopril (PRINIVIL;ZESTRIL) 30 MG tablet Take 1 tablet by mouth daily 30 tablet 3    pantoprazole (PROTONIX) 40 MG tablet Take 1 tablet by mouth 2 times daily (before meals) 60 tablet 1    carvedilol (COREG) 25 MG tablet Take 1 tablet by mouth 2 times daily (with meals) 60 tablet 3    butalbital-acetaminophen-caffeine (FIORICET, ESGIC) -40 MG per tablet Take 1 tablet by mouth every 4 hours as needed for Migraine      ezetimibe-simvastatin (VYTORIN) 10-40 MG per tablet Take 1 tablet by mouth nightly. No current facility-administered medications for this visit. Physical Exam:  BP 98/60   Pulse 71   Resp 18   Ht 4' 10\" (1.473 m)   Wt 75 lb (34 kg)   SpO2 97%   BMI 15.68 kg/m²   Wt Readings from Last 3 Encounters:   08/04/21 75 lb (34 kg)   02/18/21 78 lb (35.4 kg)   10/13/20 78 lb (35.4 kg)     The patient is awake, alert and in no discomfort or distress. No gross musculoskeletal deformity or lymphadenopathy are present. No significant skin or nail changes are present. Gross examination of head, eyes, nose and throat are negative. Jugular venous pressure is normal and no carotid bruits are present. No thyromegaly is noted. Normal respiratory effort is noted with no accessory muscle usage present. Lung fields are clear to ascultation. Cardiac examination is notable for a regular rate and rhythm with no palpable thrill. No gallop rhythm or cardiac murmur are identified. A benign abdominal examination is present with no masses or organomegaly. A normal abdominal aortic pulsation is present. Intact pulses are present throughout all extremities and no peripheral edema is present.  No focal neurologic deficits are present. Laboratory Tests:  Lab Results   Component Value Date    CREATININE 0.4 (L) 02/21/2021    BUN 9 02/21/2021     02/21/2021    K 3.0 (L) 02/21/2021     02/21/2021    CO2 23 02/21/2021     No results found for: BNP  Lab Results   Component Value Date    WBC 10.7 02/21/2021    RBC 3.28 02/21/2021    HGB 9.1 02/21/2021    HCT 30.2 02/21/2021    MCV 92.1 02/21/2021    MCH 27.7 02/21/2021    MCHC 30.1 02/21/2021    RDW 14.1 02/21/2021     02/21/2021    MPV 9.7 02/21/2021     No results for input(s): ALKPHOS, ALT, AST, PROT, BILITOT, BILIDIR, LABALBU in the last 72 hours. Lab Results   Component Value Date    MG 2.1 05/29/2019     Lab Results   Component Value Date    PROTIME 12.8 05/29/2019    INR 1.1 05/29/2019     No results found for: TSH  No components found for: CHLPL  Lab Results   Component Value Date    TRIG 179 (H) 02/19/2021    TRIG 265 (H) 05/25/2019     Lab Results   Component Value Date    HDL 34 02/19/2021    HDL 41 05/25/2019     Lab Results   Component Value Date    LDLCALC 58 02/19/2021    LDLCALC 79 05/25/2019       Cardiac Tests:  ECG: A resting electrocardiogram demonstrates evidence of sinus rhythm with nonspecific ST changes  Chest X-ray: ***  Last Echocardiogram: ***  Last stress test:  ***  Last cardiac catheterization: ***    ASSESSMENT / PLAN: ***      Follow-up office visit in {NUMBER:91323} {Time; units week/month/year w plurals:06972}. Thank you for allowing me to participate in your patient's care. Please feel free to contact me if you have any questions or concerns. Note: This report was completed utilizing a computerized voice recognition software. Every effort has been made to insure accuracy, however; inadvertent computerized transcription errors may be present. Bobby Bardales.  [facility], [address] [place] [address]    An electronic copy of this consult note was forwarded to Dr. Moran Tacna     ***

## 2021-08-17 ENCOUNTER — TELEPHONE (OUTPATIENT)
Dept: CARDIOLOGY | Age: 81
End: 2021-08-17

## 2021-08-17 NOTE — TELEPHONE ENCOUNTER
Spoke with patient reminder of appointment on 8/19/21 at 10:30 for a Pharmacological stress test, instructions and COVID checklist reviewed patient confirmed appointment.

## 2021-08-19 ENCOUNTER — HOSPITAL ENCOUNTER (OUTPATIENT)
Dept: CARDIOLOGY | Age: 81
Discharge: HOME | End: 2021-08-19
Payer: MEDICARE

## 2021-08-19 VITALS
WEIGHT: 75 LBS | HEART RATE: 74 BPM | RESPIRATION RATE: 20 BRPM | DIASTOLIC BLOOD PRESSURE: 68 MMHG | BODY MASS INDEX: 15.74 KG/M2 | HEIGHT: 58 IN | SYSTOLIC BLOOD PRESSURE: 188 MMHG

## 2021-08-19 DIAGNOSIS — I25.10 CORONARY ARTERY DISEASE INVOLVING NATIVE CORONARY ARTERY OF NATIVE HEART WITHOUT ANGINA PECTORIS: ICD-10-CM

## 2021-08-19 DIAGNOSIS — R07.89 ATYPICAL CHEST PAIN: ICD-10-CM

## 2021-08-19 PROCEDURE — 93017 CV STRESS TEST TRACING ONLY: CPT

## 2021-08-19 PROCEDURE — 2580000003 HC RX 258: Performed by: INTERNAL MEDICINE

## 2021-08-19 PROCEDURE — A9502 TC99M TETROFOSMIN: HCPCS | Performed by: INTERNAL MEDICINE

## 2021-08-19 PROCEDURE — 3430000000 HC RX DIAGNOSTIC RADIOPHARMACEUTICAL: Performed by: INTERNAL MEDICINE

## 2021-08-19 PROCEDURE — 6360000002 HC RX W HCPCS: Performed by: INTERNAL MEDICINE

## 2021-08-19 PROCEDURE — 99999 PR OFFICE/OUTPT VISIT,PROCEDURE ONLY: CPT | Performed by: INTERNAL MEDICINE

## 2021-08-19 PROCEDURE — 78452 HT MUSCLE IMAGE SPECT MULT: CPT

## 2021-08-19 RX ORDER — NITROGLYCERIN 0.4 MG/1
0.4 TABLET SUBLINGUAL EVERY 5 MIN PRN
Status: DISCONTINUED | OUTPATIENT
Start: 2021-08-19 | End: 2021-08-20 | Stop reason: HOSPADM

## 2021-08-19 RX ORDER — SODIUM CHLORIDE 0.9 % (FLUSH) 0.9 %
10 SYRINGE (ML) INJECTION PRN
Status: DISCONTINUED | OUTPATIENT
Start: 2021-08-19 | End: 2021-08-20 | Stop reason: HOSPADM

## 2021-08-19 RX ADMIN — TETROFOSMIN 10.6 MILLICURIE: 0.23 INJECTION, POWDER, LYOPHILIZED, FOR SOLUTION INTRAVENOUS at 10:34

## 2021-08-19 RX ADMIN — SODIUM CHLORIDE, PRESERVATIVE FREE 10 ML: 5 INJECTION INTRAVENOUS at 11:50

## 2021-08-19 RX ADMIN — TETROFOSMIN 35.4 MILLICURIE: 0.23 INJECTION, POWDER, LYOPHILIZED, FOR SOLUTION INTRAVENOUS at 11:51

## 2021-08-19 RX ADMIN — REGADENOSON 0.4 MG: 0.08 INJECTION, SOLUTION INTRAVENOUS at 11:51

## 2021-08-19 RX ADMIN — SODIUM CHLORIDE, PRESERVATIVE FREE 10 ML: 5 INJECTION INTRAVENOUS at 11:52

## 2021-08-19 RX ADMIN — SODIUM CHLORIDE, PRESERVATIVE FREE 10 ML: 5 INJECTION INTRAVENOUS at 10:34

## 2021-08-19 NOTE — PROCEDURES
[address] [facility], [facility]. [address]  [phone]                 Pharmacologic Stress Nuclear Gated SPECT Study    Name: Meenu Petty Account Number: [de-identified]    :  1940          Sex: female         Date of Study:  2021    Height: 4' 10\" (147.3 cm)         Weight: 75 lb (34 kg)     Ordering Provider: Marlon Rhoades          PCP: Luis Mancilla DO      Cardiologist: Marlon Rhoades             Interpreting Physician: Quentin Castro MD  _________________________________________________________________________________    Indication:   Evaluation of extent and severity of coronary artery disease    Clinical History:   Patient has prior history of coronary artery disease. Resting ECG:    CT int 154m sec, QRS int 80m sec, QT int 414m sec; HR 74 bpm  SR with non specific ST changes    Procedure:   Pharmacologic stress testing was performed with regadenoson 0.4 mg for 15 seconds. The heart rate was 74 at baseline and candido to 118 beats during the infusion. The blood pressure at baseline was 188/68 and blood pressure at the end of infusion was 158/68. Blood pressure response was normal during the stress procedure. The patient experienced pain across the chest after the Lexiscan infusion that lasted up to 30 minutes and for which she was given one SL NTG. There were no ischemic ECG changes noted while the patient was experiencing the CP . ECG during the infusion did not change. IMAGING: Myocardial perfusion imaging was performed at rest 30-35 minutes following the intravenous injection of 10.6 mCi of (Tc-tetrofosmin) followed by 10 ml of Normal Saline. As per infusion protocol, the patient was injected intravenously with 35.4 mCi of (Tc-tetrofosmin) followed by 10 ml of Normal Saline. Gated post-stress tomographic imaging was performed 45 minutes after stress.      FINDINGS: The overall quality of the

## 2021-08-20 ENCOUNTER — TELEPHONE (OUTPATIENT)
Dept: CARDIOLOGY CLINIC | Age: 81
End: 2021-08-20

## 2021-08-20 NOTE — TELEPHONE ENCOUNTER
----- Message from Cherelle Iqbal MD sent at 8/20/2021 12:19 PM EDT -----  Please notify that stress test is normal, continue as directed and follow up as scheduled    Spoke to pt re above

## 2022-02-06 ENCOUNTER — APPOINTMENT (OUTPATIENT)
Dept: GENERAL RADIOLOGY | Age: 82
DRG: 552 | End: 2022-02-06
Payer: COMMERCIAL

## 2022-02-06 ENCOUNTER — APPOINTMENT (OUTPATIENT)
Dept: CT IMAGING | Age: 82
DRG: 552 | End: 2022-02-06
Payer: COMMERCIAL

## 2022-02-06 ENCOUNTER — HOSPITAL ENCOUNTER (INPATIENT)
Age: 82
LOS: 7 days | Discharge: NURSING, REHAB OR LONG-TERM CARE | DRG: 552 | End: 2022-02-15
Attending: EMERGENCY MEDICINE | Admitting: FAMILY MEDICINE
Payer: COMMERCIAL

## 2022-02-06 DIAGNOSIS — R16.0 LIVER MASS: ICD-10-CM

## 2022-02-06 DIAGNOSIS — M54.32 SCIATICA OF LEFT SIDE: ICD-10-CM

## 2022-02-06 DIAGNOSIS — M54.9 INTRACTABLE BACK PAIN: ICD-10-CM

## 2022-02-06 DIAGNOSIS — M51.26 HERNIATED NUCLEUS PULPOSUS, L4-5: Primary | ICD-10-CM

## 2022-02-06 DIAGNOSIS — R26.2 UNABLE TO AMBULATE: ICD-10-CM

## 2022-02-06 PROCEDURE — 73502 X-RAY EXAM HIP UNI 2-3 VIEWS: CPT

## 2022-02-06 PROCEDURE — 99283 EMERGENCY DEPT VISIT LOW MDM: CPT

## 2022-02-06 PROCEDURE — 74176 CT ABD & PELVIS W/O CONTRAST: CPT

## 2022-02-06 PROCEDURE — 6370000000 HC RX 637 (ALT 250 FOR IP): Performed by: STUDENT IN AN ORGANIZED HEALTH CARE EDUCATION/TRAINING PROGRAM

## 2022-02-06 PROCEDURE — 96374 THER/PROPH/DIAG INJ IV PUSH: CPT

## 2022-02-06 RX ORDER — FENTANYL CITRATE 50 UG/ML
50 INJECTION, SOLUTION INTRAMUSCULAR; INTRAVENOUS ONCE
Status: DISCONTINUED | OUTPATIENT
Start: 2022-02-06 | End: 2022-02-06

## 2022-02-06 RX ORDER — HYDROCODONE BITARTRATE AND ACETAMINOPHEN 5; 325 MG/1; MG/1
1 TABLET ORAL ONCE
Status: COMPLETED | OUTPATIENT
Start: 2022-02-06 | End: 2022-02-06

## 2022-02-06 RX ADMIN — HYDROCODONE BITARTRATE AND ACETAMINOPHEN 1 TABLET: 5; 325 TABLET ORAL at 22:30

## 2022-02-06 ASSESSMENT — ENCOUNTER SYMPTOMS
EYE DISCHARGE: 0
SINUS PRESSURE: 0
SORE THROAT: 0
VOMITING: 0
DIARRHEA: 0
WHEEZING: 0
NAUSEA: 0
EYE PAIN: 0
SHORTNESS OF BREATH: 0
ABDOMINAL DISTENTION: 0
BACK PAIN: 1
COUGH: 0

## 2022-02-06 ASSESSMENT — PAIN SCALES - GENERAL
PAINLEVEL_OUTOF10: 10
PAINLEVEL_OUTOF10: 10

## 2022-02-07 ENCOUNTER — APPOINTMENT (OUTPATIENT)
Dept: MRI IMAGING | Age: 82
DRG: 552 | End: 2022-02-07
Payer: COMMERCIAL

## 2022-02-07 PROBLEM — K21.9 GASTROESOPHAGEAL REFLUX DISEASE WITHOUT ESOPHAGITIS: Status: ACTIVE | Noted: 2022-02-07

## 2022-02-07 PROBLEM — M54.32 SCIATICA OF LEFT SIDE: Status: ACTIVE | Noted: 2022-02-07

## 2022-02-07 PROBLEM — R52 INTRACTABLE PAIN: Status: ACTIVE | Noted: 2022-02-07

## 2022-02-07 LAB
ALBUMIN SERPL-MCNC: 4.4 G/DL (ref 3.5–5.2)
ALP BLD-CCNC: 93 U/L (ref 35–104)
ALT SERPL-CCNC: 16 U/L (ref 0–32)
ANION GAP SERPL CALCULATED.3IONS-SCNC: 11 MMOL/L (ref 7–16)
AST SERPL-CCNC: 30 U/L (ref 0–31)
BACTERIA: ABNORMAL /HPF
BASOPHILS ABSOLUTE: 0.01 E9/L (ref 0–0.2)
BASOPHILS RELATIVE PERCENT: 0.1 % (ref 0–2)
BILIRUB SERPL-MCNC: 0.3 MG/DL (ref 0–1.2)
BILIRUBIN DIRECT: <0.2 MG/DL (ref 0–0.3)
BILIRUBIN URINE: NEGATIVE
BILIRUBIN, INDIRECT: NORMAL MG/DL (ref 0–1)
BLOOD, URINE: ABNORMAL
BUN BLDV-MCNC: 19 MG/DL (ref 6–23)
BURR CELLS: ABNORMAL
CALCIUM SERPL-MCNC: 9.7 MG/DL (ref 8.6–10.2)
CHLORIDE BLD-SCNC: 101 MMOL/L (ref 98–107)
CLARITY: CLEAR
CO2: 23 MMOL/L (ref 22–29)
COLOR: YELLOW
CREAT SERPL-MCNC: 0.5 MG/DL (ref 0.5–1)
EOSINOPHILS ABSOLUTE: 0.22 E9/L (ref 0.05–0.5)
EOSINOPHILS RELATIVE PERCENT: 1.5 % (ref 0–6)
EPITHELIAL CELLS, UA: ABNORMAL /HPF
GFR AFRICAN AMERICAN: >60
GFR NON-AFRICAN AMERICAN: >60 ML/MIN/1.73
GLUCOSE BLD-MCNC: 150 MG/DL (ref 74–99)
GLUCOSE URINE: 250 MG/DL
HCT VFR BLD CALC: 40.6 % (ref 34–48)
HEMOGLOBIN: 13.3 G/DL (ref 11.5–15.5)
IMMATURE GRANULOCYTES #: 0.09 E9/L
IMMATURE GRANULOCYTES %: 0.6 % (ref 0–5)
KETONES, URINE: NEGATIVE MG/DL
LEUKOCYTE ESTERASE, URINE: NEGATIVE
LYMPHOCYTES ABSOLUTE: 2.46 E9/L (ref 1.5–4)
LYMPHOCYTES RELATIVE PERCENT: 16.8 % (ref 20–42)
MCH RBC QN AUTO: 31.9 PG (ref 26–35)
MCHC RBC AUTO-ENTMCNC: 32.8 % (ref 32–34.5)
MCV RBC AUTO: 97.4 FL (ref 80–99.9)
METER GLUCOSE: 136 MG/DL (ref 74–99)
METER GLUCOSE: 155 MG/DL (ref 74–99)
METER GLUCOSE: 169 MG/DL (ref 74–99)
METER GLUCOSE: 179 MG/DL (ref 74–99)
METER GLUCOSE: 187 MG/DL (ref 74–99)
MONOCYTES ABSOLUTE: 3.49 E9/L (ref 0.1–0.95)
MONOCYTES RELATIVE PERCENT: 23.8 % (ref 2–12)
NEUTROPHILS ABSOLUTE: 8.41 E9/L (ref 1.8–7.3)
NEUTROPHILS RELATIVE PERCENT: 57.2 % (ref 43–80)
NITRITE, URINE: NEGATIVE
OVALOCYTES: ABNORMAL
PDW BLD-RTO: 11.9 FL (ref 11.5–15)
PH UA: 7 (ref 5–9)
PLATELET # BLD: 195 E9/L (ref 130–450)
PMV BLD AUTO: 9.3 FL (ref 7–12)
POIKILOCYTES: ABNORMAL
POTASSIUM REFLEX MAGNESIUM: 4 MMOL/L (ref 3.5–5)
PROTEIN UA: NEGATIVE MG/DL
RBC # BLD: 4.17 E12/L (ref 3.5–5.5)
RBC UA: ABNORMAL /HPF (ref 0–2)
SODIUM BLD-SCNC: 135 MMOL/L (ref 132–146)
SPECIFIC GRAVITY UA: 1.01 (ref 1–1.03)
TEAR DROP CELLS: ABNORMAL
TOTAL PROTEIN: 7.5 G/DL (ref 6.4–8.3)
UROBILINOGEN, URINE: 0.2 E.U./DL
WBC # BLD: 14.7 E9/L (ref 4.5–11.5)
WBC UA: ABNORMAL /HPF (ref 0–5)

## 2022-02-07 PROCEDURE — 80076 HEPATIC FUNCTION PANEL: CPT

## 2022-02-07 PROCEDURE — G0378 HOSPITAL OBSERVATION PER HR: HCPCS

## 2022-02-07 PROCEDURE — 72148 MRI LUMBAR SPINE W/O DYE: CPT

## 2022-02-07 PROCEDURE — 93005 ELECTROCARDIOGRAM TRACING: CPT | Performed by: STUDENT IN AN ORGANIZED HEALTH CARE EDUCATION/TRAINING PROGRAM

## 2022-02-07 PROCEDURE — 36415 COLL VENOUS BLD VENIPUNCTURE: CPT

## 2022-02-07 PROCEDURE — 85025 COMPLETE CBC W/AUTO DIFF WBC: CPT

## 2022-02-07 PROCEDURE — 97161 PT EVAL LOW COMPLEX 20 MIN: CPT | Performed by: PHYSICAL THERAPIST

## 2022-02-07 PROCEDURE — 6360000002 HC RX W HCPCS: Performed by: FAMILY MEDICINE

## 2022-02-07 PROCEDURE — 97165 OT EVAL LOW COMPLEX 30 MIN: CPT

## 2022-02-07 PROCEDURE — 6360000002 HC RX W HCPCS: Performed by: STUDENT IN AN ORGANIZED HEALTH CARE EDUCATION/TRAINING PROGRAM

## 2022-02-07 PROCEDURE — 99218 PR INITIAL OBSERVATION CARE/DAY 30 MINUTES: CPT | Performed by: FAMILY MEDICINE

## 2022-02-07 PROCEDURE — 97530 THERAPEUTIC ACTIVITIES: CPT

## 2022-02-07 PROCEDURE — 81001 URINALYSIS AUTO W/SCOPE: CPT

## 2022-02-07 PROCEDURE — 83036 HEMOGLOBIN GLYCOSYLATED A1C: CPT

## 2022-02-07 PROCEDURE — 87088 URINE BACTERIA CULTURE: CPT

## 2022-02-07 PROCEDURE — 6370000000 HC RX 637 (ALT 250 FOR IP): Performed by: NURSE PRACTITIONER

## 2022-02-07 PROCEDURE — 82962 GLUCOSE BLOOD TEST: CPT

## 2022-02-07 PROCEDURE — 6370000000 HC RX 637 (ALT 250 FOR IP): Performed by: FAMILY MEDICINE

## 2022-02-07 PROCEDURE — 80048 BASIC METABOLIC PNL TOTAL CA: CPT

## 2022-02-07 PROCEDURE — 96374 THER/PROPH/DIAG INJ IV PUSH: CPT

## 2022-02-07 RX ORDER — HYDROCODONE BITARTRATE AND ACETAMINOPHEN 5; 325 MG/1; MG/1
1 TABLET ORAL EVERY 4 HOURS PRN
Status: DISCONTINUED | OUTPATIENT
Start: 2022-02-07 | End: 2022-02-15 | Stop reason: HOSPADM

## 2022-02-07 RX ORDER — METHYLPREDNISOLONE 4 MG/1
8 TABLET ORAL NIGHTLY
Status: DISCONTINUED | OUTPATIENT
Start: 2022-02-07 | End: 2022-02-07 | Stop reason: CLARIF

## 2022-02-07 RX ORDER — METHYLPREDNISOLONE 4 MG/1
4 TABLET ORAL NIGHTLY
Status: DISCONTINUED | OUTPATIENT
Start: 2022-02-09 | End: 2022-02-07 | Stop reason: CLARIF

## 2022-02-07 RX ORDER — METHYLPREDNISOLONE 4 MG/1
4 TABLET ORAL NIGHTLY
Status: COMPLETED | OUTPATIENT
Start: 2022-02-09 | End: 2022-02-11

## 2022-02-07 RX ORDER — METHYLPREDNISOLONE 4 MG/1
24 TABLET ORAL ONCE
Status: DISCONTINUED | OUTPATIENT
Start: 2022-02-07 | End: 2022-02-07 | Stop reason: SDUPTHER

## 2022-02-07 RX ORDER — METHYLPREDNISOLONE 4 MG/1
8 TABLET ORAL NIGHTLY
Status: COMPLETED | OUTPATIENT
Start: 2022-02-08 | End: 2022-02-08

## 2022-02-07 RX ORDER — METHYLPREDNISOLONE 4 MG/1
4 TABLET ORAL
Status: DISCONTINUED | OUTPATIENT
Start: 2022-02-07 | End: 2022-02-07 | Stop reason: CLARIF

## 2022-02-07 RX ORDER — DEXTROSE MONOHYDRATE 50 MG/ML
100 INJECTION, SOLUTION INTRAVENOUS PRN
Status: DISCONTINUED | OUTPATIENT
Start: 2022-02-07 | End: 2022-02-15 | Stop reason: HOSPADM

## 2022-02-07 RX ORDER — CARVEDILOL 25 MG/1
25 TABLET ORAL 2 TIMES DAILY WITH MEALS
Status: DISCONTINUED | OUTPATIENT
Start: 2022-02-07 | End: 2022-02-15 | Stop reason: HOSPADM

## 2022-02-07 RX ORDER — PANTOPRAZOLE SODIUM 40 MG/1
40 TABLET, DELAYED RELEASE ORAL
Status: DISCONTINUED | OUTPATIENT
Start: 2022-02-07 | End: 2022-02-13

## 2022-02-07 RX ORDER — METHYLPREDNISOLONE 4 MG/1
4 TABLET ORAL
Status: COMPLETED | OUTPATIENT
Start: 2022-02-08 | End: 2022-02-10

## 2022-02-07 RX ORDER — METHYLPREDNISOLONE 4 MG/1
4 TABLET ORAL
Status: COMPLETED | OUTPATIENT
Start: 2022-02-08 | End: 2022-02-12

## 2022-02-07 RX ORDER — METHYLPREDNISOLONE 4 MG/1
24 TABLET ORAL ONCE
Status: COMPLETED | OUTPATIENT
Start: 2022-02-07 | End: 2022-02-07

## 2022-02-07 RX ORDER — CLOPIDOGREL BISULFATE 75 MG/1
75 TABLET ORAL DAILY
Status: DISCONTINUED | OUTPATIENT
Start: 2022-02-07 | End: 2022-02-14

## 2022-02-07 RX ORDER — MORPHINE SULFATE 2 MG/ML
2 INJECTION, SOLUTION INTRAMUSCULAR; INTRAVENOUS ONCE
Status: COMPLETED | OUTPATIENT
Start: 2022-02-07 | End: 2022-02-07

## 2022-02-07 RX ORDER — EZETIMIBE AND SIMVASTATIN 10; 40 MG/1; MG/1
1 TABLET ORAL NIGHTLY
Status: DISCONTINUED | OUTPATIENT
Start: 2022-02-07 | End: 2022-02-08

## 2022-02-07 RX ORDER — SIMVASTATIN 20 MG
20 TABLET ORAL NIGHTLY
Status: DISCONTINUED | OUTPATIENT
Start: 2022-02-07 | End: 2022-02-07 | Stop reason: CLARIF

## 2022-02-07 RX ORDER — GLIPIZIDE 5 MG/1
5 TABLET ORAL
Status: DISCONTINUED | OUTPATIENT
Start: 2022-02-07 | End: 2022-02-15 | Stop reason: HOSPADM

## 2022-02-07 RX ORDER — DEXTROSE MONOHYDRATE 25 G/50ML
12.5 INJECTION, SOLUTION INTRAVENOUS PRN
Status: DISCONTINUED | OUTPATIENT
Start: 2022-02-07 | End: 2022-02-07 | Stop reason: ALTCHOICE

## 2022-02-07 RX ORDER — NICOTINE POLACRILEX 4 MG
15 LOZENGE BUCCAL PRN
Status: DISCONTINUED | OUTPATIENT
Start: 2022-02-07 | End: 2022-02-15 | Stop reason: HOSPADM

## 2022-02-07 RX ORDER — METHYLPREDNISOLONE 4 MG/1
4 TABLET ORAL
Status: COMPLETED | OUTPATIENT
Start: 2022-02-08 | End: 2022-02-09

## 2022-02-07 RX ORDER — DEXTROSE MONOHYDRATE 25 G/50ML
12.5 INJECTION, SOLUTION INTRAVENOUS PRN
Status: DISCONTINUED | OUTPATIENT
Start: 2022-02-07 | End: 2022-02-07 | Stop reason: SDUPTHER

## 2022-02-07 RX ORDER — SENNA AND DOCUSATE SODIUM 50; 8.6 MG/1; MG/1
1 TABLET, FILM COATED ORAL 2 TIMES DAILY
Status: DISCONTINUED | OUTPATIENT
Start: 2022-02-07 | End: 2022-02-15 | Stop reason: HOSPADM

## 2022-02-07 RX ORDER — ACETAMINOPHEN 325 MG/1
650 TABLET ORAL EVERY 4 HOURS PRN
Status: DISCONTINUED | OUTPATIENT
Start: 2022-02-07 | End: 2022-02-15 | Stop reason: HOSPADM

## 2022-02-07 RX ORDER — NICOTINE POLACRILEX 4 MG
15 LOZENGE BUCCAL PRN
Status: DISCONTINUED | OUTPATIENT
Start: 2022-02-07 | End: 2022-02-07 | Stop reason: SDUPTHER

## 2022-02-07 RX ORDER — DEXTROSE MONOHYDRATE 50 MG/ML
100 INJECTION, SOLUTION INTRAVENOUS PRN
Status: DISCONTINUED | OUTPATIENT
Start: 2022-02-07 | End: 2022-02-07 | Stop reason: SDUPTHER

## 2022-02-07 RX ADMIN — METHYLPREDNISOLONE 24 MG: 4 TABLET ORAL at 08:00

## 2022-02-07 RX ADMIN — MORPHINE SULFATE 2 MG: 2 INJECTION, SOLUTION INTRAMUSCULAR; INTRAVENOUS at 01:09

## 2022-02-07 RX ADMIN — INSULIN LISPRO 1 UNITS: 100 INJECTION, SOLUTION INTRAVENOUS; SUBCUTANEOUS at 17:00

## 2022-02-07 RX ADMIN — CARVEDILOL 25 MG: 25 TABLET, FILM COATED ORAL at 07:55

## 2022-02-07 RX ADMIN — CLOPIDOGREL BISULFATE 75 MG: 75 TABLET ORAL at 07:55

## 2022-02-07 RX ADMIN — SENNOSIDES AND DOCUSATE SODIUM 1 TABLET: 50; 8.6 TABLET ORAL at 21:40

## 2022-02-07 RX ADMIN — HYDROCODONE BITARTRATE AND ACETAMINOPHEN 1 TABLET: 5; 325 TABLET ORAL at 13:03

## 2022-02-07 RX ADMIN — HYDROCODONE BITARTRATE AND ACETAMINOPHEN 1 TABLET: 5; 325 TABLET ORAL at 07:56

## 2022-02-07 RX ADMIN — GLIPIZIDE 5 MG: 5 TABLET ORAL at 06:44

## 2022-02-07 RX ADMIN — PANTOPRAZOLE SODIUM 40 MG: 40 TABLET, DELAYED RELEASE ORAL at 06:44

## 2022-02-07 RX ADMIN — LISINOPRIL 30 MG: 20 TABLET ORAL at 07:55

## 2022-02-07 RX ADMIN — CARVEDILOL 25 MG: 25 TABLET, FILM COATED ORAL at 16:59

## 2022-02-07 RX ADMIN — INSULIN LISPRO 1 UNITS: 100 INJECTION, SOLUTION INTRAVENOUS; SUBCUTANEOUS at 12:58

## 2022-02-07 RX ADMIN — PANTOPRAZOLE SODIUM 40 MG: 40 TABLET, DELAYED RELEASE ORAL at 15:57

## 2022-02-07 RX ADMIN — SENNOSIDES AND DOCUSATE SODIUM 1 TABLET: 50; 8.6 TABLET ORAL at 07:54

## 2022-02-07 RX ADMIN — HYDROCODONE BITARTRATE AND ACETAMINOPHEN 1 TABLET: 5; 325 TABLET ORAL at 03:56

## 2022-02-07 RX ADMIN — HYDROCODONE BITARTRATE AND ACETAMINOPHEN 1 TABLET: 5; 325 TABLET ORAL at 17:08

## 2022-02-07 ASSESSMENT — PAIN SCALES - GENERAL
PAINLEVEL_OUTOF10: 8
PAINLEVEL_OUTOF10: 10
PAINLEVEL_OUTOF10: 9
PAINLEVEL_OUTOF10: 9
PAINLEVEL_OUTOF10: 8
PAINLEVEL_OUTOF10: 7
PAINLEVEL_OUTOF10: 7
PAINLEVEL_OUTOF10: 8
PAINLEVEL_OUTOF10: 6

## 2022-02-07 ASSESSMENT — PAIN DESCRIPTION - ORIENTATION: ORIENTATION: LEFT;LOWER

## 2022-02-07 ASSESSMENT — PAIN DESCRIPTION - ONSET: ONSET: ON-GOING

## 2022-02-07 ASSESSMENT — PAIN DESCRIPTION - DIRECTION: RADIATING_TOWARDS: DOWN LEFT LEG

## 2022-02-07 ASSESSMENT — PAIN DESCRIPTION - PAIN TYPE: TYPE: ACUTE PAIN

## 2022-02-07 ASSESSMENT — PAIN DESCRIPTION - PROGRESSION: CLINICAL_PROGRESSION: NOT CHANGED

## 2022-02-07 ASSESSMENT — PAIN DESCRIPTION - DESCRIPTORS: DESCRIPTORS: ACHING;CONSTANT;DISCOMFORT

## 2022-02-07 ASSESSMENT — PAIN DESCRIPTION - FREQUENCY: FREQUENCY: CONTINUOUS

## 2022-02-07 ASSESSMENT — PAIN - FUNCTIONAL ASSESSMENT: PAIN_FUNCTIONAL_ASSESSMENT: PREVENTS OR INTERFERES SOME ACTIVE ACTIVITIES AND ADLS

## 2022-02-07 NOTE — ED NOTES
Pt was able to ambulate with assistance from me and sister. Pain was decreased from when she originally came in.       Bharat Garcia RN  02/07/22 [id number]

## 2022-02-07 NOTE — PROGRESS NOTES
Physical Therapy Initial Evaluation/Plan of Care    Room #:  2541/6576-35  Patient Name: Jessica Rios  YOB: 1940  MRN: [id number]    Date of Service: 2/7/2022     Tentative placement recommendation: Subacute rehab  Equipment recommendation: To be determined      Evaluating Physical Therapist: Sheron Wagner PT #[id number]      Specific Provider Orders/Date/Referring Provider :  02/07/22 0315   PT eval and treat Start: 02/07/22 0315, End: 02/07/22 0315, ONE TIME, Standing Count: 1 Occurrences, R    Lupe Oswald DO      Admitting Diagnosis:   Liver mass [R16.0]  Herniated nucleus pulposus, L4-5 [M51.26]  Unable to ambulate [R26.2]  Intractable pain [R52]  Intractable back pain [M54.9]     for back pain, history of back trouble in the past, she states her lower back is hurting, rating her left leg,     Surgery: none  Visit Diagnoses       Codes    Herniated nucleus pulposus, L4-5    -  Primary M51.26    Intractable back pain     M54.9    Unable to ambulate     R26.2    Liver mass     R16.0          Patient Active Problem List   Diagnosis    Coronary artery disease involving native coronary artery of native heart without angina pectoris    Scoliosis    Hypertension    Hyperlipidemia    Type 2 diabetes mellitus without complication, without long-term current use of insulin (HCC)    Atrial septal aneurysm    GI bleed    Intractable pain    Sciatica of left side    Gastroesophageal reflux disease without esophagitis        ASSESSMENT of Current Deficits Patient exhibits decreased strength, balance, endurance and pain back  impairing functional mobility, transfers, gait , gait distance and tolerance to activity education/performance of log roll, increased pain with activity, subsides/diminishes with rest. Patient requires continued skilled physical therapy to address concerns listed above for increased safety and function at discharge.          PHYSICAL THERAPY  PLAN OF CARE       Physical therapy plan of care is established based on physician order,  patient diagnosis and clinical assessment    Current Treatment Recommendations:    -Bed Mobility: Lower extremity exercises  and Trunk control activities   -Sitting Balance: Facilitate active trunk muscle engagement , Facilitate postural control in all planes  and Engage in core activities to allow for movement within base of support   -Standing Balance: Perform strengthening exercises in standing to promote motor control with or without upper extremity support   -Transfers: Provide instruction on proper hand and foot position for adequate transfer of weight onto lower extremities and use of gait device if needed and Cues for hand placement, technique and safety. Provide stabilization to prevent fall   -Gait: Gait training, Standing activities to improve: base of support, weight shift, weight bearing  and Pregait training to emphasize: Sequencing , Device control, Upright and Safety   -Endurance: Utilize Supervised activities to increase level of endurance to allow for safe functional mobility including transfers and gait     PT long term treatment goals are located in below grid    Patient and or family understand(s) diagnosis, prognosis, and plan of care. Frequency of treatments: Patient will be seen  3-5 times/week.          Prior Level of Function: Patient ambulated with wheeled walker generally used cane when able to get out, has been been little while since been out due to weather  Rehab Potential: fair  + for baseline    Past medical history:   Past Medical History:   Diagnosis Date    Acute CVA (cerebrovascular accident) ([facility] [address] 5/25/2019    Arthritis     Back problem     CAD (coronary artery disease) 2010    PTCA with DAMON to prox LAD, PTCA DAMON to ostial lesion of RCA and BMS to prox lesion RCA DAMON to RCA ostial     Cerebral artery occlusion with cerebral infarction ([facility] [address] 05/2019    Hyperlipidemia     Hypertension     Nausea & vomiting     Reflux     RLD (ruptured lumbar disc)     Scoliosis     Spinal stenosis     Type 2 diabetes mellitus without complication, without long-term current use of insulin ([facility] [address] 10/14/2019     Past Surgical History:   Procedure Laterality Date    CATARACT REMOVAL      bilateral    CORONARY ANGIOPLASTY  2/25/2010    PTCA with DAMON in the proximal left anterior descending    CORONARY ANGIOPLASTY  3/11/2010    PTCA and deployment of 2 stents in the ostium and the proximal segment of RCA    CORONARY ANGIOPLASTY  5/19/2010    PTCA DAMON to ostial RCA patent LAD and RCA BMS  restenosis of RCA    CORONARY ANGIOPLASTY WITH STENT PLACEMENT      times 3    DIAGNOSTIC CARDIAC CATH LAB PROCEDURE  2/25/2010    Dr. Gasper Hale: Cardiac cath with angioplasty follow up    [address] CATH LAB PROCEDURE  3/11/2010    Cardiac cath and stent placement    DIAGNOSTIC CARDIAC CATH LAB PROCEDURE  5/19/2010    Cardiac cath heart lab and subsequent angiopilsaty    FRACTURE SURGERY      tight wrist    SHOULDER ARTHROSCOPY  08 25 2011    left shoulder arthroscopy ,acromioplasty,busectomy, release of adhesions    UPPER GASTROINTESTINAL ENDOSCOPY N/A 2/19/2021    EGD BIOPSY performed by Madie Penaloza DO at [address]:    Precautions:  Up with assistance, falls and spinal precautions ,    Social history: Patient lives alone in a apartment    with 3 steps  to enter bilateral Rail  Tub shower grab bars    Equipment owned: [address], [address] [facility] [address] chair and Quad cane,       AM-PAC Basic Mobility        AM-PAC Mobility Inpatient   How much difficulty turning over in bed?: A Lot  How much difficulty sitting down on / standing up from a chair with arms?: A Lot  How much difficulty moving from lying on back to sitting on side of bed?: A Lot  How much help from another person moving to and from a bed to a chair?: A Lot  How much help from another person needed to walk in hospital room?: A Little  How much help from another person for climbing 3-5 steps with a railing?: A Lot  AM-PAC Inpatient Mobility Raw Score : 13  AM-PAC Inpatient T-Scale Score : 36.74  Mobility Inpatient CMS 0-100% Score: 64.91  Mobility Inpatient CMS G-Code Modifier : CL    Nursing cleared patient for PT evaluation. The admitting diagnosis and active problem list as listed above have been reviewed prior to the initiation of this evaluation. OBJECTIVE;   Initial Evaluation  Date: 2/7/2022 Treatment Date:     Short Term/ Long Term   Goals   Was pt agreeable to Eval/treatment? Yes  To be met in 3 days   Pain level   9/10  Back pain right buttocks, left leg     Bed Mobility    Rolling: Moderate assist of 1    Supine to sit: Moderate assist of 1    Sit to supine: Not assessed     Scooting: Moderate assist of 1    Rolling: Minimal assist of 1    Supine to sit: Minimal assist of 1    Sit to supine: Minimal assist of 1    Scooting: Minimal assist of 1     Transfers Sit to stand:  Moderate assist of 1 Cues for hand placement and safety   Sit to stand: Minimal assist of 1     Ambulation    2 x 20 feet using  wheeled walker with Minimal assist of 1   for walker control, balance and upright and cues for sequencing and safety   50 feet using  wheeled walker with Supervision     Stair negotiation: ascended and descended   Not assessed          ROM Within functional limits    Increase range of motion 10% of affected joints    Strength BUE:  refer to OT eval  RLE:  3/5  LLE:  3/5  Increase strength in affected mm groups by 1/3 grade   Balance Sitting EOB:  fair    Dynamic Standing:  fair wheeled walker   Sitting EOB:  good    Dynamic Standing: good -wheeled walker      Patient is Alert & Oriented x person, place, time and situation and follows directions    Sensation:  Patient  denies numbness/tingling   Edema:  no   Endurance: fair       Vitals: room air   Blood Pressure at rest  Blood Pressure during session    Heart Rate at rest   Heart Rate during session     SPO2 at rest  %  SPO2 during session  %     Patient education  Patient educated on role of Physical Therapy, risks of immobility, safety and plan of care,  importance of mobility while in hospital  and spinal precautions     Patient response to education:   Pt verbalized understanding Pt demonstrated skill Pt requires further education in this area   Yes Partial Yes      Treatment:  Patient practiced and was instructed/facilitated in the following treatment: Patient instructed/performed log roll, assisted to edge of bed,   Sat edge of bed 10 minutes with Supervision  to increase dynamic sitting balance and activity tolerance. ambulated in room, assisted up to chair. Therapeutic Exercises:  ankle pumps, heel raises, glut sets and long arc quad  x 15 reps. At end of session, patient in chair with   call light and phone within reach,  all lines and tubes intact, nursing notified. Patient would benefit from continued skilled Physical Therapy to improve functional independence and quality of life. Patient's/ family goals   get rid of pain    Time in  0835  Time out  0855    Total Treatment Time  0 minutes    Evaluation time includes thorough review of current medical information, gathering information on past medical history/social history and prior level of function, completion of standardized testing/informal observation of tasks, assessment of data, and development of Plan of care and goals.      CPT codes:  Low Complexity PT evaluation (28984)  No treatment    Sana Michele, PT

## 2022-02-07 NOTE — ED PROVIDER NOTES
80-year-old female presented emerged part for back pain, history of back trouble in the past, she states her lower back is hurting, rating her left leg, denies any fecal incontinence, urinary incontinence, urinary retention, fecal retention, denies any saddle anesthesia, denies any trauma to her back, denies any history of cancer, denies any IV drug use back pain 10-10 severity, gradual onset, began 2 days ago, worsening with time, nothing makes it better, she is unable to ambulate due to the pain in her back, pain is severe in severity. Review of Systems   Constitutional: Negative for chills and fever. HENT: Negative for ear pain, sinus pressure and sore throat. Eyes: Negative for pain and discharge. Respiratory: Negative for cough, shortness of breath and wheezing. Cardiovascular: Negative for chest pain. Gastrointestinal: Negative for abdominal distention, diarrhea, nausea and vomiting. Genitourinary: Negative for dysuria and frequency. Musculoskeletal: Positive for back pain. Negative for arthralgias. Skin: Negative for rash and wound. Neurological: Negative for weakness and headaches. Hematological: Negative for adenopathy. All other systems reviewed and are negative. Physical Exam  Vitals and nursing note reviewed. Constitutional:       Appearance: Normal appearance. HENT:      Head: Normocephalic and atraumatic. Right Ear: External ear normal.      Left Ear: External ear normal.      Nose: Nose normal.      Mouth/Throat:      Mouth: Mucous membranes are moist.   Eyes:      Extraocular Movements: Extraocular movements intact. Pupils: Pupils are equal, round, and reactive to light. Cardiovascular:      Rate and Rhythm: Normal rate and regular rhythm. Pulses: Normal pulses. Heart sounds: Normal heart sounds. Pulmonary:      Effort: Pulmonary effort is normal. No respiratory distress. Breath sounds: Normal breath sounds. No stridor.  No wheezing. Abdominal:      General: Abdomen is flat. Bowel sounds are normal. There is no distension. Palpations: Abdomen is soft. There is no mass. Tenderness: There is no abdominal tenderness. Musculoskeletal:         General: Normal range of motion. Cervical back: Normal range of motion and neck supple. Skin:     General: Skin is warm and dry. Neurological:      General: No focal deficit present. Mental Status: She is alert and oriented to person, place, and time. Cranial Nerves: No cranial nerve deficit. Sensory: No sensory deficit. Motor: Weakness (Weakness bilaterally with extension of the hips, more so on the left compared to the right) present. Procedures     [facility]     ED Course as of 02/07/22 0218 Mon Feb 07, 2022 0024 L4-L5 disc bulge is present on CT consistent with patient's history of sciatica, will ambulate patient to see if she is able to tolerate doing so. [JG]   [id number] Patient remains with intractable back pain, was able to ambulate with some assistance, still with significant pain, patient lives at home alone, does not have someone else to take care of her, she is agreeable for admission and possible placement [JG]   0105 Dr. Sherryle Drew agreed for admission. Called back later, states that the patient is not in network for him, patient was admitted to hospitalist instead. [JG]      ED Course User Index  [JG] Andrés Wong MD      ED Course as of 02/07/22 0218 Mon Feb 07, 2022 0024 L4-L5 disc bulge is present on CT consistent with patient's history of sciatica, will ambulate patient to see if she is able to tolerate doing so. [JG]   [id number] Patient remains with intractable back pain, was able to ambulate with some assistance, still with significant pain, patient lives at home alone, does not have someone else to take care of her, she is agreeable for admission and possible placement [JG]   0105 Dr. Sherryle Drew agreed for admission.   Called back later, states onset: 79) in her sister; Heart Attack (age of onset: 72) in her father; Heart Attack (age of onset: 68) in her mother. The patients home medications have been reviewed.     Allergies: Aspirin and Lipitor    -------------------------------------------------- RESULTS -------------------------------------------------    LABS:  Results for orders placed or performed during the hospital encounter of 02/06/22   CBC Auto Differential   Result Value Ref Range    WBC 14.7 (H) 4.5 - 11.5 E9/L    RBC 4.17 3.50 - 5.50 E12/L    Hemoglobin 13.3 11.5 - 15.5 g/dL    Hematocrit 40.6 34.0 - 48.0 %    MCV 97.4 80.0 - 99.9 fL    MCH 31.9 26.0 - 35.0 pg    MCHC 32.8 32.0 - 34.5 %    RDW 11.9 11.5 - 15.0 fL    Platelets 133 740 - 978 E9/L    MPV 9.3 7.0 - 12.0 fL    Neutrophils % 57.2 43.0 - 80.0 %    Immature Granulocytes % 0.6 0.0 - 5.0 %    Lymphocytes % 16.8 (L) 20.0 - 42.0 %    Monocytes % 23.8 (H) 2.0 - 12.0 %    Eosinophils % 1.5 0.0 - 6.0 %    Basophils % 0.1 0.0 - 2.0 %    Neutrophils Absolute 8.41 (H) 1.80 - 7.30 E9/L    Immature Granulocytes # 0.09 E9/L    Lymphocytes Absolute 2.46 1.50 - 4.00 E9/L    Monocytes Absolute 3.49 (H) 0.10 - 0.95 E9/L    Eosinophils Absolute 0.22 0.05 - 0.50 E9/L    Basophils Absolute 0.01 0.00 - 0.20 E9/L    Poikilocytes 1+     Windsor Cells 1+     Ovalocytes 1+     Tear Drop Cells 1+    Basic Metabolic Panel w/ Reflex to MG   Result Value Ref Range    Sodium 135 132 - 146 mmol/L    Potassium reflex Magnesium 4.0 3.5 - 5.0 mmol/L    Chloride 101 98 - 107 mmol/L    CO2 23 22 - 29 mmol/L    Anion Gap 11 7 - 16 mmol/L    Glucose 150 (H) 74 - 99 mg/dL    BUN 19 6 - 23 mg/dL    CREATININE 0.5 0.5 - 1.0 mg/dL    GFR Non-African American >60 >=60 mL/min/1.73    GFR African American >60     Calcium 9.7 8.6 - 10.2 mg/dL   Hepatic Function Panel   Result Value Ref Range    Total Protein 7.5 6.4 - 8.3 g/dL    Albumin 4.4 3.5 - 5.2 g/dL    Alkaline Phosphatase 93 35 - 104 U/L    ALT 16 0 - 32 U/L    AST 30 0 - 31 U/L    Total Bilirubin 0.3 0.0 - 1.2 mg/dL    Bilirubin, Direct <0.2 0.0 - 0.3 mg/dL    Bilirubin, Indirect see below 0.0 - 1.0 mg/dL       RADIOLOGY:  CT ABDOMEN PELVIS WO CONTRAST Additional Contrast? None   Final Result   No acute finding in the abdomen and pelvis on this unenhanced study. A slightly hypodense lesion in the left lobe of the liver, which may   represent an [address]. Dedicated MRI is recommended to confirm and rule out other   possibilities such as malignant neoplasms. Distal colonic diverticulosis. No acute diverticulitis. XR HIP 2-3 VW W PELVIS LEFT   Final Result   No evidence of an acute fracture of the pelvis or left hip. EKG:  This EKG is signed and interpreted by me. Rate: 96  Rhythm: Sinus  Interpretation: no acute changes  Comparison: was normal and stable as compared to patient's most recent EKG      ------------------------- NURSING NOTES AND VITALS REVIEWED ---------------------------  Date / Time Roomed:  2/6/2022 10:08 PM  ED Bed Assignment:  [id number]/0313-02    The nursing notes within the ED encounter and vital signs as below have been reviewed. Patient Vitals for the past 24 hrs:   BP Temp Pulse Resp SpO2   02/07/22 0202 (!) 125/39 -- 79 15 99 %   02/06/22 2207 (!) 163/57 -- 91 18 97 %   02/06/22 2156 -- 98.9 °F (37.2 °C) -- -- 99 %       Oxygen Saturation Interpretation: Normal    ------------------------------------------ PROGRESS NOTES ------------------------------------------    Counseling:  I have spoken with the patient and discussed todays results, in addition to providing specific details for the plan of care and counseling regarding the diagnosis and prognosis. Their questions are answered at this time and they are agreeable with the plan of admission.    --------------------------------- ADDITIONAL PROVIDER NOTES ---------------------------------  Consultations:  Time: 0105. Spoke with Dr. Jair Nielsen. Discussed case.   They will admit the patient. This patient's ED course included: a personal history and physicial examination, re-evaluation prior to disposition, multiple bedside re-evaluations and IV medications    This patient has remained hemodynamically stable during their ED course. Diagnosis:  1. Herniated nucleus pulposus, L4-5    2. Intractable back pain    3. Unable to ambulate    4. Liver mass        Disposition:  Patient's disposition: Admit to med/surg floor  Patient's condition is stable.              Dianna Romberg, MD  Resident  02/07/22 0306

## 2022-02-07 NOTE — H&P
[address] [place], [place] [address]ist Group   History and Physical      CHIEF COMPLAINT:  Back pain    History of Present Illness:  80 y.o. female with a history of HTN, CAD s/p PCI with stents, CVA, DM type 2, HLD presents with back pain. States it initially started 2/5 and gradually worsened, located left buttock and radiating down posterior left leg but does not extend beyond the knee. Has never had pain like this before, her normal back pain is higher up her back. Takes prescription pain medication at home but can't remember what it is called. Reports pain is slightly better with her knees bent towards her chest, unable to fully straighten left leg due to severe pain. Negative right leg straight raise. Reports that she was unable to walk at home even with her walker due to severe pain, and she would like to go to nursing facility for therapy. Denies change in appetite or weight, states she has always been very thin. Informant(s) for H&P: patient    REVIEW OF SYSTEMS:  no fevers, chills, cp, sob, n/v, ha, vision/hearing changes, wt changes, hot/cold flashes, other open skin lesions, diarrhea, constipation, dysuria/hematuria unless noted in HPI. Complete ROS performed with the patient and is otherwise negative.       PMH:  Past Medical History:   Diagnosis Date    Arthritis     Back problem     CAD (coronary artery disease) 2010    PTCA with DAMON to prox LAD, PTCA DAMON to ostial lesion of RCA and BMS to prox lesion RCA DAMON to RCA ostial     Cerebral artery occlusion with cerebral infarction ([address] 05/2019    Hyperlipidemia     Hypertension     Nausea & vomiting     Reflux     RLD (ruptured lumbar disc)     Scoliosis     Spinal stenosis     Type 2 diabetes mellitus without complication, without long-term current use of insulin ([address] 10/14/2019       Surgical History:  Past Surgical History:   Procedure Laterality Date    CATARACT REMOVAL      bilateral    CORONARY ANGIOPLASTY  2/25/2010 PTCA with DAMON in the proximal left anterior descending    CORONARY ANGIOPLASTY  3/11/2010    PTCA and deployment of 2 stents in the ostium and the proximal segment of RCA    CORONARY ANGIOPLASTY  5/19/2010    PTCA DAMON to ostial RCA patent LAD and RCA BMS  restenosis of RCA    CORONARY ANGIOPLASTY WITH STENT PLACEMENT      times 3    DIAGNOSTIC CARDIAC CATH LAB PROCEDURE  2/25/2010    Dr. Jeaneth Oliveros: Cardiac cath with angioplasty follow up    [address] CATH LAB PROCEDURE  3/11/2010    Cardiac cath and stent placement    DIAGNOSTIC CARDIAC CATH LAB PROCEDURE  5/19/2010    Cardiac cath heart lab and subsequent angiopilsaty    FRACTURE SURGERY      tight wrist    SHOULDER ARTHROSCOPY  08 25 2011    left shoulder arthroscopy ,acromioplasty,busectomy, release of adhesions    UPPER GASTROINTESTINAL ENDOSCOPY N/A 2/19/2021    EGD BIOPSY performed by Maria C Shankar DO at [facility]       Medications Prior to Admission:    Prior to Admission medications    Medication Sig Start Date End Date Taking?  Authorizing Provider   clopidogrel (PLAVIX) 75 MG tablet Take 75 mg by mouth daily     Historical Provider, MD   glimepiride (AMARYL) 2 MG tablet Take 1 tablet by mouth daily (with breakfast) 2/22/21   Kulwinder Wheatley DO   lisinopril (PRINIVIL;ZESTRIL) 30 MG tablet Take 1 tablet by mouth daily 2/22/21   Kulwinder Wheatley DO   pantoprazole (PROTONIX) 40 MG tablet Take 1 tablet by mouth 2 times daily (before meals) 2/21/21   Kulwinder Wheatley DO   carvedilol (COREG) 25 MG tablet Take 1 tablet by mouth 2 times daily (with meals) 6/7/19   Jasmin Gomez MD   butalbital-acetaminophen-caffeine (FIORICET, ESGIC) -40 MG per tablet Take 1 tablet by mouth every 4 hours as needed for Migraine    Historical Provider, MD   ezetimibe-simvastatin (VYTORIN) 10-40 MG per tablet Take 1 tablet by mouth nightly Indications: patient no longer takes     Historical Provider, MD       Allergies:    Aspirin and Lipitor    Social History:    reports that she has never smoked. She has never used smokeless tobacco. She reports that she does not drink alcohol and does not use drugs. Family History:   family history includes Cancer (age of onset: 79) in her sister; Heart Attack (age of onset: 72) in her father; Heart Attack (age of onset: 68) in her mother. PHYSICAL EXAM:  Vitals:  BP (!) 125/39   Pulse 79   Temp 98.9 °F (37.2 °C)   Resp 15   SpO2 99%     Constitutional:  Elderly, thin, awake, alert, appears uncomfortable  Eyes: no scleral icterus, normal lids, no discharge  ENMT:  Normocephalic, atraumatic, mucosa moist, EOMI, does not have temporal muscle wasting  Neck:  trachea midline, no JVD  Lungs:  CTA bilaterally, no audible rhonchi or wheezes noted, respirations unlabored, no retractions  Heart[de-identified]  RRR, distant heart tones, no murmur, rub, or gallop noted during exam  Abd:  Soft, non tender, non distended, bowel sounds present  :  deferred  MSK: sarcopenia present, grimaces while moving around the bed  Ext:  Moving all extremities, no edema, pulses present, resists extension of left leg  Skin:  Warm and dry, no rashes on visible skin  Psych: non-anxious affect  Neuro:  PERRL, Alert, grossly nonfocal; following commands    LABS:  Recent Labs     02/07/22  0100      K 4.0      CO2 23   BUN 19   CREATININE 0.5   GLUCOSE 150*   CALCIUM 9.7       Recent Labs     02/07/22  0100   WBC 14.7*   RBC 4.17   HGB 13.3   HCT 40.6   MCV 97.4   MCH 31.9   MCHC 32.8   RDW 11.9      MPV 9.3       No results for input(s): POCGLU in the last 72 hours. Radiology: CT ABDOMEN PELVIS WO CONTRAST Additional Contrast? None    Result Date: 2/7/2022  EXAMINATION: CT OF THE ABDOMEN AND PELVIS WITHOUT CONTRAST 2/7/2022 12:05 am TECHNIQUE: CT of the abdomen and pelvis was performed without the administration of intravenous contrast. Multiplanar reformatted images are provided for review.  Dose modulation, iterative reconstruction, and/or weight based adjustment of the mA/kV was utilized to reduce the radiation dose to as low as reasonably achievable. COMPARISON: None. HISTORY: ORDERING SYSTEM PROVIDED HISTORY: lower lumbar pain, l sided sciatica TECHNOLOGIST PROVIDED HISTORY: Reason for exam:->lower lumbar pain, l sided sciatica Additional Contrast?->None Decision Support Exception - unselect if not a suspected or confirmed emergency medical condition->Emergency Medical Condition (MA) FINDINGS: THE STUDY LIMITED DUE TO LACK OF INTRAVENOUS CONTRAST. Lower Chest: Moderate to large-sized hiatal hernia. Mild bibasilar dependent atelectasis. Organs: An approximately 3.9 x 3.6 cm slightly hypodense lesion in the left lobe of the liver, which may represent an [address] but is incompletely evaluated on the current study. Unremarkable spleen, pancreas, adrenals, and bilateral kidneys on this unenhanced study. No radiopaque urolithiasis or hydroureteronephrosis on either side. No definite cholelithiasis. GI/Bowel: Normal appendix. Moderate to large amount of retained stool in the colon. Bowel loops nonobstructed. Distal colonic diverticulosis. No acute diverticulitis Pelvis: No adnexal mass. Grossly unremarkable urinary bladder. Peritoneum/Retroperitoneum: No free air or free fluid. No adenopathy. Vascular calcification with no abdominal aortic aneurysm. Bones/Soft Tissues: Multilevel lumbar spondylosis with levoscoliosis. Posterior disc bulges are seen in the lumbar spine at multiple levels, most prominent at L4-L5. Dedicated MRI of the lumbar spine may be obtained if clinically indicated. No acute finding in the abdomen and pelvis on this unenhanced study. A slightly hypodense lesion in the left lobe of the liver, which may represent an [address]. Dedicated MRI is recommended to confirm and rule out other possibilities such as malignant neoplasms. Distal colonic diverticulosis. No acute diverticulitis.      XR HIP 2-3 VW W PELVIS LEFT    Result Date: 2/6/2022  EXAMINATION: ONE XRAY VIEW OF THE PELVIS AND TWO XRAY VIEWS LEFT HIP 2/6/2022 11:05 pm COMPARISON: None. HISTORY: ORDERING SYSTEM PROVIDED HISTORY: r/o fx TECHNOLOGIST PROVIDED HISTORY: Reason for exam:->r/o fx FINDINGS: No evidence of an acute fracture of the pelvis or left hip. There no significant degenerative changes in the hips. Sacroiliac joints and pubic symphysis are unremarkable. There are moderate degenerative changes in lower lumbar spine. No evidence of an acute fracture of the pelvis or left hip. ASSESSMENT:      Active Problems:    Coronary artery disease involving native coronary artery of native heart without angina pectoris    Hypertension    Hyperlipidemia    Type 2 diabetes mellitus without complication, without long-term current use of insulin (HCC)    Intractable pain    Sciatica of left side    Gastroesophageal reflux disease without esophagitis  Resolved Problems:    * No resolved hospital problems. *      PLAN:    1. Sciatica. Medrol dose stefania. Pain control. 2. Inability to ambulate. PT/OT. SW for assistance with placement. 3. HTN. Continue home beta blocker/ace-I.  4. CAD. HLD. Continue home statin/aspirin. 5. DM type 2. Continue home medications. Steroids likely to increase hyperglycemia. 6. GERD. Continue home PPI. Code Status: Full code  DVT prophylaxis: Lovenox    NOTE: This report was transcribed using voice recognition software. Every effort was made to ensure accuracy; however, inadvertent computerized transcription errors may be present.      Electronically signed by Davy Dan DO on 2/7/2022 at 2:11 AM

## 2022-02-07 NOTE — PROGRESS NOTES
[address] [facility]  [facility] [place]. [place]        [id number]                                                  Patient Name: Gee Bingham    MRN: [id number]    : 1940    Room: [address] [place], [place] [address]      Evaluating OT: Eliana Fried OTR/L; [id number]     Referring Provider and Specific Provider Orders/Date:      22  OT eval and treat  Start:  22,   End:  22,   ONE TIME,   Standing Count:  1 Occurrences,   R         Lindsay Bernal, DO     Placement Recommendation: Subacute        Diagnosis:   1. Herniated nucleus pulposus, L4-5    2. Intractable back pain    3. Unable to ambulate    4.  Liver mass         Surgery: none       Pertinent Medical History:       Past Medical History:   Diagnosis Date    Acute CVA (cerebrovascular accident) ([address] 2019    Arthritis     Back problem     CAD (coronary artery disease)     PTCA with DAMON to prox LAD, PTCA DAMON to ostial lesion of RCA and BMS to prox lesion RCA DAMON to RCA ostial     Cerebral artery occlusion with cerebral infarction ([address] 2019    Hyperlipidemia     Hypertension     Nausea & vomiting     Reflux     RLD (ruptured lumbar disc)     Scoliosis     Spinal stenosis     Type 2 diabetes mellitus without complication, without long-term current use of insulin ([address] 10/14/2019         Past Surgical History:   Procedure Laterality Date    CATARACT REMOVAL      bilateral    CORONARY ANGIOPLASTY  2010    PTCA with DAMON in the proximal left anterior descending    CORONARY ANGIOPLASTY  3/11/2010    PTCA and deployment of 2 stents in the ostium and the proximal segment of RCA    CORONARY ANGIOPLASTY  2010    PTCA DAMON to ostial RCA patent LAD and RCA BMS  restenosis of RCA    CORONARY ANGIOPLASTY WITH STENT PLACEMENT      times 3    DIAGNOSTIC CARDIAC CATH LAB PROCEDURE  2010    Dr. Frey Res: Cardiac cath with angioplasty follow up    [address] CATH LAB PROCEDURE  3/11/2010    Cardiac cath and stent placement    DIAGNOSTIC CARDIAC CATH LAB PROCEDURE  5/19/2010    Cardiac cath heart lab and subsequent angiopilsaty    FRACTURE SURGERY      tight wrist    SHOULDER ARTHROSCOPY  08 25 2011    left shoulder arthroscopy ,acromioplasty,busectomy, release of adhesions    UPPER GASTROINTESTINAL ENDOSCOPY N/A 2/19/2021    EGD BIOPSY performed by Valeria Arteaga DO at [facility] ENDOSCOPY      Precautions:  Fall Risk, anticipates a liver biopsy, pain in L lateral buttocks that radiates down proximal L LE     Assessment of current deficits:     [x] Functional mobility  [x]ADLs  [x] Strength               []Cognition    [x] Functional transfers   [x] IADLs         [x] Safety Awareness   [x]Endurance    [] Fine Coordination              [x] Balance      [] Vision/perception   []Sensation     []Gross Motor Coordination  [] ROM  [] Delirium                   [] Motor Control     OT PLAN OF CARE   OT POC based on physician orders, patient diagnosis and results of clinical assessment    Frequency/Duration 1-3 days/wk for 2 weeks PRN     Specific OT Treatment Interventions to include:   * Instruction/training on adapted ADL techniques and AE recommendations to increase functional independence within precautions       * Training on energy conservation strategies, correct breathing pattern and techniques to improve independence/tolerance for self-care routine  * Functional transfer/mobility training/DME recommendations for increased independence, safety, and fall prevention  * Patient/Family education to increase follow through with safety techniques and functional independence  * Recommendation of environmental modifications for increased safety with functional transfers/mobility and ADLs  * Splinting/positioning for increased function, prevention of contractures, and improve skin integrity  * Therapeutic exercise to improve motor endurance, ROM, and functional strength for ADLs/functional transfers  * Therapeutic activities to facilitate/challenge dynamic balance, stand tolerance for increased safety and independence with ADLs  * Positioning to improve skin integrity, interaction with environment and functional independence    Recommended Adaptive Equipment: TBD      Home Living: alone; apartment, 1 story, 3 steps to enter with B rails, tub shower. Equipment owned: wheeled walker, quad cane, shower chair, grab bars, hand held shower head    Prior Level of Function: Independent with ADLs , Independent with IADLs; ambulated with quad cane or wheeled walker    Pain Level: 8/10 pain in L side of buttocks and radiates down L proximal LE; Nursing notified.       Cognition: A&O: 4/4; Follows 3 step directions   Memory: good    Sequencing: good    Problem solving: good    Judgement/safety: good     [facility]   AM-PAC Daily Activity Inpatient   How much help for putting on and taking off regular lower body clothing?: Total  How much help for Bathing?: A Lot  How much help for Toileting?: A Lot  How much help for putting on and taking off regular upper body clothing?: A Lot  How much help for taking care of personal grooming?: A Little  How much help for eating meals?: None  AM-PAC Inpatient Daily Activity Raw Score: 14  AM-PAC Inpatient ADL T-Scale Score : 33.39  ADL Inpatient CMS 0-100% Score: 59.67  ADL Inpatient CMS G-Code Modifier : CK     Functional Assessment:    Initial Eval Status  Date: 2/7/22 Treatment Status  Date: STGs = LTGs  Time frame: 10-14 days   Feeding Independent   Independent    Grooming Minimal Assist   Independent    UB Dressing Minimal Assist   Independent    LB Dressing Dependent   Modified [facility]    Bathing Moderate Assist  Modified [facility]    Toileting Moderate Assist   Independent    Bed Mobility  Supine to sit: N/T as pt was up in chair   Sit to supine: N/T as pt was returned to bedside chair   Supine to sit: Independent   Sit to supine: Independent    Functional Transfers Moderate Assist from bedside chair to wheeled walker  Moderate Assist for transfer from standing with wheeled walker to bedside chair  Transfer training with verbal cues for hand placement throughout session to improve safety. Independent    Functional Mobility Minimal assist with wheeled walker to improve balance for household distance (25 feet x 2), verbal cues for walker sequence and safety. Modified [place]    Balance Sitting:     Static: good     Dynamic: fair   Standing: fair  with wheeled walker     Activity Tolerance fair   good    Visual/  Perceptual Glasses: yes                 Hand Dominance: right      AROM (PROM) Strength Additional Info:    RUE  WFL 4/5 good  and wfl FMC/dexterity noted during ADL tasks     LUE WFL 4/5 good  and wfl FMC/dexterity noted during ADL tasks       Hearing: WFL   Sensation:  No c/o numbness or tingling  Tone: WFL   Edema: no     Comments: Upon arrival the patient was seated in bedside chair. At end of session, patient was returned to bedside chair with call light and phone within reach, all lines and tubes intact. Overall patient demonstrated decreased independence and safety during completion of ADL/functional transfer/mobility tasks. Pt would benefit from continued skilled OT to increase safety and independence with completion of ADL/IADL tasks for functional independence and quality of life. Treatment: OT treatment provided this date includes:    Instruction/training on safety and adapted techniques for completion of ADLs    Instruction/training on safe functional mobility/transfer techniques    Instruction/training on energy conservation/work simplification for completion of ADLs    Rehab Potential: Good for established goals.       Patient / Family Goal: go to rehab as she lives home alone       Patient and/or family were instructed on functional diagnosis, prognosis/goals and OT plan of care. Demonstrated good understanding. Eval Complexity: Low    Time In: 9:00am   Time Out: 9:30am    Total Treatment Time: 10      Min Units   OT Eval Low 97165  X  1    OT Eval Medium 54661      OT Eval High 75245      OT Re-Eval [id number]            ADL/Self Care 28667     Therapeutic Activities 97060  10  1    Therapeutic Ex 94733       Orthotic Management 98444       Manual [id number]     Neuro Re-Ed [id number]       Non-Billable Time        Evaluation Time additionally includes thorough review of current medical information, gathering information on past medical history/social history and prior level of function, interpretation of standardized testing/informal observation of tasks, assessment of data and development of plan of care and goals.         Evaluating OT: Miguel Brown OTR/L; [id number]

## 2022-02-07 NOTE — PLAN OF CARE
Problem: Pain:  Goal: Pain level will decrease  Outcome: Met This Shift     Problem: Pain:  Goal: Control of acute pain  Outcome: Met This Shift     Problem: Pain:  Goal: Control of chronic pain  Outcome: Met This Shift     Problem: Falls - Risk of:  Goal: Will remain free from falls  Outcome: Met This Shift     Problem: Falls - Risk of:  Goal: Absence of physical injury  Outcome: Met This Shift     Problem: Skin Integrity:  Goal: Absence of new skin breakdown  Outcome: Met This Shift     Problem: Skin Integrity:  Goal: Will show no infection signs and symptoms  Outcome: Not Met This Shift

## 2022-02-07 NOTE — PROGRESS NOTES
[address] [place], [place] [address]ist   Progress Note    Admitting Date and Time: 2/6/2022 10:08 PM  Admit Dx: Liver mass [R16.0]  Herniated nucleus pulposus, L4-5 [M51.26]  Unable to ambulate [R26.2]  Intractable pain [R52]  Intractable back pain [M54.9]    Subjective:    Patient complains of left lower back/buttock pain. She had been using Norco today almost every 4 hours. She was evaluated by Physical Therapy today and subacute rehab was recommended. Discussed with patient that she is on methylprednisolone and that it will likely make her blood sugars higher. Explained that a Humalog sliding scale would be started while an inpatient but she can resume her oral medication at discharge. ROS: denies fever, chills, cp, sob, n/v, HA unless stated above.      sennosides-docusate sodium  1 tablet Oral BID    carvedilol  25 mg Oral BID WC    clopidogrel  75 mg Oral Daily    [Held by provider] glipiZIDE  5 mg Oral QAM AC    lisinopril  30 mg Oral Daily    pantoprazole  40 mg Oral BID AC    [START ON 2/8/2022] methylPREDNISolone  4 mg Oral QAM AC    [START ON 2/8/2022] methylPREDNISolone  4 mg Oral Lunch    [START ON 2/8/2022] methylPREDNISolone  4 mg Oral Dinner    [START ON 2/8/2022] methylPREDNISolone  8 mg Oral Nightly    [START ON 2/9/2022] methylPREDNISolone  4 mg Oral Nightly    ezetimibe-simvastatin  1 tablet Oral Nightly    insulin lispro  0-6 Units SubCUTAneous TID WC    insulin lispro  0-3 Units SubCUTAneous Nightly     acetaminophen, 650 mg, Q4H PRN  HYDROcodone 5 mg - acetaminophen, 1 tablet, Q4H PRN  dextrose bolus (hypoglycemia), 125 mL, PRN   Or  dextrose bolus (hypoglycemia), 250 mL, PRN  glucose, 15 g, PRN  glucagon (rDNA), 1 mg, PRN  dextrose, 100 mL/hr, PRN         Objective:    /60   Pulse 93   Temp 98.3 °F (36.8 °C) (Oral)   Resp 16   Wt 75 lb (34 kg)   SpO2 94%   Breastfeeding No   BMI 15.68 kg/m²   General Appearance: alert and oriented to person, place and time and in no acute distress, frail  Skin: warm and dry  Head: normocephalic and atraumatic  Eyes: pupils equal, round, and reactive to light, extraocular eye movements intact, conjunctivae normal  Neck: neck supple and non tender without mass   Pulmonary/Chest: clear to auscultation bilaterally- no wheezes, rales or rhonchi, no respiratory distress  Cardiovascular: normal rate, normal S1 and S2   Abdomen: soft, non-tender, non-distended, normal bowel sounds   Extremities: no cyanosis, no clubbing and no edema  Muscoloskeletal:  Left lower back/buttock pain with movement  Neurologic: no tremor and speech normal      Recent Labs     02/07/22  0100      K 4.0      CO2 23   BUN 19   CREATININE 0.5   GLUCOSE 150*   CALCIUM 9.7       Recent Labs     02/07/22 0100   ALKPHOS 93   PROT 7.5   LABALBU 4.4   BILITOT 0.3   AST 30   ALT 16       Recent Labs     02/07/22 0100   WBC 14.7*   RBC 4.17   HGB 13.3   HCT 40.6   MCV 97.4   MCH 31.9   MCHC 32.8   RDW 11.9      MPV 9.3           Radiology:   CT ABDOMEN PELVIS WO CONTRAST Additional Contrast? None   Final Result   No acute finding in the abdomen and pelvis on this unenhanced study. A slightly hypodense lesion in the left lobe of the liver, which may   represent an [address]. Dedicated MRI is recommended to confirm and rule out other   possibilities such as malignant neoplasms. Distal colonic diverticulosis. No acute diverticulitis. XR HIP 2-3 VW W PELVIS LEFT   Final Result   No evidence of an acute fracture of the pelvis or left hip.              Assessment:  Active Problems:    Coronary artery disease involving native coronary artery of native heart without angina pectoris    Hypertension    Hyperlipidemia    Type 2 diabetes mellitus without complication, without long-term current use of insulin (HCC)    Intractable pain    Sciatica of left side    Gastroesophageal reflux disease without esophagitis  Resolved Problems:    * No resolved hospital problems. *      Plan:  1. Right lower back/sciatica pain:  Posterior disc bulges at the lumbar spine, most prominent at L4-L5. On methylprednisolone. MRI ordered to rule out compression fracture. 2. Liver lesion:  Seen on CT of the abdomen and pelvis. Possibly focal nodular hyperplasia. Will need follow up. 3. Diabetes:  Hold home glipizide. Start Humalog sliding scale, blood sugar checks, hypoglycemic protocol. Check Hgb A1C.   4. Hyperlipidemia:  Hold Vytorin. Check lipid panel in am.   5. Hypertension:  Continue Coreg and lisinopril. 6. GERD: Continue PPI. NOTE: This report was transcribed using voice recognition software. Every effort was made to ensure accuracy; however, inadvertent computerized transcription errors may be present.      Electronically signed by RANDY Lin CNP on 2/7/2022 at 12:59 PM

## 2022-02-07 NOTE — CARE COORDINATION
2/7/2022: SS Note/Discharge plan: Observation:  SS Consult noted for d/c planning for \"nursing facility placement\", PT/OT ordered and recommending GINGER placement, met with pt, explained sw role for transition of care planning, pt currently a&o and very pleasant, pt reports living alone and to functioning independently prior to hospitalization, pt reports no past SNF/Rehab placements or [facility] services. Pt does anticipate needing temporary rehab placement, pt's primary insurance listed as \"[employer]\" but pt says she thinks she also has Medicare. SNF list provided and left with pt to review with her family when they visit but pt agreeable to referral being made to [facility] admissions liaison notified and awaiting chart review and acceptance, sw to follow. Electronically signed by ELSA Palmer on 2/7/2022 at 12:51 PM

## 2022-02-08 ENCOUNTER — APPOINTMENT (OUTPATIENT)
Dept: GENERAL RADIOLOGY | Age: 82
DRG: 552 | End: 2022-02-08
Payer: COMMERCIAL

## 2022-02-08 PROBLEM — M54.9 INTRACTABLE BACK PAIN: Status: ACTIVE | Noted: 2022-02-08

## 2022-02-08 LAB
ALBUMIN SERPL-MCNC: 4 G/DL (ref 3.5–5.2)
ALP BLD-CCNC: 83 U/L (ref 35–104)
ALT SERPL-CCNC: 30 U/L (ref 0–32)
ANION GAP SERPL CALCULATED.3IONS-SCNC: 15 MMOL/L (ref 7–16)
AST SERPL-CCNC: 43 U/L (ref 0–31)
BILIRUB SERPL-MCNC: 0.3 MG/DL (ref 0–1.2)
BILIRUBIN DIRECT: <0.2 MG/DL (ref 0–0.3)
BILIRUBIN, INDIRECT: ABNORMAL MG/DL (ref 0–1)
BUN BLDV-MCNC: 17 MG/DL (ref 6–23)
CALCIUM SERPL-MCNC: 9.2 MG/DL (ref 8.6–10.2)
CHLORIDE BLD-SCNC: 101 MMOL/L (ref 98–107)
CHOLESTEROL, TOTAL: 135 MG/DL (ref 0–199)
CO2: 21 MMOL/L (ref 22–29)
CREAT SERPL-MCNC: 0.6 MG/DL (ref 0.5–1)
EKG ATRIAL RATE: 84 BPM
EKG ATRIAL RATE: 96 BPM
EKG P AXIS: 52 DEGREES
EKG P AXIS: 80 DEGREES
EKG P-R INTERVAL: 150 MS
EKG P-R INTERVAL: 152 MS
EKG Q-T INTERVAL: 350 MS
EKG Q-T INTERVAL: 384 MS
EKG QRS DURATION: 68 MS
EKG QRS DURATION: 74 MS
EKG QTC CALCULATION (BAZETT): 442 MS
EKG QTC CALCULATION (BAZETT): 453 MS
EKG R AXIS: 20 DEGREES
EKG R AXIS: 51 DEGREES
EKG T AXIS: 56 DEGREES
EKG T AXIS: 59 DEGREES
EKG VENTRICULAR RATE: 84 BPM
EKG VENTRICULAR RATE: 96 BPM
GFR AFRICAN AMERICAN: >60
GFR NON-AFRICAN AMERICAN: >60 ML/MIN/1.73
GLUCOSE BLD-MCNC: 109 MG/DL (ref 74–99)
HBA1C MFR BLD: 6.5 % (ref 4–5.6)
HCT VFR BLD CALC: 36.2 % (ref 34–48)
HDLC SERPL-MCNC: 62 MG/DL
HEMOGLOBIN: 11.8 G/DL (ref 11.5–15.5)
INR BLD: 1.1
LDL CHOLESTEROL CALCULATED: 52 MG/DL (ref 0–99)
MAGNESIUM: 2 MG/DL (ref 1.6–2.6)
MCH RBC QN AUTO: 31.5 PG (ref 26–35)
MCHC RBC AUTO-ENTMCNC: 32.6 % (ref 32–34.5)
MCV RBC AUTO: 96.5 FL (ref 80–99.9)
METER GLUCOSE: 116 MG/DL (ref 74–99)
METER GLUCOSE: 155 MG/DL (ref 74–99)
METER GLUCOSE: 194 MG/DL (ref 74–99)
METER GLUCOSE: 234 MG/DL (ref 74–99)
PDW BLD-RTO: 11.9 FL (ref 11.5–15)
PHOSPHORUS: 3 MG/DL (ref 2.5–4.5)
PLATELET # BLD: 181 E9/L (ref 130–450)
PMV BLD AUTO: 9.5 FL (ref 7–12)
POTASSIUM SERPL-SCNC: 3.4 MMOL/L (ref 3.5–5)
PROCALCITONIN: 0.12 NG/ML (ref 0–0.08)
PROTHROMBIN TIME: 13.4 SEC (ref 9.3–12.4)
RBC # BLD: 3.75 E12/L (ref 3.5–5.5)
REASON FOR REJECTION: NORMAL
REJECTED TEST: NORMAL
SODIUM BLD-SCNC: 137 MMOL/L (ref 132–146)
TOTAL PROTEIN: 6.9 G/DL (ref 6.4–8.3)
TRIGL SERPL-MCNC: 105 MG/DL (ref 0–149)
TROPONIN, HIGH SENSITIVITY: 12 NG/L (ref 0–9)
VLDLC SERPL CALC-MCNC: 21 MG/DL
WBC # BLD: 12.9 E9/L (ref 4.5–11.5)

## 2022-02-08 PROCEDURE — 93010 ELECTROCARDIOGRAM REPORT: CPT | Performed by: INTERNAL MEDICINE

## 2022-02-08 PROCEDURE — 97110 THERAPEUTIC EXERCISES: CPT

## 2022-02-08 PROCEDURE — 80076 HEPATIC FUNCTION PANEL: CPT

## 2022-02-08 PROCEDURE — 83735 ASSAY OF MAGNESIUM: CPT

## 2022-02-08 PROCEDURE — 84484 ASSAY OF TROPONIN QUANT: CPT

## 2022-02-08 PROCEDURE — 71046 X-RAY EXAM CHEST 2 VIEWS: CPT

## 2022-02-08 PROCEDURE — 97530 THERAPEUTIC ACTIVITIES: CPT

## 2022-02-08 PROCEDURE — 93005 ELECTROCARDIOGRAM TRACING: CPT | Performed by: FAMILY MEDICINE

## 2022-02-08 PROCEDURE — 99233 SBSQ HOSP IP/OBS HIGH 50: CPT | Performed by: INTERNAL MEDICINE

## 2022-02-08 PROCEDURE — 36415 COLL VENOUS BLD VENIPUNCTURE: CPT

## 2022-02-08 PROCEDURE — 6370000000 HC RX 637 (ALT 250 FOR IP): Performed by: FAMILY MEDICINE

## 2022-02-08 PROCEDURE — 92610 EVALUATE SWALLOWING FUNCTION: CPT | Performed by: SPEECH-LANGUAGE PATHOLOGIST

## 2022-02-08 PROCEDURE — 74018 RADEX ABDOMEN 1 VIEW: CPT

## 2022-02-08 PROCEDURE — 80048 BASIC METABOLIC PNL TOTAL CA: CPT

## 2022-02-08 PROCEDURE — 84100 ASSAY OF PHOSPHORUS: CPT

## 2022-02-08 PROCEDURE — 84145 PROCALCITONIN (PCT): CPT

## 2022-02-08 PROCEDURE — 85027 COMPLETE CBC AUTOMATED: CPT

## 2022-02-08 PROCEDURE — 85610 PROTHROMBIN TIME: CPT

## 2022-02-08 PROCEDURE — 6360000002 HC RX W HCPCS: Performed by: FAMILY MEDICINE

## 2022-02-08 PROCEDURE — 82962 GLUCOSE BLOOD TEST: CPT

## 2022-02-08 PROCEDURE — 80061 LIPID PANEL: CPT

## 2022-02-08 PROCEDURE — 1200000000 HC SEMI PRIVATE

## 2022-02-08 PROCEDURE — APPSS30 APP SPLIT SHARED TIME 16-30 MINUTES: Performed by: NURSE PRACTITIONER

## 2022-02-08 PROCEDURE — 6370000000 HC RX 637 (ALT 250 FOR IP): Performed by: NURSE PRACTITIONER

## 2022-02-08 RX ORDER — SIMVASTATIN 40 MG
40 TABLET ORAL NIGHTLY
Status: DISCONTINUED | OUTPATIENT
Start: 2022-02-08 | End: 2022-02-08 | Stop reason: CLARIF

## 2022-02-08 RX ORDER — POLYETHYLENE GLYCOL 3350 17 G/17G
17 POWDER, FOR SOLUTION ORAL ONCE
Status: DISCONTINUED | OUTPATIENT
Start: 2022-02-08 | End: 2022-02-15 | Stop reason: HOSPADM

## 2022-02-08 RX ORDER — EZETIMIBE AND SIMVASTATIN 10; 40 MG/1; MG/1
1 TABLET ORAL NIGHTLY
Status: DISCONTINUED | OUTPATIENT
Start: 2022-02-08 | End: 2022-02-09 | Stop reason: CLARIF

## 2022-02-08 RX ADMIN — INSULIN LISPRO 1 UNITS: 100 INJECTION, SOLUTION INTRAVENOUS; SUBCUTANEOUS at 16:33

## 2022-02-08 RX ADMIN — METHYLPREDNISOLONE 4 MG: 4 TABLET ORAL at 11:34

## 2022-02-08 RX ADMIN — SENNOSIDES AND DOCUSATE SODIUM 1 TABLET: 50; 8.6 TABLET ORAL at 21:09

## 2022-02-08 RX ADMIN — POTASSIUM BICARBONATE 40 MEQ: 782 TABLET, EFFERVESCENT ORAL at 08:37

## 2022-02-08 RX ADMIN — LISINOPRIL 30 MG: 20 TABLET ORAL at 08:32

## 2022-02-08 RX ADMIN — METHYLPREDNISOLONE 4 MG: 4 TABLET ORAL at 16:30

## 2022-02-08 RX ADMIN — INSULIN LISPRO 1 UNITS: 100 INJECTION, SOLUTION INTRAVENOUS; SUBCUTANEOUS at 21:30

## 2022-02-08 RX ADMIN — METHYLPREDNISOLONE 8 MG: 4 TABLET ORAL at 21:09

## 2022-02-08 RX ADMIN — POTASSIUM BICARBONATE 40 MEQ: 782 TABLET, EFFERVESCENT ORAL at 16:31

## 2022-02-08 RX ADMIN — METHYLPREDNISOLONE 4 MG: 4 TABLET ORAL at 06:13

## 2022-02-08 RX ADMIN — SENNOSIDES AND DOCUSATE SODIUM 1 TABLET: 50; 8.6 TABLET ORAL at 08:32

## 2022-02-08 RX ADMIN — PANTOPRAZOLE SODIUM 40 MG: 40 TABLET, DELAYED RELEASE ORAL at 06:13

## 2022-02-08 RX ADMIN — CARVEDILOL 25 MG: 25 TABLET, FILM COATED ORAL at 08:32

## 2022-02-08 RX ADMIN — HYDROCODONE BITARTRATE AND ACETAMINOPHEN 1 TABLET: 5; 325 TABLET ORAL at 06:17

## 2022-02-08 RX ADMIN — HYDROCODONE BITARTRATE AND ACETAMINOPHEN 1 TABLET: 5; 325 TABLET ORAL at 13:55

## 2022-02-08 RX ADMIN — INSULIN LISPRO 2 UNITS: 100 INJECTION, SOLUTION INTRAVENOUS; SUBCUTANEOUS at 11:36

## 2022-02-08 RX ADMIN — PANTOPRAZOLE SODIUM 40 MG: 40 TABLET, DELAYED RELEASE ORAL at 16:30

## 2022-02-08 RX ADMIN — CARVEDILOL 25 MG: 25 TABLET, FILM COATED ORAL at 16:30

## 2022-02-08 RX ADMIN — CLOPIDOGREL BISULFATE 75 MG: 75 TABLET ORAL at 08:32

## 2022-02-08 ASSESSMENT — PAIN DESCRIPTION - LOCATION: LOCATION: CHEST

## 2022-02-08 ASSESSMENT — PAIN DESCRIPTION - PAIN TYPE: TYPE: ACUTE PAIN

## 2022-02-08 ASSESSMENT — PAIN SCALES - GENERAL
PAINLEVEL_OUTOF10: 5
PAINLEVEL_OUTOF10: 9

## 2022-02-08 ASSESSMENT — PAIN DESCRIPTION - ORIENTATION: ORIENTATION: MID

## 2022-02-08 ASSESSMENT — PAIN DESCRIPTION - DESCRIPTORS: DESCRIPTORS: SHARP

## 2022-02-08 NOTE — PROGRESS NOTES
SPEECH/LANGUAGE PATHOLOGY  CLINICAL ASSESSMENT OF SWALLOWING FUNCTION   and PLAN OF CARE    PATIENT NAME:  Alicia Lozano  (female)     MRN:  [id number]    :  1940  (80 y.o.)  STATUS:  Inpatient: Room 3/0313-02    TODAY'S DATE:  2022  REFERRING PROVIDER:    SLP eval and treat Start: 22, End: 22, ONE TIME, Standing Count: 1 Occurrences, R    Rayville Aguilar, APRN - CNP  REASON FOR REFERRAL: dysphagia with pills    EVALUATING THERAPIST: Carina Chiu, SLP                 RESULTS:    DYSPHAGIA DIAGNOSIS:   Clinical indicators of mild  oropharyngeal phase dysphagia       DIET RECOMMENDATIONS:  Regular consistency solids (IDDSI level 7) with  thin liquids (IDDSI level 0), MEDICATION ADMINISTRATION and Administer medication whole, ONE AT A TIME, in pudding/applesauce follow with a liquid     FEEDING RECOMMENDATIONS:     Assistance level:  Set-up is required for all oral intake      Compensatory strategies recommended: Small bites/sips and Alternate solids and liquids      Discussed recommendations with nursing and/or faxed report to referring provider: Yes    SPEECH THERAPY  PLAN OF CARE   The dysphagia POC is established based on physician order, dysphagia diagnosis and results of clinical assessment     Skilled SLP intervention for dysphagia management on acute care 3-5 x per week until goals met, pt plateaus in function and/or discharged from hospital    Conditions Requiring Skilled Therapeutic Intervention for dysphagia:    Choking on pills     Specific dysphagia interventions to include:     Training in positioning for improved integrity of swallow  Compensatory strategy training     Specific instructions for next treatment:  development and training of compensatory swallow strategies to improve airway protection and swallow function  Patient Treatment Goals:    Short Term Goals:  Pt will implement identified compensatory swallowing strategies on 90% of opportunities or greater to improve airway protection and swallow function. Long Term Goals:   Pt will improve oropharyngeal swallow function to ensure airway protection during PO intake to maintain adequate nutrition/hydration and decrease signs/symptoms of aspiration to less than 1 x/day. OTHER RECOMMENDATIONS:  A Video Swallow Study (MBSS) is recommended and requires a physician order IF silent aspiration is suspected based on medical presentation     Patient/family Goal:    To take pills without choking on them    Plan of care discussed with Patient   The Patient understand(s) the diagnosis, prognosis and plan of care     Rehabilitation Potential/Prognosis: good                    ADMITTING DIAGNOSIS: Liver mass [R16.0]  Herniated nucleus pulposus, L4-5 [M51.26]  Unable to ambulate [R26.2]  Intractable pain [R52]  Intractable back pain [M54.9]    VISIT DIAGNOSIS:   Visit Diagnoses       Codes    Herniated nucleus pulposus, L4-5    -  Primary M51.26    Unable to ambulate     R26.2    Liver mass     R16.0           PATIENT REPORT/COMPLAINT: difficulty swallowing pills  RN cleared patient for participation in assessment     yes     PRIOR LEVEL OF SWALLOW FUNCTION:    PAST HISTORY OF DYSPHAGIA?: none reported    Home diet: Regular consistency solids (IDDSI level 7) with  thin liquids (IDDSI level 0)  Current Diet Order:  ADULT DIET; Regular; 5 carb choices (75 gm/meal); Low Sodium (2 gm)    PROCEDURE:  Consistencies Administered During the Evaluation   Liquids: thin liquid   Solids:  pureed foods      Method of Intake:   cup, spoon  Self fed      Position:   Seated, upright    CLINICAL ASSESSMENT:  Oral Stage: The oral stage of swallowing was within functional limits for consistencies administered     Patient did practice taking small bites of puree and following with a liquid in order to understand how to implement this when taking her pills.   She eats slowly and taking a drink right after a bite of food is slightly difficult for her    Pharyngeal Stage:    No signs of aspiration were noted during this evaluation however, silent aspiration cannot be ruled out at bedside. If silent aspiration is suspected, a Videofluoroscopic Study of Swallowing (MBS) is recommended and requires a physician order. Cognition:   Within functional limits for this exam    Oral Peripheral Examination   Generalized oral weakness    Current Respiratory Status    room air     Parameters of Speech Production  Respiration:  Adequate for speech production  Quality:   Within functional limits  Intensity: Within functional limits    Volitional Swallow: present     Volitional Cough:   present     Pain: No pain reported. EDUCATION:   The Speech Language Pathologist (SLP) completed education regarding results of evaluation and that intervention is warranted at this time. Learner: Patient  Education: Reviewed results and recommendations of this evaluation  Evaluation of Education:  Alda Knight understanding    This plan may be re-evaluated and revised as warranted. Evaluation Time includes thorough review of current medical information, gathering information on past medical history/social history and prior level of function, completion of standardized testing/informal observation of tasks, assessment of data and education on plan of care and goals. [x]The admitting diagnosis and active problem list, have been reviewed prior to initiation of this evaluation.         ACTIVE PROBLEM LIST:   Patient Active Problem List   Diagnosis    Coronary artery disease involving native coronary artery of native heart without angina pectoris    Scoliosis    Hypertension    Hyperlipidemia    Type 2 diabetes mellitus without complication, without long-term current use of insulin (HCC)    Atrial septal aneurysm    GI bleed    Intractable pain    Sciatica of left side    Gastroesophageal reflux disease without esophagitis    Intractable back pain         CPT code:  58[address] [facility] bedside swallow eval Burrell Cushing MSCCC/SLP  Speech Language Pathologist  [id number]

## 2022-02-08 NOTE — CARE COORDINATION
2/8/2022: SS Note/Discharge plan:   Notified Alisa Adams admissions for [employer] [facility] that their SNF is listed as being in network with [facility] insurance which is pt's managed Medicare provider, will need to PRE CERT prior to transfer,ANDRES and HENS initiated, sw/cm to follow to confirm transfer arrangements and for d/c order to complete HENS exemption. Nursing informed.  Electronically signed by ELSA Worthy on 2/8/2022 at 1:19 PM

## 2022-02-08 NOTE — PROGRESS NOTES
supple and non tender without mass   Pulmonary/Chest: diminished bilaterally, no respiratory distress  Cardiovascular: normal rate, normal S1 and S2   Abdomen: soft, non-tender, non-distended, normal bowel sounds   Extremities: no cyanosis, no clubbing and no edema  Muscoloskeletal:  Left lower back/buttock and right lower back pain with movement  Neurologic: no tremor and speech normal      Recent Labs     02/07/22  0100 02/08/22  0446    137   K 4.0 3.4*    101   CO2 23 21*   BUN 19 17   CREATININE 0.5 0.6   GLUCOSE 150* 109*   CALCIUM 9.7 9.2       Recent Labs     02/07/22  0100 02/08/22  0446   ALKPHOS 93 83   PROT 7.5 6.9   LABALBU 4.4 4.0   BILITOT 0.3 0.3   AST 30 43*   ALT 16 30       Recent Labs     02/07/22  0100 02/08/22  0446   WBC 14.7* 12.9*   RBC 4.17 3.75   HGB 13.3 11.8   HCT 40.6 36.2   MCV 97.4 96.5   MCH 31.9 31.5   MCHC 32.8 32.6   RDW 11.9 11.9    181   MPV 9.3 9.5           Radiology:   XR ABDOMEN (KUB) (SINGLE AP VIEW)   Preliminary Result   Moderate-sized hiatal hernia. Atelectatic changes in the left lung base. No   evidence of acute parenchymal disease. Stool scattered throughout the colon to suggest clinical presentation of   constipation with no signs of obvious obstruction or gross free   intraperitoneal air. XR CHEST (2 VW)   Preliminary Result   Moderate-sized hiatal hernia. Atelectatic changes in the left lung base. No   evidence of acute parenchymal disease. Stool scattered throughout the colon to suggest clinical presentation of   constipation with no signs of obvious obstruction or gross free   intraperitoneal air. MRI LUMBAR SPINE WO CONTRAST   Final Result   1. No evidence of lumbar spine fracture. 2. Levoscoliosis with significant degenerative endplate changes along   concavity of the levoscoliotic curvature. 3. Edematous degenerative endplate changes at A1/R0.    4. Lumbar spondylosis as described above with notable moderate central canal   stenosis at L1/L2 and multiple levels of significant neural foraminal   narrowing notable on the right at L1/L2, L2/L3, and L4/L5. Severe left   neural foraminal narrowing at L5/S1. CT ABDOMEN PELVIS WO CONTRAST Additional Contrast? None   Final Result   No acute finding in the abdomen and pelvis on this unenhanced study. A slightly hypodense lesion in the left lobe of the liver, which may   represent an [address]. Dedicated MRI is recommended to confirm and rule out other   possibilities such as malignant neoplasms. Distal colonic diverticulosis. No acute diverticulitis. XR HIP 2-3 VW W PELVIS LEFT   Final Result   No evidence of an acute fracture of the pelvis or left hip. Assessment:  Active Problems:    Coronary artery disease involving native coronary artery of native heart without angina pectoris    Hypertension    Hyperlipidemia    Type 2 diabetes mellitus without complication, without long-term current use of insulin (HCC)    Intractable pain    Sciatica of left side    Gastroesophageal reflux disease without esophagitis    Intractable back pain  Resolved Problems:    * No resolved hospital problems. *      Plan:  1. Intractable Left and right lower back pain:  On methylprednisolone. MRI with edematous degenerative changes L2/L3 and moderate central canal stenosis at L1/L2, multiple levels of significant neural canal stenosis, severe left neural foraminal narrowing at L5/S1. Neurosurgery consulted. 2. Liver lesion:  Seen on CT of the abdomen and pelvis. Possibly focal nodular hyperplasia. Will need outpatient follow up. 3. Diabetes:  Hold home glipizide. Continue Humalog sliding scale, blood sugar checks, hypoglycemic protocol. Hgb A1C is 6.5.   4. Chest pain:  Chest pain is reproducible and patient reports that it happens mainly after eating. EKG with no ischemic changes.   High sensitivity troponin is 12.    5. Possible aspiration:  Speech Therapy consulted. 6. Constipation:  Seen on KUB. Patient reports 2 bowel movements today. 7. Hyperlipidemia:   Continue Vytorin as an inpatient and will change to simvastatin alone at discharge. 8. Hypertension:  Continue Coreg and lisinopril. 9. GERD: Continue PPI. NOTE: This report was transcribed using voice recognition software. Every effort was made to ensure accuracy; however, inadvertent computerized transcription errors may be present.      Electronically signed by RANDY Meadows CNP on 2/8/2022 at 1:27 PM

## 2022-02-08 NOTE — PROGRESS NOTES
Physical Therapy Treatment Note/Plan of Care    Room #:  1596/3259-48  Patient Name: Xiao Guzman  YOB: 1940  MRN: [id number]    Date of Service: 2/8/2022     Tentative placement recommendation: Subacute rehab  Equipment recommendation: To be determined      Evaluating Physical Therapist: Isaias Salazar PT #[id number]      Specific Provider Orders/Date/Referring Provider :  02/07/22 0315   PT eval and treat Start: 02/07/22 0315, End: 02/07/22 0315, ONE TIME, Standing Count: 1 Occurrences, R    Felicity Quiles, DO      Admitting Diagnosis:   Liver mass [R16.0]  Herniated nucleus pulposus, L4-5 [M51.26]  Unable to ambulate [R26.2]  Intractable pain [R52]  Intractable back pain [M54.9]     for back pain, history of back trouble in the past, she states her lower back is hurting, rating her left leg,     Surgery: none  Visit Diagnoses       Codes    Herniated nucleus pulposus, L4-5    -  Primary M51.26    Intractable back pain     M54.9    Unable to ambulate     R26.2    Liver mass     R16.0          Patient Active Problem List   Diagnosis    Coronary artery disease involving native coronary artery of native heart without angina pectoris    Scoliosis    Hypertension    Hyperlipidemia    Type 2 diabetes mellitus without complication, without long-term current use of insulin (HCC)    Atrial septal aneurysm    GI bleed    Intractable pain    Sciatica of left side    Gastroesophageal reflux disease without esophagitis        ASSESSMENT of Current Deficits Patient exhibits decreased strength, balance, endurance and pain back  impairing functional mobility, transfers, gait , gait distance and tolerance to activity. Patient needed minimal assist with all functional mobility. Pt displays a kyphotic posture, narrow base of support, and small step lengths during ambulation with cueing to correct. Pt's distance limited by her c/o pain with her back and she did not want to sit up in a chair.  Patient requires continued skilled physical therapy to address concerns listed above for increased safety and function at discharge. PHYSICAL THERAPY  PLAN OF CARE       Physical therapy plan of care is established based on physician order,  patient diagnosis and clinical assessment    Current Treatment Recommendations:    -Bed Mobility: Lower extremity exercises  and Trunk control activities   -Sitting Balance: Facilitate active trunk muscle engagement , Facilitate postural control in all planes  and Engage in core activities to allow for movement within base of support   -Standing Balance: Perform strengthening exercises in standing to promote motor control with or without upper extremity support   -Transfers: Provide instruction on proper hand and foot position for adequate transfer of weight onto lower extremities and use of gait device if needed and Cues for hand placement, technique and safety. Provide stabilization to prevent fall   -Gait: Gait training, Standing activities to improve: base of support, weight shift, weight bearing  and Pregait training to emphasize: Sequencing , Device control, Upright and Safety   -Endurance: Utilize Supervised activities to increase level of endurance to allow for safe functional mobility including transfers and gait     PT long term treatment goals are located in below grid    Patient and or family understand(s) diagnosis, prognosis, and plan of care. Frequency of treatments: Patient will be seen  3-5 times/week.          Prior Level of Function: Patient ambulated with wheeled walker generally used cane when able to get out, has been been little while since been out due to weather  Rehab Potential: fair  + for baseline    Past medical history:   Past Medical History:   Diagnosis Date    Acute CVA (cerebrovascular accident) ([facility] [address] 5/25/2019    Arthritis     Back problem     CAD (coronary artery disease) 2010    PTCA with DAMON to prox LAD, PTCA DAMON to ostial lesion of RCA and BMS to prox lesion RCA DAMON to RCA ostial     Cerebral artery occlusion with cerebral infarction ([facility] [address] 05/2019    Hyperlipidemia     Hypertension     Nausea & vomiting     Reflux     RLD (ruptured lumbar disc)     Scoliosis     Spinal stenosis     Type 2 diabetes mellitus without complication, without long-term current use of insulin ([facility] [address] 10/14/2019     Past Surgical History:   Procedure Laterality Date    CATARACT REMOVAL      bilateral    CORONARY ANGIOPLASTY  2/25/2010    PTCA with DAMON in the proximal left anterior descending    CORONARY ANGIOPLASTY  3/11/2010    PTCA and deployment of 2 stents in the ostium and the proximal segment of RCA    CORONARY ANGIOPLASTY  5/19/2010    PTCA DAMON to ostial RCA patent LAD and RCA BMS  restenosis of RCA    CORONARY ANGIOPLASTY WITH STENT PLACEMENT      times 3    DIAGNOSTIC CARDIAC CATH LAB PROCEDURE  2/25/2010    Dr. Caty Owen: Cardiac cath with angioplasty follow up    [address] CATH LAB PROCEDURE  3/11/2010    Cardiac cath and stent placement    DIAGNOSTIC CARDIAC CATH LAB PROCEDURE  5/19/2010    Cardiac cath heart lab and subsequent angiopilsaty    FRACTURE SURGERY      tight wrist    SHOULDER ARTHROSCOPY  08 25 2011    left shoulder arthroscopy ,acromioplasty,busectomy, release of adhesions    UPPER GASTROINTESTINAL ENDOSCOPY N/A 2/19/2021    EGD BIOPSY performed by Marck Westbrook DO at [address]:    Precautions:  Up with assistance, falls and spinal precautions ,    Social history: Patient lives alone in a apartment    with 3 steps  to enter bilateral Rail  Tub shower grab bars    Equipment owned: [address], [address] chair and Quad cane,       AM-PAC Basic Mobility        AM-PAC Mobility Inpatient   How much difficulty turning over in bed?: A Little  How much difficulty sitting down on / standing up from a chair with arms?: A Little  How much difficulty moving from lying on back to sitting on side of bed?: A Little  How much help from another person moving to and from a bed to a chair?: A Little  How much help from another person needed to walk in hospital room?: A Little  How much help from another person for climbing 3-5 steps with a railing?: A Lot  AM-PAC Inpatient Mobility Raw Score : 17  AM-PAC Inpatient T-Scale Score : 42.13  Mobility Inpatient CMS 0-100% Score: 50.57  Mobility Inpatient CMS G-Code Modifier : CK    Nursing cleared patient for PT treatment. .   OBJECTIVE;   Initial Evaluation  Date: 2/7/2022 Treatment Date:  2/8/2022       Short Term/ Long Term   Goals   Was pt agreeable to Eval/treatment? Yes yes To be met in 3 days   Pain level   9/10  Back pain right buttocks, left leg 8/10  Back pain    Bed Mobility    Rolling: Moderate assist of 1    Supine to sit: Moderate assist of 1    Sit to supine: Not assessed     Scooting: Moderate assist of 1   Rolling: Minimal assist of 1   Supine to sit: Minimal assist of 1   Sit to supine: Minimal assist of 1   Scooting: Minimal assist of 1    Rolling: Minimal assist of 1    Supine to sit: Minimal assist of 1    Sit to supine: Minimal assist of 1    Scooting: Minimal assist of 1     Transfers Sit to stand:  Moderate assist of 1 Cues for hand placement and safety  Sit to stand: Minimal assist of 1 Cues for hand placement and safety       Sit to stand: Minimal assist of 1     Ambulation    2 x 20 feet using  wheeled walker with Minimal assist of 1   for walker control, balance and upright and cues for sequencing and safety 2 x 20 feet using  wheeled walker with Minimal assist of 1   cues for upright posture, walker approximation, increased base of support, increased step length, safety, pacing and obstacle negotiation in tight quarters    50 feet using  wheeled walker with Supervision     Stair negotiation: ascended and descended   Not assessed          ROM Within functional limits    Increase range of motion 10% of affected joints    Strength BUE:  refer to OT eval  RLE:  3/5  LLE:  3/5  Increase strength in affected mm groups by 1/3 grade   Balance Sitting EOB:  fair    Dynamic Standing:  fair wheeled walker  Sitting EOB: fair   Dynamic Standing: fair wheeled walker   Sitting EOB:  good    Dynamic Standing: good -wheeled walker      Patient is Alert & Oriented x person, place, time and situation and follows directions    Sensation:  Patient  denies numbness/tingling   Edema:  no   Endurance: fair       Vitals: room air   Blood Pressure at rest  Blood Pressure during session    Heart Rate at rest   Heart Rate during session     SPO2 at rest  %  SPO2 during session  %     Patient education  Patient educated on role of Physical Therapy, risks of immobility, safety and plan of care,  importance of mobility while in hospital  and spinal precautions     Patient response to education:   Pt verbalized understanding Pt demonstrated skill Pt requires further education in this area   Yes Partial Yes      Treatment:  Patient practiced and was instructed/facilitated in the following treatment: Patient performed supine exercises,  assisted to edge of bed,   Sat edge of bed 23 minutes with Supervision  to increase dynamic sitting balance and activity tolerance. Pt stood, ambulated to the restroom, to the sink to wash her hands, and back to the bed. Returned to supine. Therapeutic Exercises:  ankle pumps, quad sets, glut sets, heel slide, hip abduction/adduction and straight leg raise  x 20 reps. At end of session, patient in bed with   call light and phone within reach,  all lines and tubes intact, nursing notified. Patient would benefit from continued skilled Physical Therapy to improve functional independence and quality of life.          Patient's/ family goals   get rid of pain    Time in  8:45  Time out  9:25    Total Treatment Time  40 minutes        CPT codes:    Therapeutic activities (90958)   28 minutes  2 unit(s)  Therapeutic exercises (52298)   12 minutes  1 unit(s)   Serenity Banda.  [facility]  LIC # [id number]

## 2022-02-09 ENCOUNTER — APPOINTMENT (OUTPATIENT)
Dept: GENERAL RADIOLOGY | Age: 82
DRG: 552 | End: 2022-02-09
Payer: COMMERCIAL

## 2022-02-09 LAB
ALBUMIN SERPL-MCNC: 4 G/DL (ref 3.5–5.2)
ALP BLD-CCNC: 81 U/L (ref 35–104)
ALT SERPL-CCNC: 24 U/L (ref 0–32)
ANION GAP SERPL CALCULATED.3IONS-SCNC: 14 MMOL/L (ref 7–16)
AST SERPL-CCNC: 28 U/L (ref 0–31)
BILIRUB SERPL-MCNC: 0.3 MG/DL (ref 0–1.2)
BILIRUBIN DIRECT: <0.2 MG/DL (ref 0–0.3)
BILIRUBIN, INDIRECT: NORMAL MG/DL (ref 0–1)
BUN BLDV-MCNC: 19 MG/DL (ref 6–23)
CALCIUM SERPL-MCNC: 9.7 MG/DL (ref 8.6–10.2)
CHLORIDE BLD-SCNC: 102 MMOL/L (ref 98–107)
CO2: 23 MMOL/L (ref 22–29)
CREAT SERPL-MCNC: 0.6 MG/DL (ref 0.5–1)
EKG ATRIAL RATE: 79 BPM
EKG P AXIS: 73 DEGREES
EKG P-R INTERVAL: 148 MS
EKG Q-T INTERVAL: 384 MS
EKG QRS DURATION: 80 MS
EKG QTC CALCULATION (BAZETT): 440 MS
EKG R AXIS: 34 DEGREES
EKG T AXIS: 59 DEGREES
EKG VENTRICULAR RATE: 79 BPM
GFR AFRICAN AMERICAN: >60
GFR NON-AFRICAN AMERICAN: >60 ML/MIN/1.73
GLUCOSE BLD-MCNC: 201 MG/DL (ref 74–99)
HCT VFR BLD CALC: 36.6 % (ref 34–48)
HEMOGLOBIN: 12 G/DL (ref 11.5–15.5)
INR BLD: 1
MAGNESIUM: 2 MG/DL (ref 1.6–2.6)
MCH RBC QN AUTO: 31.4 PG (ref 26–35)
MCHC RBC AUTO-ENTMCNC: 32.8 % (ref 32–34.5)
MCV RBC AUTO: 95.8 FL (ref 80–99.9)
METER GLUCOSE: 126 MG/DL (ref 74–99)
METER GLUCOSE: 170 MG/DL (ref 74–99)
METER GLUCOSE: 239 MG/DL (ref 74–99)
METER GLUCOSE: 292 MG/DL (ref 74–99)
PDW BLD-RTO: 11.9 FL (ref 11.5–15)
PHOSPHORUS: 3.2 MG/DL (ref 2.5–4.5)
PLATELET # BLD: 188 E9/L (ref 130–450)
PMV BLD AUTO: 9.7 FL (ref 7–12)
POTASSIUM SERPL-SCNC: 4.4 MMOL/L (ref 3.5–5)
PROTHROMBIN TIME: 12.2 SEC (ref 9.3–12.4)
RBC # BLD: 3.82 E12/L (ref 3.5–5.5)
SODIUM BLD-SCNC: 139 MMOL/L (ref 132–146)
TOTAL PROTEIN: 7.2 G/DL (ref 6.4–8.3)
TROPONIN, HIGH SENSITIVITY: 9 NG/L (ref 0–9)
URINE CULTURE, ROUTINE: NORMAL
WBC # BLD: 9.7 E9/L (ref 4.5–11.5)

## 2022-02-09 PROCEDURE — 6360000002 HC RX W HCPCS: Performed by: NURSE PRACTITIONER

## 2022-02-09 PROCEDURE — 99232 SBSQ HOSP IP/OBS MODERATE 35: CPT | Performed by: INTERNAL MEDICINE

## 2022-02-09 PROCEDURE — APPSS30 APP SPLIT SHARED TIME 16-30 MINUTES: Performed by: NURSE PRACTITIONER

## 2022-02-09 PROCEDURE — 85027 COMPLETE CBC AUTOMATED: CPT

## 2022-02-09 PROCEDURE — 6370000000 HC RX 637 (ALT 250 FOR IP): Performed by: NURSE PRACTITIONER

## 2022-02-09 PROCEDURE — 97110 THERAPEUTIC EXERCISES: CPT

## 2022-02-09 PROCEDURE — 96375 TX/PRO/DX INJ NEW DRUG ADDON: CPT

## 2022-02-09 PROCEDURE — 1200000000 HC SEMI PRIVATE

## 2022-02-09 PROCEDURE — 84484 ASSAY OF TROPONIN QUANT: CPT

## 2022-02-09 PROCEDURE — 80048 BASIC METABOLIC PNL TOTAL CA: CPT

## 2022-02-09 PROCEDURE — 80076 HEPATIC FUNCTION PANEL: CPT

## 2022-02-09 PROCEDURE — 93005 ELECTROCARDIOGRAM TRACING: CPT | Performed by: NURSE PRACTITIONER

## 2022-02-09 PROCEDURE — 6370000000 HC RX 637 (ALT 250 FOR IP): Performed by: FAMILY MEDICINE

## 2022-02-09 PROCEDURE — 93010 ELECTROCARDIOGRAM REPORT: CPT | Performed by: INTERNAL MEDICINE

## 2022-02-09 PROCEDURE — 36415 COLL VENOUS BLD VENIPUNCTURE: CPT

## 2022-02-09 PROCEDURE — 83735 ASSAY OF MAGNESIUM: CPT

## 2022-02-09 PROCEDURE — 85610 PROTHROMBIN TIME: CPT

## 2022-02-09 PROCEDURE — 6360000002 HC RX W HCPCS: Performed by: FAMILY MEDICINE

## 2022-02-09 PROCEDURE — 82962 GLUCOSE BLOOD TEST: CPT

## 2022-02-09 PROCEDURE — 96376 TX/PRO/DX INJ SAME DRUG ADON: CPT

## 2022-02-09 PROCEDURE — 97530 THERAPEUTIC ACTIVITIES: CPT

## 2022-02-09 PROCEDURE — 74018 RADEX ABDOMEN 1 VIEW: CPT

## 2022-02-09 PROCEDURE — 84100 ASSAY OF PHOSPHORUS: CPT

## 2022-02-09 RX ORDER — ROSUVASTATIN CALCIUM 10 MG/1
10 TABLET, COATED ORAL NIGHTLY
Status: DISCONTINUED | OUTPATIENT
Start: 2022-02-09 | End: 2022-02-15 | Stop reason: HOSPADM

## 2022-02-09 RX ORDER — ONDANSETRON 2 MG/ML
4 INJECTION INTRAMUSCULAR; INTRAVENOUS EVERY 6 HOURS PRN
Status: DISCONTINUED | OUTPATIENT
Start: 2022-02-09 | End: 2022-02-15 | Stop reason: HOSPADM

## 2022-02-09 RX ADMIN — PANTOPRAZOLE SODIUM 40 MG: 40 TABLET, DELAYED RELEASE ORAL at 05:35

## 2022-02-09 RX ADMIN — METHYLPREDNISOLONE 4 MG: 4 TABLET ORAL at 16:27

## 2022-02-09 RX ADMIN — ONDANSETRON 4 MG: 2 INJECTION INTRAMUSCULAR; INTRAVENOUS at 09:55

## 2022-02-09 RX ADMIN — ONDANSETRON 4 MG: 2 INJECTION INTRAMUSCULAR; INTRAVENOUS at 16:36

## 2022-02-09 RX ADMIN — ROSUVASTATIN CALCIUM 10 MG: 10 TABLET, FILM COATED ORAL at 20:02

## 2022-02-09 RX ADMIN — SENNOSIDES AND DOCUSATE SODIUM 1 TABLET: 50; 8.6 TABLET ORAL at 20:02

## 2022-02-09 RX ADMIN — LISINOPRIL 30 MG: 20 TABLET ORAL at 07:37

## 2022-02-09 RX ADMIN — LIDOCAINE HYDROCHLORIDE: 20 SOLUTION ORAL; TOPICAL at 17:00

## 2022-02-09 RX ADMIN — INSULIN LISPRO 3 UNITS: 100 INJECTION, SOLUTION INTRAVENOUS; SUBCUTANEOUS at 12:39

## 2022-02-09 RX ADMIN — CARVEDILOL 25 MG: 25 TABLET, FILM COATED ORAL at 16:27

## 2022-02-09 RX ADMIN — PANTOPRAZOLE SODIUM 40 MG: 40 TABLET, DELAYED RELEASE ORAL at 16:27

## 2022-02-09 RX ADMIN — METHYLPREDNISOLONE 4 MG: 4 TABLET ORAL at 20:02

## 2022-02-09 RX ADMIN — SENNOSIDES AND DOCUSATE SODIUM 1 TABLET: 50; 8.6 TABLET ORAL at 07:37

## 2022-02-09 RX ADMIN — CARVEDILOL 25 MG: 25 TABLET, FILM COATED ORAL at 07:37

## 2022-02-09 RX ADMIN — HYDROCODONE BITARTRATE AND ACETAMINOPHEN 1 TABLET: 5; 325 TABLET ORAL at 16:31

## 2022-02-09 RX ADMIN — METHYLPREDNISOLONE 4 MG: 4 TABLET ORAL at 12:36

## 2022-02-09 RX ADMIN — HYDROCODONE BITARTRATE AND ACETAMINOPHEN 1 TABLET: 5; 325 TABLET ORAL at 04:15

## 2022-02-09 RX ADMIN — CLOPIDOGREL BISULFATE 75 MG: 75 TABLET ORAL at 07:38

## 2022-02-09 RX ADMIN — INSULIN LISPRO 2 UNITS: 100 INJECTION, SOLUTION INTRAVENOUS; SUBCUTANEOUS at 07:45

## 2022-02-09 RX ADMIN — METHYLPREDNISOLONE 4 MG: 4 TABLET ORAL at 05:35

## 2022-02-09 RX ADMIN — INSULIN LISPRO 1 UNITS: 100 INJECTION, SOLUTION INTRAVENOUS; SUBCUTANEOUS at 16:29

## 2022-02-09 ASSESSMENT — PAIN SCALES - GENERAL
PAINLEVEL_OUTOF10: 7
PAINLEVEL_OUTOF10: 7

## 2022-02-09 NOTE — PROGRESS NOTES
Physical Therapy Treatment Note/Plan of Care    Room #:  2831/1285-37  Patient Name: Manjula Michele  YOB: 1940  MRN: [id number]    Date of Service: 2/9/2022     Tentative placement recommendation: Subacute rehab  Equipment recommendation: To be determined      Evaluating Physical Therapist: Vaibhav Simmons PT #[id number]      Specific Provider Orders/Date/Referring Provider :  02/07/22 0315   PT eval and treat Start: 02/07/22 0315, End: 02/07/22 0315, ONE TIME, Standing Count: 1 Occurrences, R    Suni Post, DO      Admitting Diagnosis:   Liver mass [R16.0]  Herniated nucleus pulposus, L4-5 [M51.26]  Unable to ambulate [R26.2]  Intractable pain [R52]  Intractable back pain [M54.9]     for back pain, history of back trouble in the past, she states her lower back is hurting, rating her left leg,     Surgery: none  Visit Diagnoses       Codes    Unable to ambulate     R26.2    Liver mass     R16.0          Patient Active Problem List   Diagnosis    Coronary artery disease involving native coronary artery of native heart without angina pectoris    Scoliosis    Hypertension    Hyperlipidemia    Type 2 diabetes mellitus without complication, without long-term current use of insulin (HCC)    Atrial septal aneurysm    GI bleed    Intractable pain    Sciatica of left side    Gastroesophageal reflux disease without esophagitis    Intractable back pain    Herniated nucleus pulposus, L4-5        ASSESSMENT of Current Deficits Patient exhibits decreased strength, balance, endurance and pain back  impairing functional mobility, transfers, gait , gait distance and tolerance to activity. Patient needed minimal assist with all functional mobility. Pt displays a kyphotic posture, narrow base of support, and small step lengths during ambulation with cueing to correct. Pt's distance limited by her c/o pain with her back and her left side. Pt did not want to sit up in a chair.  Patient requires continued skilled physical therapy to address concerns listed above for increased safety and function at discharge. PHYSICAL THERAPY  PLAN OF CARE       Physical therapy plan of care is established based on physician order,  patient diagnosis and clinical assessment    Current Treatment Recommendations:    -Bed Mobility: Lower extremity exercises  and Trunk control activities   -Sitting Balance: Facilitate active trunk muscle engagement , Facilitate postural control in all planes  and Engage in core activities to allow for movement within base of support   -Standing Balance: Perform strengthening exercises in standing to promote motor control with or without upper extremity support   -Transfers: Provide instruction on proper hand and foot position for adequate transfer of weight onto lower extremities and use of gait device if needed and Cues for hand placement, technique and safety. Provide stabilization to prevent fall   -Gait: Gait training, Standing activities to improve: base of support, weight shift, weight bearing  and Pregait training to emphasize: Sequencing , Device control, Upright and Safety   -Endurance: Utilize Supervised activities to increase level of endurance to allow for safe functional mobility including transfers and gait     PT long term treatment goals are located in below grid    Patient and or family understand(s) diagnosis, prognosis, and plan of care. Frequency of treatments: Patient will be seen  3-5 times/week.          Prior Level of Function: Patient ambulated with wheeled walker generally used cane when able to get out, has been been little while since been out due to weather  Rehab Potential: fair  + for baseline    Past medical history:   Past Medical History:   Diagnosis Date    Acute CVA (cerebrovascular accident) ([facility] [address] 5/25/2019    Arthritis     Back problem     CAD (coronary artery disease) 2010    PTCA with DAMON to prox LAD, PTCA DAMON to ostial lesion of RCA and BMS to prox lesion RCA DAMON to RCA ostial     Cerebral artery occlusion with cerebral infarction ([facility] [address] 05/2019    Hyperlipidemia     Hypertension     Nausea & vomiting     Reflux     RLD (ruptured lumbar disc)     Scoliosis     Spinal stenosis     Type 2 diabetes mellitus without complication, without long-term current use of insulin ([facility] [address] 10/14/2019     Past Surgical History:   Procedure Laterality Date    CATARACT REMOVAL      bilateral    CORONARY ANGIOPLASTY  2/25/2010    PTCA with DAMON in the proximal left anterior descending    CORONARY ANGIOPLASTY  3/11/2010    PTCA and deployment of 2 stents in the ostium and the proximal segment of RCA    CORONARY ANGIOPLASTY  5/19/2010    PTCA DAMON to ostial RCA patent LAD and RCA BMS  restenosis of RCA    CORONARY ANGIOPLASTY WITH STENT PLACEMENT      times 3    DIAGNOSTIC CARDIAC CATH LAB PROCEDURE  2/25/2010    Dr. Emiliano Meyers: Cardiac cath with angioplasty follow up    [address] CATH LAB PROCEDURE  3/11/2010    Cardiac cath and stent placement    DIAGNOSTIC CARDIAC CATH LAB PROCEDURE  5/19/2010    Cardiac cath heart lab and subsequent angiopilsaty    FRACTURE SURGERY      tight wrist    SHOULDER ARTHROSCOPY  08 25 2011    left shoulder arthroscopy ,acromioplasty,busectomy, release of adhesions    UPPER GASTROINTESTINAL ENDOSCOPY N/A 2/19/2021    EGD BIOPSY performed by Yoel Fuller DO at [address]:    Precautions:  Up with assistance, falls and spinal precautions ,    Social history: Patient lives alone in a apartment    with 3 steps  to enter bilateral Rail  Tub shower grab bars    Equipment owned: [address], [address] [facility] Kar chair and Quad cane,       AM-PAC Basic Mobility        AM-PAC Mobility Inpatient   How much difficulty turning over in bed?: A Little  How much difficulty sitting down on / standing up from a chair with arms?: A Little  How much difficulty moving from lying on back to sitting on side of bed?: A Little  How much help from another person moving to and from a bed to a chair?: A Little  How much help from another person needed to walk in hospital room?: A Little  How much help from another person for climbing 3-5 steps with a railing?: A Lot  AM-PAC Inpatient Mobility Raw Score : 17  AM-PAC Inpatient T-Scale Score : 42.13  Mobility Inpatient CMS 0-100% Score: 50.57  Mobility Inpatient CMS G-Code Modifier : CK    Nursing cleared patient for PT treatment. .   OBJECTIVE;   Initial Evaluation  Date: 2/7/2022 Treatment Date:  2/9/2022       Short Term/ Long Term   Goals   Was pt agreeable to Eval/treatment? Yes yes To be met in 3 days   Pain level   9/10  Back pain right buttocks, left leg 8/10  Back pain    Bed Mobility    Rolling: Moderate assist of 1    Supine to sit: Moderate assist of 1    Sit to supine: Not assessed     Scooting: Moderate assist of 1   Rolling: Minimal assist of 1   Supine to sit: Minimal assist of 1   Sit to supine: Minimal assist of 1   Scooting: Minimal assist of 1    Rolling: Minimal assist of 1    Supine to sit: Minimal assist of 1    Sit to supine: Minimal assist of 1    Scooting: Minimal assist of 1     Transfers Sit to stand:  Moderate assist of 1 Cues for hand placement and safety  Sit to stand: Minimal assist of 1 Cues for hand placement and safety       Sit to stand: Minimal assist of 1     Ambulation    2 x 20 feet using  wheeled walker with Minimal assist of 1   for walker control, balance and upright and cues for sequencing and safety 2 x 20 feet using  wheeled walker with Minimal assist of 1   cues for upright posture, walker approximation, increased base of support, increased step length, safety, pacing and obstacle negotiation in tight quarters    50 feet using  wheeled walker with Supervision     Stair negotiation: ascended and descended   Not assessed          ROM Within functional limits    Increase range of motion 10% of affected joints    Strength BUE:  refer to OT eval  RLE:  3/5  LLE: 3/5  Increase strength in affected mm groups by 1/3 grade   Balance Sitting EOB:  fair    Dynamic Standing:  fair wheeled walker  Sitting EOB: fair   Dynamic Standing: fair wheeled walker   Sitting EOB:  good    Dynamic Standing: good -wheeled walker      Patient is Alert & Oriented x person, place, time and situation and follows directions    Sensation:  Patient  denies numbness/tingling   Edema:  no   Endurance: fair       Vitals: room air   Blood Pressure at rest  Blood Pressure during session    Heart Rate at rest   Heart Rate during session     SPO2 at rest  %  SPO2 during session  %     Patient education  Patient educated on role of Physical Therapy, risks of immobility, safety and plan of care,  importance of mobility while in hospital  and spinal precautions     Patient response to education:   Pt verbalized understanding Pt demonstrated skill Pt requires further education in this area   Yes Partial Yes      Treatment:  Patient practiced and was instructed/facilitated in the following treatment: Patient performed supine exercises,  assisted to edge of bed,   Sat edge of bed 7 minutes with Supervision  to increase dynamic sitting balance and activity tolerance. Pt stood, ambulated to the restroom, to the sink to wash her hands, and back to the bed. Returned to supine. Therapeutic Exercises:  ankle pumps, quad sets, glut sets, heel slide, hip abduction/adduction and straight leg raise  x 20 reps. At end of session, patient in bed with   call light and phone within reach,  all lines and tubes intact, nursing notified. Patient would benefit from continued skilled Physical Therapy to improve functional independence and quality of life. Patient's/ family goals   get rid of pain    Time in 10:00  Time out  10:23    Total Treatment Time  23 minutes        CPT codes:    Therapeutic activities (12450)   13 minutes  1 unit(s)  Therapeutic exercises (18793)   10 minutes  1 unit(s)   Cali Galvan PTA  LIC # [id number]

## 2022-02-09 NOTE — PLAN OF CARE
Problem: Pain:  Goal: Pain level will decrease  Description: Pain level will decrease  2/8/2022 2359 by Jean-Claude Gracia RN  Outcome: Met This Shift  2/8/2022 1239 by Ilda Monk RN  Outcome: Met This Shift  Goal: Control of acute pain  Description: Control of acute pain  2/8/2022 2359 by Jean-Claude Garcia RN  Outcome: Met This Shift  2/8/2022 1239 by Ilda Monk RN  Outcome: Met This Shift  Goal: Control of chronic pain  Description: Control of chronic pain  2/8/2022 2359 by Jean-Claude Garcia RN  Outcome: Met This Shift  2/8/2022 1239 by Ilda Monk RN  Outcome: Met This Shift     Problem: Falls - Risk of:  Goal: Will remain free from falls  Description: Will remain free from falls  2/8/2022 2359 by Jean-Claude Garcia RN  Outcome: Met This Shift  2/8/2022 1239 by Ilda Monk RN  Outcome: Met This Shift  Goal: Absence of physical injury  Description: Absence of physical injury  2/8/2022 2359 by Jean-Claude Garcia RN  Outcome: Met This Shift  2/8/2022 1239 by Ilda Monk RN  Outcome: Met This Shift     Problem: Skin Integrity:  Goal: Will show no infection signs and symptoms  Description: Will show no infection signs and symptoms  2/8/2022 2359 by Jean-Claude Garcia RN  Outcome: Met This Shift  2/8/2022 1239 by Ilda Monk RN  Outcome: Met This Shift  Goal: Absence of new skin breakdown  Description: Absence of new skin breakdown  2/8/2022 2359 by Jean-Claude Garcia RN  Outcome: Met This Shift  2/8/2022 1239 by Ilda Monk RN  Outcome: Met This Shift

## 2022-02-09 NOTE — CARE COORDINATION
2/9/2022: SS Note/Discharge plan:   Notified by Kristen Kohler admissions for [employer] at [facility] that they have accepted pt medically and PRE CERT SUBMITTED TODAY, ANDRES and HENS exemption initiated, pt and pt's family notified, sw to follow. Electronically signed by ELSA Whyte on 2/9/2022 at 3:14 PM

## 2022-02-09 NOTE — PROGRESS NOTES
[address] [place], [place] [address]ist   Progress Note    Admitting Date and Time: 2/6/2022 10:08 PM  Admit Dx: Liver mass [R16.0]  Herniated nucleus pulposus, L4-5 [M51.26]  Unable to ambulate [R26.2]  Intractable pain [R52]  Intractable back pain [M54.9]    Subjective:    Patient complained of recurrent chest pain this morning which has resolved. An EKG was repeated and no acute ischemic changes. High sensitivity troponin is 9. She also had nausea that was relieved with Zofran. Patient's family was present at the bedside. Per her family the patient eats very little at home and watches her blood sugars at home very closely. Diet was liberated to allow for more oral intake as patient is 75 lbs. ROS: denies fever, chills, cp, sob, n/v, HA unless stated above.      rosuvastatin  10 mg Oral Nightly    polyethylene glycol  17 g Oral Once    sennosides-docusate sodium  1 tablet Oral BID    carvedilol  25 mg Oral BID WC    clopidogrel  75 mg Oral Daily    [Held by provider] glipiZIDE  5 mg Oral QAM AC    lisinopril  30 mg Oral Daily    pantoprazole  40 mg Oral BID AC    methylPREDNISolone  4 mg Oral QAM AC    methylPREDNISolone  4 mg Oral Lunch    methylPREDNISolone  4 mg Oral Dinner    methylPREDNISolone  4 mg Oral Nightly    insulin lispro  0-6 Units SubCUTAneous TID WC    insulin lispro  0-3 Units SubCUTAneous Nightly     ondansetron, 4 mg, Q6H PRN  acetaminophen, 650 mg, Q4H PRN  HYDROcodone 5 mg - acetaminophen, 1 tablet, Q4H PRN  dextrose bolus (hypoglycemia), 125 mL, PRN   Or  dextrose bolus (hypoglycemia), 250 mL, PRN  glucose, 15 g, PRN  glucagon (rDNA), 1 mg, PRN  dextrose, 100 mL/hr, PRN         Objective:    /60   Pulse 73   Temp 97.8 °F (36.6 °C) (Oral)   Resp 16   Wt 75 lb (34 kg)   SpO2 98%   Breastfeeding No   BMI 15.68 kg/m²   General Appearance: alert and oriented to person, place and time and in no acute distress, frail  Skin: warm and dry  Head: normocephalic and atraumatic  Eyes: pupils equal, round, and reactive to light, extraocular eye movements intact, conjunctivae normal  Neck: neck supple and non tender without mass   Pulmonary/Chest: diminished bilaterally, no respiratory distress  Cardiovascular: normal rate, normal S1 and S2   Abdomen: soft, non-tender, non-distended, normal bowel sounds   Extremities: no cyanosis, no clubbing and no edema  Muscoloskeletal:  Left lower back/buttock and right lower back pain with movement  Neurologic: no tremor and speech normal      Recent Labs     02/07/22  0100 02/08/22  0446 02/09/22  0415    137 139   K 4.0 3.4* 4.4    101 102   CO2 23 21* 23   BUN 19 17 19   CREATININE 0.5 0.6 0.6   GLUCOSE 150* 109* 201*   CALCIUM 9.7 9.2 9.7       Recent Labs     02/07/22 0100 02/08/22 0446 02/09/22  0415   ALKPHOS 93 83 81   PROT 7.5 6.9 7.2   LABALBU 4.4 4.0 4.0   BILITOT 0.3 0.3 0.3   AST 30 43* 28   ALT 16 30 24       Recent Labs     02/07/22 0100 02/08/22 0446 02/09/22  0415   WBC 14.7* 12.9* 9.7   RBC 4.17 3.75 3.82   HGB 13.3 11.8 12.0   HCT 40.6 36.2 36.6   MCV 97.4 96.5 95.8   MCH 31.9 31.5 31.4   MCHC 32.8 32.6 32.8   RDW 11.9 11.9 11.9    181 188   MPV 9.3 9.5 9.7           Radiology:   XR ABDOMEN (KUB) (SINGLE AP VIEW)   Final Result   No active process. See above. XR ABDOMEN (KUB) (SINGLE AP VIEW)   Final Result   Moderate-sized hiatal hernia. Atelectatic changes in the left lung base. No   evidence of acute parenchymal disease. Stool scattered throughout the colon to suggest clinical presentation of   constipation with no signs of obvious obstruction or gross free   intraperitoneal air. XR CHEST (2 VW)   Final Result   Moderate-sized hiatal hernia. Atelectatic changes in the left lung base. No   evidence of acute parenchymal disease.       Stool scattered throughout the colon to suggest clinical presentation of   constipation with no signs of obvious obstruction or gross free intraperitoneal air. MRI LUMBAR SPINE WO CONTRAST   Final Result   1. No evidence of lumbar spine fracture. 2. Levoscoliosis with significant degenerative endplate changes along   concavity of the levoscoliotic curvature. 3. Edematous degenerative endplate changes at Q3/Q3. 4. Lumbar spondylosis as described above with notable moderate central canal   stenosis at L1/L2 and multiple levels of significant neural foraminal   narrowing notable on the right at L1/L2, L2/L3, and L4/L5. Severe left   neural foraminal narrowing at L5/S1. CT ABDOMEN PELVIS WO CONTRAST Additional Contrast? None   Final Result   No acute finding in the abdomen and pelvis on this unenhanced study. A slightly hypodense lesion in the left lobe of the liver, which may   represent an [address]. Dedicated MRI is recommended to confirm and rule out other   possibilities such as malignant neoplasms. Distal colonic diverticulosis. No acute diverticulitis. XR HIP 2-3 VW W PELVIS LEFT   Final Result   No evidence of an acute fracture of the pelvis or left hip. Assessment:  Active Problems:    Coronary artery disease involving native coronary artery of native heart without angina pectoris    Hypertension    Hyperlipidemia    Type 2 diabetes mellitus without complication, without long-term current use of insulin (HCC)    Intractable pain    Sciatica of left side    Gastroesophageal reflux disease without esophagitis    Intractable back pain    Herniated nucleus pulposus, L4-5  Resolved Problems:    * No resolved hospital problems. *      Plan:  1. Intractable Left and right lower back pain:  On methylprednisolone. Patient continues to have pain with movement. Neurosurgery on consult for abnormal MRI. 2. Liver lesion:  Seen on CT of the abdomen and pelvis. Possibly focal nodular hyperplasia. Will need outpatient follow up. Discussed with the patient and her family.    3. Diabetes:  Hold home glipizide. Continue Humalog sliding scale, blood sugar checks, hypoglycemic protocol. Hgb A1C is 6.5.   4. Moderate hiatal hernia:  Continue PPI. Encouraged patient to be up in chair for meals and at least 30 minutes after. 5. Chest pain:  EKG repeated with no ischemic changes. High sensitivity troponin is 9.    6. Possible aspiration:  Speech Therapy consulted. 7. Hyperlipidemia:  Vytorin stopped per discussion with Clinical Pharmacist and changed to monotherapy. Crestor ordered as an inpatient and will change to simvastatin alone at discharge. 8. Hypertension:  Continue Coreg and lisinopril. 9. GERD: Continue PPI. NOTE: This report was transcribed using voice recognition software. Every effort was made to ensure accuracy; however, inadvertent computerized transcription errors may be present.      Electronically signed by RANDY Dalton CNP on 2/9/2022 at 1:01 PM

## 2022-02-09 NOTE — DISCHARGE INSTR - COC
Continuity of Care Form    Patient Name: Dorita Mcleod   :  1940  MRN:  [id number]    Admit date:  2022  Discharge date:  2/15/2022    Code Status Order: Full Code   Advance Directives:      Admitting Physician:  Sharon Lainez DO  PCP: Antwon Thapa DO    Discharging Nurse: [facility]AN [facility] Unit/Room#: 0313/0313-02  Discharging Unit Phone Number: [phone]    Emergency Contact:   Extended Emergency Contact Information  Primary Emergency Contact: Noemí Renee, [address] Phone: [phone]  Mobile Phone: [phone]  Relation: Brother/Sister   needed?  No    Past Surgical History:  Past Surgical History:   Procedure Laterality Date    CATARACT REMOVAL      bilateral    CORONARY ANGIOPLASTY  2010    PTCA with DAMON in the proximal left anterior descending    CORONARY ANGIOPLASTY  3/11/2010    PTCA and deployment of 2 stents in the ostium and the proximal segment of RCA    CORONARY ANGIOPLASTY  2010    PTCA DAMON to ostial RCA patent LAD and RCA BMS  restenosis of RCA    CORONARY ANGIOPLASTY WITH STENT PLACEMENT      times 3    DIAGNOSTIC CARDIAC CATH LAB PROCEDURE  2010    Dr. Mindy Wilson: Cardiac cath with angioplasty follow up     DIAGNOSTIC CARDIAC CATH LAB PROCEDURE  3/11/2010    Cardiac cath and stent placement    DIAGNOSTIC CARDIAC CATH LAB PROCEDURE  2010    Cardiac cath heart lab and subsequent angiopilsaty    FRACTURE SURGERY      tight wrist    SHOULDER ARTHROSCOPY  2011    left shoulder arthroscopy ,acromioplasty,busectomy, release of adhesions    UPPER GASTROINTESTINAL ENDOSCOPY N/A 2021    EGD BIOPSY performed by Maria Fernanda Alejandro DO at [facility] ENDOSCOPY       Immunization History:   Immunization History   Administered Date(s) Administered    COVID-19, J&J, PF, 0.5 mL 2021    COVID-19, Pfizer Purple top, DILUTE for use, 12+ yrs, 30mcg/0.3mL dose 2021    Influenza, High Dose (Fluzone 65 yrs and older) 10/03/2015, 10/03/2016 Influenza, Quadv, IM, PF (6 mo and older Fluzone, Flulaval, Fluarix, and 3 yrs and older Afluria) 10/02/2017, 09/14/2020    Influenza, Loletta Flatten, Recombinant, IM PF (Flublok 18 yrs and older) 10/07/2019    Influenza, Quadv, adjuvanted, 65 yrs +, IM, PF (Fluad) 09/14/2020    Influenza, Triv, inactivated, subunit, adjuvanted, IM (Fluad 65 yrs and older) 10/18/2018    Pneumococcal Conjugate 13-valent ([id number]) 11/12/2016    Pneumococcal Polysaccharide ([id number]) 11/13/2017    Zoster Live (Zostavax) 10/03/2016       Active Problems:  Patient Active Problem List   Diagnosis Code    Coronary artery disease involving native coronary artery of native heart without angina pectoris I25.10    Scoliosis M41.9    Hypertension I10    Hyperlipidemia E78.5    Type 2 diabetes mellitus without complication, without long-term current use of insulin (HCC) E11.9    Atrial septal aneurysm I25.3    GI bleed K92.2    Intractable pain R52    Sciatica of left side M54.32    Gastroesophageal reflux disease without esophagitis K21.9    Intractable back pain M54.9    Herniated nucleus pulposus, L4-5 M51.26       Isolation/Infection:   Isolation            No Isolation          Patient Infection Status       None to display            Nurse Assessment:  Last Vital Signs: /60   Pulse 73   Temp 97.8 °F (36.6 °C) (Oral)   Resp 16   Wt 75 lb (34 kg)   SpO2 98%   Breastfeeding No   BMI 15.68 kg/m²     Last documented pain score (0-10 scale): Pain Level: 7  Last Weight:   Wt Readings from Last 1 Encounters:   02/07/22 75 lb (34 kg)     Mental Status:  oriented and alert    IV Access:  - None    Nursing Mobility/ADLs:  Walking   Assisted  Transfer  Assisted  Bathing  Assisted  Dressing  Assisted  Toileting  Assisted  Feeding  Assisted  Med Admin  Assisted  Med Delivery   whole    Wound Care Documentation and Therapy:        Elimination:  Continence:    Bowel: Yes  Bladder: Yes  Urinary Catheter: None   Colostomy/Ileostomy/Ileal Conduit: No       Date of Last BM: 2/14/022    Intake/Output Summary (Last 24 hours) at 2/9/2022 1433  Last data filed at 2/9/2022 1158  Gross per 24 hour   Intake 420 ml   Output 1350 ml   Net -930 ml     I/O last 3 completed shifts: In: 480 [P.O.:480]  Out: [address] [Urine:1850]    Safety Concerns: At Risk for Falls    Impairments/Disabilities:      None    Nutrition Therapy:  Current Nutrition Therapy:   - Oral Diet:  General    Routes of Feeding: Oral  Liquids: Thin Liquids  Daily Fluid Restriction: no  Last Modified Barium Swallow with Video (Video Swallowing Test): not done    Rehab Therapies: Physical Therapy and Occupational Therapy  Weight Bearing Status/Restrictions: No weight bearing restirctions  Other Medical Equipment (for information only, NOT a DME order):  walker  Other Treatments: ***    Patient's personal belongings (please select all that are sent with patient):  None    RN SIGNATURE:  Electronically signed by Robert Mcnair RN on 2/15/22 at 9:07 AM EST    CASE MANAGEMENT/SOCIAL WORK SECTION    Inpatient Status Date: 2/8/2022    Readmission Risk Assessment Score:  Readmission Risk              Risk of Unplanned Readmission:  12           Discharging to Facility/ Agency   Name: [address] [place] [address]  Address:  Phone: [phone]    Dialysis Facility (if applicable)   Name:  Address:  Dialysis Schedule:  Phone:  Fax:    / signature: Electronically signed by ELSA Danielle on 2/9/22 at 2:36 PM EST    PHYSICIAN SECTION    Prognosis: Good    Condition at Discharge: Stable    Rehab Potential (if transferring to Rehab): Good    Recommended Labs or Other Treatments After Discharge: PT/OT, nursing    Physician Certification: I certify the above information and transfer of Rahul Allan  is necessary for the continuing treatment of the diagnosis listed and that she requires [facility] for less 30 days.      Update Admission H&P: Changes in H&P as follows - see discharge summary     PHYSICIAN SIGNATURE:  Electronically signed by [employer] on 2/14/22 at 4:12 PM EST

## 2022-02-10 LAB
ALBUMIN SERPL-MCNC: 3.7 G/DL (ref 3.5–5.2)
ALP BLD-CCNC: 79 U/L (ref 35–104)
ALT SERPL-CCNC: 27 U/L (ref 0–32)
ANION GAP SERPL CALCULATED.3IONS-SCNC: 12 MMOL/L (ref 7–16)
AST SERPL-CCNC: 30 U/L (ref 0–31)
BILIRUB SERPL-MCNC: 0.3 MG/DL (ref 0–1.2)
BUN BLDV-MCNC: 21 MG/DL (ref 6–23)
CALCIUM SERPL-MCNC: 9.7 MG/DL (ref 8.6–10.2)
CHLORIDE BLD-SCNC: 101 MMOL/L (ref 98–107)
CO2: 24 MMOL/L (ref 22–29)
CREAT SERPL-MCNC: 0.6 MG/DL (ref 0.5–1)
GFR AFRICAN AMERICAN: >60
GFR NON-AFRICAN AMERICAN: >60 ML/MIN/1.73
GLUCOSE BLD-MCNC: 173 MG/DL (ref 74–99)
HCT VFR BLD CALC: 37.8 % (ref 34–48)
HEMOGLOBIN: 12.4 G/DL (ref 11.5–15.5)
INR BLD: 1.1
MAGNESIUM: 2.2 MG/DL (ref 1.6–2.6)
MCH RBC QN AUTO: 31.6 PG (ref 26–35)
MCHC RBC AUTO-ENTMCNC: 32.8 % (ref 32–34.5)
MCV RBC AUTO: 96.2 FL (ref 80–99.9)
METER GLUCOSE: 145 MG/DL (ref 74–99)
METER GLUCOSE: 163 MG/DL (ref 74–99)
METER GLUCOSE: 271 MG/DL (ref 74–99)
METER GLUCOSE: 295 MG/DL (ref 74–99)
PDW BLD-RTO: 11.8 FL (ref 11.5–15)
PHOSPHORUS: 4.3 MG/DL (ref 2.5–4.5)
PLATELET # BLD: 218 E9/L (ref 130–450)
PMV BLD AUTO: 9.6 FL (ref 7–12)
POTASSIUM SERPL-SCNC: 4.1 MMOL/L (ref 3.5–5)
PROTHROMBIN TIME: 12.4 SEC (ref 9.3–12.4)
RBC # BLD: 3.93 E12/L (ref 3.5–5.5)
SODIUM BLD-SCNC: 137 MMOL/L (ref 132–146)
TOTAL PROTEIN: 7.3 G/DL (ref 6.4–8.3)
WBC # BLD: 8.7 E9/L (ref 4.5–11.5)

## 2022-02-10 PROCEDURE — 96376 TX/PRO/DX INJ SAME DRUG ADON: CPT

## 2022-02-10 PROCEDURE — 85610 PROTHROMBIN TIME: CPT

## 2022-02-10 PROCEDURE — 84100 ASSAY OF PHOSPHORUS: CPT

## 2022-02-10 PROCEDURE — 99232 SBSQ HOSP IP/OBS MODERATE 35: CPT | Performed by: INTERNAL MEDICINE

## 2022-02-10 PROCEDURE — 82962 GLUCOSE BLOOD TEST: CPT

## 2022-02-10 PROCEDURE — 97530 THERAPEUTIC ACTIVITIES: CPT

## 2022-02-10 PROCEDURE — 99221 1ST HOSP IP/OBS SF/LOW 40: CPT | Performed by: SURGERY

## 2022-02-10 PROCEDURE — 6360000002 HC RX W HCPCS: Performed by: FAMILY MEDICINE

## 2022-02-10 PROCEDURE — 97535 SELF CARE MNGMENT TRAINING: CPT

## 2022-02-10 PROCEDURE — 6370000000 HC RX 637 (ALT 250 FOR IP): Performed by: FAMILY MEDICINE

## 2022-02-10 PROCEDURE — 80053 COMPREHEN METABOLIC PANEL: CPT

## 2022-02-10 PROCEDURE — 85027 COMPLETE CBC AUTOMATED: CPT

## 2022-02-10 PROCEDURE — 36415 COLL VENOUS BLD VENIPUNCTURE: CPT

## 2022-02-10 PROCEDURE — 1200000000 HC SEMI PRIVATE

## 2022-02-10 PROCEDURE — 83735 ASSAY OF MAGNESIUM: CPT

## 2022-02-10 PROCEDURE — 6360000002 HC RX W HCPCS: Performed by: NURSE PRACTITIONER

## 2022-02-10 PROCEDURE — 6370000000 HC RX 637 (ALT 250 FOR IP): Performed by: NURSE PRACTITIONER

## 2022-02-10 PROCEDURE — APPSS30 APP SPLIT SHARED TIME 16-30 MINUTES: Performed by: NURSE PRACTITIONER

## 2022-02-10 RX ADMIN — INSULIN LISPRO 3 UNITS: 100 INJECTION, SOLUTION INTRAVENOUS; SUBCUTANEOUS at 11:17

## 2022-02-10 RX ADMIN — HYDROCODONE BITARTRATE AND ACETAMINOPHEN 1 TABLET: 5; 325 TABLET ORAL at 20:21

## 2022-02-10 RX ADMIN — PANTOPRAZOLE SODIUM 40 MG: 40 TABLET, DELAYED RELEASE ORAL at 06:08

## 2022-02-10 RX ADMIN — ROSUVASTATIN CALCIUM 10 MG: 10 TABLET, FILM COATED ORAL at 20:22

## 2022-02-10 RX ADMIN — METHYLPREDNISOLONE 4 MG: 4 TABLET ORAL at 06:08

## 2022-02-10 RX ADMIN — LISINOPRIL 30 MG: 20 TABLET ORAL at 08:22

## 2022-02-10 RX ADMIN — HYDROCODONE BITARTRATE AND ACETAMINOPHEN 1 TABLET: 5; 325 TABLET ORAL at 04:28

## 2022-02-10 RX ADMIN — INSULIN LISPRO 3 UNITS: 100 INJECTION, SOLUTION INTRAVENOUS; SUBCUTANEOUS at 16:37

## 2022-02-10 RX ADMIN — METHYLPREDNISOLONE 4 MG: 4 TABLET ORAL at 20:22

## 2022-02-10 RX ADMIN — PANTOPRAZOLE SODIUM 40 MG: 40 TABLET, DELAYED RELEASE ORAL at 16:36

## 2022-02-10 RX ADMIN — SENNOSIDES AND DOCUSATE SODIUM 1 TABLET: 50; 8.6 TABLET ORAL at 20:22

## 2022-02-10 RX ADMIN — METHYLPREDNISOLONE 4 MG: 4 TABLET ORAL at 11:17

## 2022-02-10 RX ADMIN — CARVEDILOL 25 MG: 25 TABLET, FILM COATED ORAL at 08:23

## 2022-02-10 RX ADMIN — SENNOSIDES AND DOCUSATE SODIUM 1 TABLET: 50; 8.6 TABLET ORAL at 08:22

## 2022-02-10 RX ADMIN — ONDANSETRON 4 MG: 2 INJECTION INTRAMUSCULAR; INTRAVENOUS at 16:37

## 2022-02-10 RX ADMIN — INSULIN LISPRO 1 UNITS: 100 INJECTION, SOLUTION INTRAVENOUS; SUBCUTANEOUS at 21:00

## 2022-02-10 RX ADMIN — CARVEDILOL 25 MG: 25 TABLET, FILM COATED ORAL at 16:36

## 2022-02-10 ASSESSMENT — PAIN SCALES - GENERAL
PAINLEVEL_OUTOF10: 8
PAINLEVEL_OUTOF10: 0
PAINLEVEL_OUTOF10: 0
PAINLEVEL_OUTOF10: 8
PAINLEVEL_OUTOF10: 0
PAINLEVEL_OUTOF10: 0

## 2022-02-10 NOTE — CONSULTS
Comprehensive Nutrition Assessment    Type and Reason for Visit:  Initial,Consult (ONS)    Nutrition Recommendations/Plan: Will continue w/ Magic TID and add Ensure Compact TID    Nutrition Assessment:  Pt admits w/ Dx: Liver Mass w/ PMH of DM, HTN, CVA. Pt tolerates diet w/ ~45% PO intakes, pt reports no sign. wt changes and states this is UBW. Will modify ONS TID and continue to monitor    Malnutrition Assessment:  Malnutrition Status: At risk for malnutrition (Comment)    Context:  Acute Illness     Findings of the 6 clinical characteristics of malnutrition:  Energy Intake:  Mild decrease in energy intake (Comment)  Weight Loss:  No significant weight loss (per emr wt hx and pt report)     Body Fat Loss:  No significant body fat loss     Muscle Mass Loss:  No significant muscle mass loss    Fluid Accumulation:  No significant fluid accumulation     Strength:  Not Performed    Estimated Daily Nutrient Needs:  Energy (kcal):  7531-8914; Weight Used for Energy Requirements:  Admission     Protein (g):  50-60 (x1.5-1.7gm/kg); Weight Used for Protein Requirements:  Current        Fluid (ml/day):  9915-7644; Method Used for Fluid Requirements:  1 ml/kcal      Nutrition Related Findings:  -I/O -1.8L      Wounds:  None       Current Nutrition Therapies:    ADULT DIET;  Regular  ADULT ORAL NUTRITION SUPPLEMENT; Breakfast, Lunch, Dinner; Frozen Oral Supplement  ADULT ORAL NUTRITION SUPPLEMENT; Breakfast, Lunch, Dinner; Standard 4 oz Oral Supplement    Anthropometric Measures:  · Height: 4' 10\" (147.3 cm)  · Current Body Weight: 75 lb (34 kg) (2/9 bed)   · Usual Body Weight: 78 lb (35.4 kg) (per EMR review 2/18/21 actual wt)     · Ideal Body Weight: 90 lbs; % Ideal Body Weight 83.3 %   · BMI: 15.7  · BMI Categories: Underweight (BMI less than 22) age over 72       Nutrition Diagnosis:   · Inadequate oral intake related to early satiety as evidenced by intake 26-50%,BMI      Nutrition Interventions:   Food and/or Nutrient Delivery:  Continue Current Diet,Modify Oral Nutrition Supplement  Nutrition Education/Counseling:  No recommendation at this time   Coordination of Nutrition Care:  Continue to monitor while inpatient    Goals:  Consume >50% meals/ONS       Nutrition Monitoring and Evaluation:   Behavioral-Environmental Outcomes:  None Identified   Food/Nutrient Intake Outcomes:  Food and Nutrient Intake,Supplement Intake  Physical Signs/Symptoms Outcomes:  Biochemical Data,Fluid Status or Edema,Nutrition Focused Physical Findings,Skin,Weight,GI Status     Discharge Planning:     Too soon to determine     Electronically signed by Tracey Hampton RD, LD on 2/9/22 at 7:17 PM EST    Contact: [phone]

## 2022-02-10 NOTE — CONSULTS
GENERAL SURGERY  CONSULT NOTE  2/10/2022    Physician Consulted: Dr. Kandy Andrea  Reason for Consult: [facility], dysphagia   Referring Physician: Dr. Tayler Serra     ARIELA Dawson is a 80 y.o. female with history as below who presented initially for severe back pain. She is also complaining of increased dysphagia and emesis. She states she has had some level of dysphagia for a long time but it recently became severe to the point of only being able to tolerate minimal liquids. If she attempts to eat solids she regurgitates it. She denies any noticeable weight loss but her family is concerned about her nutritional status. On imaging, she has a significant hiatal hernia. She has not had any prior abdominal surgeries. She does take plavix and her last dose was yesterday morning.        Past Medical History:   Diagnosis Date    Acute CVA (cerebrovascular accident) ([address] 5/25/2019    Arthritis     Back problem     CAD (coronary artery disease) 2010    PTCA with DAMON to prox LAD, PTCA DAMON to ostial lesion of RCA and BMS to prox lesion RCA DAMON to RCA ostial     Cerebral artery occlusion with cerebral infarction ([address] 05/2019    Hyperlipidemia     Hypertension     Nausea & vomiting     Reflux     RLD (ruptured lumbar disc)     Scoliosis     Spinal stenosis     Type 2 diabetes mellitus without complication, without long-term current use of insulin ([address] 10/14/2019       Past Surgical History:   Procedure Laterality Date    CATARACT REMOVAL      bilateral    CORONARY ANGIOPLASTY  2/25/2010    PTCA with DAMON in the proximal left anterior descending    CORONARY ANGIOPLASTY  3/11/2010    PTCA and deployment of 2 stents in the ostium and the proximal segment of RCA    CORONARY ANGIOPLASTY  5/19/2010    PTCA DAMON to ostial RCA patent LAD and RCA BMS  restenosis of RCA    CORONARY ANGIOPLASTY WITH STENT PLACEMENT      times 3    DIAGNOSTIC CARDIAC CATH LAB PROCEDURE  2/25/2010    Dr. Charissa Tavarez: Cardiac cath with angioplasty follow up    [address] CATH LAB PROCEDURE  3/11/2010    Cardiac cath and stent placement    DIAGNOSTIC CARDIAC CATH LAB PROCEDURE  5/19/2010    Cardiac cath heart lab and subsequent angiopilsaty    FRACTURE SURGERY      tight wrist    SHOULDER ARTHROSCOPY  08 25 2011    left shoulder arthroscopy ,acromioplasty,busectomy, release of adhesions    UPPER GASTROINTESTINAL ENDOSCOPY N/A 2/19/2021    EGD BIOPSY performed by Leslie Mcintosh DO at [place] [address]       Medications Prior to Admission:    Prior to Admission medications    Medication Sig Start Date End Date Taking?  Authorizing Provider   glimepiride (AMARYL) 2 MG tablet Take 1 tablet by mouth daily (with breakfast) 2/22/21  Yes Kelly Lighter, DO   lisinopril (PRINIVIL;ZESTRIL) 30 MG tablet Take 1 tablet by mouth daily 2/22/21  Yes Kelly Lighter, DO   pantoprazole (PROTONIX) 40 MG tablet Take 1 tablet by mouth 2 times daily (before meals) 2/21/21  Yes Kelly Lighter, DO   carvedilol (COREG) 25 MG tablet Take 1 tablet by mouth 2 times daily (with meals) 6/7/19  Yes Jorge Cai MD   ezetimibe-simvastatin (VYTORIN) 10-40 MG per tablet Take 1 tablet by mouth nightly Indications: patient no longer takes    Yes Historical Provider, MD   clopidogrel (PLAVIX) 75 MG tablet Take 75 mg by mouth daily     Historical Provider, MD   butalbital-acetaminophen-caffeine (FIORICET, ESGIC) -40 MG per tablet Take 1 tablet by mouth every 4 hours as needed for Migraine    Historical Provider, MD       Allergies   Allergen Reactions    Aspirin      Caused bleeding ulcers    Lipitor      Caused elevated liver enzymes       Family History   Problem Relation Age of Onset    Heart Attack Mother 68    Heart Attack Father 72    Cancer Sister 79        pancreatic       Social History     Tobacco Use    Smoking status: Never Smoker    Smokeless tobacco: Never Used   Vaping Use    Vaping Use: Never used   Substance Use Topics    Alcohol use: Never Alcohol/week: 0.0 standard drinks     Comment: No caffeine    Drug use: Never         Review of Systems   General ROS: negative  Hematological and Lymphatic ROS: negative  Respiratory ROS: no cough, shortness of breath, or wheezing  Cardiovascular ROS: no chest pain or dyspnea on exertion  Gastrointestinal ROS: positive for - swallowing difficulty/pain  Genito-Urinary ROS: negative  Musculoskeletal ROS: negative      PHYSICAL EXAM:    Vitals:    02/10/22 0823   BP: (!) 121/44   Pulse: 77   Resp:    Temp:    SpO2: 94%       General Appearance:  awake, alert, oriented, in no acute distress  Skin:  Skin color, texture, turgor normal. No rashes or lesions. Head/face:  NCAT  Eyes:  No gross abnormalities. Lungs:  Normal expansion. Clear to auscultation. Heart:  Heart regular rate  Abdomen:  Soft, non-tender, non-distended  Extremities: Extremities warm to touch, pink, with no edema. LABS:    CBC  Recent Labs     02/10/22  0425   WBC 8.7   HGB 12.4   HCT 37.8        BMP  Recent Labs     02/10/22  0425      K 4.1      CO2 24   BUN 21   CREATININE 0.6   CALCIUM 9.7     Liver Function  Recent Labs     02/09/22  0415 02/09/22  0415 02/10/22  0425   BILITOT 0.3   < > 0.3   BILIDIR <0.2  --   --    AST 28   < > 30   ALT 24   < > 27   ALKPHOS 81   < > 79   PROT 7.2   < > 7.3   LABALBU 4.0   < > 3.7    < > = values in this interval not displayed. No results for input(s): LACTATE in the last 72 hours. Recent Labs     02/10/22  0805   INR 1.1       RADIOLOGY    CT ABDOMEN PELVIS WO CONTRAST Additional Contrast? None    Result Date: 2/7/2022  EXAMINATION: CT OF THE ABDOMEN AND PELVIS WITHOUT CONTRAST 2/7/2022 12:05 am TECHNIQUE: CT of the abdomen and pelvis was performed without the administration of intravenous contrast. Multiplanar reformatted images are provided for review.  Dose modulation, iterative reconstruction, and/or weight based adjustment of the mA/kV was utilized to reduce the radiation dose to as low as reasonably achievable. COMPARISON: None. HISTORY: ORDERING SYSTEM PROVIDED HISTORY: lower lumbar pain, l sided sciatica TECHNOLOGIST PROVIDED HISTORY: Reason for exam:->lower lumbar pain, l sided sciatica Additional Contrast?->None Decision Support Exception - unselect if not a suspected or confirmed emergency medical condition->Emergency Medical Condition (MA) FINDINGS: THE STUDY LIMITED DUE TO LACK OF INTRAVENOUS CONTRAST. Lower Chest: Moderate to large-sized hiatal hernia. Mild bibasilar dependent atelectasis. Organs: An approximately 3.9 x 3.6 cm slightly hypodense lesion in the left lobe of the liver, which may represent an [address] but is incompletely evaluated on the current study. Unremarkable spleen, pancreas, adrenals, and bilateral kidneys on this unenhanced study. No radiopaque urolithiasis or hydroureteronephrosis on either side. No definite cholelithiasis. GI/Bowel: Normal appendix. Moderate to large amount of retained stool in the colon. Bowel loops nonobstructed. Distal colonic diverticulosis. No acute diverticulitis Pelvis: No adnexal mass. Grossly unremarkable urinary bladder. Peritoneum/Retroperitoneum: No free air or free fluid. No adenopathy. Vascular calcification with no abdominal aortic aneurysm. Bones/Soft Tissues: Multilevel lumbar spondylosis with levoscoliosis. Posterior disc bulges are seen in the lumbar spine at multiple levels, most prominent at L4-L5. Dedicated MRI of the lumbar spine may be obtained if clinically indicated. No acute finding in the abdomen and pelvis on this unenhanced study. A slightly hypodense lesion in the left lobe of the liver, which may represent an [address]. Dedicated MRI is recommended to confirm and rule out other possibilities such as malignant neoplasms. Distal colonic diverticulosis. No acute diverticulitis.      MRI LUMBAR SPINE WO CONTRAST    Result Date: 2/7/2022  EXAMINATION: MRI OF THE LUMBAR SPINE WITHOUT CONTRAST, 2/7/2022 L2/L3. 4. Lumbar spondylosis as described above with notable moderate central canal stenosis at L1/L2 and multiple levels of significant neural foraminal narrowing notable on the right at L1/L2, L2/L3, and L4/L5. Severe left neural foraminal narrowing at L5/S1. XR HIP 2-3 VW W PELVIS LEFT    Result Date: 2/6/2022  EXAMINATION: ONE XRAY VIEW OF THE PELVIS AND TWO XRAY VIEWS LEFT HIP 2/6/2022 11:05 pm COMPARISON: None. HISTORY: ORDERING SYSTEM PROVIDED HISTORY: r/o fx TECHNOLOGIST PROVIDED HISTORY: Reason for exam:->r/o fx FINDINGS: No evidence of an acute fracture of the pelvis or left hip. There no significant degenerative changes in the hips. Sacroiliac joints and pubic symphysis are unremarkable. There are moderate degenerative changes in lower lumbar spine. No evidence of an acute fracture of the pelvis or left hip.          ASSESSMENT:  80 y.o. female with symptomatic hiatal hernia     PLAN:  - since patient has been dealing with this issue for many months, can continue talking about possible repair as an outpatient   - will need outpatient studies such as esophagram and manometry prior to any procedures being scheduled   - continue diet as tolerated    Discussed with Dr. Faviola Calderon    Electronically signed by Bhavya Hernández MD on 2/10/22 at 12:43 PM EST    General Surgery Progress Note  [facility]    Patient's Name/Date of Birth: Tiago Shaw / 1940    Date: February 11, 2022     Surgeon: Faviola Calderon MD    Chief Complaint: hiatal hernia    Patient Active Problem List   Diagnosis    Coronary artery disease involving native coronary artery of native heart without angina pectoris    Scoliosis    Hypertension    Hyperlipidemia    Type 2 diabetes mellitus without complication, without long-term current use of insulin ([facility]    Atrial septal aneurysm    GI bleed    Intractable pain    Sciatica of left side    Gastroesophageal reflux disease without esophagitis    Intractable back pain    Herniated nucleus pulposus, L4-5       Subjective: HPI as above. No new complaints. Objective:  /61   Pulse 71   Temp 98.7 °F (37.1 °C) (Oral)   Resp 16   Ht 4' 10\" (1.473 m)   Wt 75 lb (34 kg)   SpO2 97%   Breastfeeding No   BMI 15.68 kg/m²   Labs:  Recent Labs     02/09/22  0415 02/10/22  0425 02/11/22  0438   WBC 9.7 8.7 7.5   HGB 12.0 12.4 12.9   HCT 36.6 37.8 39.0     Lab Results   Component Value Date    CREATININE 0.6 02/11/2022    BUN 22 02/11/2022     02/11/2022    K 4.2 02/11/2022    CL 99 02/11/2022    CO2 23 02/11/2022     No results for input(s): LIPASE, AMYLASE in the last 72 hours.   CBC with Differential:    Lab Results   Component Value Date    WBC 7.5 02/11/2022    RBC 4.08 02/11/2022    HGB 12.9 02/11/2022    HCT 39.0 02/11/2022     02/11/2022    MCV 95.6 02/11/2022    MCH 31.6 02/11/2022    MCHC 33.1 02/11/2022    RDW 11.5 02/11/2022    LYMPHOPCT 16.8 02/07/2022    MONOPCT 23.8 02/07/2022    BASOPCT 0.1 02/07/2022    MONOSABS 3.49 02/07/2022    LYMPHSABS 2.46 02/07/2022    EOSABS 0.22 02/07/2022    BASOSABS 0.01 02/07/2022     CMP:    Lab Results   Component Value Date     02/11/2022    K 4.2 02/11/2022    K 4.0 02/07/2022    CL 99 02/11/2022    CO2 23 02/11/2022    BUN 22 02/11/2022    CREATININE 0.6 02/11/2022    GFRAA >60 02/11/2022    LABGLOM >60 02/11/2022    GLUCOSE 188 02/11/2022    GLUCOSE 116 08/15/2011    PROT 7.3 02/10/2022    LABALBU 3.7 02/10/2022    CALCIUM 9.4 02/11/2022    BILITOT 0.3 02/10/2022    ALKPHOS 79 02/10/2022    AST 30 02/10/2022    ALT 27 02/10/2022       General appearance:  NAD  Head: NCAT, PERRLA, EOMI, red conjunctiva  Neck: supple, no masses  Lungs: CTAB, equal chest rise bilateral  Heart: Reg rate  Abdomen: soft, nondistended, tender mild epigastrium  Skin; no lesions  Gu: no cva tenderness  Extremities: extremities normal, atraumatic, no cyanosis or edema      Assessment/Plan:  Anusha Modi is a 80 y.o. female with symptomatic hiatal hernia    We discussed surgical repair which would be undertaken as an outpatient. Patient is on Plavix for coronary artery disease. She also has other significant medical problems, including type 2 diabetes, cerebral vascular infarct, hypertension. She will need medical optimization prior to this semielective surgery  She will also need EGD with manometry to rule out achalasia or other esophageal pathology  After this, she will be scheduled for a laparoscopic hiatal hernia repair with possible fundoplication, possible gastropexy  We discussed the risks, benefits, alternatives to surgery. She agrees to proceed.     Argelia Ibanez MD

## 2022-02-10 NOTE — PLAN OF CARE
Problem: Pain:  Goal: Pain level will decrease  Description: Pain level will decrease  Outcome: Met This Shift  Goal: Control of acute pain  Description: Control of acute pain  Outcome: Met This Shift  Goal: Control of chronic pain  Description: Control of chronic pain  Outcome: Completed     Problem: Falls - Risk of:  Goal: Will remain free from falls  Description: Will remain free from falls  Outcome: Met This Shift  Goal: Absence of physical injury  Description: Absence of physical injury  Outcome: Met This Shift     Problem: Skin Integrity:  Goal: Will show no infection signs and symptoms  Description: Will show no infection signs and symptoms  Outcome: Met This Shift  Goal: Absence of new skin breakdown  Description: Absence of new skin breakdown  Outcome: Met This Shift

## 2022-02-10 NOTE — PROGRESS NOTES
Physical Therapy Treatment Note/Plan of Care    Room #:  5019/2313-53  Patient Name: Remedios Huffman  YOB: 1940  MRN: [id number]    Date of Service: 2/10/2022     Tentative placement recommendation: Subacute rehab  Equipment recommendation: To be determined      Evaluating Physical Therapist: Melinda Johnson, PT #[id number]      Specific Provider Orders/Date/Referring Provider :  02/07/22 0315   PT eval and treat Start: 02/07/22 0315, End: 02/07/22 0315, ONE TIME, Standing Count: 1 Occurrences, R    Rafiq Contreras,       Admitting Diagnosis:   Liver mass [R16.0]  Herniated nucleus pulposus, L4-5 [M51.26]  Unable to ambulate [R26.2]  Intractable pain [R52]  Intractable back pain [M54.9]     for back pain, history of back trouble in the past, she states her lower back is hurting, rating her left leg,     Surgery: none  Visit Diagnoses       Codes    Unable to ambulate     R26.2    Liver mass     R16.0          Patient Active Problem List   Diagnosis    Coronary artery disease involving native coronary artery of native heart without angina pectoris    Scoliosis    Hypertension    Hyperlipidemia    Type 2 diabetes mellitus without complication, without long-term current use of insulin (HCC)    Atrial septal aneurysm    GI bleed    Intractable pain    Sciatica of left side    Gastroesophageal reflux disease without esophagitis    Intractable back pain    Herniated nucleus pulposus, L4-5        ASSESSMENT of Current Deficits Patient exhibits decreased strength, balance, endurance and pain back  impairing functional mobility, transfers, gait , gait distance and tolerance to activity. Pt displays a kyphotic posture, narrow base of support, and small step lengths during ambulation with cueing to correct. Patient requires continued skilled physical therapy to address concerns listed above for increased safety and function at discharge.          PHYSICAL THERAPY  PLAN OF CARE       Physical therapy plan of care is established based on physician order,  patient diagnosis and clinical assessment    Current Treatment Recommendations:    -Bed Mobility: Lower extremity exercises  and Trunk control activities   -Sitting Balance: Facilitate active trunk muscle engagement , Facilitate postural control in all planes  and Engage in core activities to allow for movement within base of support   -Standing Balance: Perform strengthening exercises in standing to promote motor control with or without upper extremity support   -Transfers: Provide instruction on proper hand and foot position for adequate transfer of weight onto lower extremities and use of gait device if needed and Cues for hand placement, technique and safety. Provide stabilization to prevent fall   -Gait: Gait training, Standing activities to improve: base of support, weight shift, weight bearing  and Pregait training to emphasize: Sequencing , Device control, Upright and Safety   -Endurance: Utilize Supervised activities to increase level of endurance to allow for safe functional mobility including transfers and gait     PT long term treatment goals are located in below grid    Patient and or family understand(s) diagnosis, prognosis, and plan of care. Frequency of treatments: Patient will be seen  3-5 times/week.          Prior Level of Function: Patient ambulated with wheeled walker generally used cane when able to get out, has been been little while since been out due to weather  Rehab Potential: fair  + for baseline    Past medical history:   Past Medical History:   Diagnosis Date    Acute CVA (cerebrovascular accident) ([facility] [address] 5/25/2019    Arthritis     Back problem     CAD (coronary artery disease) 2010    PTCA with DAMON to prox LAD, PTCA DAMON to ostial lesion of RCA and BMS to prox lesion RCA DAMON to RCA ostial     Cerebral artery occlusion with cerebral infarction ([facility] [address] 05/2019    Hyperlipidemia     Hypertension     Nausea & vomiting     Reflux  RLD (ruptured lumbar disc)     Scoliosis     Spinal stenosis     Type 2 diabetes mellitus without complication, without long-term current use of insulin ([facility] [address] 10/14/2019     Past Surgical History:   Procedure Laterality Date    CATARACT REMOVAL      bilateral    CORONARY ANGIOPLASTY  2/25/2010    PTCA with DAMON in the proximal left anterior descending    CORONARY ANGIOPLASTY  3/11/2010    PTCA and deployment of 2 stents in the ostium and the proximal segment of RCA    CORONARY ANGIOPLASTY  5/19/2010    PTCA DAMON to ostial RCA patent LAD and RCA BMS  restenosis of RCA    CORONARY ANGIOPLASTY WITH STENT PLACEMENT      times 3    DIAGNOSTIC CARDIAC CATH LAB PROCEDURE  2/25/2010    Dr. Juan Carlos Post: Cardiac cath with angioplasty follow up    [address] CATH LAB PROCEDURE  3/11/2010    Cardiac cath and stent placement    DIAGNOSTIC CARDIAC CATH LAB PROCEDURE  5/19/2010    Cardiac cath heart lab and subsequent angiopilsaty    FRACTURE SURGERY      tight wrist    SHOULDER ARTHROSCOPY  08 25 2011    left shoulder arthroscopy ,acromioplasty,busectomy, release of adhesions    UPPER GASTROINTESTINAL ENDOSCOPY N/A 2/19/2021    EGD BIOPSY performed by Kacey Knowles DO at [address]:    Precautions:  Up with assistance, falls and spinal precautions ,    Social history: Patient lives alone in a apartment    with 3 steps  to enter bilateral Rail  Tub shower grab bars    Equipment owned: [address] [facility], [address] [facility] Kar chair and Quad cane,       AM-PAC Basic Mobility        AM-PAC Mobility Inpatient   How much difficulty turning over in bed?: A Little  How much difficulty sitting down on / standing up from a chair with arms?: A Little  How much difficulty moving from lying on back to sitting on side of bed?: A Little  How much help from another person moving to and from a bed to a chair?: A Little  How much help from another person needed to walk in hospital room?: A Little  How much help from another person for climbing 3-5 steps with a railing?: A Lot  AM-PAC Inpatient Mobility Raw Score : 17  AM-PAC Inpatient T-Scale Score : 42.13  Mobility Inpatient CMS 0-100% Score: 50.57  Mobility Inpatient CMS G-Code Modifier : CK    Nursing cleared patient for PT treatment. .   OBJECTIVE;   Initial Evaluation  Date: 2/7/2022 Treatment Date:  2/10/2022       Short Term/ Long Term   Goals   Was pt agreeable to Eval/treatment? Yes yes To be met in 3 days   Pain level   9/10  Back pain right buttocks, left leg 8/10  Back pain    Bed Mobility    Rolling: Moderate assist of 1    Supine to sit: Moderate assist of 1    Sit to supine: Not assessed     Scooting: Moderate assist of 1   Rolling: Supervision    Supine to sit: Supervision    Sit to supine: Supervision    Scooting: Supervision     Rolling: Minimal assist of 1    Supine to sit: Minimal assist of 1    Sit to supine: Minimal assist of 1    Scooting: Minimal assist of 1     Transfers Sit to stand:  Moderate assist of 1 Cues for hand placement and safety  Sit to stand: Supervision  Cues for hand placement and safety       Sit to stand: Minimal assist of 1     Ambulation    2 x 20 feet using  wheeled walker with Minimal assist of 1   for walker control, balance and upright and cues for sequencing and safety 4 x 100 feet using  wheeled walker with Supervision    cues for upright posture, walker approximation, increased base of support, increased step length, safety and pacing     50 feet using  wheeled walker with Supervision     Stair negotiation: ascended and descended   Not assessed          ROM Within functional limits    Increase range of motion 10% of affected joints    Strength BUE:  refer to OT eval  RLE:  3/5  LLE:  3/5  Increase strength in affected mm groups by 1/3 grade   Balance Sitting EOB:  fair    Dynamic Standing:  fair wheeled walker  Sitting EOB: fair +  Dynamic Standing: fair wheeled walker   Sitting EOB:  good    Dynamic Standing: good -wheeled walker      Patient is Alert & Oriented x person, place, time and situation and follows directions    Sensation:  Patient  denies numbness/tingling   Edema:  no   Endurance: fair       Vitals: room air   Blood Pressure at rest  Blood Pressure during session    Heart Rate at rest   Heart Rate during session     SPO2 at rest  %  SPO2 during session  %     Patient education  Patient educated on role of Physical Therapy, risks of immobility, safety and plan of care,  importance of mobility while in hospital  and spinal precautions     Patient response to education:   Pt verbalized understanding Pt demonstrated skill Pt requires further education in this area   Yes Partial Yes      Treatment:  Patient practiced and was instructed/facilitated in the following treatment: Patient  Sat edge of bed 4 minutes with Supervision  to increase dynamic sitting balance and activity tolerance. Pt stood, ambulated in the hallway, and back to the bed. Returned to supine. Therapeutic Exercises:  not performed     At end of session, patient in bed with   call light and phone within reach,  all lines and tubes intact, nursing notified. Patient would benefit from continued skilled Physical Therapy to improve functional independence and quality of life. Patient's/ family goals   get rid of pain    Time in 2:13  Time out  2:23    Total Treatment Time  10 minutes        CPT codes:    Therapeutic activities (18224)   10 minutes  1 unit(s)   Suzanne Galvan  [facility]  LIC # [id number]

## 2022-02-10 NOTE — PROGRESS NOTES
Physical Therapy    0313/0313-02    Patient unavailable for physical therapy treatment due to nausea. Yvonne Galvan  [facility]  LIC # [id number]

## 2022-02-10 NOTE — CONSULTS
[address] [facility]                                  CONSULTATION    PATIENT NAME: Cristiane Murillo                      :        1940  MED REC NO:   [id number]                            ROOM:       7646  ACCOUNT NO:   [de-identified]                           ADMIT DATE: 2022  PROVIDER:     Andrea Hicks MD    CONSULT DATE:  2022    CHIEF COMPLAINT:  Pain in the lower back radiating down to the left  lower extremity of two weeks duration. HISTORY OF PRESENT ILLNESS:  This is an 80-year-old female who had  sudden onset of pain in the lower back two weeks ago which since then  has persisted. The pain has progressively worsened over the past  several days and has been radiating from the lower back to the left  buttock and around the posterolateral aspect to the ankle. The pain  only involved the left leg and not the right. It is more pronounced in  the back as compared to the leg. She denies any weakness or numbness  though she does have some trouble walking. She underwent an MRI scan of  the lumbar spine which was reviewed. It was reported to show  spondylotic changes with narrowing at the neural foramina at the level  of L1-2, L2-3, L4-5 on the right and severe neural foraminal narrowing  at the level of L5-S1 on the left. She also had some edema due to  degenerative changes at the level of L2-3. No fractures were noted. The study was reviewed by me. The patient denies significant symptoms  pertaining to her back in the past.    PAST MEDICAL HISTORY:  Past history of acute CVA, arthritis, back pain,  coronary artery disease, hyperlipidemia, hypertension, nausea, and  vomiting. She has lumbar disc scoliosis, spinal stenosis, and diabetes  mellitus type 2.     PAST SURGICAL HISTORY:  Cataract removal, coronary angioplasty with  stent placement, diagnostic cardiac cath, fracture surgery of left  wrist, shoulder arthroscopic procedure of left shoulder, and upper GI  endoscopy. PERSONAL HISTORY:  Allergy to ASPIRIN and LIPITOR. SOCIAL HISTORY:  Not a smoker. She does not use alcohol or street  drugs. PHYSICAL EXAMINATION:  GENERAL:  She is a well-developed, well-nourished female, underweight,  complaining of hiatal hernia. NEUROLOGIC:  Cranial nerves II through XII are grossly intact. Pupils  are equal and reactive. Extraocular movements are full. Vision is full  to confrontation. Movement of cervical spine fair. Handgrip is equal  bilaterally. Finger-to-nose performed without difficulty. No focal  deficit noted in the upper extremities. No definite focal deficit noted  in the lower extremities. Sensation intact bilaterally. Deep tendon  reflexes to be 1+ bilaterally. Ankle jerk to be plus/minus bilaterally. Sensation intact bilaterally. [employer] test is negative. IMPRESSION:  Lumbar spondylotic changes causing lumbar radiculopathy and  back pain. Multiple medical comorbidities. I reviewed the MRI scan of the lumbar spine. She probably will do well  with epidural nerve block versus physical therapy. I explained that to  the patient. She would like to have epidural nerve block. If that does  not help, I would be glad to reevaluate her. We will follow along.         Starlett Habermann, MD    D: 02/09/2022 16:02:27       T: 02/09/2022 16:06:17     [email or web address]  Job#: [id number]     Doc#: [id number]    CC:

## 2022-02-10 NOTE — PROGRESS NOTES
OCCUPATIONAL THERAPY TREATMENT NOTE     [address] [facility]  [facility]. [place]         Date:2/10/2022  Patient Name: Van Santana  MRN: [id number]  : 1940  Room: [address] [place], [place] [address]             Evaluating OT: Mandi Gutierrez OTR/L; [id number]      Referring Provider and Specific Provider Orders/Date:      22   OT eval and treat  Start:  22,   End:  22,   ONE TIME,   Standing Count:  1 Occurrences,   R         Levora Sanders, DO      Placement Recommendation: Subacute         Diagnosis:   1. Herniated nucleus pulposus, L4-5    2. Intractable back pain    3. Unable to ambulate    4.  Liver mass         Surgery: none        Pertinent Medical History:       Past Medical History        Past Medical History:   Diagnosis Date    Acute CVA (cerebrovascular accident) ([address] 2019    Arthritis      Back problem      CAD (coronary artery disease)      PTCA with DAMON to prox LAD, PTCA DAMON to ostial lesion of RCA and BMS to prox lesion RCA DAMON to RCA ostial     Cerebral artery occlusion with cerebral infarction ([address] 2019    Hyperlipidemia      Hypertension      Nausea & vomiting      Reflux      RLD (ruptured lumbar disc)      Scoliosis      Spinal stenosis      Type 2 diabetes mellitus without complication, without long-term current use of insulin ([address] 10/14/2019            Past Surgical History         Past Surgical History:   Procedure Laterality Date    CATARACT REMOVAL         bilateral    CORONARY ANGIOPLASTY   2010     PTCA with DAMON in the proximal left anterior descending    CORONARY ANGIOPLASTY   3/11/2010     PTCA and deployment of 2 stents in the ostium and the proximal segment of RCA    CORONARY ANGIOPLASTY   2010     PTCA DAMON to ostial RCA patent LAD and RCA BMS  restenosis of RCA    CORONARY ANGIOPLASTY WITH STENT PLACEMENT         times 3    DIAGNOSTIC CARDIAC CATH LAB PROCEDURE   2010   Dr. Sury Ramos: Cardiac cath with angioplasty follow up    [address] CATH LAB PROCEDURE   3/11/2010     Cardiac cath and stent placement    DIAGNOSTIC CARDIAC CATH LAB PROCEDURE   5/19/2010     Cardiac cath heart lab and subsequent angiopilsaty    FRACTURE SURGERY         tight wrist    SHOULDER ARTHROSCOPY   08 25 2011     left shoulder arthroscopy ,acromioplasty,busectomy, release of adhesions    UPPER GASTROINTESTINAL ENDOSCOPY N/A 2/19/2021     EGD BIOPSY [facility] by [facility], DO at [facility] ENDOSCOPY          Precautions:  Fall Risk, anticipates a liver biopsy, pain in L lateral buttocks that radiates down proximal L LE      Assessment of current deficits:     [x]? Functional mobility            [x]?ADLs           [x]? Strength                   []?Cognition    [x]? Functional transfers          [x]? IADLs         [x]? Safety Awareness   [x]? Endurance    []? Fine Coordination              [x]? Balance      []? Vision/perception    []? Sensation      []? Gross Motor Coordination  []? ROM           []?  Delirium                   []? Motor Control      OT PLAN OF CARE   OT POC based on physician orders, patient diagnosis and results of clinical assessment     Frequency/Duration 1-3 days/wk for 2 weeks PRN      Specific OT Treatment Interventions to include:   * Instruction/training on adapted ADL techniques and AE recommendations to increase functional independence within precautions       * Training on energy conservation strategies, correct breathing pattern and techniques to improve independence/tolerance for self-care routine  * Functional transfer/mobility training/DME recommendations for increased independence, safety, and fall prevention  * Patient/Family education to increase follow through with safety techniques and functional independence  * Recommendation of environmental modifications for increased safety with functional transfers/mobility and ADLs  * Splinting/positioning for increased function, prevention of contractures, and improve skin integrity  * Therapeutic exercise to improve motor endurance, ROM, and functional strength for ADLs/functional transfers  * Therapeutic activities to facilitate/challenge dynamic balance, stand tolerance for increased safety and independence with ADLs  * Positioning to improve skin integrity, interaction with environment and functional independence     Recommended Adaptive Equipment: TBD       Home Living: alone; apartment, 1 story, 3 steps to enter with B rails, tub shower.        Equipment owned: wheeled walker, quad cane, shower chair, grab bars, hand held shower head     Prior Level of Function: Independent with ADLs , Independent with IADLs; ambulated with quad cane or wheeled walker     Pain Level: 8/10 pain in L side of buttocks and radiates down L proximal LE; Nursing notified.               Cognition: A&O: 4/4; Follows 3 step directions              Memory: good               Sequencing: good               Problem solving: good               Judgement/safety: good      [facility]   AM-PAC Daily Activity Inpatient   How much help for putting on and taking off regular lower body clothing?: Total  How much help for Bathing?: A Lot  How much help for Toileting?: A Lot  How much help for putting on and taking off regular upper body clothing?: A Lot  How much help for taking care of personal grooming?: A Little  How much help for eating meals?: None  AM-[facility] Inpatient Daily Activity Raw Score: 14  AM-PAC Inpatient ADL T-Scale Score : 33.39  ADL Inpatient CMS 0-100% Score: 59.67  ADL Inpatient CMS G-Code Modifier : CK                Functional Assessment:     Initial Eval Status  Date: 2/7/22 Treatment Status  Date: 2/10/22 STGs = LTGs  Time frame: 10-14 days   Feeding Independent   n/t Independent    Grooming Minimal Assist  MIN A standing  Independent    UB Dressing Minimal Assist  MIN A requiring assist to tie/adjust in back  Independent    LB Dressing Dependent  SBA to fausto/doff socks seated in chair using cross over method  Modified [place]    Bathing Moderate Assist N/t; pt educated with regards to DME, bathing AE  Modified [place]    Toileting Moderate Assist  n/t Independent    Bed Mobility  Supine to sit: N/T as pt was up in chair   Sit to supine: N/T as pt was returned to bedside chair  Supine>sit supervision   Sit>supine n/t pt seated up in chair at end of session. Supine to sit: Independent   Sit to supine: Independent    Functional Transfers Moderate Assist from bedside chair to wheeled walker  Moderate Assist for transfer from standing with wheeled walker to bedside chair  Transfer training with verbal cues for hand placement throughout session to improve safety. MIN A sit<>stand from EOB to w/w v/c's for safety  Independent    Functional Mobility Minimal assist with wheeled walker to improve balance for household distance (25 feet x 2), verbal cues for walker sequence and safety. MIN A with w/w house hold distances v/c's for safety awareness navigation around objects  Modified [place]    Balance Sitting:     Static: good     Dynamic: fair   Standing: fair  with wheeled walker Sitting:   Static: supervision  Dynamic: supervision   Standing: MIN A with w/w      Activity Tolerance Fair  Fair  Good    Visual/  Perceptual Glasses: yes                         Comments: Upon arrival pt supine in bed, with family present, agreeable to therapy session. Pt educated with regards to bed mobility, functional transfers, functional mobility, hand placement, walker safety, LE/UE dressing techniques, DME, bathing AE. At end of session pt seated in chair, with family present,  all lines and tubes intact, call light within reach. · Pt has made good  progress towards set goals.    · Continue with current plan of care      Treatment Time In:1024            Treatment Time Out: 4180                Treatment Charges: Mins Units   Ther Ex  04741     Manual Therapy [address] 1   ADL/Home Mgt 38468 10 1   Neuro Re-ed [address] [facility] manage/training  83262     Non-Billable Time     Total Timed Treatment [address] [facility] [place]/L [id number]

## 2022-02-10 NOTE — PROGRESS NOTES
[address] [place], [place] [address]ist   Progress Note    Admitting Date and Time: 2/6/2022 10:08 PM  Admit Dx: Liver mass [R16.0]  Herniated nucleus pulposus, L4-5 [M51.26]  Unable to ambulate [R26.2]  Intractable pain [R52]  Intractable back pain [M54.9]    Subjective:    Patient reports that she has not been able to eat and complains of discomfort when swallowing. General Surgery was consulted. She was seen by Neurosurgery and the patient would like to have an epidural nerve block. IR consulted but patient was on Plavix, which is now on hold. ROS: denies fever, chills, cp, sob, n/v, HA unless stated above.      rosuvastatin  10 mg Oral Nightly    polyethylene glycol  17 g Oral Once    sennosides-docusate sodium  1 tablet Oral BID    carvedilol  25 mg Oral BID WC    [Held by provider] clopidogrel  75 mg Oral Daily    [Held by provider] glipiZIDE  5 mg Oral QAM AC    lisinopril  30 mg Oral Daily    pantoprazole  40 mg Oral BID AC    methylPREDNISolone  4 mg Oral QAM AC    methylPREDNISolone  4 mg Oral Nightly    insulin lispro  0-6 Units SubCUTAneous TID WC    insulin lispro  0-3 Units SubCUTAneous Nightly     ondansetron, 4 mg, Q6H PRN  acetaminophen, 650 mg, Q4H PRN  HYDROcodone 5 mg - acetaminophen, 1 tablet, Q4H PRN  dextrose bolus (hypoglycemia), 125 mL, PRN   Or  dextrose bolus (hypoglycemia), 250 mL, PRN  glucose, 15 g, PRN  glucagon (rDNA), 1 mg, PRN  dextrose, 100 mL/hr, PRN         Objective:    BP (!) 121/44   Pulse 77   Temp 97.7 °F (36.5 °C) (Oral)   Resp 16   Ht 4' 10\" (1.473 m)   Wt 75 lb (34 kg)   SpO2 94%   Breastfeeding No   BMI 15.68 kg/m²   General Appearance: alert and oriented to person, place and time and in no acute distress, frail  Skin: warm and dry  Head: normocephalic and atraumatic  Eyes: pupils equal, round, and reactive to light, extraocular eye movements intact, conjunctivae normal  Neck: neck supple and non tender without mass   Pulmonary/Chest: diminished bilaterally, no respiratory distress  Cardiovascular: normal rate, normal S1 and S2   Abdomen: soft, non-tender, non-distended, normal bowel sounds   Extremities: no cyanosis, no clubbing and no edema  Muscoloskeletal:  Left lower back/buttock and right lower back pain with movement  Neurologic: no tremor and speech normal      Recent Labs     02/08/22  0446 02/09/22  0415 02/10/22  0425    139 137   K 3.4* 4.4 4.1    102 101   CO2 21* 23 24   BUN 17 19 21   CREATININE 0.6 0.6 0.6   GLUCOSE 109* 201* 173*   CALCIUM 9.2 9.7 9.7       Recent Labs     02/08/22  0446 02/09/22  0415 02/10/22  0425   ALKPHOS 83 81 79   PROT 6.9 7.2 7.3   LABALBU 4.0 4.0 3.7   BILITOT 0.3 0.3 0.3   AST 43* 28 30   ALT 30 24 27       Recent Labs     02/08/22  0446 02/09/22  0415 02/10/22  0425   WBC 12.9* 9.7 8.7   RBC 3.75 3.82 3.93   HGB 11.8 12.0 12.4   HCT 36.2 36.6 37.8   MCV 96.5 95.8 96.2   MCH 31.5 31.4 31.6   MCHC 32.6 32.8 32.8   RDW 11.9 11.9 11.8    188 218   MPV 9.5 9.7 9.6           Radiology:   XR ABDOMEN (KUB) (SINGLE AP VIEW)   Final Result   No active process. See above. XR ABDOMEN (KUB) (SINGLE AP VIEW)   Final Result   Moderate-sized hiatal hernia. Atelectatic changes in the left lung base. No   evidence of acute parenchymal disease. Stool scattered throughout the colon to suggest clinical presentation of   constipation with no signs of obvious obstruction or gross free   intraperitoneal air. XR CHEST (2 VW)   Final Result   Moderate-sized hiatal hernia. Atelectatic changes in the left lung base. No   evidence of acute parenchymal disease. Stool scattered throughout the colon to suggest clinical presentation of   constipation with no signs of obvious obstruction or gross free   intraperitoneal air. MRI LUMBAR SPINE WO CONTRAST   Final Result   1. No evidence of lumbar spine fracture.    2. Levoscoliosis with significant degenerative endplate changes along   concavity of the levoscoliotic curvature. 3. Edematous degenerative endplate changes at S8/F2. 4. Lumbar spondylosis as described above with notable moderate central canal   stenosis at L1/L2 and multiple levels of significant neural foraminal   narrowing notable on the right at L1/L2, L2/L3, and L4/L5. Severe left   neural foraminal narrowing at L5/S1. CT ABDOMEN PELVIS WO CONTRAST Additional Contrast? None   Final Result   No acute finding in the abdomen and pelvis on this unenhanced study. A slightly hypodense lesion in the left lobe of the liver, which may   represent an [address] Dedicated MRI is recommended to confirm and rule out other   possibilities such as malignant neoplasms. Distal colonic diverticulosis. No acute diverticulitis. XR HIP 2-3 VW W PELVIS LEFT   Final Result   No evidence of an acute fracture of the pelvis or left hip. IR INTERVENTIONAL RADIOLOGY PROCEDURE REQUEST    (Results Pending)       Assessment:  Active Problems:    Coronary artery disease involving native coronary artery of native heart without angina pectoris    Hypertension    Hyperlipidemia    Type 2 diabetes mellitus without complication, without long-term current use of insulin (HCC)    Intractable pain    Sciatica of left side    Gastroesophageal reflux disease without esophagitis    Intractable back pain    Herniated nucleus pulposus, L4-5  Resolved Problems:    * No resolved hospital problems. *      Plan:  1. Intractable Left and right lower back pain:  On methylprednisolone. Patient continues to have pain with movement. Neurosurgery evaluated and patient would like an epidural nerve block. IR consulted and cannot be done until Monday due to Plavix (last dose was 2/9). 2. Liver lesion:  Seen on CT of the abdomen and pelvis. Possibly focal nodular hyperplasia. Will need outpatient follow up. Discussed with the patient and her family. 3. Diabetes:  Hold home glipizide. Continue Humalog sliding scale, blood sugar checks, hypoglycemic protocol. Hgb A1C is 6.5.   4. Moderate hiatal hernia:  Continue PPI. Patient reports discomfort while   5. Chest pain:  EKG with no ischemic changes. High sensitivity troponin was 9.    6. Possible aspiration:  Speech Therapy following. 7. Hyperlipidemia:  Continue Crestor while an inpatient. 8. Hypertension:  Continue Coreg and lisinopril. 9. GERD: Continue PPI. NOTE: This report was transcribed using voice recognition software. Every effort was made to ensure accuracy; however, inadvertent computerized transcription errors may be present.      Electronically signed by RANDY Crowell CNP on 2/10/2022 at 2:16 PM

## 2022-02-11 ENCOUNTER — APPOINTMENT (OUTPATIENT)
Dept: GENERAL RADIOLOGY | Age: 82
DRG: 552 | End: 2022-02-11
Payer: COMMERCIAL

## 2022-02-11 LAB
ANION GAP SERPL CALCULATED.3IONS-SCNC: 14 MMOL/L (ref 7–16)
BUN BLDV-MCNC: 22 MG/DL (ref 6–23)
CALCIUM SERPL-MCNC: 9.4 MG/DL (ref 8.6–10.2)
CHLORIDE BLD-SCNC: 99 MMOL/L (ref 98–107)
CO2: 23 MMOL/L (ref 22–29)
CREAT SERPL-MCNC: 0.6 MG/DL (ref 0.5–1)
GFR AFRICAN AMERICAN: >60
GFR NON-AFRICAN AMERICAN: >60 ML/MIN/1.73
GLUCOSE BLD-MCNC: 188 MG/DL (ref 74–99)
HCT VFR BLD CALC: 39 % (ref 34–48)
HEMOGLOBIN: 12.9 G/DL (ref 11.5–15.5)
MCH RBC QN AUTO: 31.6 PG (ref 26–35)
MCHC RBC AUTO-ENTMCNC: 33.1 % (ref 32–34.5)
MCV RBC AUTO: 95.6 FL (ref 80–99.9)
METER GLUCOSE: 133 MG/DL (ref 74–99)
METER GLUCOSE: 144 MG/DL (ref 74–99)
METER GLUCOSE: 269 MG/DL (ref 74–99)
METER GLUCOSE: 324 MG/DL (ref 74–99)
PDW BLD-RTO: 11.5 FL (ref 11.5–15)
PLATELET # BLD: 240 E9/L (ref 130–450)
PMV BLD AUTO: 9.3 FL (ref 7–12)
POTASSIUM SERPL-SCNC: 4.2 MMOL/L (ref 3.5–5)
RBC # BLD: 4.08 E12/L (ref 3.5–5.5)
SODIUM BLD-SCNC: 136 MMOL/L (ref 132–146)
WBC # BLD: 7.5 E9/L (ref 4.5–11.5)

## 2022-02-11 PROCEDURE — 6360000002 HC RX W HCPCS: Performed by: FAMILY MEDICINE

## 2022-02-11 PROCEDURE — 97530 THERAPEUTIC ACTIVITIES: CPT

## 2022-02-11 PROCEDURE — 74220 X-RAY XM ESOPHAGUS 1CNTRST: CPT

## 2022-02-11 PROCEDURE — 97110 THERAPEUTIC EXERCISES: CPT

## 2022-02-11 PROCEDURE — 92526 ORAL FUNCTION THERAPY: CPT | Performed by: SPEECH-LANGUAGE PATHOLOGIST

## 2022-02-11 PROCEDURE — 6370000000 HC RX 637 (ALT 250 FOR IP): Performed by: NURSE PRACTITIONER

## 2022-02-11 PROCEDURE — 82962 GLUCOSE BLOOD TEST: CPT

## 2022-02-11 PROCEDURE — 96376 TX/PRO/DX INJ SAME DRUG ADON: CPT

## 2022-02-11 PROCEDURE — 1200000000 HC SEMI PRIVATE

## 2022-02-11 PROCEDURE — 6360000002 HC RX W HCPCS: Performed by: NURSE PRACTITIONER

## 2022-02-11 PROCEDURE — 85027 COMPLETE CBC AUTOMATED: CPT

## 2022-02-11 PROCEDURE — 80048 BASIC METABOLIC PNL TOTAL CA: CPT

## 2022-02-11 PROCEDURE — 36415 COLL VENOUS BLD VENIPUNCTURE: CPT

## 2022-02-11 PROCEDURE — 2500000003 HC RX 250 WO HCPCS: Performed by: NURSE PRACTITIONER

## 2022-02-11 PROCEDURE — 99232 SBSQ HOSP IP/OBS MODERATE 35: CPT | Performed by: INTERNAL MEDICINE

## 2022-02-11 PROCEDURE — 6370000000 HC RX 637 (ALT 250 FOR IP): Performed by: FAMILY MEDICINE

## 2022-02-11 PROCEDURE — 87635 SARS-COV-2 COVID-19 AMP PRB: CPT

## 2022-02-11 PROCEDURE — APPSS30 APP SPLIT SHARED TIME 16-30 MINUTES: Performed by: NURSE PRACTITIONER

## 2022-02-11 RX ADMIN — HYDROCODONE BITARTRATE AND ACETAMINOPHEN 1 TABLET: 5; 325 TABLET ORAL at 08:03

## 2022-02-11 RX ADMIN — INSULIN LISPRO 1 UNITS: 100 INJECTION, SOLUTION INTRAVENOUS; SUBCUTANEOUS at 08:04

## 2022-02-11 RX ADMIN — LISINOPRIL 30 MG: 20 TABLET ORAL at 08:03

## 2022-02-11 RX ADMIN — PANTOPRAZOLE SODIUM 40 MG: 40 TABLET, DELAYED RELEASE ORAL at 16:57

## 2022-02-11 RX ADMIN — PANTOPRAZOLE SODIUM 40 MG: 40 TABLET, DELAYED RELEASE ORAL at 06:32

## 2022-02-11 RX ADMIN — CARVEDILOL 25 MG: 25 TABLET, FILM COATED ORAL at 16:57

## 2022-02-11 RX ADMIN — INSULIN LISPRO 4 UNITS: 100 INJECTION, SOLUTION INTRAVENOUS; SUBCUTANEOUS at 16:58

## 2022-02-11 RX ADMIN — INSULIN LISPRO 3 UNITS: 100 INJECTION, SOLUTION INTRAVENOUS; SUBCUTANEOUS at 12:59

## 2022-02-11 RX ADMIN — ONDANSETRON 4 MG: 2 INJECTION INTRAMUSCULAR; INTRAVENOUS at 16:57

## 2022-02-11 RX ADMIN — METHYLPREDNISOLONE 4 MG: 4 TABLET ORAL at 20:17

## 2022-02-11 RX ADMIN — ROSUVASTATIN CALCIUM 10 MG: 10 TABLET, FILM COATED ORAL at 20:17

## 2022-02-11 RX ADMIN — HYDROCODONE BITARTRATE AND ACETAMINOPHEN 1 TABLET: 5; 325 TABLET ORAL at 21:14

## 2022-02-11 RX ADMIN — SENNOSIDES AND DOCUSATE SODIUM 1 TABLET: 50; 8.6 TABLET ORAL at 20:17

## 2022-02-11 RX ADMIN — BARIUM SULFATE 176 G: 960 POWDER, FOR SUSPENSION ORAL at 11:08

## 2022-02-11 RX ADMIN — CARVEDILOL 25 MG: 25 TABLET, FILM COATED ORAL at 08:03

## 2022-02-11 RX ADMIN — SENNOSIDES AND DOCUSATE SODIUM 1 TABLET: 50; 8.6 TABLET ORAL at 08:02

## 2022-02-11 RX ADMIN — METHYLPREDNISOLONE 4 MG: 4 TABLET ORAL at 06:32

## 2022-02-11 ASSESSMENT — PAIN SCALES - GENERAL
PAINLEVEL_OUTOF10: 5
PAINLEVEL_OUTOF10: 2
PAINLEVEL_OUTOF10: 7

## 2022-02-11 ASSESSMENT — PAIN DESCRIPTION - PAIN TYPE: TYPE: CHRONIC PAIN

## 2022-02-11 ASSESSMENT — PAIN DESCRIPTION - ORIENTATION: ORIENTATION: RIGHT;LEFT

## 2022-02-11 ASSESSMENT — PAIN DESCRIPTION - DESCRIPTORS: DESCRIPTORS: ACHING

## 2022-02-11 ASSESSMENT — PAIN DESCRIPTION - LOCATION: LOCATION: LEG

## 2022-02-11 ASSESSMENT — PAIN DESCRIPTION - PROGRESSION: CLINICAL_PROGRESSION: GRADUALLY IMPROVING

## 2022-02-11 NOTE — PROGRESS NOTES
[address] [place], [place] [address]ist   Progress Note    Admitting Date and Time: 2/6/2022 10:08 PM  Admit Dx: Liver mass [R16.0]  Herniated nucleus pulposus, L4-5 [M51.26]  Unable to ambulate [R26.2]  Intractable pain [R52]  Intractable back pain [M54.9]    Subjective:    Patient said that she continues to have difficulty eating. Surgery evaluated and are planning outpatient workup. Spoke with Speech Therapy and Surgery and Esophagram to be done as an inpatient today. Awaiting precert for GINGER.      ROS: denies fever, chills, cp, sob, n/v, HA unless stated above.      rosuvastatin  10 mg Oral Nightly    polyethylene glycol  17 g Oral Once    sennosides-docusate sodium  1 tablet Oral BID    carvedilol  25 mg Oral BID WC    [Held by provider] clopidogrel  75 mg Oral Daily    [Held by provider] glipiZIDE  5 mg Oral QAM AC    lisinopril  30 mg Oral Daily    pantoprazole  40 mg Oral BID AC    methylPREDNISolone  4 mg Oral QAM AC    methylPREDNISolone  4 mg Oral Nightly    insulin lispro  0-6 Units SubCUTAneous TID WC    insulin lispro  0-3 Units SubCUTAneous Nightly     ondansetron, 4 mg, Q6H PRN  acetaminophen, 650 mg, Q4H PRN  HYDROcodone 5 mg - acetaminophen, 1 tablet, Q4H PRN  dextrose bolus (hypoglycemia), 125 mL, PRN   Or  dextrose bolus (hypoglycemia), 250 mL, PRN  glucose, 15 g, PRN  glucagon (rDNA), 1 mg, PRN  dextrose, 100 mL/hr, PRN         Objective:    /61   Pulse 71   Temp 98.7 °F (37.1 °C) (Oral)   Resp 16   Ht 4' 10\" (1.473 m)   Wt 75 lb (34 kg)   SpO2 96%   Breastfeeding No   BMI 15.68 kg/m²   General Appearance: alert and oriented to person, place and time and in no acute distress, frail, cachetic   Skin: warm and dry  Head: normocephalic and atraumatic  Eyes: pupils equal, round, and reactive to light, conjunctivae normal  Neck: neck supple and non tender without mass   Pulmonary/Chest: diminished bilaterally, no respiratory distress  Cardiovascular: normal rate, normal S1 and S2   Abdomen: soft, non-tender, non-distended, normal bowel sounds   Extremities: no cyanosis, no clubbing and no edema  Muscoloskeletal:  Left lower back/buttock and right lower back pain with movement  Neurologic: no tremor and speech normal      Recent Labs     02/09/22  0415 02/10/22  0425 02/11/22  0438    137 136   K 4.4 4.1 4.2    101 99   CO2 23 24 23   BUN 19 21 22   CREATININE 0.6 0.6 0.6   GLUCOSE 201* 173* 188*   CALCIUM 9.7 9.7 9.4       Recent Labs     02/09/22  0415 02/10/22  0425   ALKPHOS 81 79   PROT 7.2 7.3   LABALBU 4.0 3.7   BILITOT 0.3 0.3   AST 28 30   ALT 24 27       Recent Labs     02/09/22  0415 02/10/22  0425 02/11/22  0438   WBC 9.7 8.7 7.5   RBC 3.82 3.93 4.08   HGB 12.0 12.4 12.9   HCT 36.6 37.8 39.0   MCV 95.8 96.2 95.6   MCH 31.4 31.6 31.6   MCHC 32.8 32.8 33.1   RDW 11.9 11.8 11.5    218 240   MPV 9.7 9.6 9.3           Radiology:   XR ABDOMEN (KUB) (SINGLE AP VIEW)   Final Result   No active process. See above. XR ABDOMEN (KUB) (SINGLE AP VIEW)   Final Result   Moderate-sized hiatal hernia. Atelectatic changes in the left lung base. No   evidence of acute parenchymal disease. Stool scattered throughout the colon to suggest clinical presentation of   constipation with no signs of obvious obstruction or gross free   intraperitoneal air. XR CHEST (2 VW)   Final Result   Moderate-sized hiatal hernia. Atelectatic changes in the left lung base. No   evidence of acute parenchymal disease. Stool scattered throughout the colon to suggest clinical presentation of   constipation with no signs of obvious obstruction or gross free   intraperitoneal air. MRI LUMBAR SPINE WO CONTRAST   Final Result   1. No evidence of lumbar spine fracture. 2. Levoscoliosis with significant degenerative endplate changes along   concavity of the levoscoliotic curvature. 3. Edematous degenerative endplate changes at T7/E3.    4. Lumbar spondylosis as 3. Diabetes:  Hold home glipizide. Continue Humalog sliding scale, blood sugar checks, hypoglycemic protocol. Hgb A1C is 6.5.   4. Moderate hiatal hernia:  Continue PPI. General Surgery consulted and patient is to follow up as an outpatient. She will need an EGD with manometry to rule out other esophageal pathology. Esophogram to be done today. 5. Chest pain:  EKG with no ischemic changes. High sensitivity troponin was 9.    6. Possible aspiration:  Speech Therapy following. 7. Hyperlipidemia:  Continue Crestor while an inpatient. 8. Hypertension:  Continue Coreg and lisinopril. 9. GERD: Continue PPI. NOTE: This report was transcribed using voice recognition software. Every effort was made to ensure accuracy; however, inadvertent computerized transcription errors may be present.      Electronically signed by RANDY Herman CNP on 2/11/2022 at 1:46 PM

## 2022-02-11 NOTE — PROGRESS NOTES
Physical Therapy Treatment Note/Plan of Care    Room #:  6832/9451-98  Patient Name: Ina Shelton  YOB: 1940  MRN: [id number]    Date of Service: 2/11/2022     Tentative placement recommendation: Subacute rehab  Equipment recommendation: To be determined      Evaluating Physical Therapist: Sana Michele, PT #[id number]      Specific Provider Orders/Date/Referring Provider :  02/07/22 0315   PT eval and treat Start: 02/07/22 0315, End: 02/07/22 0315, ONE TIME, Standing Count: 1 Occurrences, R    Ayad Pal,       Admitting Diagnosis:   Liver mass [R16.0]  Herniated nucleus pulposus, L4-5 [M51.26]  Unable to ambulate [R26.2]  Intractable pain [R52]  Intractable back pain [M54.9]     for back pain, history of back trouble in the past, she states her lower back is hurting, rating her left leg,     Surgery: none  Visit Diagnoses       Codes    Unable to ambulate     R26.2    Liver mass     R16.0          Patient Active Problem List   Diagnosis    Coronary artery disease involving native coronary artery of native heart without angina pectoris    Scoliosis    Hypertension    Hyperlipidemia    Type 2 diabetes mellitus without complication, without long-term current use of insulin (HCC)    Atrial septal aneurysm    GI bleed    Intractable pain    Sciatica of left side    Gastroesophageal reflux disease without esophagitis    Intractable back pain    Herniated nucleus pulposus, L4-5        ASSESSMENT of Current Deficits Patient exhibits decreased strength, balance, endurance and pain back  impairing functional mobility, transfers, gait , gait distance and tolerance to activity. Pt displays a kyphotic posture, narrow base of support, and small step lengths during ambulation with cueing to correct. Pt needed a seated rest period due to fatigue. Patient requires continued skilled physical therapy to address concerns listed above for increased safety and function at discharge.          PHYSICAL THERAPY  PLAN OF CARE       Physical therapy plan of care is established based on physician order,  patient diagnosis and clinical assessment    Current Treatment Recommendations:    -Bed Mobility: Lower extremity exercises  and Trunk control activities   -Sitting Balance: Facilitate active trunk muscle engagement , Facilitate postural control in all planes  and Engage in core activities to allow for movement within base of support   -Standing Balance: Perform strengthening exercises in standing to promote motor control with or without upper extremity support   -Transfers: Provide instruction on proper hand and foot position for adequate transfer of weight onto lower extremities and use of gait device if needed and Cues for hand placement, technique and safety. Provide stabilization to prevent fall   -Gait: Gait training, Standing activities to improve: base of support, weight shift, weight bearing  and Pregait training to emphasize: Sequencing , Device control, Upright and Safety   -Endurance: Utilize Supervised activities to increase level of endurance to allow for safe functional mobility including transfers and gait     PT long term treatment goals are located in below grid    Patient and or family understand(s) diagnosis, prognosis, and plan of care. Frequency of treatments: Patient will be seen  3-5 times/week.          Prior Level of Function: Patient ambulated with wheeled walker generally used cane when able to get out, has been been little while since been out due to weather  Rehab Potential: fair  + for baseline    Past medical history:   Past Medical History:   Diagnosis Date    Acute CVA (cerebrovascular accident) ([address] 5/25/2019    Arthritis     Back problem     CAD (coronary artery disease) 2010    PTCA with DAMON to prox LAD, PTCA DAMON to ostial lesion of RCA and BMS to prox lesion RCA DAMON to RCA ostial     Cerebral artery occlusion with cerebral infarction ([address] 05/2019    Hyperlipidemia     Hypertension     Nausea & vomiting     Reflux     RLD (ruptured lumbar disc)     Scoliosis     Spinal stenosis     Type 2 diabetes mellitus without complication, without long-term current use of insulin ([facility] 10/14/2019     Past Surgical History:   Procedure Laterality Date    CATARACT REMOVAL      bilateral    CORONARY ANGIOPLASTY  2/25/2010    PTCA with DAMON in the proximal left anterior descending    CORONARY ANGIOPLASTY  3/11/2010    PTCA and deployment of 2 stents in the ostium and the proximal segment of RCA    CORONARY ANGIOPLASTY  5/19/2010    PTCA DAMON to ostial RCA patent LAD and RCA BMS  restenosis of RCA    CORONARY ANGIOPLASTY WITH STENT PLACEMENT      times 3    DIAGNOSTIC CARDIAC CATH LAB PROCEDURE  2/25/2010    Dr. Gil Sanchez: Cardiac cath with angioplasty follow up    [address] CATH LAB PROCEDURE  3/11/2010    Cardiac cath and stent placement    DIAGNOSTIC CARDIAC CATH LAB PROCEDURE  5/19/2010    Cardiac cath heart lab and subsequent angiopilsaty    FRACTURE SURGERY      tight wrist    SHOULDER ARTHROSCOPY  08 25 2011    left shoulder arthroscopy ,acromioplasty,busectomy, release of adhesions    UPPER GASTROINTESTINAL ENDOSCOPY N/A 2/19/2021    EGD BIOPSY performed by Giancarlo Patel DO at [address]:    Precautions:  Up with assistance, falls and spinal precautions ,    Social history: Patient lives alone in a apartment    with 3 steps  to enter bilateral Rail  Tub shower grab bars    Equipment owned: [address], [address] [facility] Kar chair and Quad cane,       AM-PAC Basic Mobility        AM-PAC Mobility Inpatient   How much difficulty turning over in bed?: A Little  How much difficulty sitting down on / standing up from a chair with arms?: A Little  How much difficulty moving from lying on back to sitting on side of bed?: A Little  How much help from another person moving to and from a bed to a chair?: A Little  How much help from another person needed to walk in hospital room?: A Little  How much help from another person for climbing 3-5 steps with a railing?: A Lot  AM-PAC Inpatient Mobility Raw Score : 17  AM-PAC Inpatient T-Scale Score : 42.13  Mobility Inpatient CMS 0-100% Score: 50.57  Mobility Inpatient CMS G-Code Modifier : CK    Nursing cleared patient for PT treatment. .   OBJECTIVE;   Initial Evaluation  Date: 2/7/2022 Treatment Date:  2/11/2022       Short Term/ Long Term   Goals   Was pt agreeable to Eval/treatment? Yes yes To be met in 3 days   Pain level   9/10  Back pain right buttocks, left leg 8/10  Back pain    Bed Mobility    Rolling: Moderate assist of 1    Supine to sit: Moderate assist of 1    Sit to supine: Not assessed     Scooting: Moderate assist of 1   Rolling: Supervision    Supine to sit: Supervision    Sit to supine: Supervision    Scooting: Supervision     Rolling: Minimal assist of 1    Supine to sit: Minimal assist of 1    Sit to supine: Minimal assist of 1    Scooting: Minimal assist of 1     Transfers Sit to stand:  Moderate assist of 1 Cues for hand placement and safety  Sit to stand: Supervision  Cues for hand placement and safety       Sit to stand: Minimal assist of 1     Ambulation    2 x 20 feet using  wheeled walker with Minimal assist of 1   for walker control, balance and upright and cues for sequencing and safety 4 x 100 feet using  wheeled walker with Supervision    cues for upright posture, walker approximation, increased base of support, increased step length, safety and pacing     50 feet using  wheeled walker with Supervision     Stair negotiation: ascended and descended   Not assessed          ROM Within functional limits    Increase range of motion 10% of affected joints    Strength BUE:  refer to OT eval  RLE:  3/5  LLE:  3/5  Increase strength in affected mm groups by 1/3 grade   Balance Sitting EOB:  fair    Dynamic Standing:  fair wheeled walker  Sitting EOB: fair +  Dynamic Standing: fair wheeled walker   Sitting EOB:  good    Dynamic Standing: good -wheeled walker      Patient is Alert & Oriented x person, place, time and situation and follows directions    Sensation:  Patient  denies numbness/tingling   Edema:  no   Endurance: fair       Vitals: room air   Blood Pressure at rest  Blood Pressure during session    Heart Rate at rest   Heart Rate during session     SPO2 at rest  %  SPO2 during session  %     Patient education  Patient educated on role of Physical Therapy, risks of immobility, safety and plan of care,  importance of mobility while in hospital  and spinal precautions     Patient response to education:   Pt verbalized understanding Pt demonstrated skill Pt requires further education in this area   Yes Partial Yes      Treatment:  Patient practiced and was instructed/facilitated in the following treatment: Patient  Sat edge of bed 4 minutes with Supervision  to increase dynamic sitting balance and activity tolerance. Pt stood, ambulated to the restroom;  in the hallway, needed a seated rest period, and back to the bed. Returned to supine and performed supine exercises. Therapeutic Exercises:  ankle pumps, quad sets, glut sets, heel slide, hip abduction/adduction and straight leg raise x 15 - 20 reps. AROM     At end of session, patient in bed with   call light and phone within reach,  all lines and tubes intact, nursing notified. Patient would benefit from continued skilled Physical Therapy to improve functional independence and quality of life. Patient's/ family goals   get rid of pain    Time in 8:40  Time out  9:20    Total Treatment Time  40 minutes        CPT codes:    Therapeutic activities (96956)   28 minutes  2 unit(s)  Therapeutic exercises (16850)   12 minutes  1 unit(s)   Cali Galvan  [facility]  LIC # [id number]

## 2022-02-11 NOTE — PROGRESS NOTES
Speech Language Pathology      NAME:  Chanel Garcia  :  1940  DATE: 2022  ROOM:  [facility]3/0313-02    Patient seen for dysphagia therapy 15 minutes. Patient tolerating thin liquids with sequential swallows with no change in vocal quality and no cough and no throat clear. With thin liquids independent of other solids patient did not complain of any tightness in chest or nausea however she does report that sensation following eating her cereal and banana this morning. May benefit from esophagram to fully evaluate her esophageal motility. Will continue POC and sign off soon if no pharyngeal dysphagia present.       Liver mass [R16.0]  Herniated nucleus pulposus, L4-5 [M51.26]  Unable to ambulate [R26.2]  Intractable pain [R52]  Intractable back pain [M54.9]    [id number]  dysphagia tx    Vincenzo Hitchcock MSCCC/SLP  Speech Language Pathologist  [id number]

## 2022-02-11 NOTE — CARE COORDINATION
2/11/2022: SS Note/Discharge plan: COVID PENDING 2/11/22:  Milad Fuentes admissions for [employer] at [facility] and are still waiting on [facility] which was SUBMITTED 2/9, will notify sw or nurses station as soon as they receive authorization, request a RAPID COVID TEST prior to transfer,  ANDRES and HENS initiated, pt and pt's family notified, sw will follow to assist with transfer arrangements and for d/c order to complete HENS exemption.  Electronically signed by ELSA Mendoza on 2/11/2022 at 8:54 AM

## 2022-02-12 LAB
ALBUMIN SERPL-MCNC: 4 G/DL (ref 3.5–5.2)
ALP BLD-CCNC: 74 U/L (ref 35–104)
ALT SERPL-CCNC: 41 U/L (ref 0–32)
ANION GAP SERPL CALCULATED.3IONS-SCNC: 14 MMOL/L (ref 7–16)
AST SERPL-CCNC: 30 U/L (ref 0–31)
BILIRUB SERPL-MCNC: 0.3 MG/DL (ref 0–1.2)
BUN BLDV-MCNC: 23 MG/DL (ref 6–23)
CALCIUM SERPL-MCNC: 9.3 MG/DL (ref 8.6–10.2)
CHLORIDE BLD-SCNC: 100 MMOL/L (ref 98–107)
CO2: 24 MMOL/L (ref 22–29)
CREAT SERPL-MCNC: 0.6 MG/DL (ref 0.5–1)
GFR AFRICAN AMERICAN: >60
GFR NON-AFRICAN AMERICAN: >60 ML/MIN/1.73
GLUCOSE BLD-MCNC: 269 MG/DL (ref 74–99)
HCT VFR BLD CALC: 37.6 % (ref 34–48)
HEMOGLOBIN: 12.5 G/DL (ref 11.5–15.5)
MAGNESIUM: 1.9 MG/DL (ref 1.6–2.6)
MCH RBC QN AUTO: 31.3 PG (ref 26–35)
MCHC RBC AUTO-ENTMCNC: 33.2 % (ref 32–34.5)
MCV RBC AUTO: 94.2 FL (ref 80–99.9)
METER GLUCOSE: 184 MG/DL (ref 74–99)
METER GLUCOSE: 185 MG/DL (ref 74–99)
METER GLUCOSE: 202 MG/DL (ref 74–99)
METER GLUCOSE: 327 MG/DL (ref 74–99)
PDW BLD-RTO: 11.3 FL (ref 11.5–15)
PHOSPHORUS: 3 MG/DL (ref 2.5–4.5)
PLATELET # BLD: 245 E9/L (ref 130–450)
PMV BLD AUTO: 9.2 FL (ref 7–12)
POTASSIUM SERPL-SCNC: 4.2 MMOL/L (ref 3.5–5)
PREALBUMIN: 24 MG/DL (ref 20–40)
RBC # BLD: 3.99 E12/L (ref 3.5–5.5)
SODIUM BLD-SCNC: 138 MMOL/L (ref 132–146)
TOTAL PROTEIN: 6.9 G/DL (ref 6.4–8.3)
WBC # BLD: 7.9 E9/L (ref 4.5–11.5)

## 2022-02-12 PROCEDURE — 84100 ASSAY OF PHOSPHORUS: CPT

## 2022-02-12 PROCEDURE — 83735 ASSAY OF MAGNESIUM: CPT

## 2022-02-12 PROCEDURE — APPSS30 APP SPLIT SHARED TIME 16-30 MINUTES: Performed by: NURSE PRACTITIONER

## 2022-02-12 PROCEDURE — 36415 COLL VENOUS BLD VENIPUNCTURE: CPT

## 2022-02-12 PROCEDURE — 6360000002 HC RX W HCPCS: Performed by: FAMILY MEDICINE

## 2022-02-12 PROCEDURE — 6370000000 HC RX 637 (ALT 250 FOR IP): Performed by: FAMILY MEDICINE

## 2022-02-12 PROCEDURE — 80053 COMPREHEN METABOLIC PANEL: CPT

## 2022-02-12 PROCEDURE — 96376 TX/PRO/DX INJ SAME DRUG ADON: CPT

## 2022-02-12 PROCEDURE — 1200000000 HC SEMI PRIVATE

## 2022-02-12 PROCEDURE — 6370000000 HC RX 637 (ALT 250 FOR IP): Performed by: NURSE PRACTITIONER

## 2022-02-12 PROCEDURE — 99232 SBSQ HOSP IP/OBS MODERATE 35: CPT | Performed by: INTERNAL MEDICINE

## 2022-02-12 PROCEDURE — 82962 GLUCOSE BLOOD TEST: CPT

## 2022-02-12 PROCEDURE — 6360000002 HC RX W HCPCS: Performed by: NURSE PRACTITIONER

## 2022-02-12 PROCEDURE — 84134 ASSAY OF PREALBUMIN: CPT

## 2022-02-12 PROCEDURE — 85027 COMPLETE CBC AUTOMATED: CPT

## 2022-02-12 RX ADMIN — INSULIN LISPRO 2 UNITS: 100 INJECTION, SOLUTION INTRAVENOUS; SUBCUTANEOUS at 16:36

## 2022-02-12 RX ADMIN — PANTOPRAZOLE SODIUM 40 MG: 40 TABLET, DELAYED RELEASE ORAL at 16:36

## 2022-02-12 RX ADMIN — METHYLPREDNISOLONE 4 MG: 4 TABLET ORAL at 06:11

## 2022-02-12 RX ADMIN — ROSUVASTATIN CALCIUM 10 MG: 10 TABLET, FILM COATED ORAL at 21:11

## 2022-02-12 RX ADMIN — SENNOSIDES AND DOCUSATE SODIUM 1 TABLET: 50; 8.6 TABLET ORAL at 09:12

## 2022-02-12 RX ADMIN — SENNOSIDES AND DOCUSATE SODIUM 1 TABLET: 50; 8.6 TABLET ORAL at 21:11

## 2022-02-12 RX ADMIN — PANTOPRAZOLE SODIUM 40 MG: 40 TABLET, DELAYED RELEASE ORAL at 06:11

## 2022-02-12 RX ADMIN — INSULIN LISPRO 4 UNITS: 100 INJECTION, SOLUTION INTRAVENOUS; SUBCUTANEOUS at 11:03

## 2022-02-12 RX ADMIN — ONDANSETRON 4 MG: 2 INJECTION INTRAMUSCULAR; INTRAVENOUS at 09:06

## 2022-02-12 RX ADMIN — INSULIN LISPRO 1 UNITS: 100 INJECTION, SOLUTION INTRAVENOUS; SUBCUTANEOUS at 07:23

## 2022-02-12 RX ADMIN — CARVEDILOL 25 MG: 25 TABLET, FILM COATED ORAL at 16:36

## 2022-02-12 RX ADMIN — CARVEDILOL 25 MG: 25 TABLET, FILM COATED ORAL at 09:05

## 2022-02-12 RX ADMIN — LISINOPRIL 30 MG: 20 TABLET ORAL at 09:05

## 2022-02-12 ASSESSMENT — PAIN SCALES - GENERAL: PAINLEVEL_OUTOF10: 8

## 2022-02-12 ASSESSMENT — PAIN DESCRIPTION - DESCRIPTORS: DESCRIPTORS: SQUEEZING

## 2022-02-12 ASSESSMENT — PAIN DESCRIPTION - LOCATION: LOCATION: BACK

## 2022-02-12 ASSESSMENT — PAIN DESCRIPTION - FREQUENCY: FREQUENCY: INTERMITTENT

## 2022-02-12 NOTE — PROGRESS NOTES
[address] [place], [place] [address]ist   Progress Note    Admitting Date and Time: 2/6/2022 10:08 PM  Admit Dx: Liver mass [R16.0]  Herniated nucleus pulposus, L4-5 [M51.26]  Unable to ambulate [R26.2]  Intractable pain [R52]  Intractable back pain [M54.9]    Subjective:    Patient was sitting up in the bed at the time of the exam.  Her family was present visiting. Discussed esophagram findings with patient. Awaiting precert for rehab. ROS: denies fever, chills, cp, sob, n/v, HA unless stated above.      rosuvastatin  10 mg Oral Nightly    polyethylene glycol  17 g Oral Once    sennosides-docusate sodium  1 tablet Oral BID    carvedilol  25 mg Oral BID WC    [Held by provider] clopidogrel  75 mg Oral Daily    [Held by provider] glipiZIDE  5 mg Oral QAM AC    lisinopril  30 mg Oral Daily    pantoprazole  40 mg Oral BID AC    insulin lispro  0-6 Units SubCUTAneous TID WC    insulin lispro  0-3 Units SubCUTAneous Nightly     ondansetron, 4 mg, Q6H PRN  acetaminophen, 650 mg, Q4H PRN  HYDROcodone 5 mg - acetaminophen, 1 tablet, Q4H PRN  dextrose bolus (hypoglycemia), 125 mL, PRN   Or  dextrose bolus (hypoglycemia), 250 mL, PRN  glucose, 15 g, PRN  glucagon (rDNA), 1 mg, PRN  dextrose, 100 mL/hr, PRN         Objective:    /62   Pulse 86   Temp 97.5 °F (36.4 °C) (Oral)   Resp 16   Ht 4' 10\" (1.473 m)   Wt 75 lb (34 kg)   SpO2 97%   Breastfeeding No   BMI 15.68 kg/m²   General Appearance: alert and oriented to person, place and time and in no acute distress, frail, cachetic   Skin: warm and dry  Head: normocephalic and atraumatic  Eyes: pupils equal, round, and reactive to light, conjunctivae normal  Neck: neck supple and non tender without mass   Pulmonary/Chest: diminished bilaterally, no respiratory distress  Cardiovascular: normal rate, normal S1 and S2   Abdomen: soft, non-tender, non-distended, normal bowel sounds   Extremities: no cyanosis, no clubbing and no edema  Muscoloskeletal: Left lower back/buttock and right lower back pain with movement  Neurologic: no tremor and speech normal      Recent Labs     02/10/22  0425 02/11/22  0438 02/12/22  0317    136 138   K 4.1 4.2 4.2    99 100   CO2 24 23 24   BUN 21 22 23   CREATININE 0.6 0.6 0.6   GLUCOSE 173* 188* 269*   CALCIUM 9.7 9.4 9.3       Recent Labs     02/10/22  0425 02/12/22  0317   ALKPHOS 79 74   PROT 7.3 6.9   LABALBU 3.7 4.0   BILITOT 0.3 0.3   AST 30 30   ALT 27 41*       Recent Labs     02/10/22  0425 02/11/22  0438 02/12/22  0317   WBC 8.7 7.5 7.9   RBC 3.93 4.08 3.99   HGB 12.4 12.9 12.5   HCT 37.8 39.0 37.6   MCV 96.2 95.6 94.2   MCH 31.6 31.6 31.3   MCHC 32.8 33.1 33.2   RDW 11.8 11.5 11.3*    240 245   MPV 9.6 9.3 9.2           Radiology:   FL ESOPHAGRAM   Final Result   1. Moderate sized paraesophageal hiatus hernia with some reflux. 2. No evidence of esophageal obstruction or stricture. 3. Multiple tertiary contractions, consistent with presbyesophagus. No   esophageal mass or ulceration seen. XR ABDOMEN (KUB) (SINGLE AP VIEW)   Final Result   No active process. See above. XR ABDOMEN (KUB) (SINGLE AP VIEW)   Final Result   Moderate-sized hiatal hernia. Atelectatic changes in the left lung base. No   evidence of acute parenchymal disease. Stool scattered throughout the colon to suggest clinical presentation of   constipation with no signs of obvious obstruction or gross free   intraperitoneal air. XR CHEST (2 VW)   Final Result   Moderate-sized hiatal hernia. Atelectatic changes in the left lung base. No   evidence of acute parenchymal disease. Stool scattered throughout the colon to suggest clinical presentation of   constipation with no signs of obvious obstruction or gross free   intraperitoneal air. MRI LUMBAR SPINE WO CONTRAST   Final Result   1. No evidence of lumbar spine fracture.    2. Levoscoliosis with significant degenerative endplate changes along   concavity of the levoscoliotic curvature. 3. Edematous degenerative endplate changes at M8/D9. 4. Lumbar spondylosis as described above with notable moderate central canal   stenosis at L1/L2 and multiple levels of significant neural foraminal   narrowing notable on the right at L1/L2, L2/L3, and L4/L5. Severe left   neural foraminal narrowing at L5/S1. CT ABDOMEN PELVIS WO CONTRAST Additional Contrast? None   Final Result   No acute finding in the abdomen and pelvis on this unenhanced study. A slightly hypodense lesion in the left lobe of the liver, which may   represent an [address]. Dedicated MRI is recommended to confirm and rule out other   possibilities such as malignant neoplasms. Distal colonic diverticulosis. No acute diverticulitis. XR HIP 2-3 VW W PELVIS LEFT   Final Result   No evidence of an acute fracture of the pelvis or left hip. IR INTERVENTIONAL RADIOLOGY PROCEDURE REQUEST    (Results Pending)       Assessment:  Active Problems:    Coronary artery disease involving native coronary artery of native heart without angina pectoris    Hypertension    Hyperlipidemia    Type 2 diabetes mellitus without complication, without long-term current use of insulin (HCC)    Intractable pain    Sciatica of left side    Gastroesophageal reflux disease without esophagitis    Intractable back pain    Herniated nucleus pulposus, L4-5  Resolved Problems:    * No resolved hospital problems. *      Plan:  1. Intractable Left and right lower back pain:  Completed methylprednisolone. Neurosurgery evaluated and patient would like an epidural nerve block. IR consulted and cannot be done until Monday 2/14 due to Plavix (last dose was 2/9). 2. Liver lesion:  Seen on CT of the abdomen and pelvis. Possibly focal nodular hyperplasia. Will need outpatient follow up. Discussed with the patient and her family. 3. Diabetes:  Hold home glipizide.   Continue Humalog sliding scale, blood sugar checks, hypoglycemic protocol. Hgb A1C is 6.5.   4. Moderate hiatal hernia:  Continue PPI. General Surgery consulted and patient is to follow up as an outpatient. She will need an EGD with manometry to rule out other esophageal pathology. Esophogram showed moderate sized paraesophageal hiatus hernia with some reflux, multiple tertiary contractions, consistent with presbyesophagus. Speech Therapy following. 5. Chest pain:  EKG with no ischemic changes. High sensitivity troponin levels: 9,12,9.    6. Hyperlipidemia:  Continue Crestor while an inpatient. 7. Hypertension:  Continue Coreg and lisinopril. 8. GERD: Continue PPI. NOTE: This report was transcribed using voice recognition software. Every effort was made to ensure accuracy; however, inadvertent computerized transcription errors may be present.      Electronically signed by RANDY Heath CNP on 2/12/2022 at 10:55 AM

## 2022-02-13 LAB
ALBUMIN SERPL-MCNC: 3.9 G/DL (ref 3.5–5.2)
ALP BLD-CCNC: 71 U/L (ref 35–104)
ALT SERPL-CCNC: 35 U/L (ref 0–32)
ANION GAP SERPL CALCULATED.3IONS-SCNC: 12 MMOL/L (ref 7–16)
AST SERPL-CCNC: 29 U/L (ref 0–31)
BILIRUB SERPL-MCNC: <0.2 MG/DL (ref 0–1.2)
BUN BLDV-MCNC: 26 MG/DL (ref 6–23)
CALCIUM SERPL-MCNC: 9.6 MG/DL (ref 8.6–10.2)
CHLORIDE BLD-SCNC: 97 MMOL/L (ref 98–107)
CO2: 26 MMOL/L (ref 22–29)
CREAT SERPL-MCNC: 0.7 MG/DL (ref 0.5–1)
GFR AFRICAN AMERICAN: >60
GFR NON-AFRICAN AMERICAN: >60 ML/MIN/1.73
GLUCOSE BLD-MCNC: 177 MG/DL (ref 74–99)
HCT VFR BLD CALC: 37.4 % (ref 34–48)
HEMOGLOBIN: 12.6 G/DL (ref 11.5–15.5)
MAGNESIUM: 2.1 MG/DL (ref 1.6–2.6)
MCH RBC QN AUTO: 32.1 PG (ref 26–35)
MCHC RBC AUTO-ENTMCNC: 33.7 % (ref 32–34.5)
MCV RBC AUTO: 95.4 FL (ref 80–99.9)
METER GLUCOSE: 146 MG/DL (ref 74–99)
METER GLUCOSE: 246 MG/DL (ref 74–99)
METER GLUCOSE: 275 MG/DL (ref 74–99)
METER GLUCOSE: 314 MG/DL (ref 74–99)
PDW BLD-RTO: 11.5 FL (ref 11.5–15)
PHOSPHORUS: 3.9 MG/DL (ref 2.5–4.5)
PLATELET # BLD: 263 E9/L (ref 130–450)
PMV BLD AUTO: 9.1 FL (ref 7–12)
POTASSIUM SERPL-SCNC: 4.2 MMOL/L (ref 3.5–5)
RBC # BLD: 3.92 E12/L (ref 3.5–5.5)
SODIUM BLD-SCNC: 135 MMOL/L (ref 132–146)
TOTAL PROTEIN: 7 G/DL (ref 6.4–8.3)
WBC # BLD: 8.1 E9/L (ref 4.5–11.5)

## 2022-02-13 PROCEDURE — 6360000002 HC RX W HCPCS: Performed by: NURSE PRACTITIONER

## 2022-02-13 PROCEDURE — 84100 ASSAY OF PHOSPHORUS: CPT

## 2022-02-13 PROCEDURE — 80053 COMPREHEN METABOLIC PANEL: CPT

## 2022-02-13 PROCEDURE — 96376 TX/PRO/DX INJ SAME DRUG ADON: CPT

## 2022-02-13 PROCEDURE — 97530 THERAPEUTIC ACTIVITIES: CPT

## 2022-02-13 PROCEDURE — 36415 COLL VENOUS BLD VENIPUNCTURE: CPT

## 2022-02-13 PROCEDURE — 97110 THERAPEUTIC EXERCISES: CPT

## 2022-02-13 PROCEDURE — 6370000000 HC RX 637 (ALT 250 FOR IP): Performed by: FAMILY MEDICINE

## 2022-02-13 PROCEDURE — 82962 GLUCOSE BLOOD TEST: CPT

## 2022-02-13 PROCEDURE — APPSS30 APP SPLIT SHARED TIME 16-30 MINUTES: Performed by: NURSE PRACTITIONER

## 2022-02-13 PROCEDURE — 6370000000 HC RX 637 (ALT 250 FOR IP): Performed by: NURSE PRACTITIONER

## 2022-02-13 PROCEDURE — 99232 SBSQ HOSP IP/OBS MODERATE 35: CPT | Performed by: INTERNAL MEDICINE

## 2022-02-13 PROCEDURE — 85027 COMPLETE CBC AUTOMATED: CPT

## 2022-02-13 PROCEDURE — 83735 ASSAY OF MAGNESIUM: CPT

## 2022-02-13 PROCEDURE — 1200000000 HC SEMI PRIVATE

## 2022-02-13 RX ORDER — PANTOPRAZOLE SODIUM 40 MG/1
40 TABLET, DELAYED RELEASE ORAL 2 TIMES DAILY
Status: DISCONTINUED | OUTPATIENT
Start: 2022-02-13 | End: 2022-02-15 | Stop reason: HOSPADM

## 2022-02-13 RX ADMIN — INSULIN LISPRO 4 UNITS: 100 INJECTION, SOLUTION INTRAVENOUS; SUBCUTANEOUS at 11:29

## 2022-02-13 RX ADMIN — INSULIN LISPRO 1 UNITS: 100 INJECTION, SOLUTION INTRAVENOUS; SUBCUTANEOUS at 07:40

## 2022-02-13 RX ADMIN — LISINOPRIL 30 MG: 20 TABLET ORAL at 07:39

## 2022-02-13 RX ADMIN — SENNOSIDES AND DOCUSATE SODIUM 1 TABLET: 50; 8.6 TABLET ORAL at 20:31

## 2022-02-13 RX ADMIN — CARVEDILOL 25 MG: 25 TABLET, FILM COATED ORAL at 07:39

## 2022-02-13 RX ADMIN — SENNOSIDES AND DOCUSATE SODIUM 1 TABLET: 50; 8.6 TABLET ORAL at 07:39

## 2022-02-13 RX ADMIN — ONDANSETRON 4 MG: 2 INJECTION INTRAMUSCULAR; INTRAVENOUS at 10:09

## 2022-02-13 RX ADMIN — HYDROCODONE BITARTRATE AND ACETAMINOPHEN 1 TABLET: 5; 325 TABLET ORAL at 20:31

## 2022-02-13 RX ADMIN — ONDANSETRON 4 MG: 2 INJECTION INTRAMUSCULAR; INTRAVENOUS at 17:26

## 2022-02-13 RX ADMIN — INSULIN LISPRO 3 UNITS: 100 INJECTION, SOLUTION INTRAVENOUS; SUBCUTANEOUS at 16:13

## 2022-02-13 RX ADMIN — INSULIN LISPRO 1 UNITS: 100 INJECTION, SOLUTION INTRAVENOUS; SUBCUTANEOUS at 20:32

## 2022-02-13 RX ADMIN — PANTOPRAZOLE SODIUM 40 MG: 40 TABLET, DELAYED RELEASE ORAL at 20:31

## 2022-02-13 RX ADMIN — PANTOPRAZOLE SODIUM 40 MG: 40 TABLET, DELAYED RELEASE ORAL at 06:19

## 2022-02-13 RX ADMIN — ROSUVASTATIN CALCIUM 10 MG: 10 TABLET, FILM COATED ORAL at 20:31

## 2022-02-13 RX ADMIN — HYDROCODONE BITARTRATE AND ACETAMINOPHEN 1 TABLET: 5; 325 TABLET ORAL at 07:38

## 2022-02-13 RX ADMIN — CARVEDILOL 25 MG: 25 TABLET, FILM COATED ORAL at 16:13

## 2022-02-13 ASSESSMENT — PAIN SCALES - GENERAL
PAINLEVEL_OUTOF10: 0
PAINLEVEL_OUTOF10: 0
PAINLEVEL_OUTOF10: 7
PAINLEVEL_OUTOF10: 2
PAINLEVEL_OUTOF10: 2
PAINLEVEL_OUTOF10: 7

## 2022-02-13 ASSESSMENT — PAIN DESCRIPTION - FREQUENCY: FREQUENCY: INTERMITTENT

## 2022-02-13 ASSESSMENT — PAIN DESCRIPTION - LOCATION: LOCATION: BACK

## 2022-02-13 ASSESSMENT — PAIN DESCRIPTION - DESCRIPTORS: DESCRIPTORS: SQUEEZING

## 2022-02-13 NOTE — PROGRESS NOTES
[address] [place], [place] [address]ist   Progress Note    Admitting Date and Time: 2/6/2022 10:08 PM  Admit Dx: Liver mass [R16.0]  Herniated nucleus pulposus, L4-5 [M51.26]  Unable to ambulate [R26.2]  Intractable pain [R52]  Intractable back pain [M54.9]    Subjective:    Patient reports feeling nauseated this am and complains of increased acid reflux. She is aware of outpatient plan to follow up with General Surgery for EGD with manometry and possible laparoscopic hiatal hernia repair with possible fundoplication. Discussed adjusting her Protonix evening dose to be given at bedtime. She is for a lumbar epidural steroid injection with IR tomorrow. Awaiting precert for rehab. ROS: denies fever, chills, cp, sob, n/v, HA unless stated above.      GI cocktail   Oral Once    pantoprazole  40 mg Oral BID    rosuvastatin  10 mg Oral Nightly    polyethylene glycol  17 g Oral Once    sennosides-docusate sodium  1 tablet Oral BID    carvedilol  25 mg Oral BID WC    [Held by provider] clopidogrel  75 mg Oral Daily    [Held by provider] glipiZIDE  5 mg Oral QAM AC    lisinopril  30 mg Oral Daily    insulin lispro  0-6 Units SubCUTAneous TID WC    insulin lispro  0-3 Units SubCUTAneous Nightly     ondansetron, 4 mg, Q6H PRN  acetaminophen, 650 mg, Q4H PRN  HYDROcodone 5 mg - acetaminophen, 1 tablet, Q4H PRN  dextrose bolus (hypoglycemia), 125 mL, PRN   Or  dextrose bolus (hypoglycemia), 250 mL, PRN  glucose, 15 g, PRN  glucagon (rDNA), 1 mg, PRN  dextrose, 100 mL/hr, PRN         Objective:    BP (!) 140/65   Pulse 82   Temp 97.9 °F (36.6 °C) (Oral)   Resp 18   Ht 4' 10\" (1.473 m)   Wt 75 lb (34 kg)   SpO2 98%   Breastfeeding No   BMI 15.68 kg/m²   General Appearance: alert and oriented to person, place and time and in no acute distress, frail, cachetic   Skin: warm and dry  Head: normocephalic and atraumatic  Eyes: pupils equal, round, and reactive to light, conjunctivae normal  Neck: neck supple and non tender without mass   Pulmonary/Chest: diminished bilaterally, no respiratory distress  Cardiovascular: normal rate, normal S1 and S2   Abdomen: soft, non-tender, non-distended, normal bowel sounds   Extremities: no cyanosis, no clubbing and no edema  Muscoloskeletal:  Left lower back/buttock and right lower back pain with movement  Neurologic: no tremor and speech normal      Recent Labs     02/11/22 0438 02/12/22 0317 02/13/22 0452    138 135   K 4.2 4.2 4.2   CL 99 100 97*   CO2 23 24 26   BUN 22 23 26*   CREATININE 0.6 0.6 0.7   GLUCOSE 188* 269* 177*   CALCIUM 9.4 9.3 9.6       Recent Labs     02/12/22 0317 02/13/22 0452   ALKPHOS 74 71   PROT 6.9 7.0   LABALBU 4.0 3.9   BILITOT 0.3 <0.2   AST 30 29   ALT 41* 35*       Recent Labs     02/11/22 0438 02/12/22 0317 02/13/22 0452   WBC 7.5 7.9 8.1   RBC 4.08 3.99 3.92   HGB 12.9 12.5 12.6   HCT 39.0 37.6 37.4   MCV 95.6 94.2 95.4   MCH 31.6 31.3 32.1   MCHC 33.1 33.2 33.7   RDW 11.5 11.3* 11.5    245 263   MPV 9.3 9.2 9.1           Radiology:   FL ESOPHAGRAM   Final Result   1. Moderate sized paraesophageal hiatus hernia with some reflux. 2. No evidence of esophageal obstruction or stricture. 3. Multiple tertiary contractions, consistent with presbyesophagus. No   esophageal mass or ulceration seen. XR ABDOMEN (KUB) (SINGLE AP VIEW)   Final Result   No active process. See above. XR ABDOMEN (KUB) (SINGLE AP VIEW)   Final Result   Moderate-sized hiatal hernia. Atelectatic changes in the left lung base. No   evidence of acute parenchymal disease. Stool scattered throughout the colon to suggest clinical presentation of   constipation with no signs of obvious obstruction or gross free   intraperitoneal air. XR CHEST (2 VW)   Final Result   Moderate-sized hiatal hernia. Atelectatic changes in the left lung base. No   evidence of acute parenchymal disease.       Stool scattered throughout the colon to suggest clinical presentation of   constipation with no signs of obvious obstruction or gross free   intraperitoneal air. MRI LUMBAR SPINE WO CONTRAST   Final Result   1. No evidence of lumbar spine fracture. 2. Levoscoliosis with significant degenerative endplate changes along   concavity of the levoscoliotic curvature. 3. Edematous degenerative endplate changes at U6/C4. 4. Lumbar spondylosis as described above with notable moderate central canal   stenosis at L1/L2 and multiple levels of significant neural foraminal   narrowing notable on the right at L1/L2, L2/L3, and L4/L5. Severe left   neural foraminal narrowing at L5/S1. CT ABDOMEN PELVIS WO CONTRAST Additional Contrast? None   Final Result   No acute finding in the abdomen and pelvis on this unenhanced study. A slightly hypodense lesion in the left lobe of the liver, which may   represent an [address]. Dedicated MRI is recommended to confirm and rule out other   possibilities such as malignant neoplasms. Distal colonic diverticulosis. No acute diverticulitis. XR HIP 2-3 VW W PELVIS LEFT   Final Result   No evidence of an acute fracture of the pelvis or left hip. IR INTERVENTIONAL RADIOLOGY PROCEDURE REQUEST    (Results Pending)       Assessment:  Active Problems:    Coronary artery disease involving native coronary artery of native heart without angina pectoris    Hypertension    Hyperlipidemia    Type 2 diabetes mellitus without complication, without long-term current use of insulin (HCC)    Intractable pain    Sciatica of left side    Gastroesophageal reflux disease without esophagitis    Intractable back pain    Herniated nucleus pulposus, L4-5  Resolved Problems:    * No resolved hospital problems. *      Plan:  1. Intractable Left and right lower back pain:  Completed methylprednisolone. Neurosurgery evaluated and patient would like an epidural nerve block.   IR consulted and cannot be done until Monday 2/14 due to Plavix (last dose was 2/9). NPO after midnight. 2. Liver lesion:  Seen on CT of the abdomen and pelvis. Possibly focal nodular hyperplasia. Will need outpatient follow up. Discussed with the patient and her family. 3. Diabetes:  Hold home glipizide. Continue Humalog sliding scale, blood sugar checks, hypoglycemic protocol. Hgb A1C is 6.5.   4. Moderate hiatal hernia:  Change protonix to q am and q HS. General Surgery consulted and patient is to follow up as an outpatient. She will need an EGD with manometry to rule out other esophageal pathology. Esophogram showed moderate sized paraesophageal hiatus hernia with some reflux, multiple tertiary contractions, consistent with presbyesophagus. Speech Therapy following. 5. Chest pain:  EKG with no ischemic changes. High sensitivity troponin levels: 9,12,9.    6. Hyperlipidemia:  Continue Crestor while an inpatient. 7. Hypertension:  Continue Coreg and lisinopril. 8. GERD: Continue PPI. NOTE: This report was transcribed using voice recognition software. Every effort was made to ensure accuracy; however, inadvertent computerized transcription errors may be present.      Electronically signed by RANDY Sidhu CNP on 2/13/2022 at 10:05 AM

## 2022-02-13 NOTE — PROGRESS NOTES
Physical Therapy Treatment Note/Plan of Care    Room #:  7244/5942-80  Patient Name: Jessie Bañuelos  YOB: 1940  MRN: [id number]    Date of Service: 2/13/2022     Tentative placement recommendation: Subacute rehab  Equipment recommendation: To be determined      Evaluating Physical Therapist: Mone Lewis, PT #[id number]      Specific Provider Orders/Date/Referring Provider :  02/07/22 0315   PT eval and treat Start: 02/07/22 0315, End: 02/07/22 0315, ONE TIME, Standing Count: 1 Occurrences, R    Maribell Light, DO      Admitting Diagnosis:   Liver mass [R16.0]  Herniated nucleus pulposus, L4-5 [M51.26]  Unable to ambulate [R26.2]  Intractable pain [R52]  Intractable back pain [M54.9]     for back pain, history of back trouble in the past, she states her lower back is hurting, rating her left leg,     Surgery: none  Visit Diagnoses       Codes    Unable to ambulate     R26.2    Liver mass     R16.0          Patient Active Problem List   Diagnosis    Coronary artery disease involving native coronary artery of native heart without angina pectoris    Scoliosis    Hypertension    Hyperlipidemia    Type 2 diabetes mellitus without complication, without long-term current use of insulin (HCC)    Atrial septal aneurysm    GI bleed    Intractable pain    Sciatica of left side    Gastroesophageal reflux disease without esophagitis    Intractable back pain    Herniated nucleus pulposus, L4-5        ASSESSMENT of Current Deficits Patient exhibits decreased strength, balance, endurance and pain back  impairing functional mobility, transfers, gait , gait distance and tolerance to activity. Patient limited in ambulation distance this session d/t fatigue and sciatic pain in LB and L LE. Patient also reports nausea today. Patient requires continued skilled physical therapy to address concerns listed above for increased safety and function at discharge.          PHYSICAL THERAPY  PLAN OF CARE       Physical therapy plan of care is established based on physician order,  patient diagnosis and clinical assessment    Current Treatment Recommendations:    -Bed Mobility: Lower extremity exercises  and Trunk control activities   -Sitting Balance: Facilitate active trunk muscle engagement , Facilitate postural control in all planes  and Engage in core activities to allow for movement within base of support   -Standing Balance: Perform strengthening exercises in standing to promote motor control with or without upper extremity support   -Transfers: Provide instruction on proper hand and foot position for adequate transfer of weight onto lower extremities and use of gait device if needed and Cues for hand placement, technique and safety. Provide stabilization to prevent fall   -Gait: Gait training, Standing activities to improve: base of support, weight shift, weight bearing  and Pregait training to emphasize: Sequencing , Device control, Upright and Safety   -Endurance: Utilize Supervised activities to increase level of endurance to allow for safe functional mobility including transfers and gait     PT long term treatment goals are located in below grid    Patient and or family understand(s) diagnosis, prognosis, and plan of care. Frequency of treatments: Patient will be seen  3-5 times/week.          Prior Level of Function: Patient ambulated with wheeled walker generally used cane when able to get out, has been been little while since been out due to weather  Rehab Potential: fair  + for baseline    Past medical history:   Past Medical History:   Diagnosis Date    Acute CVA (cerebrovascular accident) ([facility] [address] 5/25/2019    Arthritis     Back problem     CAD (coronary artery disease) 2010    PTCA with DAMON to prox LAD, PTCA DAMON to ostial lesion of RCA and BMS to prox lesion RCA DAMON to RCA ostial     Cerebral artery occlusion with cerebral infarction ([facility] [address] 05/2019    Hyperlipidemia     Hypertension     Nausea & vomiting  Reflux     RLD (ruptured lumbar disc)     Scoliosis     Spinal stenosis     Type 2 diabetes mellitus without complication, without long-term current use of insulin ([facility] [address] 10/14/2019     Past Surgical History:   Procedure Laterality Date    CATARACT REMOVAL      bilateral    CORONARY ANGIOPLASTY  2/25/2010    PTCA with DAMON in the proximal left anterior descending    CORONARY ANGIOPLASTY  3/11/2010    PTCA and deployment of 2 stents in the ostium and the proximal segment of RCA    CORONARY ANGIOPLASTY  5/19/2010    PTCA DAMON to ostial RCA patent LAD and RCA BMS  restenosis of RCA    CORONARY ANGIOPLASTY WITH STENT PLACEMENT      times 3    DIAGNOSTIC CARDIAC CATH LAB PROCEDURE  2/25/2010    Dr. Mindy Wilson: Cardiac cath with angioplasty follow up    [address] CATH LAB PROCEDURE  3/11/2010    Cardiac cath and stent placement    DIAGNOSTIC CARDIAC CATH LAB PROCEDURE  5/19/2010    Cardiac cath heart lab and subsequent angiopilsaty    FRACTURE SURGERY      tight wrist    SHOULDER ARTHROSCOPY  08 25 2011    left shoulder arthroscopy ,acromioplasty,busectomy, release of adhesions    UPPER GASTROINTESTINAL ENDOSCOPY N/A 2/19/2021    EGD BIOPSY performed by Maria Fernanda Alejandro DO at [address]:    Precautions:  Up with assistance, falls and spinal precautions ,    Social history: Patient lives alone in a apartment    with 3 steps  to enter bilateral Rail  Tub shower grab bars    Equipment owned: [address] [employer] [place], [address] [facility] chair and Quad cane,       AM-PAC Basic Mobility        AM-PAC Mobility Inpatient   How much difficulty turning over in bed?: A Little  How much difficulty sitting down on / standing up from a chair with arms?: A Little  How much difficulty moving from lying on back to sitting on side of bed?: A Little  How much help from another person moving to and from a bed to a chair?: A Little  How much help from another person needed to walk in hospital room?: A Little  How much help from another person for climbing 3-5 steps with a railing?: A Lot  AM-PAC Inpatient Mobility Raw Score : 17  AM-PAC Inpatient T-Scale Score : 42.13  Mobility Inpatient CMS 0-100% Score: 50.57  Mobility Inpatient CMS G-Code Modifier : CK    Nursing cleared patient for PT treatment. .   OBJECTIVE;   Initial Evaluation  Date: 2/7/2022 Treatment Date:  2/13/2022       Short Term/ Long Term   Goals   Was pt agreeable to Eval/treatment? Yes yes To be met in 3 days   Pain level   9/10  Back pain right buttocks, left leg 8/10  Back pain into L LE    Bed Mobility    Rolling: Moderate assist of 1    Supine to sit: Moderate assist of 1    Sit to supine: Not assessed     Scooting: Moderate assist of 1   Rolling: Supervision    Supine to sit: Supervision    Sit to supine: Supervision    Scooting: Supervision     Rolling: Minimal assist of 1    Supine to sit: Minimal assist of 1    Sit to supine: Minimal assist of 1    Scooting: Minimal assist of 1     Transfers Sit to stand:  Moderate assist of 1 Cues for hand placement and safety  Sit to stand: Supervision  Cues for hand placement and safety       Sit to stand: Minimal assist of 1     Ambulation    2 x 20 feet using  wheeled walker with Minimal assist of 1   for walker control, balance and upright and cues for sequencing and safety 100 feet using  wheeled walker with Supervision    cues for upright posture, safety and pacing     50 feet using  wheeled walker with Supervision     Stair negotiation: ascended and descended   Not assessed          ROM Within functional limits    Increase range of motion 10% of affected joints    Strength BUE:  refer to OT eval  RLE:  3/5  LLE:  3/5  Increase strength in affected mm groups by 1/3 grade   Balance Sitting EOB:  fair    Dynamic Standing:  fair wheeled walker  Sitting EOB: fair +  Dynamic Standing: fair wheeled walker   Sitting EOB:  good    Dynamic Standing: good -wheeled walker      Patient is Alert & Oriented x person, place, time and situation and follows directions    Sensation:  Patient  denies numbness/tingling   Edema:  no   Endurance: fair       Vitals: room air   Blood Pressure at rest  Blood Pressure during session    Heart Rate at rest   Heart Rate during session     SPO2 at rest  %  SPO2 during session  %     Patient education  Patient educated on role of Physical Therapy, risks of immobility, safety and plan of care,  importance of mobility while in hospital , spinal precautions, safety  and seated exercises     Patient response to education:   Pt verbalized understanding Pt demonstrated skill Pt requires further education in this area   Yes Partial Yes      Treatment:  Patient practiced and was instructed/facilitated in the following treatment: Patient transferred to edge of bed and Sat edge of bed 10 minutes with Supervision  to increase dynamic sitting balance and activity tolerance. performed seated exercises. Pt stood, ambulated in the hallway and returned to bedside. Patient returned to supine position and positioned for comfort. Therapeutic Exercises:  ankle pumps, hip abduction/adduction, long arc quad and seated marching x 10-15 reps. AROM     At end of session, patient in bed with   call light and phone within reach,  all lines and tubes intact, nursing notified. Patient would benefit from continued skilled Physical Therapy to improve functional independence and quality of life.          Patient's/ family goals   get rid of pain    Time in 8:51  Time out  9:15    Total Treatment Time  24 minutes    CPT codes:  Therapeutic activities (66813)   14 minutes  1 unit(s)  Therapeutic exercises (73396)   10 minutes  1 unit(s)   Tom Hastings PTA  #[id number]

## 2022-02-14 ENCOUNTER — APPOINTMENT (OUTPATIENT)
Dept: INTERVENTIONAL RADIOLOGY/VASCULAR | Age: 82
DRG: 552 | End: 2022-02-14
Payer: COMMERCIAL

## 2022-02-14 LAB
ALBUMIN SERPL-MCNC: 3.5 G/DL (ref 3.5–5.2)
ALP BLD-CCNC: 69 U/L (ref 35–104)
ALT SERPL-CCNC: 27 U/L (ref 0–32)
ANION GAP SERPL CALCULATED.3IONS-SCNC: 12 MMOL/L (ref 7–16)
AST SERPL-CCNC: 21 U/L (ref 0–31)
BILIRUB SERPL-MCNC: <0.2 MG/DL (ref 0–1.2)
BUN BLDV-MCNC: 25 MG/DL (ref 6–23)
CALCIUM SERPL-MCNC: 9.3 MG/DL (ref 8.6–10.2)
CHLORIDE BLD-SCNC: 100 MMOL/L (ref 98–107)
CO2: 25 MMOL/L (ref 22–29)
CREAT SERPL-MCNC: 0.6 MG/DL (ref 0.5–1)
GFR AFRICAN AMERICAN: >60
GFR NON-AFRICAN AMERICAN: >60 ML/MIN/1.73
GLUCOSE BLD-MCNC: 150 MG/DL (ref 74–99)
HCT VFR BLD CALC: 37.7 % (ref 34–48)
HEMOGLOBIN: 12.5 G/DL (ref 11.5–15.5)
INR BLD: 1.1
MCH RBC QN AUTO: 30.9 PG (ref 26–35)
MCHC RBC AUTO-ENTMCNC: 33.2 % (ref 32–34.5)
MCV RBC AUTO: 93.3 FL (ref 80–99.9)
METER GLUCOSE: 154 MG/DL (ref 74–99)
METER GLUCOSE: 219 MG/DL (ref 74–99)
METER GLUCOSE: 309 MG/DL (ref 74–99)
METER GLUCOSE: 314 MG/DL (ref 74–99)
PDW BLD-RTO: 11.2 FL (ref 11.5–15)
PLATELET # BLD: 274 E9/L (ref 130–450)
PMV BLD AUTO: 9 FL (ref 7–12)
POTASSIUM SERPL-SCNC: 4.4 MMOL/L (ref 3.5–5)
PROTHROMBIN TIME: 13 SEC (ref 9.3–12.4)
RBC # BLD: 4.04 E12/L (ref 3.5–5.5)
SODIUM BLD-SCNC: 137 MMOL/L (ref 132–146)
TOTAL PROTEIN: 6.4 G/DL (ref 6.4–8.3)
WBC # BLD: 7.7 E9/L (ref 4.5–11.5)

## 2022-02-14 PROCEDURE — 97530 THERAPEUTIC ACTIVITIES: CPT

## 2022-02-14 PROCEDURE — 85610 PROTHROMBIN TIME: CPT

## 2022-02-14 PROCEDURE — 6370000000 HC RX 637 (ALT 250 FOR IP): Performed by: FAMILY MEDICINE

## 2022-02-14 PROCEDURE — 97110 THERAPEUTIC EXERCISES: CPT

## 2022-02-14 PROCEDURE — 82962 GLUCOSE BLOOD TEST: CPT

## 2022-02-14 PROCEDURE — 1200000000 HC SEMI PRIVATE

## 2022-02-14 PROCEDURE — 80053 COMPREHEN METABOLIC PANEL: CPT

## 2022-02-14 PROCEDURE — 85027 COMPLETE CBC AUTOMATED: CPT

## 2022-02-14 PROCEDURE — 99232 SBSQ HOSP IP/OBS MODERATE 35: CPT | Performed by: INTERNAL MEDICINE

## 2022-02-14 PROCEDURE — 6360000002 HC RX W HCPCS: Performed by: NURSE PRACTITIONER

## 2022-02-14 PROCEDURE — 62323 NJX INTERLAMINAR LMBR/SAC: CPT

## 2022-02-14 PROCEDURE — 3E0T3BZ INTRODUCTION OF ANESTHETIC AGENT INTO PERIPHERAL NERVES AND PLEXI, PERCUTANEOUS APPROACH: ICD-10-PCS | Performed by: INTERNAL MEDICINE

## 2022-02-14 PROCEDURE — 6360000002 HC RX W HCPCS: Performed by: RADIOLOGY

## 2022-02-14 PROCEDURE — 2500000003 HC RX 250 WO HCPCS: Performed by: RADIOLOGY

## 2022-02-14 PROCEDURE — 36415 COLL VENOUS BLD VENIPUNCTURE: CPT

## 2022-02-14 PROCEDURE — 6360000004 HC RX CONTRAST MEDICATION: Performed by: RADIOLOGY

## 2022-02-14 PROCEDURE — 6370000000 HC RX 637 (ALT 250 FOR IP): Performed by: NURSE PRACTITIONER

## 2022-02-14 RX ORDER — LIDOCAINE HYDROCHLORIDE 5 MG/ML
1 INJECTION, SOLUTION INFILTRATION; INTRAVENOUS ONCE
Status: COMPLETED | OUTPATIENT
Start: 2022-02-14 | End: 2022-02-14

## 2022-02-14 RX ORDER — CLOPIDOGREL BISULFATE 75 MG/1
75 TABLET ORAL DAILY
Status: DISCONTINUED | OUTPATIENT
Start: 2022-02-15 | End: 2022-02-15 | Stop reason: HOSPADM

## 2022-02-14 RX ORDER — BETAMETHASONE SODIUM PHOSPHATE AND BETAMETHASONE ACETATE 3; 3 MG/ML; MG/ML
12 INJECTION, SUSPENSION INTRA-ARTICULAR; INTRALESIONAL; INTRAMUSCULAR; SOFT TISSUE ONCE
Status: COMPLETED | OUTPATIENT
Start: 2022-02-14 | End: 2022-02-14

## 2022-02-14 RX ADMIN — SENNOSIDES AND DOCUSATE SODIUM 1 TABLET: 50; 8.6 TABLET ORAL at 09:27

## 2022-02-14 RX ADMIN — INSULIN LISPRO 4 UNITS: 100 INJECTION, SOLUTION INTRAVENOUS; SUBCUTANEOUS at 12:57

## 2022-02-14 RX ADMIN — INSULIN LISPRO 2 UNITS: 100 INJECTION, SOLUTION INTRAVENOUS; SUBCUTANEOUS at 16:48

## 2022-02-14 RX ADMIN — SENNOSIDES AND DOCUSATE SODIUM 1 TABLET: 50; 8.6 TABLET ORAL at 19:52

## 2022-02-14 RX ADMIN — PANTOPRAZOLE SODIUM 40 MG: 40 TABLET, DELAYED RELEASE ORAL at 19:52

## 2022-02-14 RX ADMIN — LISINOPRIL 30 MG: 20 TABLET ORAL at 09:27

## 2022-02-14 RX ADMIN — LIDOCAINE HYDROCHLORIDE 1 ML: 5 INJECTION, SOLUTION INFILTRATION at 08:45

## 2022-02-14 RX ADMIN — BETAMETHASONE SODIUM PHOSPHATE AND BETAMETHASONE ACETATE 12 MG: 3; 3 INJECTION, SUSPENSION INTRA-ARTICULAR; INTRALESIONAL; INTRAMUSCULAR at 08:44

## 2022-02-14 RX ADMIN — CARVEDILOL 25 MG: 25 TABLET, FILM COATED ORAL at 07:38

## 2022-02-14 RX ADMIN — ONDANSETRON 4 MG: 2 INJECTION INTRAMUSCULAR; INTRAVENOUS at 10:23

## 2022-02-14 RX ADMIN — CARVEDILOL 25 MG: 25 TABLET, FILM COATED ORAL at 16:44

## 2022-02-14 RX ADMIN — INSULIN LISPRO 1 UNITS: 100 INJECTION, SOLUTION INTRAVENOUS; SUBCUTANEOUS at 10:27

## 2022-02-14 RX ADMIN — ROSUVASTATIN CALCIUM 10 MG: 10 TABLET, FILM COATED ORAL at 19:52

## 2022-02-14 RX ADMIN — IOPAMIDOL 3 ML: 408 INJECTION, SOLUTION INTRATHECAL at 08:44

## 2022-02-14 RX ADMIN — INSULIN LISPRO 2 UNITS: 100 INJECTION, SOLUTION INTRAVENOUS; SUBCUTANEOUS at 19:52

## 2022-02-14 RX ADMIN — ACETAMINOPHEN 650 MG: 325 TABLET ORAL at 19:08

## 2022-02-14 ASSESSMENT — PAIN SCALES - GENERAL
PAINLEVEL_OUTOF10: 3
PAINLEVEL_OUTOF10: 8

## 2022-02-14 ASSESSMENT — PAIN DESCRIPTION - PROGRESSION: CLINICAL_PROGRESSION: GRADUALLY IMPROVING

## 2022-02-14 NOTE — PROGRESS NOTES
Speech Language Pathology      NAME:  Xiao Guzman  :  1940  DATE: 2022  ROOM:  [facility]1/0321-01    Late entry Discussed dysphagia last week (Friday) with radiologist who completed esophagram.  No evidence of pharyngeal dysphagia noted on the images he did capture during esophogram.  No further dysphagia therapy warranted at this time. If full evaluation of pharyngeal function is needed please order MBSS speech therapy will sign off at this time.      Liver mass [R16.0]  Herniated nucleus pulposus, L4-5 [M51.26]  Unable to ambulate [R26.2]  Intractable pain [R52]  Intractable back pain [M54.9]        Burrell Cushing MSCCC/SLP  Speech Language Pathologist  [id number]

## 2022-02-14 NOTE — PROGRESS NOTES
Physical Therapy Treatment Note/Plan of Care    Room #:  2828/1991-66  Patient Name: Dorita Mcleod  YOB: 1940  MRN: [id number]    Date of Service: 2/14/2022     Tentative placement recommendation: Subacute rehab  Equipment recommendation: To be determined      Evaluating Physical Therapist: Shonda James, PT #[id number]      Specific Provider Orders/Date/Referring Provider :  02/07/22 0315   PT eval and treat Start: 02/07/22 0315, End: 02/07/22 0315, ONE TIME, Standing Count: 1 Occurrences, R    Sharon Lainez,       Admitting Diagnosis:   Liver mass [R16.0]  Herniated nucleus pulposus, L4-5 [M51.26]  Unable to ambulate [R26.2]  Intractable pain [R52]  Intractable back pain [M54.9]     for back pain, history of back trouble in the past, she states her lower back is hurting, rating her left leg,     Surgery: none  Visit Diagnoses       Codes    Unable to ambulate     R26.2    Liver mass     R16.0          Patient Active Problem List   Diagnosis    Coronary artery disease involving native coronary artery of native heart without angina pectoris    Scoliosis    Hypertension    Hyperlipidemia    Type 2 diabetes mellitus without complication, without long-term current use of insulin (HCC)    Atrial septal aneurysm    GI bleed    Intractable pain    Sciatica of left side    Gastroesophageal reflux disease without esophagitis    Intractable back pain    Herniated nucleus pulposus, L4-5        ASSESSMENT of Current Deficits Patient exhibits decreased strength, balance, endurance and pain back  impairing functional mobility, transfers, gait , gait distance and tolerance to activity. Patient needing a seated rest period during ambulation due to impaired endurance. Patient requires continued skilled physical therapy to address concerns listed above for increased safety and function at discharge.          PHYSICAL THERAPY  PLAN OF CARE       Physical therapy plan of care is established based on physician order,  patient diagnosis and clinical assessment    Current Treatment Recommendations:    -Bed Mobility: Lower extremity exercises  and Trunk control activities   -Sitting Balance: Facilitate active trunk muscle engagement , Facilitate postural control in all planes  and Engage in core activities to allow for movement within base of support   -Standing Balance: Perform strengthening exercises in standing to promote motor control with or without upper extremity support   -Transfers: Provide instruction on proper hand and foot position for adequate transfer of weight onto lower extremities and use of gait device if needed and Cues for hand placement, technique and safety. Provide stabilization to prevent fall   -Gait: Gait training, Standing activities to improve: base of support, weight shift, weight bearing  and Pregait training to emphasize: Sequencing , Device control, Upright and Safety   -Endurance: Utilize Supervised activities to increase level of endurance to allow for safe functional mobility including transfers and gait     PT long term treatment goals are located in below grid    Patient and or family understand(s) diagnosis, prognosis, and plan of care. Frequency of treatments: Patient will be seen  3-5 times/week.          Prior Level of Function: Patient ambulated with wheeled walker generally used cane when able to get out, has been been little while since been out due to weather  Rehab Potential: fair  + for baseline    Past medical history:   Past Medical History:   Diagnosis Date    Acute CVA (cerebrovascular accident) ([facility] [address] 5/25/2019    Arthritis     Back problem     CAD (coronary artery disease) 2010    PTCA with DAMON to prox LAD, PTCA DAMON to ostial lesion of RCA and BMS to prox lesion RCA DAMON to RCA ostial     Cerebral artery occlusion with cerebral infarction ([address] 05/2019    Hyperlipidemia     Hypertension     Nausea & vomiting     Reflux     RLD (ruptured lumbar disc)  Scoliosis     Spinal stenosis     Type 2 diabetes mellitus without complication, without long-term current use of insulin ([facility] [address] 10/14/2019     Past Surgical History:   Procedure Laterality Date    CATARACT REMOVAL      bilateral    CORONARY ANGIOPLASTY  2/25/2010    PTCA with DAMON in the proximal left anterior descending    CORONARY ANGIOPLASTY  3/11/2010    PTCA and deployment of 2 stents in the ostium and the proximal segment of RCA    CORONARY ANGIOPLASTY  5/19/2010    PTCA DAMON to ostial RCA patent LAD and RCA BMS  restenosis of RCA    CORONARY ANGIOPLASTY WITH STENT PLACEMENT      times 3    DIAGNOSTIC CARDIAC CATH LAB PROCEDURE  2/25/2010    Dr. Elias Busch: Cardiac cath with angioplasty follow up    [address] CATH LAB PROCEDURE  3/11/2010    Cardiac cath and stent placement    DIAGNOSTIC CARDIAC CATH LAB PROCEDURE  5/19/2010    Cardiac cath heart lab and subsequent angiopilsaty    FRACTURE SURGERY      tight wrist    SHOULDER ARTHROSCOPY  08 25 2011    left shoulder arthroscopy ,acromioplasty,busectomy, release of adhesions    UPPER GASTROINTESTINAL ENDOSCOPY N/A 2/19/2021    EGD BIOPSY performed by Joel Montoya DO at [address]:    Precautions:  Up with assistance, falls and spinal precautions ,    Social history: Patient lives alone in a apartment    with 3 steps  to enter bilateral Rail  Tub shower grab bars    Equipment owned: Kaylene Soliz, [address] [facility] chair and Quad cane,       AM-PAC Basic Mobility        AM-PAC Mobility Inpatient   How much difficulty turning over in bed?: None  How much difficulty sitting down on / standing up from a chair with arms?: A Little  How much difficulty moving from lying on back to sitting on side of bed?: None  How much help from another person moving to and from a bed to a chair?: A Little  How much help from another person needed to walk in hospital room?: A Little  How much help from another person for climbing 3-5 steps with a railing?: A Little  AM-PAC Inpatient Mobility Raw Score : 20  AM-PAC Inpatient T-Scale Score : 47.67  Mobility Inpatient CMS 0-100% Score: 35.83  Mobility Inpatient CMS G-Code Modifier : [address] cleared patient for PT treatment. .   OBJECTIVE;   Initial Evaluation  Date: 2/7/2022 Treatment Date:  2/14/2022       Short Term/ Long Term   Goals   Was pt agreeable to Eval/treatment? Yes yes To be met in 3 days   Pain level   9/10  Back pain right buttocks, left leg 8/10  Back pain into L LE    Bed Mobility    Rolling: Moderate assist of 1    Supine to sit: Moderate assist of 1    Sit to supine: Not assessed     Scooting: Moderate assist of 1   Rolling: Supervision    Supine to sit: Supervision    Sit to supine: Supervision    Scooting: Supervision     Rolling: Minimal assist of 1    Supine to sit: Minimal assist of 1    Sit to supine: Minimal assist of 1    Scooting: Minimal assist of 1     Transfers Sit to stand:  Moderate assist of 1 Cues for hand placement and safety  Sit to stand: Supervision  Cues for hand placement and safety       Sit to stand: Minimal assist of 1     Ambulation    2 x 20 feet using  wheeled walker with Minimal assist of 1   for walker control, balance and upright and cues for sequencing and safety 2 x 140 feet using  wheeled walker with Supervision    cues for upright posture, safety and pacing     50 feet using  wheeled walker with Supervision     Stair negotiation: ascended and descended   Not assessed          ROM Within functional limits    Increase range of motion 10% of affected joints    Strength BUE:  refer to OT eval  RLE:  3/5  LLE:  3/5  Increase strength in affected mm groups by 1/3 grade   Balance Sitting EOB:  fair    Dynamic Standing:  fair wheeled walker  Sitting EOB: fair +  Dynamic Standing: fair wheeled walker   Sitting EOB:  good    Dynamic Standing: good -wheeled walker      Patient is Alert & Oriented x person, place, time and situation and follows directions Sensation:  Patient  denies numbness/tingling   Edema:  no   Endurance: fair       Vitals: room air   Blood Pressure at rest  Blood Pressure during session    Heart Rate at rest   Heart Rate during session     SPO2 at rest  %  SPO2 during session  %     Patient education  Patient educated on role of Physical Therapy, risks of immobility, safety and plan of care,  importance of mobility while in hospital , spinal precautions, safety  and seated exercises     Patient response to education:   Pt verbalized understanding Pt demonstrated skill Pt requires further education in this area   Yes Partial Yes      Treatment:  Patient practiced and was instructed/facilitated in the following treatment: Patient transferred to edge of bed and Sat edge of bed 3 minutes with Supervision  to increase dynamic sitting balance and activity tolerance. Pt stood, ambulated in the hallway, needed a seated rest period at the end of the hallway, performed seated exercises, and returned to bedside. Therapeutic Exercises:  ankle pumps, hip abduction/adduction, long arc quad and seated marching x 20 reps. 2 sets. AROM     At end of session, patient in bed with   call light and phone within reach,  all lines and tubes intact, nursing notified. Patient would benefit from continued skilled Physical Therapy to improve functional independence and quality of life. Patient's/ family goals   get rid of pain    Time in 13:28  Time out  13:51    Total Treatment Time  23 minutes    CPT codes:  Therapeutic activities (67835)   9 minutes  1 unit(s)  Therapeutic exercises (71025)   14 minutes  1 unit(s)   Cali Galvan  [facility]  LIC # [id number]

## 2022-02-14 NOTE — PROGRESS NOTES
Lumbar epidural injection    [id number] - Patient transferred self from cart to table. Patient positioned, secured and prepped for procedure. Dr. Bernadette Beal reviewed case with patient. Voices understanding of procedure. Emotional support provided to patient. Patient reports pain to lower back, radiates down left leg, no numbness/tingling    Pre procedure /55 86 20 100% on room air prone    0833 - Start procedure /52 83 20 100% on room air prone    0837 - End procedure /56 85 20 99% on room air prone, band aid applied to lower back    0839 - Patient transferred self back onto cart. Patient denies any dizziness/lightheadedness. Patient denies any numbness/tingling of legs/feet.     Nurse to nurse called to third floor spoke with Javier Liang RN    Patient transported back to the floor

## 2022-02-14 NOTE — CARE COORDINATION
2/14/2022: SS Note/Discharge plan: COVID PENDING 2/11/22:  Notified by Nico Guzman admissions for [employer] at [facility] that they received [facility] 9/84/23, ANDRES and HENS initiated, nursing informed, sw will follow for discharge order to complete HENS exemption and to assist with transfer arrangements. Electronically signed by ELSA Langley on 2/14/2022 at 11:27 AM

## 2022-02-14 NOTE — PROGRESS NOTES
[address] [place], [place] [address]ist   Progress Note    Admitting Date and Time: 2/6/2022 10:08 PM  Admit Dx: Liver mass [R16.0]  Herniated nucleus pulposus, L4-5 [M51.26]  Unable to ambulate [R26.2]  Intractable pain [R52]  Intractable back pain [M54.9]    Subjective/interval history:    2/13: Patient reports feeling nauseated this am and complains of increased acid reflux. She is aware of outpatient plan to follow up with General Surgery for EGD with manometry and possible laparoscopic hiatal hernia repair with possible fundoplication. Discussed adjusting her Protonix evening dose to be given at bedtime. She is for a lumbar epidural steroid injection with IR tomorrow. Awaiting precert for rehab.     2/89: Patient had an epidural injection today, and has already noted some relief with her pain. Still having issues with esophageal reflux, and has been eating small amounts. ROS: denies fever, chills, cp, sob, n/v, HA unless stated above.      pantoprazole  40 mg Oral BID    rosuvastatin  10 mg Oral Nightly    polyethylene glycol  17 g Oral Once    sennosides-docusate sodium  1 tablet Oral BID    carvedilol  25 mg Oral BID WC    [Held by provider] clopidogrel  75 mg Oral Daily    [Held by provider] glipiZIDE  5 mg Oral QAM AC    lisinopril  30 mg Oral Daily    insulin lispro  0-6 Units SubCUTAneous TID WC    insulin lispro  0-3 Units SubCUTAneous Nightly     ondansetron, 4 mg, Q6H PRN  acetaminophen, 650 mg, Q4H PRN  HYDROcodone 5 mg - acetaminophen, 1 tablet, Q4H PRN  dextrose bolus (hypoglycemia), 125 mL, PRN   Or  dextrose bolus (hypoglycemia), 250 mL, PRN  glucose, 15 g, PRN  glucagon (rDNA), 1 mg, PRN  dextrose, 100 mL/hr, PRN         Objective:    /66   Pulse 81   Temp 97.9 °F (36.6 °C) (Oral)   Resp 18   Ht 4' 10\" (1.473 m)   Wt 75 lb (34 kg)   SpO2 96%   Breastfeeding No   BMI 15.68 kg/m²   General Appearance: alert and oriented to person, place and time and in no acute distress, frail, cachetic   Skin: warm and dry  Head: normocephalic and atraumatic  Eyes: pupils equal, round, and reactive to light, conjunctivae normal  Neck: neck supple and non tender without mass   Pulmonary/Chest: Nonlabored on room air. Diminished otherwise clear to auscultation bilaterally. Cardiovascular: normal rate, normal S1 and S2   Abdomen: soft, non-tender, non-distended, normal bowel sounds   Extremities: no cyanosis, no clubbing and no edema  Muscoloskeletal:  Left lower back/buttock and right lower back pain with movement  Neurologic: No cranial nerve deficit, speech normal.  Muscle strength proximal and distal lower extremities intact. Sensation intact throughout. Reflexes normal.      Recent Labs     02/12/22 0317 02/13/22 0452 02/14/22  0504    135 137   K 4.2 4.2 4.4    97* 100   CO2 24 26 25   BUN 23 26* 25*   CREATININE 0.6 0.7 0.6   GLUCOSE 269* 177* 150*   CALCIUM 9.3 9.6 9.3       Recent Labs     02/12/22 0317 02/13/22 0452 02/14/22  0504   ALKPHOS 74 71 69   PROT 6.9 7.0 6.4   LABALBU 4.0 3.9 3.5   BILITOT 0.3 <0.2 <0.2   AST 30 29 21   ALT 41* 35* 27       Recent Labs     02/12/22 0317 02/13/22 0452 02/14/22  0504   WBC 7.9 8.1 7.7   RBC 3.99 3.92 4.04   HGB 12.5 12.6 12.5   HCT 37.6 37.4 37.7   MCV 94.2 95.4 93.3   MCH 31.3 32.1 30.9   MCHC 33.2 33.7 33.2   RDW 11.3* 11.5 11.2*    263 274   MPV 9.2 9.1 9.0           Radiology:   IR FLUOROSCOPY GUIDED SPINAL INJECTION   Final Result   Fluoroscopy provided for procedure. Correlation with procedure report. FL ESOPHAGRAM   Final Result   1. Moderate sized paraesophageal hiatus hernia with some reflux. 2. No evidence of esophageal obstruction or stricture. 3. Multiple tertiary contractions, consistent with presbyesophagus. No   esophageal mass or ulceration seen. XR ABDOMEN (KUB) (SINGLE AP VIEW)   Final Result   No active process. See above.          XR ABDOMEN (KUB) (SINGLE AP VIEW) Final Result   Moderate-sized hiatal hernia. Atelectatic changes in the left lung base. No   evidence of acute parenchymal disease. Stool scattered throughout the colon to suggest clinical presentation of   constipation with no signs of obvious obstruction or gross free   intraperitoneal air. XR CHEST (2 VW)   Final Result   Moderate-sized hiatal hernia. Atelectatic changes in the left lung base. No   evidence of acute parenchymal disease. Stool scattered throughout the colon to suggest clinical presentation of   constipation with no signs of obvious obstruction or gross free   intraperitoneal air. MRI LUMBAR SPINE WO CONTRAST   Final Result   1. No evidence of lumbar spine fracture. 2. Levoscoliosis with significant degenerative endplate changes along   concavity of the levoscoliotic curvature. 3. Edematous degenerative endplate changes at C6/V1. 4. Lumbar spondylosis as described above with notable moderate central canal   stenosis at L1/L2 and multiple levels of significant neural foraminal   narrowing notable on the right at L1/L2, L2/L3, and L4/L5. Severe left   neural foraminal narrowing at L5/S1. CT ABDOMEN PELVIS WO CONTRAST Additional Contrast? None   Final Result   No acute finding in the abdomen and pelvis on this unenhanced study. A slightly hypodense lesion in the left lobe of the liver, which may   represent an [address]. Dedicated MRI is recommended to confirm and rule out other   possibilities such as malignant neoplasms. Distal colonic diverticulosis. No acute diverticulitis. XR HIP 2-3 VW W PELVIS LEFT   Final Result   No evidence of an acute fracture of the pelvis or left hip.              Assessment:  Active Problems:    Coronary artery disease involving native coronary artery of native heart without angina pectoris    Hypertension    Hyperlipidemia    Type 2 diabetes mellitus without complication, without long-term current use of computerized transcription errors may be present.      Electronically signed by Cayla Spence DO on 2/14/2022 at 4:11 PM

## 2022-02-14 NOTE — PLAN OF CARE
Problem: Pain:  Goal: Pain level will decrease  Description: Pain level will decrease  Outcome: Met This Shift  Goal: Control of acute pain  Description: Control of acute pain  Outcome: Met This Shift     Problem: Falls - Risk of:  Goal: Will remain free from falls  Description: Will remain free from falls  Outcome: Met This Shift  Goal: Absence of physical injury  Description: Absence of physical injury  Outcome: Met This Shift     Problem: Skin Integrity:  Goal: Will show no infection signs and symptoms  Description: Will show no infection signs and symptoms  Outcome: Met This Shift  Goal: Absence of new skin breakdown  Description: Absence of new skin breakdown  Outcome: Met This Shift     Problem: Health Behavior:  Goal: Identification of resources available to assist in meeting health care needs will improve  Description: Identification of resources available to assist in meeting health care needs will improve  Outcome: Met This Shift     Problem: Nutritional:  Goal: Consumption of the prescribed amount of daily calories will improve  Description: Consumption of the prescribed amount of daily calories will improve  Outcome: Not Met This Shift     Problem: Safety:  Goal: Ability to chew and swallow food without choking will improve  Description: Ability to chew and swallow food without choking will improve  Outcome: Not Met This Shift  Goal: Signs and symptoms of aspiration will decrease  Description: Signs and symptoms of aspiration will decrease  Outcome: Met This Shift

## 2022-02-15 VITALS
WEIGHT: 75 LBS | DIASTOLIC BLOOD PRESSURE: 63 MMHG | RESPIRATION RATE: 16 BRPM | OXYGEN SATURATION: 96 % | HEART RATE: 72 BPM | HEIGHT: 58 IN | SYSTOLIC BLOOD PRESSURE: 149 MMHG | TEMPERATURE: 97.6 F | BODY MASS INDEX: 15.74 KG/M2

## 2022-02-15 LAB
METER GLUCOSE: 200 MG/DL (ref 74–99)
METER GLUCOSE: 324 MG/DL (ref 74–99)
SARS-COV-2, NAAT: NOT DETECTED

## 2022-02-15 PROCEDURE — 97110 THERAPEUTIC EXERCISES: CPT

## 2022-02-15 PROCEDURE — 96376 TX/PRO/DX INJ SAME DRUG ADON: CPT

## 2022-02-15 PROCEDURE — 97530 THERAPEUTIC ACTIVITIES: CPT

## 2022-02-15 PROCEDURE — 6360000002 HC RX W HCPCS: Performed by: NURSE PRACTITIONER

## 2022-02-15 PROCEDURE — 99239 HOSP IP/OBS DSCHRG MGMT >30: CPT | Performed by: INTERNAL MEDICINE

## 2022-02-15 PROCEDURE — 6370000000 HC RX 637 (ALT 250 FOR IP): Performed by: FAMILY MEDICINE

## 2022-02-15 PROCEDURE — 6370000000 HC RX 637 (ALT 250 FOR IP): Performed by: INTERNAL MEDICINE

## 2022-02-15 PROCEDURE — 6370000000 HC RX 637 (ALT 250 FOR IP): Performed by: NURSE PRACTITIONER

## 2022-02-15 PROCEDURE — 82962 GLUCOSE BLOOD TEST: CPT

## 2022-02-15 PROCEDURE — 97535 SELF CARE MNGMENT TRAINING: CPT

## 2022-02-15 RX ORDER — NICOTINE POLACRILEX 4 MG
15 LOZENGE BUCCAL PRN
Qty: 45 G | Refills: 1 | DISCHARGE
Start: 2022-02-15

## 2022-02-15 RX ORDER — SENNA AND DOCUSATE SODIUM 50; 8.6 MG/1; MG/1
1 TABLET, FILM COATED ORAL 2 TIMES DAILY
DISCHARGE
Start: 2022-02-15 | End: 2022-07-21

## 2022-02-15 RX ORDER — HYDROCODONE BITARTRATE AND ACETAMINOPHEN 5; 325 MG/1; MG/1
1 TABLET ORAL EVERY 6 HOURS PRN
Qty: 12 TABLET | Refills: 0 | Status: SHIPPED | OUTPATIENT
Start: 2022-02-15 | End: 2022-02-18

## 2022-02-15 RX ADMIN — PANTOPRAZOLE SODIUM 40 MG: 40 TABLET, DELAYED RELEASE ORAL at 05:54

## 2022-02-15 RX ADMIN — CARVEDILOL 25 MG: 25 TABLET, FILM COATED ORAL at 07:47

## 2022-02-15 RX ADMIN — LISINOPRIL 30 MG: 20 TABLET ORAL at 07:47

## 2022-02-15 RX ADMIN — SENNOSIDES AND DOCUSATE SODIUM 1 TABLET: 50; 8.6 TABLET ORAL at 07:48

## 2022-02-15 RX ADMIN — INSULIN LISPRO 2 UNITS: 100 INJECTION, SOLUTION INTRAVENOUS; SUBCUTANEOUS at 08:14

## 2022-02-15 RX ADMIN — INSULIN LISPRO 4 UNITS: 100 INJECTION, SOLUTION INTRAVENOUS; SUBCUTANEOUS at 11:19

## 2022-02-15 RX ADMIN — CLOPIDOGREL BISULFATE 75 MG: 75 TABLET ORAL at 07:47

## 2022-02-15 RX ADMIN — ONDANSETRON 4 MG: 2 INJECTION INTRAMUSCULAR; INTRAVENOUS at 07:49

## 2022-02-15 ASSESSMENT — PAIN SCALES - GENERAL: PAINLEVEL_OUTOF10: 0

## 2022-02-15 NOTE — PROGRESS NOTES
OCCUPATIONAL THERAPY Treatment Note   [facility]  [facility] [place]. [place]        Date:2/15/2022                                                  Patient Name: Yulissa Dinero    MRN: [id number]    : 1940    Room: [address] [place] [address]      Evaluating OT: Mandeep Noble OTR/L; [id number]     Referring Provider and Specific Provider Orders/Date:      22  OT eval and treat  Start:  22,   End:  22,   ONE TIME,   Standing Count:  1 Occurrences,   [address], DO     Placement Recommendation: Subacute VS HHOT        Diagnosis:   1. Herniated nucleus pulposus, L4-5    2. Intractable back pain    3. Unable to ambulate    4. Liver mass    5.  Sciatica of left side         Surgery: Lumbar epidural injection       Pertinent Medical History:       Past Medical History:   Diagnosis Date    Acute CVA (cerebrovascular accident) ([address] 2019    Arthritis     Back problem     CAD (coronary artery disease)     PTCA with DAMON to prox LAD, PTCA DAMON to ostial lesion of RCA and BMS to prox lesion RCA DAMON to RCA ostial     Cerebral artery occlusion with cerebral infarction ([address] 2019    Hyperlipidemia     Hypertension     Nausea & vomiting     Reflux     RLD (ruptured lumbar disc)     Scoliosis     Spinal stenosis     Type 2 diabetes mellitus without complication, without long-term current use of insulin ([address] 10/14/2019         Past Surgical History:   Procedure Laterality Date    CATARACT REMOVAL      bilateral    CORONARY ANGIOPLASTY  2010    PTCA with DAMON in the proximal left anterior descending    CORONARY ANGIOPLASTY  3/11/2010    PTCA and deployment of 2 stents in the ostium and the proximal segment of RCA    CORONARY ANGIOPLASTY  2010    PTCA DAMON to ostial RCA patent LAD and RCA BMS  restenosis of RCA    CORONARY ANGIOPLASTY WITH STENT PLACEMENT      times 3    DIAGNOSTIC CARDIAC CATH LAB PROCEDURE 2/25/2010    Dr. Gasper Hale: Cardiac cath with angioplasty follow up    [address] CATH LAB PROCEDURE  3/11/2010    Cardiac cath and stent placement    DIAGNOSTIC CARDIAC CATH LAB PROCEDURE  5/19/2010    Cardiac cath heart lab and subsequent angiopilsaty    FRACTURE SURGERY      tight wrist    SHOULDER ARTHROSCOPY  08 25 2011    left shoulder arthroscopy ,acromioplasty,busectomy, release of adhesions    UPPER GASTROINTESTINAL ENDOSCOPY N/A 2/19/2021    EGD BIOPSY performed by Madie Pnealoza DO at [facility] ENDOSCOPY      Precautions:  Fall Risk, Lumbar epidural injection       Assessment of current deficits:     [x] Functional mobility  [x]ADLs  [x] Strength               []Cognition    [x] Functional transfers   [x] IADLs         [x] Safety Awareness   [x]Endurance    [] Fine Coordination              [x] Balance      [] Vision/perception   []Sensation     []Gross Motor Coordination  [] ROM  [] Delirium                   [] Motor Control     OT PLAN OF CARE   OT POC based on physician orders, patient diagnosis and results of clinical assessment    Frequency/Duration 1-3 days/wk for 2 weeks PRN     Specific OT Treatment Interventions to include:   * Instruction/training on adapted ADL techniques and AE recommendations to increase functional independence within precautions   * Training on energy conservation strategies, correct breathing pattern and techniques to improve independence/tolerance for self-care routine  * Functional transfer/mobility training/DME recommendations for increased independence, safety, and fall prevention  * Patient/Family education to increase follow through with safety techniques and functional independence  * Recommendation of environmental modifications for increased safety with functional transfers/mobility and ADLs  * Splinting/positioning for increased function, prevention of contractures, and improve skin integrity  * Therapeutic exercise to improve motor endurance, ROM, and functional strength for ADLs/functional transfers  * Therapeutic activities to facilitate/challenge dynamic balance, stand tolerance for increased safety and independence with ADLs  * Positioning to improve skin integrity, interaction with environment and functional independence    Recommended Adaptive Equipment: TBD      Home Living: alone; apartment, 1 story, 3 steps to enter with B rails, tub shower. Equipment owned: wheeled walker, quad cane, shower chair, grab bars, hand held shower head    Prior Level of Function: Independent with ADLs , Independent with IADLs; ambulated with quad cane or wheeled walker    Pain Level: no pain at time of treatment; Nursing notified. Cognition: A&O: 4/4; Follows 3 step directions   Memory: good    Sequencing: good    Problem solving: good    Judgement/safety: good     [facility]   AM-PAC Daily Activity Inpatient   How much help for putting on and taking off regular lower body clothing?: A Little  How much help for Bathing?: A Little  How much help for Toileting?: A Little  How much help for putting on and taking off regular upper body clothing?: A Little  How much help for taking care of personal grooming?: A Little  How much help for eating meals?: None  AM-[facility] Inpatient Daily Activity Raw Score: 19  AM-PAC Inpatient ADL T-Scale Score : 40.22  ADL Inpatient CMS 0-100% Score: 42.8  ADL Inpatient CMS G-Code Modifier : CK     Functional Assessment:    Initial Eval Status  Date: 2/7/22 Treatment Status  Date: 2/15/22 STGs = LTGs  Time frame: 10-14 days   Feeding Independent   Independent    Grooming Minimal Assist   Independent    UB Dressing Minimal Assist   Independent    LB Dressing Dependent  Supervision to thread feet through hospital pants while seated EOB then stood with supervision to bring pants up around hips and waist.   Supervision to doff pants standing at bedside then sat to bring down around feet. Supervision to doff and don socks while seated EOB. Independent     Bathing Moderate Assist  Modified [place]    Toileting Moderate Assist   Independent    Bed Mobility  Supine to sit: N/T as pt was up in chair   Sit to supine: N/T as pt was returned to bedside chair  Supine to sit: supervision  Sit to supine: supervision  Supine to sit: Independent   Sit to supine: Independent    Functional Transfers Moderate Assist from bedside chair to wheeled walker  Moderate Assist for transfer from standing with wheeled walker to bedside chair  Transfer training with verbal cues for hand placement throughout session to improve safety. Supervision for transfer to and from EOB. Supervision for transfer to and from low commode with minimal verbal cues to use grab bar for safe commode transfer. Independent    Functional Mobility Minimal assist with wheeled walker to improve balance for household distance (25 feet x 2), verbal cues for walker sequence and safety. Minimal assist with hand held assist to and from bathroom. Modified [place]    Balance Sitting:     Static: good     Dynamic: fair   Standing: fair  with wheeled walker Sitting:  Static: good  Dynamic: fair plus  Standing: fair plus with hand held assist    Activity Tolerance fair  Fair plus  good    Visual/  Perceptual Glasses: yes                 Hand Dominance: right      AROM (PROM) Strength Additional Info:    RUE  WFL 4/5 good  and wfl FMC/dexterity noted during ADL tasks     LUE WFL 4/5 good  and wfl FMC/dexterity noted during ADL tasks       Hearing: WFL   Sensation:  No c/o numbness or tingling  Tone: WFL   Edema: no     Comments: Upon arrival the patient was seated in bedside chair. At end of session, patient was returned to bedside chair with call light and phone within reach, all lines and tubes intact. Overall patient demonstrated decreased independence and safety during completion of ADL/functional transfer/mobility tasks.   Pt would benefit from continued skilled OT to increase safety and independence with completion of ADL/IADL tasks for functional independence and quality of life. Treatment: OT treatment provided this date includes:    Instruction/training on safety and adapted techniques for completion of ADLs    Instruction/training on safe functional mobility/transfer techniques    Instruction/training on energy conservation/work simplification for completion of ADLs   BUE AROM exercises: 15 X in all planes of movement to increase ROM required for functional transfers/ADL participation. Exercises completed in shoulder and elbow flexion/extension, internal/external rotation, abduction/adduction, supination/pronation, digit and wrist flexion/extension, abduction/adduction and digit opposition. Rehab Potential: Good for established goals. Patient / Family Goal: go to rehab as she lives home alone       Patient and/or family were instructed on functional diagnosis, prognosis/goals and OT plan of care. Demonstrated good understanding. Eval Complexity: Low    Time In: 10:10am   Time Out: 10:35am    Total Treatment Time: 25      Min Units   OT Eval Low 81551     OT Eval Medium 37635      OT Eval High 11825      OT Re-Eval 80732            ADL/Self Care 36230  10  1    Therapeutic Activities 37233      Therapeutic Ex 16156  15  1   Orthotic Management 15315       Manual 76613     Neuro Re-Ed 82236       Non-Billable Time        Evaluation Time additionally includes thorough review of current medical information, gathering information on past medical history/social history and prior level of function, interpretation of standardized testing/informal observation of tasks, assessment of data and development of plan of care and goals.         Evaluating OT: Landy Curtis OTR/L; [id number]

## 2022-02-15 NOTE — PROGRESS NOTES
Physical Therapy Treatment Note/Plan of Care    Room #:  8078/2918-66  Patient Name: Tiago Shaw  YOB: 1940  MRN: [id number]    Date of Service: 2/15/2022     Tentative placement recommendation: Subacute rehab  Equipment recommendation: To be determined      Evaluating Physical Therapist: Jose Rafael Kohli, PT #[id number]      Specific Provider Orders/Date/Referring Provider :  02/07/22 0315   PT eval and treat Start: 02/07/22 0315, End: 02/07/22 0315, ONE TIME, Standing Count: 1 Occurrences, R    Laurie Weathers DO      Admitting Diagnosis:   Liver mass [R16.0]  Herniated nucleus pulposus, L4-5 [M51.26]  Unable to ambulate [R26.2]  Intractable pain [R52]  Intractable back pain [M54.9]     for back pain, history of back trouble in the past, she states her lower back is hurting, rating her left leg,     Surgery: none  Visit Diagnoses       Codes    Unable to ambulate     R26.2    Liver mass     R16.0          Patient Active Problem List   Diagnosis    Coronary artery disease involving native coronary artery of native heart without angina pectoris    Scoliosis    Hypertension    Hyperlipidemia    Type 2 diabetes mellitus without complication, without long-term current use of insulin (HCC)    Atrial septal aneurysm    GI bleed    Intractable pain    Sciatica of left side    Gastroesophageal reflux disease without esophagitis    Intractable back pain    Herniated nucleus pulposus, L4-5        ASSESSMENT of Current Deficits  Patient needing a seated rest period during ambulation due to impaired endurance.        PHYSICAL THERAPY  PLAN OF CARE       Physical therapy plan of care is established based on physician order,  patient diagnosis and clinical assessment    Current Treatment Recommendations:    -Bed Mobility: Lower extremity exercises  and Trunk control activities   -Sitting Balance: Facilitate active trunk muscle engagement , Facilitate postural control in all planes  and Engage in core activities to allow for movement within base of support   -Standing Balance: Perform strengthening exercises in standing to promote motor control with or without upper extremity support   -Transfers: Provide instruction on proper hand and foot position for adequate transfer of weight onto lower extremities and use of gait device if needed and Cues for hand placement, technique and safety. Provide stabilization to prevent fall   -Gait: Gait training, Standing activities to improve: base of support, weight shift, weight bearing  and Pregait training to emphasize: Sequencing , Device control, Upright and Safety   -Endurance: Utilize Supervised activities to increase level of endurance to allow for safe functional mobility including transfers and gait     PT long term treatment goals are located in below grid    Patient and or family understand(s) diagnosis, prognosis, and plan of care. Frequency of treatments: Patient will be seen  3-5 times/week.          Prior Level of Function: Patient ambulated with wheeled walker generally used cane when able to get out, has been been little while since been out due to weather  Rehab Potential: fair  + for baseline    Past medical history:   Past Medical History:   Diagnosis Date    Acute CVA (cerebrovascular accident) ([address] 5/25/2019    Arthritis     Back problem     CAD (coronary artery disease) 2010    PTCA with DAMON to prox LAD, PTCA DAMON to ostial lesion of RCA and BMS to prox lesion RCA DAMON to RCA ostial     Cerebral artery occlusion with cerebral infarction ([address] 05/2019    Hyperlipidemia     Hypertension     Nausea & vomiting     Reflux     RLD (ruptured lumbar disc)     Scoliosis     Spinal stenosis     Type 2 diabetes mellitus without complication, without long-term current use of insulin ([address] 10/14/2019     Past Surgical History:   Procedure Laterality Date    CATARACT REMOVAL      bilateral    CORONARY ANGIOPLASTY  2/25/2010    PTCA with DAMON in the proximal left anterior descending    CORONARY ANGIOPLASTY  3/11/2010    PTCA and deployment of 2 stents in the ostium and the proximal segment of RCA    CORONARY ANGIOPLASTY  5/19/2010    PTCA DAMON to ostial RCA patent LAD and RCA BMS  restenosis of RCA    CORONARY ANGIOPLASTY WITH STENT PLACEMENT      times 3    DIAGNOSTIC CARDIAC CATH LAB PROCEDURE  2/25/2010    Dr. Prashant Mckenzie: Cardiac cath with angioplasty follow up    [address] CATH LAB PROCEDURE  3/11/2010    Cardiac cath and stent placement    DIAGNOSTIC CARDIAC CATH LAB PROCEDURE  5/19/2010    Cardiac cath heart lab and subsequent angiopilsaty    FRACTURE SURGERY      tight wrist    SHOULDER ARTHROSCOPY  08 25 2011    left shoulder arthroscopy ,acromioplasty,busectomy, release of adhesions    UPPER GASTROINTESTINAL ENDOSCOPY N/A 2/19/2021    EGD BIOPSY performed by Margaux Leiva DO at [address]:    Precautions: Up with assistance, falls and spinal precautions ,    Social history: Patient lives alone in a apartment    with 3 steps  to enter bilateral Rail  Tub shower grab bars    Equipment owned: Magalis Clement, [address] [facility] [address] chair and Quad cane,       AM-PAC Basic Mobility        AM-PAC Mobility Inpatient   How much difficulty turning over in bed?: None  How much difficulty sitting down on / standing up from a chair with arms?: A Little  How much difficulty moving from lying on back to sitting on side of bed?: None  How much help from another person moving to and from a bed to a chair?: A Little  How much help from another person needed to walk in hospital room?: A Little  How much help from another person for climbing 3-5 steps with a railing?: A Little  -[facility] Inpatient Mobility Raw Score : 20  AM-PAC Inpatient T-Scale Score : 47.67  Mobility Inpatient CMS 0-100% Score: 35.83  Mobility Inpatient CMS G-Code Modifier : [address] cleared patient for PT treatment.  .   OBJECTIVE;   Initial Evaluation  Date: 2/7/2022 Treatment Date:  2/15/2022       Short Term/ Long Term   Goals   Was pt agreeable to Eval/treatment? Yes yes To be met in 3 days   Pain level   9/10  Back pain right buttocks, left leg 8/10  Back pain into L LE    Bed Mobility    Rolling: Moderate assist of 1    Supine to sit: Moderate assist of 1    Sit to supine: Not assessed     Scooting: Moderate assist of 1   Rolling: Supervision    Supine to sit: Supervision    Sit to supine: Supervision    Scooting: Supervision     Rolling: Minimal assist of 1    Supine to sit: Minimal assist of 1    Sit to supine: Minimal assist of 1    Scooting: Minimal assist of 1     Transfers Sit to stand:  Moderate assist of 1 Cues for hand placement and safety  Sit to stand: Supervision  Cues for hand placement and safety       Sit to stand: Minimal assist of 1     Ambulation    2 x 20 feet using  wheeled walker with Minimal assist of 1   for walker control, balance and upright and cues for sequencing and safety 2 x 140 feet using  wheeled walker with Supervision    12 feet from the restroom to the bed cues for upright posture, safety and pacing     50 feet using  wheeled walker with Supervision     Stair negotiation: ascended and descended   Not assessed          ROM Within functional limits    Increase range of motion 10% of affected joints    Strength BUE:  refer to OT eval  RLE:  3/5  LLE:  3/5  Increase strength in affected mm groups by 1/3 grade   Balance Sitting EOB:  fair    Dynamic Standing:  fair wheeled walker  Sitting EOB: fair +  Dynamic Standing: fair wheeled walker   Sitting EOB:  good    Dynamic Standing: good -wheeled walker      Patient is Alert & Oriented x person, place, time and situation and follows directions    Sensation:  Patient  denies numbness/tingling   Edema:  no   Endurance: fair       Vitals: room air   Blood Pressure at rest  Blood Pressure during session    Heart Rate at rest   Heart Rate during session     SPO2 at rest  %  SPO2 during session  % Patient education  Patient educated on role of Physical Therapy, risks of immobility, safety and plan of care,  importance of mobility while in hospital , spinal precautions, safety  and seated exercises     Patient response to education:   Pt verbalized understanding Pt demonstrated skill Pt requires further education in this area   Yes Partial Yes      Treatment:  Patient practiced and was instructed/facilitated in the following treatment: Patient transferred to edge of bed and Sat edge of bed 3 minutes with Supervision  to increase dynamic sitting balance and activity tolerance. Pt stood, ambulated in the hallway, needed a seated rest period at the end of the hallway, ambulated back to the restroom in her room, to the sink, and back to her bed. Therapeutic Exercises:  not performed    At end of session, patient in bed with   call light and phone within reach,  all lines and tubes intact, nursing notified. Patient would benefit from continued skilled Physical Therapy to improve functional independence and quality of life. Patient's/ family goals   get rid of pain    Time in 08:58  Time out  09:22    Total Treatment Time  24 minutes    CPT codes:  Therapeutic activities (66290)   24 minutes  2 unit(s)   Aba Galvan  [facility]  LIC # [id number]

## 2022-02-15 NOTE — PLAN OF CARE
Problem: Pain:  Goal: Pain level will decrease  Description: Pain level will decrease  Outcome: Met This Shift  Goal: Control of acute pain  Description: Control of acute pain  Outcome: Met This Shift     Problem: Falls - Risk of:  Goal: Will remain free from falls  Description: Will remain free from falls  Outcome: Met This Shift  Goal: Absence of physical injury  Description: Absence of physical injury  Outcome: Met This Shift     Problem: Skin Integrity:  Goal: Will show no infection signs and symptoms  Description: Will show no infection signs and symptoms  Outcome: Met This Shift  Goal: Absence of new skin breakdown  Description: Absence of new skin breakdown  Outcome: Met This Shift     Problem: Health Behavior:  Goal: Identification of resources available to assist in meeting health care needs will improve  Description: Identification of resources available to assist in meeting health care needs will improve  Outcome: Met This Shift     Problem: Nutritional:  Goal: Consumption of the prescribed amount of daily calories will improve  Description: Consumption of the prescribed amount of daily calories will improve  Outcome: Met This Shift     Problem: Safety:  Goal: Ability to chew and swallow food without choking will improve  Description: Ability to chew and swallow food without choking will improve  Outcome: Met This Shift  Goal: Signs and symptoms of aspiration will decrease  Description: Signs and symptoms of aspiration will decrease  Outcome: Met This Shift

## 2022-02-15 NOTE — DISCHARGE SUMMARY
[facility] Physician Discharge Summary       [facility]  [address] [facility] [address]  [address] [facility], [address]  [address] [facility] [phone]    Schedule an appointment as soon as possible for a visit in 1 week  Hiatal hernia repair    Kenia MondragonDO  [address] [place] [address]  [phone]    Schedule an appointment as soon as possible for a visit      [address] MD  [address] [facility], [address] [facility]  [phone]            Activity level: Up with assistance. Advance activity per PT/OT recommendation    Diet: ADULT DIET; Regular  ADULT ORAL NUTRITION SUPPLEMENT; Breakfast, Lunch, Dinner; Standard 4 oz Oral Supplement    Labs: Routine labs per primary care physician    Condition at discharge: Stable    [facility]po: [facility] for subacute rehab    Patient ID:  Tenpr[address]h [address]  [id number]  55 y.o.  1940    Admit date: 2/6/2022    Discharge date and time:  2/15/2022  7:52 AM    Admission Diagnoses: Active Problems:    Coronary artery disease involving native coronary artery of native heart without angina pectoris    Hypertension    Hyperlipidemia    Type 2 diabetes mellitus without complication, without long-term current use of insulin (HCC)    Intractable pain    Sciatica of left side    Gastroesophageal reflux disease without esophagitis    Intractable back pain    Herniated nucleus pulposus, L4-5  Resolved Problems:    * No resolved hospital problems. *      Discharge Diagnoses:  Active Problems:    Coronary artery disease involving native coronary artery of native heart without angina pectoris    Hypertension    Hyperlipidemia    Type 2 diabetes mellitus without complication, without long-term current use of insulin (HCC)    Intractable pain    Sciatica of left side    Gastroesophageal reflux disease without esophagitis    Intractable back pain    Herniated nucleus pulposus, L4-5  Resolved Problems:    * No resolved hospital problems. *      Consults:  IP CONSULT TO INTERNAL MEDICINE  IP CONSULT TO SOCIAL WORK  IP CONSULT TO DIETITIAN  IP CONSULT TO NEUROSURGERY  IP CONSULT TO GENERAL SURGERY    Procedures: Epidural nerve block on 2/14/2022    Hospital Course: This is an 70-year-old female who was admitted to the hospital with intractable back pain with sciatica as well as significant reflux from hiatal hernia. Patient was evaluated by neurosurgery regarding her lumbar pain with sciatica, and it was recommended she have epidural nerve block. This was done on 2/14/2022 after home Plavix was held for sufficient amount of time. Regarding symptoms from hiatal hernia, she was evaluated by general surgery who recommended outpatient surgery to treat this. Physical therapy and Occupational Therapy followed and treated while patient was in the hospital, and recommended subacute rehab. Patient was agreeable to this and stable for discharge to subacute rehab on 2/15/2022. Problem list as noted below. 1. Intractable Left and right lower back pain:  Completed methylprednisolone. Neurosurgery evaluated and recommended epidural nerve block. This was done on 2/14/2022  2. Liver lesion:  Seen on CT of the abdomen and pelvis. Possibly focal nodular hyperplasia. Will need outpatient follow up. Discussed with the patient and her family. 3. Type 2 diabetes mellitus: Resume glipizide at discharge. Hgb A1C is 6.5, which is very aggressive control given her age and comorbidities. She can discuss with her primary care physician whether or not to discontinue sulfonylurea to avoid risk of hypoglycemia. 4. Moderate hiatal hernia: Continue PPI. General Surgery consulted and patient is to follow up as an outpatient. She will need an EGD with manometry to rule out other esophageal pathology.   Esophogram showed moderate sized paraesophageal hiatus hernia with some reflux, multiple tertiary contractions, consistent with presbyesophagus. No speech therapy intervention necessary at this time per SLP note. 5. Chest pain:  EKG with no ischemic changes. High sensitivity troponin with unremarkable trend up with levels: 9,12,9.    6. Hyperlipidemia:  Continue home Vytorin  7. Hypertension:  Continue Coreg and lisinopril. 8. GERD in the setting of hiatal hernia: Continue PPI. 9. History of coronary artery disease and stroke-resume Plavix. Eventually, she will need this held again in anticipation of surgical treatment for hiatal hernia      Discharge Exam:  Vitals:    02/14/22 0546 02/14/22 0915 02/14/22 1630 02/15/22 0545   BP: (!) 117/58 124/66 (!) 144/61 (!) 149/63   Pulse: 70 81 102 72   Resp: 18  18 16   Temp: 97.9 °F (36.6 °C)  98.6 °F (37 °C) 97.6 °F (36.4 °C)   TempSrc: Oral  Oral Oral   SpO2: 96%  96% 95%   Weight:       Height:         General Appearance: alert and oriented to person, place and time and in no acute distress, frail, cachetic   Skin: warm and dry  Head: normocephalic and atraumatic  Eyes: pupils equal, round, and reactive to light, conjunctivae normal  Neck: neck supple and non tender without mass   Pulmonary/Chest: Nonlabored on room air. Diminished otherwise clear to auscultation bilaterally. Cardiovascular: normal rate, normal S1 and S2   Abdomen: soft, non-tender, non-distended, normal bowel sounds   Extremities: no cyanosis, no clubbing and no edema  Muscoloskeletal:  Left lower back/buttock and right lower back pain with movement  Neurologic: No cranial nerve deficit, speech normal.  Muscle strength proximal and distal lower extremities intact. Sensation intact throughout. Reflexes normal.  I/O last 3 completed shifts: In: 120 [P.O.:120]  Out: -   No intake/output data recorded.       LABS:  Recent Labs     02/13/22  0452 02/14/22  0504    137   K 4.2 4.4   CL 97* 100   CO2 26 25   BUN 26* 25*   CREATININE 0.7 0.6   GLUCOSE 177* 150* CALCIUM 9.6 9.3       Recent Labs     02/13/22  0452 02/14/22  0504   WBC 8.1 7.7   RBC 3.92 4.04   HGB 12.6 12.5   HCT 37.4 37.7   MCV 95.4 93.3   MCH 32.1 30.9   MCHC 33.7 33.2   RDW 11.5 11.2*    274   MPV 9.1 9.0       No results for input(s): POCGLU in the last 72 hours. Imaging:  CT ABDOMEN PELVIS WO CONTRAST Additional Contrast? None    Result Date: 2/7/2022  EXAMINATION: CT OF THE ABDOMEN AND PELVIS WITHOUT CONTRAST 2/7/2022 12:05 am TECHNIQUE: CT of the abdomen and pelvis was performed without the administration of intravenous contrast. Multiplanar reformatted images are provided for review. Dose modulation, iterative reconstruction, and/or weight based adjustment of the mA/kV was utilized to reduce the radiation dose to as low as reasonably achievable. COMPARISON: None. HISTORY: ORDERING SYSTEM PROVIDED HISTORY: lower lumbar pain, l sided sciatica TECHNOLOGIST PROVIDED HISTORY: Reason for exam:->lower lumbar pain, l sided sciatica Additional Contrast?->None Decision Support Exception - unselect if not a suspected or confirmed emergency medical condition->Emergency Medical Condition (MA) FINDINGS: THE STUDY LIMITED DUE TO LACK OF INTRAVENOUS CONTRAST. Lower Chest: Moderate to large-sized hiatal hernia. Mild bibasilar dependent atelectasis. Organs: An approximately 3.9 x 3.6 cm slightly hypodense lesion in the left lobe of the liver, which may represent an [address] but is incompletely evaluated on the current study. Unremarkable spleen, pancreas, adrenals, and bilateral kidneys on this unenhanced study. No radiopaque urolithiasis or hydroureteronephrosis on either side. No definite cholelithiasis. GI/Bowel: Normal appendix. Moderate to large amount of retained stool in the colon. Bowel loops nonobstructed. Distal colonic diverticulosis. No acute diverticulitis Pelvis: No adnexal mass. Grossly unremarkable urinary bladder. Peritoneum/Retroperitoneum: No free air or free fluid.   No adenopathy. Vascular calcification with no abdominal aortic aneurysm. Bones/Soft Tissues: Multilevel lumbar spondylosis with levoscoliosis. Posterior disc bulges are seen in the lumbar spine at multiple levels, most prominent at L4-L5. Dedicated MRI of the lumbar spine may be obtained if clinically indicated. No acute finding in the abdomen and pelvis on this unenhanced study. A slightly hypodense lesion in the left lobe of the liver, which may represent an [address]. Dedicated MRI is recommended to confirm and rule out other possibilities such as malignant neoplasms. Distal colonic diverticulosis. No acute diverticulitis. MRI LUMBAR SPINE WO CONTRAST    Result Date: 2/7/2022  EXAMINATION: MRI OF THE LUMBAR SPINE WITHOUT CONTRAST, 2/7/2022 4:35 pm TECHNIQUE: Multiplanar multisequence MRI of the lumbar spine was performed without the administration of intravenous contrast. COMPARISON: January 11, 2016 HISTORY: ORDERING SYSTEM PROVIDED HISTORY: pain, concern for compression fracture TECHNOLOGIST PROVIDED HISTORY: Reason for exam:->pain, concern for compression fracture FINDINGS: No evidence of fracture or malalignment. Severe disc space narrowing at L1/L2 and L2/L3. There is levoscoliosis of lumbar spine with significant degenerative endplate changes including marginal osteophytosis along concavity of levo scoliotic curvature. Edema is seen involving endplates at L2 and L3. Conus medullaris is visualized and is unremarkable. L1/L2: Circumferential disc bulge is present resulting in moderate effacement of ventral thecal sac. There is central canal stenosis. AP dimension of thecal sac measures approximately 8 mm. Severe right neural foraminal narrowing. L2/L3: Circumferential disc bulge is present resulting in moderate effacement of ventral thecal sac. No significant central canal stenosis. Severe right neural foraminal narrowing. L3/L4: There is circumferential disc bulge with facet hypertrophy.   No significant central canal stenosis. There is severe right neural foraminal narrowing and mild left neural foraminal narrowing. L4/L5: Broad-based central disc herniation is present resulting in moderate effacement of ventral thecal sac. Mild central canal stenosis with AP dimension of thecal sac measuring approximately 9 mm. There is severe right neural foraminal narrowing and moderate to severe left neural foraminal narrowing. L5/S1: No central canal stenosis. Severe left neural foraminal narrowing and mild right neural foraminal narrowing. 1. No evidence of lumbar spine fracture. 2. Levoscoliosis with significant degenerative endplate changes along concavity of the levoscoliotic curvature. 3. Edematous degenerative endplate changes at K2/U6. 4. Lumbar spondylosis as described above with notable moderate central canal stenosis at L1/L2 and multiple levels of significant neural foraminal narrowing notable on the right at L1/L2, L2/L3, and L4/L5. Severe left neural foraminal narrowing at L5/S1. XR HIP 2-3 VW W PELVIS LEFT    Result Date: 2/6/2022  EXAMINATION: ONE XRAY VIEW OF THE PELVIS AND TWO XRAY VIEWS LEFT HIP 2/6/2022 11:05 pm COMPARISON: None. HISTORY: ORDERING SYSTEM PROVIDED HISTORY: r/o fx TECHNOLOGIST PROVIDED HISTORY: Reason for exam:->r/o fx FINDINGS: No evidence of an acute fracture of the pelvis or left hip. There no significant degenerative changes in the hips. Sacroiliac joints and pubic symphysis are unremarkable. There are moderate degenerative changes in lower lumbar spine. No evidence of an acute fracture of the pelvis or left hip. Patient Instructions:   Current Discharge Medication List      START taking these medications    Details   HYDROcodone-acetaminophen (NORCO) 5-325 MG per tablet Take 1 tablet by mouth every 6 hours as needed for Pain for up to 3 days.   Qty: 12 tablet, Refills: 0    Comments: Reduce doses taken as pain becomes manageable  Associated

## 2022-02-23 ENCOUNTER — TELEPHONE (OUTPATIENT)
Dept: SURGERY | Age: 82
End: 2022-02-23

## 2022-02-23 ENCOUNTER — OFFICE VISIT (OUTPATIENT)
Dept: SURGERY | Age: 82
End: 2022-02-23
Payer: COMMERCIAL

## 2022-02-23 VITALS
DIASTOLIC BLOOD PRESSURE: 62 MMHG | SYSTOLIC BLOOD PRESSURE: 130 MMHG | WEIGHT: 77 LBS | HEART RATE: 107 BPM | BODY MASS INDEX: 16.16 KG/M2 | TEMPERATURE: 97 F | HEIGHT: 58 IN

## 2022-02-23 DIAGNOSIS — K44.9 HIATAL HERNIA: Primary | ICD-10-CM

## 2022-02-23 PROCEDURE — 99212 OFFICE O/P EST SF 10 MIN: CPT | Performed by: SURGERY

## 2022-02-23 RX ORDER — BISACODYL 10 MG
10 SUPPOSITORY, RECTAL RECTAL DAILY
COMMUNITY
End: 2022-07-21 | Stop reason: ALTCHOICE

## 2022-02-23 RX ORDER — METOCLOPRAMIDE 5 MG/1
5 TABLET ORAL 3 TIMES DAILY
COMMUNITY
End: 2022-07-21 | Stop reason: ALTCHOICE

## 2022-02-23 RX ORDER — ACETAMINOPHEN 325 MG/1
650 TABLET ORAL EVERY 6 HOURS PRN
COMMUNITY
End: 2022-07-21

## 2022-02-23 NOTE — PROGRESS NOTES
General Surgery History and Physical  [facility]    Patient's Name/Date of Birth: Tad Mccrary / 1940    Date: February 23, 2022     Surgeon: Melyssa Vance MD    PCP: Lolly Haider DO     Chief Complaint: GERD, HIATAL HERNIA    HPI:   Tad Mccrary is a 80 y.o. female who presents for evaluation of GERD and symptomatic hiatal hernia. Timing is intermittent with food, radiation to midline and epigastrium, alleviated by npo and started several months ago and severity is 8/10. Has been under medical management for GERD for many months, symptoms have become refractory to maximal medical therapy. Admits early satiety and regurgitation. Denies SOB, fever, chills, nausea, vomiting. Has not had recent endoscopy performed to evaluate for esophagitis.      Patient Active Problem List   Diagnosis    Coronary artery disease involving native coronary artery of native heart without angina pectoris    Scoliosis    Hypertension    Hyperlipidemia    Type 2 diabetes mellitus without complication, without long-term current use of insulin (HCC)    Atrial septal aneurysm    GI bleed    Intractable pain    Sciatica of left side    Gastroesophageal reflux disease without esophagitis    Intractable back pain    Herniated nucleus pulposus, L4-5       Past Medical History:   Diagnosis Date    Acute CVA (cerebrovascular accident) ([address] 5/25/2019    Arthritis     Back problem     CAD (coronary artery disease) 2010    PTCA with DAMON to prox LAD, PTCA DAMON to ostial lesion of RCA and BMS to prox lesion RCA DAMON to RCA ostial     Cerebral artery occlusion with cerebral infarction ([address] 05/2019    Hyperlipidemia     Hypertension     Nausea & vomiting     Reflux     RLD (ruptured lumbar disc)     Scoliosis     Spinal stenosis     Type 2 diabetes mellitus without complication, without long-term current use of insulin ([address] 10/14/2019       Past Surgical History:   Procedure Laterality Date    CATARACT REMOVAL      bilateral    CORONARY ANGIOPLASTY  2/25/2010    PTCA with DAMON in the proximal left anterior descending    CORONARY ANGIOPLASTY  3/11/2010    PTCA and deployment of 2 stents in the ostium and the proximal segment of RCA    CORONARY ANGIOPLASTY  5/19/2010    PTCA DAMON to ostial RCA patent LAD and RCA BMS  restenosis of RCA    CORONARY ANGIOPLASTY WITH STENT PLACEMENT      times 3    DIAGNOSTIC CARDIAC CATH LAB PROCEDURE  2/25/2010    Dr. Joseph Santos: Cardiac cath with angioplasty follow up    [address] CATH LAB PROCEDURE  3/11/2010    Cardiac cath and stent placement    DIAGNOSTIC CARDIAC CATH LAB PROCEDURE  5/19/2010    Cardiac cath heart lab and subsequent angiopilsaty    FRACTURE SURGERY      tight wrist    SHOULDER ARTHROSCOPY  08 25 2011    left shoulder arthroscopy ,acromioplasty,busectomy, release of adhesions    UPPER GASTROINTESTINAL ENDOSCOPY N/A 2/19/2021    [facility]  at [address]   Allergen Reactions    Aspirin      Caused bleeding ulcers    Lipitor      Caused elevated liver enzymes       The patient has a family history that is negative for severe cardiovascular or respiratory issues, negative for reaction to anesthesia. Time spent reviewing past medical, surgical, social and family history, vitals, nursing assessment and images. No changes from above documented history.     Social History     Socioeconomic History    Marital status: Single     Spouse name: Not on file    Number of children: Not on file    Years of education: Not on file    Highest education level: Not on file   Occupational History    Not on file   Tobacco Use    Smoking status: Never Smoker    Smokeless tobacco: Never Used   Vaping Use    Vaping Use: Never used   Substance and Sexual Activity    Alcohol use: Never     Alcohol/week: 0.0 standard drinks     Comment: No caffeine    Drug use: Never    Sexual activity: Not on file   Other Topics Concern    Not on file   Social History Narrative    Denies caffeine. Social Determinants of Health     Financial Resource Strain:     Difficulty of Paying Living Expenses: Not on file   Food Insecurity:     Worried About Running Out of Food in the Last Year: Not on file    Micah of Food in the Last Year: Not on file   Transportation Needs:     Lack of Transportation (Medical): Not on file    Lack of Transportation (Non-Medical):  Not on file   Physical Activity:     Days of Exercise per Week: Not on file    Minutes of Exercise per Session: Not on file   Stress:     Feeling of Stress : Not on file   Social Connections:     Frequency of Communication with Friends and Family: Not on file    Frequency of Social Gatherings with Friends and Family: Not on file    Attends [employer] Services: Not on file    Active Member of [employer] or Organizations: Not on file    Attends Club or Organization Meetings: Not on file    Marital Status: Not on file   Intimate Partner Violence:     Fear of Current or Ex-Partner: Not on file    Emotionally Abused: Not on file    Physically Abused: Not on file    Sexually Abused: Not on file   Housing Stability:     Unable to Pay for Housing in the Last Year: Not on file    Number of [place] in the Last Year: Not on file    Unstable Housing in the Last Year: Not on file           Review of Systems  Review of Systems -  General ROS: negative for - chills, fatigue or malaise  ENT ROS: negative for - hearing change, nasal congestion or nasal discharge  Allergy and Immunology ROS: negative for - hives, itchy/watery eyes or nasal congestion  Hematological and Lymphatic ROS: negative for - blood clots, blood transfusions, bruising or fatigue  Endocrine ROS: negative for - malaise/lethargy, mood swings, palpitations or polydipsia/polyuria  Respiratory ROS: negative for - sputum changes, stridor, tachypnea or wheezing  Cardiovascular ROS: negative for - irregular heartbeat, loss of consciousness, murmur or orthopnea  Gastrointestinal ROS: negative for - constipation, diarrhea, gas/bloating, heartburn or hematemesis  Genito-Urinary ROS: negative for -  genital discharge, genital ulcers or hematuria  Musculoskeletal ROS: negative for - gait disturbance, muscle pain or muscular weakness  Psych/Soc ROS: Neg suicidal ideation or visual/auditory hallucinations    Physical exam:   There were no vitals taken for this visit.   General appearance:  NAD  Head: NCAT, PERRLA, EOMI, red conjunctiva  Neck: supple, no masses  Lungs: CTAB, equal chest rise bilateral  Heart: Reg rate  Abdomen: soft, nontender, nondistended  Rectal: No bleeding  Skin; no lesions  Gu: no cva tenderness  Extremities: no edema  Psychosocial: No auditory or visual hallucinations      Radiology:  CT abdomen/pelvis: Large hiatal hernia  UGI: large hiatal hernia  Manometry: Pending  PH testing: Pending  Last EGD pathology - pending    Assessment:  Anusha Modi is a 80 y.o. female with symptomatic paraesophageal hernia  Patient Active Problem List   Diagnosis    Coronary artery disease involving native coronary artery of native heart without angina pectoris    Scoliosis    Hypertension    Hyperlipidemia    Type 2 diabetes mellitus without complication, without long-term current use of insulin (HCC)    Atrial septal aneurysm    GI bleed    Intractable pain    Sciatica of left side    Gastroesophageal reflux disease without esophagitis    Intractable back pain    Herniated nucleus pulposus, L4-5         Plan:  EGD with manometry  Pending findings would proceed to OR with lap poss robot assisted paraesophageal hernia repair with fundoplication, myofascial flap and poss gastropexy  Discussed the risk, benefits and alternatives of surgery including wound infections, bleeding, scar, recurrent hernia formation, dysphagia, inability to belch or vomit, stricture and need for dilation and the risks of general anesthetic including MI, CVA, sudden death or reactions to anesthetic medications. The patient understands the risks and alternatives and the possibility of converting to an open procedure. All questions were answered to the patient's satisfaction and they freely signed the consent.              Matty Hendrickson MD  11:53 AM  2/23/2022

## 2022-02-23 NOTE — TELEPHONE ENCOUNTER
Prior Authorization Form:      DEMOGRAPHICS:                     Patient Name:  Van Santana  Patient :  1940            Insurance:  Payor:  [address] / Plan:  [address] / Product Type: *No Product type* /   Insurance ID Number:    Payor/Plan Subscr  Sex Relation Sub. Ins. ID Effective Group Num   1.  Rafi Dee 1940 Female Self [id number] 1/1/15 [id number]                                   [address]         DIAGNOSIS & PROCEDURE:                       Procedure/Operation: EGD and Manometry           CPT Code: 11267    Diagnosis:  Hiatal Hernia    ICD10 Code: K44.9    Location:  [facility]    Surgeon:  France Baron INFORMATION:                          Date: 3.4.2022    Time: TBD              Anesthesia:  MAC/TIVA                                                       Status:  Outpatient        Special Comments:         Electronically signed by Lilly Yi on 2022 at 2:25 PM

## 2022-02-25 ENCOUNTER — TELEPHONE (OUTPATIENT)
Dept: ENDOSCOPY | Age: 82
End: 2022-02-25

## 2022-02-25 NOTE — TELEPHONE ENCOUNTER
phoned pt to schedule her esophageal manometry testing. confirmed her time of 1200pm for manometry and egd at 1325. spoke with her caretaker mckayla.

## 2022-02-27 ENCOUNTER — APPOINTMENT (OUTPATIENT)
Dept: GENERAL RADIOLOGY | Age: 82
End: 2022-02-27
Payer: COMMERCIAL

## 2022-02-27 ENCOUNTER — HOSPITAL ENCOUNTER (OUTPATIENT)
Age: 82
Setting detail: OBSERVATION
Discharge: NURSING, REHAB OR LONG-TERM CARE | End: 2022-03-01
Attending: EMERGENCY MEDICINE | Admitting: INTERNAL MEDICINE
Payer: COMMERCIAL

## 2022-02-27 ENCOUNTER — APPOINTMENT (OUTPATIENT)
Dept: CT IMAGING | Age: 82
End: 2022-02-27
Payer: COMMERCIAL

## 2022-02-27 DIAGNOSIS — R26.9 GAIT ABNORMALITY: ICD-10-CM

## 2022-02-27 DIAGNOSIS — M54.32 SCIATICA OF LEFT SIDE: Primary | ICD-10-CM

## 2022-02-27 PROBLEM — R26.2 AMBULATORY DYSFUNCTION: Status: ACTIVE | Noted: 2022-02-27

## 2022-02-27 LAB — METER GLUCOSE: 158 MG/DL (ref 74–99)

## 2022-02-27 PROCEDURE — 72170 X-RAY EXAM OF PELVIS: CPT

## 2022-02-27 PROCEDURE — 2580000003 HC RX 258: Performed by: INTERNAL MEDICINE

## 2022-02-27 PROCEDURE — 6360000002 HC RX W HCPCS: Performed by: INTERNAL MEDICINE

## 2022-02-27 PROCEDURE — G0378 HOSPITAL OBSERVATION PER HR: HCPCS

## 2022-02-27 PROCEDURE — 99285 EMERGENCY DEPT VISIT HI MDM: CPT

## 2022-02-27 PROCEDURE — 96372 THER/PROPH/DIAG INJ SC/IM: CPT

## 2022-02-27 PROCEDURE — 72131 CT LUMBAR SPINE W/O DYE: CPT

## 2022-02-27 PROCEDURE — 99226 PR SBSQ OBSERVATION CARE/DAY 35 MINUTES: CPT | Performed by: INTERNAL MEDICINE

## 2022-02-27 PROCEDURE — 6370000000 HC RX 637 (ALT 250 FOR IP): Performed by: INTERNAL MEDICINE

## 2022-02-27 PROCEDURE — 6370000000 HC RX 637 (ALT 250 FOR IP): Performed by: PHYSICIAN ASSISTANT

## 2022-02-27 PROCEDURE — 82962 GLUCOSE BLOOD TEST: CPT

## 2022-02-27 RX ORDER — ACETAMINOPHEN 325 MG/1
650 TABLET ORAL EVERY 6 HOURS PRN
Status: DISCONTINUED | OUTPATIENT
Start: 2022-02-27 | End: 2022-03-01 | Stop reason: HOSPADM

## 2022-02-27 RX ORDER — SENNA AND DOCUSATE SODIUM 50; 8.6 MG/1; MG/1
1 TABLET, FILM COATED ORAL 2 TIMES DAILY
Status: DISCONTINUED | OUTPATIENT
Start: 2022-02-27 | End: 2022-03-01 | Stop reason: HOSPADM

## 2022-02-27 RX ORDER — ACETAMINOPHEN 650 MG/1
650 SUPPOSITORY RECTAL EVERY 6 HOURS PRN
Status: DISCONTINUED | OUTPATIENT
Start: 2022-02-27 | End: 2022-03-01 | Stop reason: HOSPADM

## 2022-02-27 RX ORDER — DEXTROSE MONOHYDRATE 25 G/50ML
12.5 INJECTION, SOLUTION INTRAVENOUS PRN
Status: DISCONTINUED | OUTPATIENT
Start: 2022-02-27 | End: 2022-02-27

## 2022-02-27 RX ORDER — HYDROCODONE BITARTRATE AND ACETAMINOPHEN 5; 325 MG/1; MG/1
1 TABLET ORAL ONCE
Status: COMPLETED | OUTPATIENT
Start: 2022-02-27 | End: 2022-02-27

## 2022-02-27 RX ORDER — EZETIMIBE AND SIMVASTATIN 10; 40 MG/1; MG/1
1 TABLET ORAL NIGHTLY
Status: DISCONTINUED | OUTPATIENT
Start: 2022-02-27 | End: 2022-03-01 | Stop reason: HOSPADM

## 2022-02-27 RX ORDER — DEXTROSE MONOHYDRATE 50 MG/ML
100 INJECTION, SOLUTION INTRAVENOUS PRN
Status: DISCONTINUED | OUTPATIENT
Start: 2022-02-27 | End: 2022-03-01 | Stop reason: HOSPADM

## 2022-02-27 RX ORDER — SODIUM CHLORIDE 0.9 % (FLUSH) 0.9 %
5-40 SYRINGE (ML) INJECTION PRN
Status: DISCONTINUED | OUTPATIENT
Start: 2022-02-27 | End: 2022-03-01 | Stop reason: HOSPADM

## 2022-02-27 RX ORDER — BISACODYL 10 MG
10 SUPPOSITORY, RECTAL RECTAL DAILY
Status: DISCONTINUED | OUTPATIENT
Start: 2022-02-28 | End: 2022-03-01 | Stop reason: HOSPADM

## 2022-02-27 RX ORDER — EZETIMIBE AND SIMVASTATIN 10; 40 MG/1; MG/1
1 TABLET ORAL NIGHTLY
Status: DISCONTINUED | OUTPATIENT
Start: 2022-02-27 | End: 2022-02-27

## 2022-02-27 RX ORDER — GLIPIZIDE 5 MG/1
5 TABLET ORAL
Status: DISCONTINUED | OUTPATIENT
Start: 2022-02-28 | End: 2022-03-01 | Stop reason: HOSPADM

## 2022-02-27 RX ORDER — SODIUM CHLORIDE 0.9 % (FLUSH) 0.9 %
5-40 SYRINGE (ML) INJECTION EVERY 12 HOURS SCHEDULED
Status: DISCONTINUED | OUTPATIENT
Start: 2022-02-27 | End: 2022-03-01 | Stop reason: HOSPADM

## 2022-02-27 RX ORDER — PANTOPRAZOLE SODIUM 40 MG/1
40 TABLET, DELAYED RELEASE ORAL
Status: DISCONTINUED | OUTPATIENT
Start: 2022-02-27 | End: 2022-03-01 | Stop reason: HOSPADM

## 2022-02-27 RX ORDER — ONDANSETRON 4 MG/1
4 TABLET, ORALLY DISINTEGRATING ORAL EVERY 8 HOURS PRN
Status: DISCONTINUED | OUTPATIENT
Start: 2022-02-27 | End: 2022-03-01 | Stop reason: HOSPADM

## 2022-02-27 RX ORDER — SODIUM CHLORIDE 9 MG/ML
25 INJECTION, SOLUTION INTRAVENOUS PRN
Status: DISCONTINUED | OUTPATIENT
Start: 2022-02-27 | End: 2022-03-01 | Stop reason: HOSPADM

## 2022-02-27 RX ORDER — CARVEDILOL 25 MG/1
25 TABLET ORAL 2 TIMES DAILY WITH MEALS
Status: DISCONTINUED | OUTPATIENT
Start: 2022-02-27 | End: 2022-03-01 | Stop reason: HOSPADM

## 2022-02-27 RX ORDER — ONDANSETRON 2 MG/ML
4 INJECTION INTRAMUSCULAR; INTRAVENOUS EVERY 6 HOURS PRN
Status: DISCONTINUED | OUTPATIENT
Start: 2022-02-27 | End: 2022-03-01 | Stop reason: HOSPADM

## 2022-02-27 RX ORDER — CLOPIDOGREL BISULFATE 75 MG/1
75 TABLET ORAL DAILY
Status: DISCONTINUED | OUTPATIENT
Start: 2022-02-27 | End: 2022-03-01 | Stop reason: HOSPADM

## 2022-02-27 RX ORDER — POLYETHYLENE GLYCOL 3350 17 G/17G
17 POWDER, FOR SOLUTION ORAL DAILY PRN
Status: DISCONTINUED | OUTPATIENT
Start: 2022-02-27 | End: 2022-03-01 | Stop reason: HOSPADM

## 2022-02-27 RX ORDER — METOCLOPRAMIDE 5 MG/1
5 TABLET ORAL 3 TIMES DAILY
Status: DISCONTINUED | OUTPATIENT
Start: 2022-02-27 | End: 2022-03-01 | Stop reason: HOSPADM

## 2022-02-27 RX ORDER — NICOTINE POLACRILEX 4 MG
15 LOZENGE BUCCAL PRN
Status: DISCONTINUED | OUTPATIENT
Start: 2022-02-27 | End: 2022-03-01 | Stop reason: HOSPADM

## 2022-02-27 RX ADMIN — HYDROCODONE BITARTRATE AND ACETAMINOPHEN 1 TABLET: 5; 325 TABLET ORAL at 12:23

## 2022-02-27 RX ADMIN — CARVEDILOL 25 MG: 25 TABLET, FILM COATED ORAL at 17:48

## 2022-02-27 RX ADMIN — ENOXAPARIN SODIUM 40 MG: 100 INJECTION SUBCUTANEOUS at 17:49

## 2022-02-27 RX ADMIN — SODIUM CHLORIDE, PRESERVATIVE FREE 10 ML: 5 INJECTION INTRAVENOUS at 20:25

## 2022-02-27 RX ADMIN — SENNOSIDES AND DOCUSATE SODIUM 1 TABLET: 50; 8.6 TABLET ORAL at 20:25

## 2022-02-27 RX ADMIN — PANTOPRAZOLE SODIUM 40 MG: 40 TABLET, DELAYED RELEASE ORAL at 17:48

## 2022-02-27 RX ADMIN — CLOPIDOGREL BISULFATE 75 MG: 75 TABLET ORAL at 17:48

## 2022-02-27 RX ADMIN — METOCLOPRAMIDE 5 MG: 5 TABLET ORAL at 20:25

## 2022-02-27 RX ADMIN — METOCLOPRAMIDE 5 MG: 5 TABLET ORAL at 17:48

## 2022-02-27 RX ADMIN — LISINOPRIL 30 MG: 20 TABLET ORAL at 17:48

## 2022-02-27 ASSESSMENT — PAIN SCALES - GENERAL
PAINLEVEL_OUTOF10: 5
PAINLEVEL_OUTOF10: 6

## 2022-02-27 ASSESSMENT — PAIN DESCRIPTION - ORIENTATION
ORIENTATION: LEFT
ORIENTATION: LEFT

## 2022-02-27 ASSESSMENT — PAIN DESCRIPTION - PAIN TYPE
TYPE: CHRONIC PAIN
TYPE: ACUTE PAIN

## 2022-02-27 ASSESSMENT — PAIN DESCRIPTION - LOCATION
LOCATION: HIP;LEG
LOCATION: HIP;LEG

## 2022-02-27 ASSESSMENT — PAIN DESCRIPTION - PROGRESSION: CLINICAL_PROGRESSION: GRADUALLY IMPROVING

## 2022-02-27 ASSESSMENT — PAIN - FUNCTIONAL ASSESSMENT: PAIN_FUNCTIONAL_ASSESSMENT: 0-10

## 2022-02-27 NOTE — PLAN OF CARE
Problem: Falls - Risk of:  Goal: Will remain free from falls  Description: Will remain free from falls  2/27/2022 1740 by Chelsea Koch RN  Outcome: Met This Shift     Problem: Falls - Risk of:  Goal: Absence of physical injury  Description: Absence of physical injury  2/27/2022 1740 by Chelsea Koch RN  Outcome: Met This Shift     Problem: Pain:  Goal: Pain level will decrease  Description: Pain level will decrease  2/27/2022 1740 by Chelsea Koch RN  Outcome: Met This Shift     Problem: Pain:  Goal: Control of acute pain  Description: Control of acute pain  2/27/2022 1740 by Chelsea Koch RN  Outcome: Met This Shift     Problem: Pain:  Goal: Control of chronic pain  Description: Control of chronic pain  2/27/2022 1740 by Chelsea Koch RN  Outcome: Met This Shift     Problem:  Activity:  Goal: Ability to tolerate increased activity will improve  Description: Ability to tolerate increased activity will improve  2/27/2022 1740 by Chelsea Koch RN  Outcome: Not Met This Shift

## 2022-02-27 NOTE — ED PROVIDER NOTES
ED Attending shared visit  CC: No  HPI:  2/27/22, Time: 11:25 AM ÁNGEL Hampton is a 80 y.o. female presenting to the ED for left leg pain, bilateral leg stiffness, beginning chronic worse today. The complaint has been constant, moderate in severity, and worsened by standing, walking. Patient comes in with complaint of left leg pain and generalized stiffness of her lower extremities. She states she got up and got into the bath without any difficulties when attempting to get out of the bath her lower legs felt stiff and she felt like her leg was getting give out. She denied any fall. No head injury no neck pain. Patient has history of herniated disc L4-5 with some central canal stenosis. Patient is following with Dr. Zahida Wang. She did have a epidural done on 12/14. She denies any fevers. Patient denies abdominal pain no urinary or bowel incontinence no saddle paresthesias no urinary frequency urgency burning. She states she was unable to ambulate had to crawl the tub. Patient has chronic pain to the left lower leg denies any numbness tingling or weakness of the lower extremity. Patient was recently discharged on Friday from rehab related to her sciatica and hiatal hernia     Review of Systems:   A complete review of systems was performed and pertinent positives and negatives are stated within HPI, all other systems reviewed and are negative.          --------------------------------------------- PAST HISTORY ---------------------------------------------  Past Medical History:  has a past medical history of Acute CVA (cerebrovascular accident) ([address] Arthritis, Back problem, CAD (coronary artery disease), Cerebral artery occlusion with cerebral infarction ([address] Hyperlipidemia, Hypertension, Nausea & vomiting, Reflux, RLD (ruptured lumbar disc), Scoliosis, Spinal stenosis, and Type 2 diabetes mellitus without complication, without long-term current use of insulin ([address]     Past Surgical History:  has a past surgical history that includes Coronary angioplasty with stent; Cataract removal; fracture surgery; Shoulder arthroscopy (08 25 2011); Diagnostic Cardiac Cath Lab Procedure (2/25/2010); Diagnostic Cardiac Cath Lab Procedure (3/11/2010); Diagnostic Cardiac Cath Lab Procedure (5/19/2010); Coronary angioplasty (2/25/2010); Coronary angioplasty (3/11/2010); Coronary angioplasty (5/19/2010); and Upper gastrointestinal endoscopy (N/A, 2/19/2021). Social History:  reports that she has never smoked. She has never used smokeless tobacco. She reports that she does not drink alcohol and does not use drugs. Family History: family history includes Cancer (age of onset: 79) in her sister; Heart Attack (age of onset: 72) in her father; Heart Attack (age of onset: 68) in her mother. The patients home medications have been reviewed. Allergies: Aspirin and Lipitor    -------------------------------------------------- RESULTS -------------------------------------------------  All laboratory and radiology results have been personally reviewed by myself   LABS:  No results found for this visit on 02/27/22. RADIOLOGY:  Interpreted by Lindsey Mario   Final Result   1. No acute abnormality involving lumbar spine. 2. Levoscoliosis with significant degenerative endplate changes along   concavity of the curvature. 3. Lumbar spondylosis as described above with multiple levels of central   canal stenosis. MRI may be helpful for further evaluation of lumbar   spondylosis. XR PELVIS (1-2 VIEWS)   Final Result   No fracture or dislocation. No significant interval change, when compared to   the previous study performed 02/06/2022. Colonic diverticulosis.             ------------------------- NURSING NOTES AND VITALS REVIEWED ---------------------------   The nursing notes within the ED encounter and vital signs as below have been reviewed.    BP (!) 154/71   Pulse 75 Temp 97.7 °F (36.5 °C) (Oral)   Resp 18   Ht 4' 9\" (1.448 m)   Wt 77 lb (34.9 kg)   SpO2 96%   BMI 16.66 kg/m²   Oxygen Saturation Interpretation: Normal      ---------------------------------------------------PHYSICAL EXAM--------------------------------------      Constitutional/General: Alert and oriented x3, well appearing, non toxic in NAD  Head: Normocephalic and atraumatic  Eyes: PERRL, EOMI  Mouth: Oropharynx clear, handling secretions, no trismus  Neck: Supple, full ROM,   Pulmonary: Lungs clear to auscultation bilaterally, no wheezes, rales, or rhonchi. Not in respiratory distress  Cardiovascular:  Regular rate and rhythm, no murmurs, gallops, or rubs. 2+ distal pulses  Abdomen: Soft, non tender, non distended,   But generalized vertebral paravertebral tenderness  Extremities: Moves all extremities x 4. Warm and well perfused no bony point tenderness of the hip or lower extremity noted normal strength normal sensation pulses are normal.  Skin: warm and dry without rash  Neurologic: GCS 15,  Psych: Normal Affect      ------------------------------ ED COURSE/MEDICAL DECISION MAKING----------------------  Medications   HYDROcodone-acetaminophen (NORCO) 5-325 MG per tablet 1 tablet (1 tablet Oral Given 2/27/22 1223)         ED COURSE:   Patient ambulated with great difficulty. Family member here, she states she generally is able to walk with the walker without difficulties. 1400 Dr. Nayana Kramer states this patient is not on his insurance and is being covered by the hospitalist service. Patient was admitted at the beginning of the month to hospitalist service    [id number] discussed with hospitalist Dr. Edith Zazueta he is agreeable to admission    Medical Decision Making:    Patient comes in with complaint of left leg pain with tightness general weakness of her lower extremities. She denied any fall. She has history of left-sided sciatica as well as spinal canal stenosis.   Patient was attempted to ambulate here in

## 2022-02-27 NOTE — ED NOTES
Ambulated patient with walker and assistance. Patient had a difficult time ambulating and wasn't able to ambulate like she normally does. Notified Rochelle SKELTON.       Aurora Simmons RN  02/27/22 1591

## 2022-02-27 NOTE — H&P
Department of Internal Medicine  General Internal Medicine  Attending History and Physical      CHIEF COMPLAINT:  Unable to ambulate    Reason for Admission:  Ambulatory dysfunction    History Obtained From:  patient    HISTORY OF PRESENT ILLNESS:      The patient is a 80 y.o. female with significant past medical history of DM type 2 and Hyperlipidemia and severe back pain who presents with inability to ambulate. Patient was discharged about 2 weeks ago after she presented with similar complaint. During last hospitalization, patient has spinal injection to help control pain. She was discharged to SNF. She noted that she was doing well, but today, she went to the bathroom and noted that her leg became weak and unable to support her whole body. She denied hitting her head on the ground and denied passing out. She denied any pain. In the ER, she was unable to ambulate even with the aid of walker and she was admitted for further evaluation and care.      Past Medical History:        Diagnosis Date    Acute CVA (cerebrovascular accident) ([address] 5/25/2019    Arthritis     Back problem     CAD (coronary artery disease) 2010    PTCA with DAMON to prox LAD, PTCA DAMON to ostial lesion of RCA and BMS to prox lesion RCA DAMON to RCA ostial     Cerebral artery occlusion with cerebral infarction ([address] 05/2019    Hyperlipidemia     Hypertension     Nausea & vomiting     Reflux     RLD (ruptured lumbar disc)     Scoliosis     Spinal stenosis     Type 2 diabetes mellitus without complication, without long-term current use of insulin ([address] 10/14/2019     Past Surgical History:        Procedure Laterality Date    CATARACT REMOVAL      bilateral    CORONARY ANGIOPLASTY  2/25/2010    PTCA with DAMON in the proximal left anterior descending    CORONARY ANGIOPLASTY  3/11/2010    PTCA and deployment of 2 stents in the ostium and the proximal segment of RCA    CORONARY ANGIOPLASTY  5/19/2010    PTCA DAMON to ostial RCA patent LAD and RCA BMS  restenosis of RCA    CORONARY ANGIOPLASTY WITH STENT PLACEMENT      times 3    DIAGNOSTIC CARDIAC CATH LAB PROCEDURE  2/25/2010    Dr. Santos Gilbert: Cardiac cath with angioplasty follow up    [address] CATH LAB PROCEDURE  3/11/2010    Cardiac cath and stent placement    DIAGNOSTIC CARDIAC CATH LAB PROCEDURE  5/19/2010    Cardiac cath heart lab and subsequent angiopilsaty    FRACTURE SURGERY      tight wrist    SHOULDER ARTHROSCOPY  08 25 2011    left shoulder arthroscopy ,acromioplasty,busectomy, release of adhesions    UPPER GASTROINTESTINAL ENDOSCOPY N/A 2/19/2021    EGD BIOPSY performed by Frandy Rowell DO at [place] [address]     Medications Prior to Admission:    Medications Prior to Admission: acetaminophen (TYLENOL) 325 MG tablet, Take 650 mg by mouth every 6 hours as needed for Pain  metoclopramide (REGLAN) 5 MG tablet, Take 5 mg by mouth in the morning, at noon, and at bedtime  bisacodyl (DULCOLAX) 10 MG suppository, Place 10 mg rectally daily  magnesium hydroxide (MILK OF MAGNESIA) 400 MG/5ML suspension, Take 30 mLs by mouth daily as needed for Constipation  glucose (GLUTOSE) 40 % GEL, Take 37.5 mLs by mouth as needed (hypoglycemia)  sennosides-docusate sodium (SENOKOT-S) 8.6-50 MG tablet, Take 1 tablet by mouth 2 times daily  clopidogrel (PLAVIX) 75 MG tablet, Take 75 mg by mouth daily   glimepiride (AMARYL) 2 MG tablet, Take 1 tablet by mouth daily (with breakfast)  lisinopril (PRINIVIL;ZESTRIL) 30 MG tablet, Take 1 tablet by mouth daily  pantoprazole (PROTONIX) 40 MG tablet, Take 1 tablet by mouth 2 times daily (before meals)  carvedilol (COREG) 25 MG tablet, Take 1 tablet by mouth 2 times daily (with meals)  butalbital-acetaminophen-caffeine (FIORICET, ESGIC) -40 MG per tablet, Take 1 tablet by mouth every 4 hours as needed for Migraine (Patient not taking: Reported on 2/23/2022)  ezetimibe-simvastatin (VYTORIN) 10-40 MG per tablet, Take 1 tablet by mouth nightly Indications: patient no longer takes     Allergies:  Aspirin and Lipitor    Social History:   TOBACCO:   reports that she has never smoked. She has never used smokeless tobacco.  ETOH:   reports no history of alcohol use.     Family History:       Problem Relation Age of Onset    Heart Attack Mother 68    Heart Attack Father 72    Cancer Sister 79        pancreatic     REVIEW OF SYSTEMS:  CONSTITUTIONAL:  negative for  fevers and chills  EYES:  negative for  glasses and blurred vision  HEENT:  negative for  hearing loss and ear drainage  RESPIRATORY:  negative for  dry cough and cough with sputum  CARDIOVASCULAR:  negative for  chest pain, dyspnea, orthopnea  GASTROINTESTINAL:  negative for nausea and vomiting  ENDOCRINE:  negative for heat intolerance and cold intolerance  MUSCULOSKELETAL:  positive for  muscle weakness  NEUROLOGICAL:  negative for headaches, dizziness, numbness and syncope  BEHAVIOR/PSYCH:  negative for poor appetite and increased appetite  PHYSICAL EXAM:    Vitals:  BP (!) 154/71   Pulse 75   Temp 97.7 °F (36.5 °C) (Oral)   Resp 18   Ht 4' 9\" (1.448 m)   Wt 77 lb (34.9 kg)   SpO2 96%   BMI 16.66 kg/m²     CONSTITUTIONAL:  awake  EYES:  extra-ocular muscles intact and vision intact  ENT:  atraumatic  NECK:  supple, symmetrical, trachea midline  HEMATOLOGIC/LYMPHATICS:  no cervical lymphadenopathy  LUNGS:  good air exchange and no crackles or wheezing  CARDIOVASCULAR:  normal apical pulses and normal S1 and S2  ABDOMEN:  normal bowel sounds, non-distended and non-tender  MUSCULOSKELETAL:  full range of motion noted  NEUROLOGIC:  Mental Status Exam:  Level of Alertness:   awake  Orientation:   person, place, time  Motor Exam:  Motor exam is 4 out of 5 all extremities   SKIN:  no bruising or bleeding    DATA:  CBC:   Lab Results   Component Value Date    WBC 7.7 02/14/2022    RBC 4.04 02/14/2022    HGB 12.5 02/14/2022    HCT 37.7 02/14/2022    MCV 93.3 02/14/2022    MCH 30.9 02/14/2022 MCHC 33.2 02/14/2022    RDW 11.2 02/14/2022     02/14/2022    MPV 9.0 02/14/2022     BMP:    Lab Results   Component Value Date     02/14/2022    K 4.4 02/14/2022    K 4.0 02/07/2022     02/14/2022    CO2 25 02/14/2022    BUN 25 02/14/2022    LABALBU 3.5 02/14/2022    CREATININE 0.6 02/14/2022    CALCIUM 9.3 02/14/2022    GFRAA >60 02/14/2022    LABGLOM >60 02/14/2022    GLUCOSE 150 02/14/2022    GLUCOSE 116 08/15/2011     ASSESSMENT AND PLAN:      1. Ambulatory dysfunction with intractable lower back pain:   PT/OT  Patient has spinal injection on last admission  Will consult neurosurgery  Pain control if needed    2. DM type 2: continue glimepiride  Diabetic diet  Accucheck ACHS    3. Hyperlipidemia:  Continue Zetia and Statin    4. Hypertension Essential: on home meds  Lisinopril and metoprolol    5. Hx of CVA: continue Plavix and secondary medication prophylaxis.

## 2022-02-27 NOTE — DISCHARGE INSTR - COC
Continuity of Care Form    Patient Name: Rahul Allan   :  1940  MRN:  [id number]    Admit date:  2022  Discharge date:  3/1/2022    Code Status Order: Prior   Advance Directives:      Admitting Physician:  No admitting provider for patient encounter. PCP: Landy Lim DO    Discharging Nurse: [facility] Unit/Room#:   Discharging Unit Phone Number: [phone]    Emergency Contact:   Extended Emergency Contact Information  Primary Emergency Contact: Shelbi Price, [address] Phone: [phone]  Mobile Phone: [phone]  Relation: Brother/Sister   needed?  No    Past Surgical History:  Past Surgical History:   Procedure Laterality Date    CATARACT REMOVAL      bilateral    CORONARY ANGIOPLASTY  2010    PTCA with DAMON in the proximal left anterior descending    CORONARY ANGIOPLASTY  3/11/2010    PTCA and deployment of 2 stents in the ostium and the proximal segment of RCA    CORONARY ANGIOPLASTY  2010    PTCA DAMON to ostial RCA patent LAD and RCA BMS  restenosis of RCA    CORONARY ANGIOPLASTY WITH STENT PLACEMENT      times 3    DIAGNOSTIC CARDIAC CATH LAB PROCEDURE  2010    Dr. Johnathan Ibanez: Cardiac cath with angioplasty follow up     DIAGNOSTIC CARDIAC CATH LAB PROCEDURE  3/11/2010    Cardiac cath and stent placement    DIAGNOSTIC CARDIAC CATH LAB PROCEDURE  2010    Cardiac cath heart lab and subsequent angiopilsaty    FRACTURE SURGERY      tight wrist    SHOULDER ARTHROSCOPY  2011    left shoulder arthroscopy ,acromioplasty,busectomy, release of adhesions    UPPER GASTROINTESTINAL ENDOSCOPY N/A 2021    EGD BIOPSY performed by Janet Harman DO at [facility] ENDOSCOPY       Immunization History:   Immunization History   Administered Date(s) Administered    COVID-19, J&J, PF, 0.5 mL 2021    COVID-19, Pfizer Purple top, DILUTE for use, 12+ yrs, 30mcg/0.3mL dose 2021    Influenza, High Dose (Fluzone 65 yrs and older) 10/03/2015, 10/03/2016 Influenza, Quadv, IM, PF (6 mo and older Fluzone, Flulaval, Fluarix, and 3 yrs and older Afluria) 10/02/2017, 09/14/2020    Influenza, Elio Lard, Recombinant, IM PF (Flublok 18 yrs and older) 10/07/2019    Influenza, Quadv, adjuvanted, 65 yrs +, IM, PF (Fluad) 09/14/2020    Influenza, Triv, inactivated, subunit, adjuvanted, IM (Fluad 65 yrs and older) 10/18/2018    Pneumococcal Conjugate 13-valent (Pasysyc81) 11/12/2016    Pneumococcal Polysaccharide ([id number]) 11/13/2017    Zoster Live (Zostavax) 10/03/2016       Active Problems:  Patient Active Problem List   Diagnosis Code    Coronary artery disease involving native coronary artery of native heart without angina pectoris I25.10    Scoliosis M41.9    Hypertension I10    Hyperlipidemia E78.5    Type 2 diabetes mellitus without complication, without long-term current use of insulin (HCC) E11.9    Atrial septal aneurysm I25.3    GI bleed K92.2    Intractable pain R52    Sciatica of left side M54.32    Gastroesophageal reflux disease without esophagitis K21.9    Intractable back pain M54.9    Herniated nucleus pulposus, L4-5 M51.26       Isolation/Infection:   Isolation            No Isolation          Patient Infection Status       None to display            Nurse Assessment:  Last Vital Signs: There were no vitals taken for this visit. Last documented pain score (0-10 scale):    Last Weight:   Wt Readings from Last 1 Encounters:   02/23/22 77 lb (34.9 kg)     Mental Status:  oriented and alert    IV Access:  - None    Nursing Mobility/ADLs:  Walking   Assisted  Transfer  Assisted  Bathing  Assisted  Dressing  Assisted  Toileting  Assisted  Feeding  Independent  Med Admin  Assisted  Med Delivery   whole    Wound Care Documentation and Therapy:        Elimination:  Continence:    Bowel: Yes  Bladder: Yes  Urinary Catheter: None   Colostomy/Ileostomy/Ileal Conduit: No       Date of Last BM: 3/1/2022  No intake or output data in the 24 hours ending 02/27/22 1125  No

## 2022-02-28 PROBLEM — E44.0 MODERATE PROTEIN-CALORIE MALNUTRITION (HCC): Status: ACTIVE | Noted: 2022-02-28

## 2022-02-28 LAB
ACANTHOCYTES: ABNORMAL
ALBUMIN SERPL-MCNC: 3.7 G/DL (ref 3.5–5.2)
ALP BLD-CCNC: 78 U/L (ref 35–104)
ALT SERPL-CCNC: 12 U/L (ref 0–32)
ANION GAP SERPL CALCULATED.3IONS-SCNC: 13 MMOL/L (ref 7–16)
AST SERPL-CCNC: 21 U/L (ref 0–31)
BASOPHILS ABSOLUTE: 0 E9/L (ref 0–0.2)
BASOPHILS RELATIVE PERCENT: 0.1 % (ref 0–2)
BILIRUB SERPL-MCNC: 0.2 MG/DL (ref 0–1.2)
BUN BLDV-MCNC: 16 MG/DL (ref 6–23)
CALCIUM SERPL-MCNC: 9 MG/DL (ref 8.6–10.2)
CHLORIDE BLD-SCNC: 100 MMOL/L (ref 98–107)
CO2: 23 MMOL/L (ref 22–29)
CREAT SERPL-MCNC: 0.6 MG/DL (ref 0.5–1)
EOSINOPHILS ABSOLUTE: 0.07 E9/L (ref 0.05–0.5)
EOSINOPHILS RELATIVE PERCENT: 0.9 % (ref 0–6)
GFR AFRICAN AMERICAN: >60
GFR NON-AFRICAN AMERICAN: >60 ML/MIN/1.73
GLUCOSE BLD-MCNC: 109 MG/DL (ref 74–99)
HCT VFR BLD CALC: 35.4 % (ref 34–48)
HEMOGLOBIN: 11.5 G/DL (ref 11.5–15.5)
LYMPHOCYTES ABSOLUTE: 1.66 E9/L (ref 1.5–4)
LYMPHOCYTES RELATIVE PERCENT: 21.1 % (ref 20–42)
MCH RBC QN AUTO: 30.9 PG (ref 26–35)
MCHC RBC AUTO-ENTMCNC: 32.5 % (ref 32–34.5)
MCV RBC AUTO: 95.2 FL (ref 80–99.9)
METER GLUCOSE: 149 MG/DL (ref 74–99)
METER GLUCOSE: 160 MG/DL (ref 74–99)
MONOCYTES ABSOLUTE: 0.95 E9/L (ref 0.1–0.95)
MONOCYTES RELATIVE PERCENT: 12.3 % (ref 2–12)
NEUTROPHILS ABSOLUTE: 5.21 E9/L (ref 1.8–7.3)
NEUTROPHILS RELATIVE PERCENT: 65.8 % (ref 43–80)
OVALOCYTES: ABNORMAL
PDW BLD-RTO: 12.3 FL (ref 11.5–15)
PLATELET # BLD: 245 E9/L (ref 130–450)
PMV BLD AUTO: 9.3 FL (ref 7–12)
POIKILOCYTES: ABNORMAL
POLYCHROMASIA: ABNORMAL
POTASSIUM REFLEX MAGNESIUM: 3.8 MMOL/L (ref 3.5–5)
RBC # BLD: 3.72 E12/L (ref 3.5–5.5)
SODIUM BLD-SCNC: 136 MMOL/L (ref 132–146)
TEAR DROP CELLS: ABNORMAL
TOTAL PROTEIN: 6.2 G/DL (ref 6.4–8.3)
WBC # BLD: 7.9 E9/L (ref 4.5–11.5)

## 2022-02-28 PROCEDURE — G0378 HOSPITAL OBSERVATION PER HR: HCPCS

## 2022-02-28 PROCEDURE — 85025 COMPLETE CBC W/AUTO DIFF WBC: CPT

## 2022-02-28 PROCEDURE — 97165 OT EVAL LOW COMPLEX 30 MIN: CPT

## 2022-02-28 PROCEDURE — 6370000000 HC RX 637 (ALT 250 FOR IP): Performed by: INTERNAL MEDICINE

## 2022-02-28 PROCEDURE — 97161 PT EVAL LOW COMPLEX 20 MIN: CPT

## 2022-02-28 PROCEDURE — 2580000003 HC RX 258: Performed by: INTERNAL MEDICINE

## 2022-02-28 PROCEDURE — 96372 THER/PROPH/DIAG INJ SC/IM: CPT

## 2022-02-28 PROCEDURE — 80053 COMPREHEN METABOLIC PANEL: CPT

## 2022-02-28 PROCEDURE — 82962 GLUCOSE BLOOD TEST: CPT

## 2022-02-28 PROCEDURE — 36415 COLL VENOUS BLD VENIPUNCTURE: CPT

## 2022-02-28 PROCEDURE — 6360000002 HC RX W HCPCS: Performed by: INTERNAL MEDICINE

## 2022-02-28 PROCEDURE — 99225 PR SBSQ OBSERVATION CARE/DAY 25 MINUTES: CPT | Performed by: INTERNAL MEDICINE

## 2022-02-28 RX ADMIN — ACETAMINOPHEN 650 MG: 325 TABLET ORAL at 01:12

## 2022-02-28 RX ADMIN — SENNOSIDES AND DOCUSATE SODIUM 1 TABLET: 50; 8.6 TABLET ORAL at 20:15

## 2022-02-28 RX ADMIN — SODIUM CHLORIDE, PRESERVATIVE FREE 10 ML: 5 INJECTION INTRAVENOUS at 08:15

## 2022-02-28 RX ADMIN — METOCLOPRAMIDE 5 MG: 5 TABLET ORAL at 08:14

## 2022-02-28 RX ADMIN — CARVEDILOL 25 MG: 25 TABLET, FILM COATED ORAL at 08:14

## 2022-02-28 RX ADMIN — SENNOSIDES AND DOCUSATE SODIUM 1 TABLET: 50; 8.6 TABLET ORAL at 08:13

## 2022-02-28 RX ADMIN — LISINOPRIL 30 MG: 20 TABLET ORAL at 08:13

## 2022-02-28 RX ADMIN — CLOPIDOGREL BISULFATE 75 MG: 75 TABLET ORAL at 08:14

## 2022-02-28 RX ADMIN — BISACODYL 10 MG: 10 SUPPOSITORY RECTAL at 08:14

## 2022-02-28 RX ADMIN — SODIUM CHLORIDE, PRESERVATIVE FREE 10 ML: 5 INJECTION INTRAVENOUS at 20:16

## 2022-02-28 RX ADMIN — GLIPIZIDE 5 MG: 5 TABLET ORAL at 06:32

## 2022-02-28 RX ADMIN — METOCLOPRAMIDE 5 MG: 5 TABLET ORAL at 20:15

## 2022-02-28 RX ADMIN — CARVEDILOL 25 MG: 25 TABLET, FILM COATED ORAL at 17:20

## 2022-02-28 RX ADMIN — PANTOPRAZOLE SODIUM 40 MG: 40 TABLET, DELAYED RELEASE ORAL at 06:32

## 2022-02-28 RX ADMIN — ENOXAPARIN SODIUM 40 MG: 100 INJECTION SUBCUTANEOUS at 08:14

## 2022-02-28 RX ADMIN — ACETAMINOPHEN 650 MG: 325 TABLET ORAL at 22:09

## 2022-02-28 RX ADMIN — METOCLOPRAMIDE 5 MG: 5 TABLET ORAL at 17:20

## 2022-02-28 RX ADMIN — ACETAMINOPHEN 650 MG: 325 TABLET ORAL at 15:51

## 2022-02-28 RX ADMIN — PANTOPRAZOLE SODIUM 40 MG: 40 TABLET, DELAYED RELEASE ORAL at 17:20

## 2022-02-28 ASSESSMENT — PAIN SCALES - GENERAL
PAINLEVEL_OUTOF10: 0
PAINLEVEL_OUTOF10: 3
PAINLEVEL_OUTOF10: 5
PAINLEVEL_OUTOF10: 3
PAINLEVEL_OUTOF10: 0

## 2022-02-28 NOTE — CARE COORDINATION
2/28/2022: SS Note/Discharge plan: Observation:  SS Consult noted for d/c planning, pt recently discharged from [facility] at [facility], met with pt and pt's brother-in-law, Yoselin Noble and his wife Geovanna Austin at pt's bedside who relayed that pt was discharged home but was only home a few days before they brought her back to the hospital, pt does prefer to go back to [facility] for another rehab stay, referral made to [facility]. [facility] admissions liaison however they currently have no bed availability, [facility]. [facility] relayed that pt was doing well in her rehab and was cut by her insurance, she relayed that she does have beds available on [facility] for future reference and they accept Medicaid waiver, pt and her family informed, they are in the process of applying for Medicaid for pt and she is considering eventually moving into their AL, [facility]. [facility] notified. Pt reviewing SNF provider list for [facility] with her family for alternative choices.  sw to follow, Electronically signed by ELSA Gunderson on 2/28/2022 at 12:49 PM

## 2022-02-28 NOTE — PROGRESS NOTES
Pharmacist Review and Automatic Dose Adjustment of Prophylactic Enoxaparin      The reviewing pharmacist has made an adjustment to the ordered enoxaparin dose or converted to UFH per the approved [facility] protocol and table as identified below. Xiao Guzman is a 80 y.o. female. Recent Labs     02/28/22  0436   CREATININE 0.6     CrCl 41 mL/min    Height:   Ht Readings from Last 1 Encounters:   02/28/22 4' 9\" (1.448 m)     Weight:  Wt Readings from Last 1 Encounters:   02/28/22 78 lb (35.4 kg)               Plan: Based upon the patient's weight and renal function, the enoxaparin dose has been changed/converted to 30 mg SUBQ daily.       Thank you,  Luis Castaneda, [facility]  2/28/2022, 1:38 PM

## 2022-02-28 NOTE — PLAN OF CARE
Problem: Falls - Risk of:  Goal: Will remain free from falls  Description: Will remain free from falls  Outcome: Met This Shift  Goal: Absence of physical injury  Description: Absence of physical injury  Outcome: Met This Shift     Problem: Pain:  Goal: Pain level will decrease  Description: Pain level will decrease  Outcome: Met This Shift  Goal: Control of acute pain  Description: Control of acute pain  Outcome: Met This Shift     Problem: Activity:  Goal: Ability to avoid complications of mobility impairment will improve  Description: Ability to avoid complications of mobility impairment will improve  Outcome: Met This Shift  Goal: Range of joint motion will improve  Description: Range of joint motion will improve  Outcome: Met This Shift  Goal: Ability to ambulate will improve  Description: Ability to ambulate will improve  Outcome: Met This Shift  Goal: Muscle strength will improve  Description: Muscle strength will improve  Outcome: Met This Shift     Problem: Malnutrition  (NI-5.2)  Goal: Food and/or Nutrient Delivery  Description: Individualized approach for food/nutrient provision.   2/28/2022 1153 by Sheryle Shirts, RD, LD  Outcome: Met This Shift     Problem: Skin Integrity:  Goal: Will show no infection signs and symptoms  Description: Will show no infection signs and symptoms  Outcome: Met This Shift  Goal: Absence of new skin breakdown  Description: Absence of new skin breakdown  Outcome: Met This Shift     Problem: Safety:  Goal: Ability to remain free from injury will improve  Description: Ability to remain free from injury will improve  Outcome: Met This Shift

## 2022-02-28 NOTE — PROGRESS NOTES
clinical assessment    Current Treatment Recommendations:    -Bed Mobility: Lower extremity exercises   -Sitting Balance: Incorporate reaching activities to activate trunk muscles   -Standing Balance: Perform strengthening exercises in standing to promote motor control with or without upper extremity support   -Transfers: Provide instruction on proper hand and foot position for adequate transfer of weight onto lower extremities and use of gait device if needed and Cues for hand placement, technique and safety. Provide stabilization to prevent fall   -Gait: Gait training and Standing activities to improve: base of support, weight shift, weight bearing    -Endurance: Utilize Supervised activities to increase level of endurance to allow for safe functional mobility including transfers and gait     PT long term treatment goals are located in below grid    Patient and or family understand(s) diagnosis, prognosis, and plan of care. Frequency of treatments: Patient will be seen  daily.          Prior Level of Function: Patient ambulated with wheeled walker   Rehab Potential: good  for baseline    Past medical history:   Past Medical History:   Diagnosis Date    Acute CVA (cerebrovascular accident) ([facility] 5/25/2019    Arthritis     Back problem     CAD (coronary artery disease) 2010    PTCA with DAMON to prox LAD, PTCA DAMON to ostial lesion of RCA and BMS to prox lesion RCA DAMON to RCA ostial     Cerebral artery occlusion with cerebral infarction ([address] 05/2019    Hyperlipidemia     Hypertension     Nausea & vomiting     Reflux     RLD (ruptured lumbar disc)     Scoliosis     Spinal stenosis     Type 2 diabetes mellitus without complication, without long-term current use of insulin ([address] 10/14/2019     Past Surgical History:   Procedure Laterality Date    CATARACT REMOVAL      bilateral    CORONARY ANGIOPLASTY  2/25/2010    PTCA with DAMON in the proximal left anterior descending    CORONARY ANGIOPLASTY  3/11/2010 PTCA and deployment of 2 stents in the ostium and the proximal segment of RCA    CORONARY ANGIOPLASTY  5/19/2010    PTCA DAMON to ostial RCA patent LAD and RCA BMS  restenosis of RCA    CORONARY ANGIOPLASTY WITH STENT PLACEMENT      times 3    DIAGNOSTIC CARDIAC CATH LAB PROCEDURE  2/25/2010    Dr. Chriss Hammond: Cardiac cath with angioplasty follow up    [address] CATH LAB PROCEDURE  3/11/2010    Cardiac cath and stent placement    DIAGNOSTIC CARDIAC CATH LAB PROCEDURE  5/19/2010    Cardiac cath heart lab and subsequent angiopilsaty    FRACTURE SURGERY      tight wrist    SHOULDER ARTHROSCOPY  08 25 2011    left shoulder arthroscopy ,acromioplasty,busectomy, release of adhesions    UPPER GASTROINTESTINAL ENDOSCOPY N/A 2/19/2021    EGD BIOPSY performed by Margarita Paul DO at [address]:    Precautions: Up as tolerated, falls and spinal precautions ,   Social history: Patient lives alone in a apartment 1 st floor  with 3 steps  to enter bilateral Rail  Tub shower grab bars. Equipment owned: Wheeled Walker, [address] [facility] [employer] chair and Quad cane,      [address]   How much difficulty turning over in bed?: A Little  How much difficulty sitting down on / standing up from a chair with arms?: A Lot  How much difficulty moving from lying on back to sitting on side of bed?: A Lot  How much help from another person moving to and from a bed to a chair?: A Lot  How much help from another person needed to walk in hospital room?: A Lot  How much help from another person for climbing 3-5 steps with a railing?: Total  AM-PAC Inpatient Mobility Raw Score : 12  AM-PAC Inpatient T-Scale Score : 35.33  Mobility Inpatient CMS 0-100% Score: 68.66  Mobility Inpatient CMS G-Code Modifier : CL    Nursing cleared patient for PT evaluation. The admitting diagnosis and active problem list as listed above have been reviewed prior to the initiation of this evaluation. OBJECTIVE;   Initial Evaluation  Date: 2/28/2022 Treatment Date:     Short Term/ Long Term   Goals   Was pt agreeable to Eval/treatment? Yes  To be met in 4 days   Pain level   8/10  Achy all over body, fatigued      Bed Mobility    Rolling: Minimal assist of 1    Supine to sit: Moderate assist of 1    Sit to supine: Moderate assist of 1    Scooting: Minimal assist of 1    Rolling: Independent    Supine to sit: Independent    Sit to supine: Independent    Scooting: Independent     Transfers Sit to stand: Moderate assist of 1   Sit to stand: Independent    Ambulation    6 side steps using  wheeled walker with Moderate assist of 1   for walker control, balance, weight shift and safety   50 feet using  wheeled walker with Modified Independent    Stair negotiation: ascended and descended   Not assessed     3 steps with HR and min A   ROM Within functional limits    Increase range of motion 10% of affected joints    Strength BUE:  refer to OT eval  RLE:  3+/5  LLE:  3+/5  Increase strength in affected mm groups by 1/3 grade   Balance Sitting EOB:  good -  Dynamic Standing:  poor + wheeled walker    Sitting EOB:  good   Dynamic Standing: good      Patient is Alert & Oriented x person, place, time and situation and follows directions    Sensation:  Patient  denies numbness/tingling   Edema:  no   Endurance: fair  -, fatigue easily     Vitals: room air   Blood Pressure at rest  Blood Pressure during session    Heart Rate at rest 91 Heart Rate during session    SPO2 at rest 98%  SPO2 during session %     Patient education  Patient educated on role of Physical Therapy, risks of immobility, safety and plan of care,  importance of mobility while in hospital , ankle pumps, quad set and glut set for edema control, blood clot prevention, importance and purpose of adaptive device and adjusted to proper height for the patient.  and safety      Patient response to education:   Pt verbalized understanding Pt demonstrated skill Pt requires further education in this area   Yes Partial Yes      Treatment:  Patient practiced and was instructed/facilitated in the following treatment: Patient rolled x 2 for bedpan, assisted with self hygiene. Sat edge of bed 5 minutes with Minimal assist of 1 to increase dynamic sitting balance and activity tolerance. progressing to supervision. Stood and took side steps. Assisted back to supine. Therapeutic Exercises:  not performed  x  reps. At end of session, patient in bed with alarm call light and phone within reach,  all lines and tubes intact, nursing notified. Patient would benefit from continued skilled Physical Therapy to improve functional independence and quality of life. Patient's/ family goals   home but open to rehab if needed     Time in  0844  Time out  903    Total Treatment Time  0 minutes    Evaluation time includes thorough review of current medical information, gathering information on past medical history/social history and prior level of function, completion of standardized testing/informal observation of tasks, assessment of data, and development of Plan of care and goals.      CPT codes:  Low Complexity PT evaluation (63713)  No treatment    Girma Giles PT

## 2022-02-28 NOTE — CARE COORDINATION
2/28/2022: SS Note/Discharge plan: Observation:  Met with pt,she reviewed alternative SNF choices/list with her family, prefers [facility] (1st ) referral made to [facility] admissions liaison, facility has a bed available, awaiting chart review and acceptance, relayed [facility] (2nd) and [facility] (3rd) tanja to follow. Electronically signed by ELSA Whyte on 2/28/2022 at 3:05 PM

## 2022-02-28 NOTE — PROGRESS NOTES
[facility]  [place]. [place]        Date:2022                                                  Patient Name: Sharon Davey    MRN: [id number]    : 1940    Room: [address] [place] [address]-      Evaluating OT: Willowmundo Hoffman OTR/L #[id number]     Referring Provider and Specific Provider Orders/Date:      22  OT eval and treat  Start:  22,   End:  22,   ONE TIME,   Standing Count:  1 Occurrences,   R         Ion Griffiths MD      Placement Recommendation: Subacute vs HHOT if patient is able to meet goals        Diagnosis:   1. Sciatica of left side    2.  Gait abnormality           Surgery: None      Pertinent Medical History:       Past Medical History:   Diagnosis Date    Acute CVA (cerebrovascular accident) ([address] 2019    Arthritis     Back problem     CAD (coronary artery disease)     PTCA with DAMON to prox LAD, PTCA DAMON to ostial lesion of RCA and BMS to prox lesion RCA DAMON to RCA ostial     Cerebral artery occlusion with cerebral infarction ([address] 2019    Hyperlipidemia     Hypertension     Nausea & vomiting     Reflux     RLD (ruptured lumbar disc)     Scoliosis     Spinal stenosis     Type 2 diabetes mellitus without complication, without long-term current use of insulin ([address] 10/14/2019         Past Surgical History:   Procedure Laterality Date    CATARACT REMOVAL      bilateral    CORONARY ANGIOPLASTY  2010    PTCA with DAMON in the proximal left anterior descending    CORONARY ANGIOPLASTY  3/11/2010    PTCA and deployment of 2 stents in the ostium and the proximal segment of RCA    CORONARY ANGIOPLASTY  2010    PTCA DAMON to ostial RCA patent LAD and RCA BMS  restenosis of RCA    CORONARY ANGIOPLASTY WITH STENT PLACEMENT      times 3    DIAGNOSTIC CARDIAC CATH LAB PROCEDURE  2010    Dr. Fitzgerald Masters: Cardiac cath with angioplasty follow up    [address] CATH LAB PROCEDURE  3/11/2010    Cardiac cath and stent placement    DIAGNOSTIC CARDIAC CATH LAB PROCEDURE  5/19/2010    Cardiac cath heart lab and subsequent angiopilsaty    FRACTURE SURGERY      tight wrist    SHOULDER ARTHROSCOPY  08 25 2011    left shoulder arthroscopy ,acromioplasty,busectomy, release of adhesions    UPPER GASTROINTESTINAL ENDOSCOPY N/A 2/19/2021    EGD BIOPSY performed by Cody Contreras DO at [facility] ENDOSCOPY        Precautions:  Fall Risk, bed alarm.       Assessment of current deficits    [x] Functional mobility  [x]ADLs  [x] Strength               []Cognition    [x] Functional transfers   [x] IADLs         [x] Safety Awareness   [x]Endurance    [] Fine Coordination              [x] Balance      [] Vision/perception   []Sensation     []Gross Motor Coordination  [] ROM  [] Delirium                   [] Motor Control     OT PLAN OF CARE   OT POC based on physician orders, patient diagnosis and results of clinical assessment    Frequency/Duration 1-3 days/wk for 2 weeks PRN     Specific OT Treatment Interventions to include:   * Instruction/training on adapted ADL techniques and AE recommendations to increase functional independence within precautions       * Training on energy conservation strategies, correct breathing pattern and techniques to improve independence/tolerance for self-care routine  * Functional transfer/mobility training/DME recommendations for increased independence, safety, and fall prevention  * Patient/Family education to increase follow through with safety techniques and functional independence  * Recommendation of environmental modifications for increased safety with functional transfers/mobility and ADLs  * Therapeutic exercise to improve motor endurance, ROM, and functional strength for ADLs/functional transfers  * Therapeutic activities to facilitate/challenge dynamic balance, stand tolerance for increased safety and independence with ADLs    Recommended Adaptive Equipment: bedside commode if plans for home      Home Living: Lives alone, apartment, 1 story, 3 steps to enter with bilateral hand rail. Bathroom set-up: tub/shower, grab bar, shower chair         Equipment owned: wheeled walker, 4-prong cane, Life Alert button     Prior Level of Function: Pt reports being independent at baseline with ADLs and with IADLs. Of note, patient states she was recently discharged home from rehab and was at home x1 day prior to returning to hospital d/t sciatica concerns; ambulated with wheeled walker independently at baseline. Driving: Does not drive; brother-in-law provides transportation   Occupation: N/a  Enjoys: N/a      Pain Level: Pt reporting 6/10 pain in left LE; Nursing aware . Cognition: A&O: 4/4; Follows 2-3 step directions   Memory: intact   Sequencing: good    Problem solving: fair Renard Spry plus   Judgement/safety: fair Renard Spry plus     [facility]   AM-PAC Daily Activity Inpatient   How much help for putting on and taking off regular lower body clothing?: A Little  How much help for Bathing?: A Lot  How much help for Toileting?: A Lot  How much help for putting on and taking off regular upper body clothing?: A Little  How much help for taking care of personal grooming?: A Little  How much help for eating meals?: A Little  AM-[facility] Inpatient Daily Activity Raw Score: 16  AM-PAC Inpatient ADL T-Scale Score : 35.96  ADL Inpatient CMS 0-100% Score: 53.32  ADL Inpatient CMS G-Code Modifier : CK     Functional Assessment:    Initial Eval Status  Date: 2/28/22   Treatment Status  Date: STGs = LTGs  Time frame: 10-14 days   Feeding Supervision/Set-up    Independent    Grooming Stand by Assist/Set-up    Moderate [place]    UB Dressing Stand by Assist    Moderate [place]    LB Dressing Minimal Assist   Pt donned socks while long-sitting in bed however decreased functional reach to pull up on socks at EOB.      Moderate [place] and phone within reach, all lines and tubes intact. Overall, patient demonstrated  decreased independence and safety during completion of ADL tasks. Pt would benefit from continued skilled OT to increase safety and independence with completion of ADL tasks and functional mobility for improved quality of life. Rehab Potential: Good for established goals. Patient / Family Goal: Return home      Patient and/or family were instructed on functional diagnosis, prognosis/goals and OT plan of care. Demonstrated fair understanding. Eval Complexity: Low    Time In: 11:05 AM   Time Out: 11:25 AM    Total Treatment Time: 0       Min Units   OT Eval Low 97165  X  1    OT Eval Medium 22318      OT Eval High 87489      OT Re-Eval [id number]            ADL/Self Care 42198     Therapeutic Activities 05532       Therapeutic Ex 37640       Orthotic Management 28969       Manual [id number]     Neuro Re-Ed 47826       Non-Billable Time        Evaluation Time additionally includes thorough review of current medical information, gathering information on past medical history/social history and prior level of function, interpretation of standardized testing/informal observation of tasks, assessment of data and development of plan of care and goals.         Evaluating OT: Jeanmarie Recio OTR/L #[id number]

## 2022-02-28 NOTE — CONSULTS
CHIEF COMPLAINT:  Pain in the lower back radiating down to the left  lower extremity & right lower extremities      HISTORY OF PRESENT ILLNESS:  This is an 66-year-old female who had  sudden onset of pain in the lower back two days ago which since then  has persisted. The pain has progressively worsened & causing ambulatory dysfunctions. The pain has been radiating from the lower back to the left  buttock and around the posterolateral aspect to the ankle. The pain  Not only involves the left leg and also the right. It is more pronounced in  the back as compared to the legs. She denies any weakness or numbness  though she does have some trouble walking. The patient was evaluated on 2/9/22 & subsequently underwent epidural epidural nerve block which only lasted a couple of days. She had physical therapy also with some relief of pain. .She underwent an MRI scan of  the lumbar spine which was reviewed. It was reported to show  spondylotic changes with narrowing at the neural foramina at the level  of L1-2, L2-3, L4-5 on the right and severe neural foraminal narrowing  at the level of L5-S1 on the left. She also had some edema due to  degenerative changes at the level of L2-3. No fractures were noted. The study was reviewed by me. She is readmitted because of recurrence of pain. PAST MEDICAL HISTORY:  Past history of acute CVA, arthritis, back pain,  coronary artery disease, hyperlipidemia, hypertension, nausea, and  vomiting. She has lumbar disc scoliosis, spinal stenosis, and diabetes  mellitus type 2.     PAST SURGICAL HISTORY:  Cataract removal, coronary angioplasty with  stent placement, diagnostic cardiac cath, fracture surgery of left  wrist, shoulder arthroscopic procedure of left shoulder, and upper GI  endoscopy.     PERSONAL HISTORY:  Allergy to ASPIRIN and LIPITOR.     SOCIAL HISTORY:  Not a smoker.   She does not use alcohol or street  drugs.     PHYSICAL EXAMINATION:  GENERAL:  She is a well-developed, well-nourished female, underweight,  complaining of hiatal hernia. NEUROLOGIC:  Cranial nerves II through XII are grossly intact. Pupils  are equal and reactive. Extraocular movements are full. Vision is full  to confrontation. Movement of cervical spine fair. Handgrip is equal  bilaterally. Finger-to-nose performed without difficulty. No focal  deficit noted in the upper extremities. 4/5 weaknesst noted  in the lower extremities. Deep tendon  reflexes to be 1+ bilaterally. Ankle jerk to be plus/minus bilaterally. Sensation intact bilaterally. Giselle Uche test is negative.     IMPRESSION:  Lumbar spondylotic changes causing lumbar radiculopathy and  back pain. Multiple medical comorbidities. The patient on Plavix.   I discussed the options of management with the patient. Epidural nerve block, continue with physical therapy & 3rd option of surgical decompression. She would like to have epidural nerve block again prior to considering other options. She is on Plavix. If the epidural nerve block does  not help, I would be glad to reevaluate her. We will follow along.   Thank you           Ranjan Purvis MD

## 2022-02-28 NOTE — PROGRESS NOTES
Admitting Date and Time: 2/27/2022 11:20 AM  Admit Dx: Gait abnormality [R26.9]  Sciatica of left side [M54.32]  Ambulatory dysfunction [R26.2]    Subjective:    Pt feels tired    ROS: denies fever, chills, cp, sob, n/v, HA unless stated above.      bisacodyl  10 mg Rectal Daily    carvedilol  25 mg Oral BID WC    clopidogrel  75 mg Oral Daily    glipiZIDE  5 mg Oral QAM AC    lisinopril  30 mg Oral Daily    metoclopramide  5 mg Oral TID    pantoprazole  40 mg Oral BID AC    sennosides-docusate sodium  1 tablet Oral BID    sodium chloride flush  5-40 mL IntraVENous 2 times per day    enoxaparin  40 mg SubCUTAneous Daily    ezetimibe-simvastatin  1 tablet Oral Nightly     sodium chloride flush, 5-40 mL, PRN  sodium chloride, 25 mL, PRN  ondansetron, 4 mg, Q8H PRN   Or  ondansetron, 4 mg, Q6H PRN  polyethylene glycol, 17 g, Daily PRN  acetaminophen, 650 mg, Q6H PRN   Or  acetaminophen, 650 mg, Q6H PRN  glucose, 15 g, PRN  glucagon (rDNA), 1 mg, PRN  dextrose, 100 mL/hr, PRN  dextrose bolus (hypoglycemia), 125 mL, PRN   Or  dextrose bolus (hypoglycemia), 250 mL, PRN         Objective:    BP (!) 107/50   Pulse 82   Temp 99 °F (37.2 °C) (Oral)   Resp 18   Ht 4' 9\" (1.448 m)   Wt 78 lb (35.4 kg)   SpO2 96%   BMI 16.88 kg/m²   General Appearance: alert and oriented to person, place and time and in no acute distress  Skin: warm and dry  Head: normocephalic and atraumatic  Eyes: pupils equal, round, and reactive to light, extraocular eye movements intact, conjunctivae normal  Neck: neck supple and non tender without mass   Pulmonary/Chest: clear to auscultation bilaterally- no wheezes, rales or rhonchi, normal air movement, no respiratory distress  Cardiovascular: normal rate, normal S1 and S2 and no carotid bruits  Abdomen: soft, non-tender, non-distended, normal bowel sounds, no masses or organomegaly  Extremities: no cyanosis, no clubbing and no  edema  Neurologic: no cranial nerve deficit and speech normal      Recent Labs     02/28/22 0436      K 3.8      CO2 23   BUN 16   CREATININE 0.6   GLUCOSE 109*   CALCIUM 9.0       Recent Labs     02/28/22 0436   ALKPHOS 78   PROT 6.2*   LABALBU 3.7   BILITOT 0.2   AST 21   ALT 12       Recent Labs     02/28/22 0436   WBC 7.9   RBC 3.72   HGB 11.5   HCT 35.4   MCV 95.2   MCH 30.9   MCHC 32.5   RDW 12.3      MPV 9.3           Radiology:   CT LUMBAR SPINE WO CONTRAST   Final Result   1. No acute abnormality involving lumbar spine. 2. Levoscoliosis with significant degenerative endplate changes along   concavity of the curvature. 3. Lumbar spondylosis as described above with multiple levels of central   canal stenosis. MRI may be helpful for further evaluation of lumbar   spondylosis. XR PELVIS (1-2 VIEWS)   Final Result   No fracture or dislocation. No significant interval change, when compared to   the previous study performed 02/06/2022. Colonic diverticulosis. Assessment:  Principal Problem:    Gait abnormality  Active Problems:    Ambulatory dysfunction    Moderate protein-calorie malnutrition ([address]  Resolved Problems:    * No resolved hospital problems. *      Plan: History of present illness from History and Physical:  The patient is a 80 y.o. female with significant past medical history of DM type 2 and Hyperlipidemia and severe back pain who presents with inability to ambulate. Patient was discharged about 2 weeks ago after she presented with similar complaint. During last hospitalization, patient has spinal injection to help control pain. She was discharged to SNF. She noted that she was doing well, but today, she went to the bathroom and noted that her leg became weak and unable to support her whole body. She denied hitting her head on the ground and denied passing out. She denied any pain. In the ER, she was unable to ambulate even with the aid of walker and she was admitted for further evaluation and care. Review of ED dnote says she denied falling. 1. Back pain with history of L4-L5 herniated disc with some central canal stenosis and recent epidural during last admission on 12/14 now with decreased ability to ambulate. Neuurosurgery f/u consult pending   2/28 physical therapy recommends subacute rehab. Discussed with case management team who will seek placement    2. DM type 2: continue glimepiride. crt and glucose stable. Diabetic diet  Accucheck ACHS     3. Hyperlipidemia:Continue Zetia and Statin     4. Hypertension Essential: on home meds  Lisinopril and metoprolol adding parameters since had low reading overnight a/o am of 2/28     5. Hx of CVA: continue Plavix and secondary medication prophylaxis  6. Full code  7. dvt prophy  8. dispo subacute rehab. NOTE: This report was transcribed using voice recognition software. Every effort was made to ensure accuracy; however, inadvertent computerized transcription errors may be present.      Electronically signed by Zhao Steiner MD on 2/28/2022 at 12:19 PM

## 2022-02-28 NOTE — PROGRESS NOTES
Comprehensive Nutrition Assessment    Type and Reason for Visit:  Initial,Positive Nutrition Screen    Nutrition Recommendations/Plan: Continue Current Diet, Start Glucerna BID (chocolate per pt preference)    Nutrition Assessment:  Pt w/ ambulatory dysfunction. Hx CVA/DM. Pt w/ moderate malnutrition, will add ONS BID & monitor. Malnutrition Assessment:  Malnutrition Status: Moderate malnutrition    Context:  Chronic Illness     Findings of the 6 clinical characteristics of malnutrition:  Energy Intake:  7 - 75% or less estimated energy requirements for 1 month or longer  Weight Loss:  Unable to assess (d/t lack of actual wt hx per EMR)     Body Fat Loss:  1 - Mild body fat loss Orbital,Triceps   Muscle Mass Loss:  1 - Mild muscle mass loss Temples (temporalis),Clavicles (pectoralis & deltoids)  Fluid Accumulation:  No significant fluid accumulation     Strength:  Not Performed    Estimated Daily Nutrient Needs:  Energy (kcal):   (30-35 kcal/kg); Weight Used for Energy Requirements:  Current     Protein (g):  50-60 (1.5-1.8); Weight Used for Protein Requirements:  Current        Fluid (ml/day):  ; Method Used for Fluid Requirements:  1 ml/kcal      Nutrition Related Findings:  A&O X4, I&Os WDL, no edema, abd soft/flat, +BS      Wounds:  None       Current Nutrition Therapies:    ADULT DIET;  Regular    Anthropometric Measures:  · Height: 4' 9\" (144.8 cm)  · Current Body Weight: 78 lb (35.4 kg) (2/28 bed)    · Usual Body Weight:  (no actual wt hx per EMR, noted 75-77#)     · Ideal Body Weight: 85 lbs; % Ideal Body Weight 91.8 %   · BMI: 16.9  · BMI Categories: Underweight (BMI less than 22) age over 72       Nutrition Diagnosis:   · Moderate malnutrition,In context of chronic illness related to inadequate protein-energy intake as evidenced by mild loss of subcutaneous fat,mild muscle loss,poor intake prior to admission    Nutrition Interventions:   Nutrition Education/Counseling:  Education

## 2022-03-01 VITALS
BODY MASS INDEX: 16.83 KG/M2 | SYSTOLIC BLOOD PRESSURE: 124 MMHG | DIASTOLIC BLOOD PRESSURE: 69 MMHG | TEMPERATURE: 98.2 F | OXYGEN SATURATION: 96 % | HEIGHT: 57 IN | HEART RATE: 72 BPM | WEIGHT: 78 LBS | RESPIRATION RATE: 16 BRPM

## 2022-03-01 LAB
METER GLUCOSE: 140 MG/DL (ref 74–99)
METER GLUCOSE: 146 MG/DL (ref 74–99)
SARS-COV-2, NAAT: NOT DETECTED

## 2022-03-01 PROCEDURE — 87635 SARS-COV-2 COVID-19 AMP PRB: CPT

## 2022-03-01 PROCEDURE — 6360000002 HC RX W HCPCS: Performed by: INTERNAL MEDICINE

## 2022-03-01 PROCEDURE — G0378 HOSPITAL OBSERVATION PER HR: HCPCS

## 2022-03-01 PROCEDURE — 96372 THER/PROPH/DIAG INJ SC/IM: CPT

## 2022-03-01 PROCEDURE — 6370000000 HC RX 637 (ALT 250 FOR IP): Performed by: INTERNAL MEDICINE

## 2022-03-01 PROCEDURE — 2580000003 HC RX 258: Performed by: INTERNAL MEDICINE

## 2022-03-01 PROCEDURE — 97110 THERAPEUTIC EXERCISES: CPT

## 2022-03-01 PROCEDURE — 97530 THERAPEUTIC ACTIVITIES: CPT

## 2022-03-01 PROCEDURE — 82962 GLUCOSE BLOOD TEST: CPT

## 2022-03-01 PROCEDURE — 99217 PR OBSERVATION CARE DISCHARGE MANAGEMENT: CPT | Performed by: INTERNAL MEDICINE

## 2022-03-01 RX ADMIN — CARVEDILOL 25 MG: 25 TABLET, FILM COATED ORAL at 08:39

## 2022-03-01 RX ADMIN — GLIPIZIDE 5 MG: 5 TABLET ORAL at 05:30

## 2022-03-01 RX ADMIN — SODIUM CHLORIDE, PRESERVATIVE FREE 10 ML: 5 INJECTION INTRAVENOUS at 08:40

## 2022-03-01 RX ADMIN — PANTOPRAZOLE SODIUM 40 MG: 40 TABLET, DELAYED RELEASE ORAL at 05:30

## 2022-03-01 RX ADMIN — ONDANSETRON 4 MG: 4 TABLET, ORALLY DISINTEGRATING ORAL at 12:13

## 2022-03-01 RX ADMIN — ENOXAPARIN SODIUM 30 MG: 100 INJECTION SUBCUTANEOUS at 08:39

## 2022-03-01 RX ADMIN — SENNOSIDES AND DOCUSATE SODIUM 1 TABLET: 50; 8.6 TABLET ORAL at 08:39

## 2022-03-01 RX ADMIN — METOCLOPRAMIDE 5 MG: 5 TABLET ORAL at 08:39

## 2022-03-01 ASSESSMENT — PAIN SCALES - GENERAL: PAINLEVEL_OUTOF10: 0

## 2022-03-01 NOTE — DISCHARGE SUMMARY
[facility] Physician Discharge Summary       [facility] ([place], [place]) of the [address] [facility] [id number] [phone]          Activity level: Slowly increase as tolerated    Diet: ADULT DIET; Regular  ADULT ORAL NUTRITION SUPPLEMENT; Breakfast, Dinner; Diabetic Oral Supplement    Labs: None are pending at the discharge    Condition at discharge: Stable    Dispo:[facility] for rehab via family car    Patient ID:  Darryl Hendrickson  [id number]  67 y.o.  1940    Admit date: 2/27/2022    Discharge date and time:  3/1/2022  8:51 AM    Admission Diagnoses: Principal Problem:    Gait abnormality  Active Problems:    Ambulatory dysfunction    Moderate protein-calorie malnutrition ([address]  Resolved Problems:    * No resolved hospital problems. *      Discharge Diagnoses: Principal Problem:    Gait abnormality  Active Problems:    Ambulatory dysfunction    Moderate protein-calorie malnutrition ([address]  Resolved Problems:    * No resolved hospital problems. *      Consults:  IP CONSULT TO PRIMARY CARE PROVIDER  IP CONSULT TO NEUROSURGERY  IP CONSULT TO SOCIAL WORK    Procedures: None significant except if described in hospital course. Hospital Course:   History of present illness from History and Physical:  The patient is a 81 y.o. female with significant past medical history of DM type 2 and Hyperlipidemia and severe back pain who presents with inability to ambulate. Patient was discharged about 2 weeks ago after she presented with similar complaint. During last hospitalization, patient has spinal injection to help control pain. She was discharged to SNF. She noted that she was doing well, but today, she went to the bathroom and noted that her leg became weak and unable to support her whole body. She denied hitting her head on the ground and denied passing out. She denied any pain.  In the ER, she was unable to ambulate even with the aid of walker and she was seem to be appropriate     I/O last 3 completed shifts: In: 1440 [P.O.:1440]  Out: 600 [Urine:600]  I/O this shift:  In: 200 [P.O.:200]  Out: -       LABS:  Recent Labs     02/28/22  0436      K 3.8      CO2 23   BUN 16   CREATININE 0.6   GLUCOSE 109*   CALCIUM 9.0       Recent Labs     02/28/22  0436   WBC 7.9   RBC 3.72   HGB 11.5   HCT 35.4   MCV 95.2   MCH 30.9   MCHC 32.5   RDW 12.3      MPV 9.3       No results for input(s): POCGLU in the last 72 hours. Imaging:  XR PELVIS (1-2 VIEWS)    Result Date: 2/27/2022  EXAMINATION: ONE XRAY VIEW OF THE PELVIS 2/27/2022 12:02 pm COMPARISON: The previous study performed 02/06/2022. HISTORY: ORDERING SYSTEM PROVIDED HISTORY: pain TECHNOLOGIST PROVIDED HISTORY: Reason for exam:->pain FINDINGS: There is no evidence of fracture or dislocation. Again seen is a lumbar scoliosis, with convexity to the left. Osteo-degenerative changes are again noted involving the visualized lumbar spine. No other significant osseous abnormality is seen. Multiple colonic diverticula are noted and outlined by old barium. Vascular calcifications are again seen in the pelvis. No fracture or dislocation. No significant interval change, when compared to the previous study performed 02/06/2022. Colonic diverticulosis. CT LUMBAR SPINE WO CONTRAST    Result Date: 2/27/2022  EXAMINATION: CT OF THE LUMBAR SPINE WITHOUT CONTRAST  2/27/2022 TECHNIQUE: CT of the lumbar spine was performed without the administration of intravenous contrast. Multiplanar reformatted images are provided for review. Adjustment of mA and/or kV according to patient size was utilized. Dose modulation, iterative reconstruction, and/or weight based adjustment of the mA/kV was utilized to reduce the radiation dose to as low as reasonably achievable.  COMPARISON: None HISTORY: ORDERING SYSTEM PROVIDED HISTORY: pain TECHNOLOGIST PROVIDED HISTORY: Reason for exam:->pain Decision Support Exception - unselect if not a suspected or confirmed emergency medical condition->Emergency Medical Condition (MA) FINDINGS: Levoscoliosis of the lumbar spine with significant degenerative endplate changes along concavity of the curvature. Severe disc space narrowing at L1/L2 and L2/L3. Severe facet arthropathy on the left at L5/S1. No lytic or blastic bone lesion. Chronic appearing disc herniations at all lumbar levels resulting in significant central canal stenosis most notable at L2/L3, L3/L4, and L4/L5. Severe left neural foraminal narrowing at L5/S1. 1. No acute abnormality involving lumbar spine. 2. Levoscoliosis with significant degenerative endplate changes along concavity of the curvature. 3. Lumbar spondylosis as described above with multiple levels of central canal stenosis. MRI may be helpful for further evaluation of lumbar spondylosis.          Patient Instructions:   Current Discharge Medication List      CONTINUE these medications which have NOT CHANGED    Details   acetaminophen (TYLENOL) 325 MG tablet Take 650 mg by mouth every 6 hours as needed for Pain      metoclopramide (REGLAN) 5 MG tablet Take 5 mg by mouth in the morning, at noon, and at bedtime      bisacodyl (DULCOLAX) 10 MG suppository Place 10 mg rectally daily      magnesium hydroxide (MILK OF MAGNESIA) 400 MG/5ML suspension Take 30 mLs by mouth daily as needed for Constipation      glucose (GLUTOSE) 40 % GEL Take 37.5 mLs by mouth as needed (hypoglycemia)  Qty: 45 g, Refills: 1      sennosides-docusate sodium (SENOKOT-S) 8.6-50 MG tablet Take 1 tablet by mouth 2 times daily      clopidogrel (PLAVIX) 75 MG tablet Take 75 mg by mouth daily       glimepiride (AMARYL) 2 MG tablet Take 1 tablet by mouth daily (with breakfast)  Qty: 30 tablet, Refills: 3      lisinopril (PRINIVIL;ZESTRIL) 30 MG tablet Take 1 tablet by mouth daily  Qty: 30 tablet, Refills: 3      pantoprazole (PROTONIX) 40 MG tablet Take 1 tablet by mouth 2 times daily (before meals)  Qty: 60 tablet, Refills: 1      carvedilol (COREG) 25 MG tablet Take 1 tablet by mouth 2 times daily (with meals)  Qty: 60 tablet, Refills: 3      butalbital-acetaminophen-caffeine (FIORICET, ESGIC) -40 MG per tablet Take 1 tablet by mouth every 4 hours as needed for Migraine      ezetimibe-simvastatin (VYTORIN) 10-40 MG per tablet Take 1 tablet by mouth nightly Indications: patient no longer takes                Note that more than 30 minutes was spent in preparing discharge papers, discussing discharge with patient, medication review, etc.    NOTE: This report was transcribed using voice recognition software. Every effort was made to ensure accuracy; however, inadvertent computerized transcription errors may be present.      Signed:  Electronically signed by Violet Curling, MD on 3/1/2022 at 8:51 AM

## 2022-03-01 NOTE — CARE COORDINATION
3/01/2022: SS Note/Discharge plan: Observation:  Notified by Tobi Daniels admissions liaison for [facility] that they received insurance PRE CERT and transfer confirmed for today, request a RAPID COVID TEST prior to transfer, nursing notified, Deidra Sands and PASRR completed, pt and pt's sister-in-law informed, family prefer to transport her to facility by car today for around 12 noon.  Electronically signed by ELSA Worthy on 3/1/2022 at 10:48 AM

## 2022-03-01 NOTE — PLAN OF CARE
Problem: Falls - Risk of:  Goal: Will remain free from falls  Description: Will remain free from falls  2/28/2022 1948 by Nayeli Carroll RN  Outcome: Met This Shift  2/28/2022 1439 by Joelle Cormier RN  Outcome: Met This Shift  Goal: Absence of physical injury  Description: Absence of physical injury  2/28/2022 1948 by Nayeli Carroll RN  Outcome: Met This Shift  2/28/2022 1439 by Joelle Cormier RN  Outcome: Met This Shift     Problem: Pain:  Goal: Pain level will decrease  Description: Pain level will decrease  2/28/2022 1948 by Nayeli Carroll RN  Outcome: Met This Shift  2/28/2022 1439 by Joelle Cormier RN  Outcome: Met This Shift  Goal: Control of acute pain  Description: Control of acute pain  2/28/2022 1948 by Nayeli Carroll RN  Outcome: Met This Shift  2/28/2022 1439 by Joelle Cormier RN  Outcome: Met This Shift  Goal: Control of chronic pain  Description: Control of chronic pain  Outcome: Met This Shift     Problem:  Activity:  Goal: Ability to tolerate increased activity will improve  Description: Ability to tolerate increased activity will improve  Outcome: Met This Shift  Goal: Ability to avoid complications of mobility impairment will improve  Description: Ability to avoid complications of mobility impairment will improve  2/28/2022 1948 by Nayeli Carroll RN  Outcome: Met This Shift  2/28/2022 1439 by Joelle Cormier RN  Outcome: Met This Shift  Goal: Range of joint motion will improve  Description: Range of joint motion will improve  2/28/2022 1948 by Nayeli Carroll RN  Outcome: Met This Shift  2/28/2022 1439 by Joelle Cormier RN  Outcome: Met This Shift  Goal: Ability to ambulate will improve  Description: Ability to ambulate will improve  2/28/2022 1948 by Nayeli Carroll RN  Outcome: Met This Shift  2/28/2022 1439 by Joelle Cormier RN  Outcome: Met This Shift  Goal: Muscle strength will improve  Description: Muscle strength will improve  2/28/2022 1948 by Nayeli Carroll RN  Outcome: Met This Shift  2/28/2022 1439 by Joelle Cormier RN  Outcome: Met This Shift     Problem: Malnutrition  (NI-5.2)  Goal: Food and/or Nutrient Delivery  Description: Individualized approach for food/nutrient provision.   2/28/2022 1153 by Divina Nesbitt RD, LD  Outcome: Met This Shift     Problem: Skin Integrity:  Goal: Will show no infection signs and symptoms  Description: Will show no infection signs and symptoms  2/28/2022 1948 by Cj Stevenson RN  Outcome: Met This Shift  2/28/2022 1439 by Cindy Nj RN  Outcome: Met This Shift  Goal: Absence of new skin breakdown  Description: Absence of new skin breakdown  2/28/2022 1948 by Cj Stevenson RN  Outcome: Met This Shift  2/28/2022 1439 by Cindy Nj RN  Outcome: Met This Shift     Problem: Safety:  Goal: Ability to remain free from injury will improve  Description: Ability to remain free from injury will improve  2/28/2022 1948 by Cj Stevenson RN  Outcome: Met This Shift  2/28/2022 1439 by Cindy Nj RN  Outcome: Met This Shift

## 2022-03-01 NOTE — CARE COORDINATION
3/01/2022: SS Note/Discharge plan: Observation:  Notified by Elian Andujarins admissions liaison for [facility] that they accepted pt medically and PRE CERT SUBMITTED 6/30/02, ANDRES and PASRR initiated, will need a RAPID COVID TEST prior to transfer, pt and nursing informed, sw will follow to confirm transfer arrangements and for d/c order to complete PASRR.  Electronically signed by ELSA Obrien on 3/1/2022 at 8:38 AM

## 2022-03-01 NOTE — PROGRESS NOTES
Physical Therapy Treatment Note/Plan of Care    Room #:  0315/0315-01  Patient Name: Thang Ruelas  YOB: 1940  MRN: [id number]    Date of Service: 3/1/2022     Tentative placement recommendation: Subacute rehab  Equipment recommendation: To be determined      Evaluating Physical Therapist: Benedicto Proctor #[id number]      Specific Provider Orders/Date/Referring Provider :   02/27/22 1715   PT evaluation and treat Start: 02/27/22 1715, End: 02/27/22 1715, ONE TIME, Standing Count: 1 Occurrences, Duy Linares MD      Admitting Diagnosis:   Gait abnormality [R26.9]  Sciatica of left side [M54.32]  Ambulatory dysfunction [R26.2]      Surgery: none      Patient Active Problem List   Diagnosis    Coronary artery disease involving native coronary artery of native heart without angina pectoris    Scoliosis    Hypertension    Hyperlipidemia    Type 2 diabetes mellitus without complication, without long-term current use of insulin (HCC)    Atrial septal aneurysm    GI bleed    Intractable pain    Sciatica of left side    Gastroesophageal reflux disease without esophagitis    Intractable back pain    Herniated nucleus pulposus, L4-5    Gait abnormality    Ambulatory dysfunction    Moderate protein-calorie malnutrition ([address]        ASSESSMENT of Current Deficits Patient exhibits decreased strength, balance and endurance impairing functional mobility, transfers and gait . Patient presets with weakness and soreness in her quads. Pt needing minimal assist for sit to stand transfers and distance limited by her increased soreness in both her quads. The patient will benefit from continued skilled therapy to increase strength and improve balance for safe functional mobility, to decrease risk of falls, and to meet goals at discharge.          PHYSICAL THERAPY  PLAN OF CARE       Physical therapy plan of care is established based on physician order,  patient diagnosis and clinical and deployment of 2 stents in the ostium and the proximal segment of RCA    CORONARY ANGIOPLASTY  5/19/2010    PTCA DAMON to ostial RCA patent LAD and RCA BMS  restenosis of RCA    CORONARY ANGIOPLASTY WITH STENT PLACEMENT      times 3    DIAGNOSTIC CARDIAC CATH LAB PROCEDURE  2/25/2010    Dr. Alice Ray: Cardiac cath with angioplasty follow up    [address] CATH LAB PROCEDURE  3/11/2010    Cardiac cath and stent placement    DIAGNOSTIC CARDIAC CATH LAB PROCEDURE  5/19/2010    Cardiac cath heart lab and subsequent angiopilsaty    FRACTURE SURGERY      tight wrist    SHOULDER ARTHROSCOPY  08 25 2011    left shoulder arthroscopy ,acromioplasty,busectomy, release of adhesions    UPPER GASTROINTESTINAL ENDOSCOPY N/A 2/19/2021    EGD BIOPSY performed by Phil Lanier DO at [address]:    Precautions: Up as tolerated, falls and spinal precautions ,   Social history: Patient lives alone in a apartment 1 st floor  with 3 steps  to enter bilateral Rail  Tub shower grab bars. Equipment owned: Wheeled Walker, [address] [facility] [address] chair and Quad cane,      [address] [facility] Mobility Inpatient   How much difficulty turning over in bed?: A Little  How much difficulty sitting down on / standing up from a chair with arms?: A Little  How much difficulty moving from lying on back to sitting on side of bed?: A Little  How much help from another person moving to and from a bed to a chair?: A Little  How much help from another person needed to walk in hospital room?: A Little  How much help from another person for climbing 3-5 steps with a railing?: A Lot  AM-PAC Inpatient Mobility Raw Score : 17  AM-PAC Inpatient T-Scale Score : 42.13  Mobility Inpatient CMS 0-100% Score: 50.57  Mobility Inpatient CMS G-Code Modifier : CK    Nursing cleared patient for PT treatment.  .   OBJECTIVE;   Initial Evaluation  Date: 2/28/2022 Treatment Date:  3/1/2022       Short Term/ Long Term   Goals   Was pt agreeable to Eval/treatment? Yes yes To be met in 4 days   Pain level   8/10  Achy all over body, fatigued  No number given  Back pain    Bed Mobility    Rolling: Minimal assist of 1    Supine to sit: Moderate assist of 1    Sit to supine: Moderate assist of 1    Scooting: Minimal assist of 1   Rolling: Supervision    Supine to sit: Supervision    Sit to supine: Not assessed    Scooting: Supervision     Rolling: Independent    Supine to sit: Independent    Sit to supine: Independent    Scooting: Independent     Transfers Sit to stand:  Moderate assist of 1  Sit to stand: Minimal assist of 1 Cues for hand placement and safety       Sit to stand: Independent    Ambulation    6 side steps using  wheeled walker with Moderate assist of 1   for walker control, balance, weight shift and safety 2 x 50 feet using  wheeled walker with Minimal assist of 1   cues for upright posture, walker approximation, increased base of support, increased step length, safety and pacing    50 feet using  wheeled walker with Modified Independent    Stair negotiation: ascended and descended   Not assessed     3 steps with HR and min A   ROM Within functional limits    Increase range of motion 10% of affected joints    Strength BUE:  refer to OT eval  RLE:  3+/5  LLE:  3+/5  Increase strength in affected mm groups by 1/3 grade   Balance Sitting EOB:  good -  Dynamic Standing:  poor + wheeled walker   Sitting EOB: good   Dynamic Standing: fair wheeled walker   Sitting EOB:  good   Dynamic Standing: good      Patient is Alert & Oriented x person, place, time and situation and follows directions    Sensation:  Patient  denies numbness/tingling   Edema:  no   Endurance: fair  -, fatigue easily     Vitals: room air   Blood Pressure at rest  Blood Pressure during session    Heart Rate at rest  Heart Rate during session    SPO2 at rest %  SPO2 during session %     Patient education  Patient educated on role of Physical Therapy, risks of immobility, safety and plan of care,  importance of mobility while in hospital , ankle pumps, quad set and glut set for edema control, blood clot prevention, importance and purpose of adaptive device and adjusted to proper height for the patient. and safety      Patient response to education:   Pt verbalized understanding Pt demonstrated skill Pt requires further education in this area   Yes Partial Yes      Treatment:  Patient practiced and was instructed/facilitated in the following treatment: Patient performed supine exercises. Pt assisted to edge of bed,  Sat edge of bed 10 minutes with Supervision  to increase dynamic sitting balance and activity tolerance. Pt stood, ambulated in the hallway, and back to the chair. Therapeutic Exercises:  ankle pumps, quad sets, glut sets, heel slide, hip abduction/adduction and straight leg raise  x 10 reps. AROM     At end of session, patient in chair with . call light and phone within reach,  all lines and tubes intact, nursing notified. Patient would benefit from continued skilled Physical Therapy to improve functional independence and quality of life. Patient's/ family goals   home but open to rehab if needed     Time in  09:04  Time out  09:30    Total Treatment Time  26 minutes        CPT codes:    Therapeutic activities (91680)   16 minutes  1 unit(s)  Therapeutic exercises (45851)   10 minutes  1 unit(s)   Mary Galvan  [facility]  LIC # [id number]

## 2022-03-09 ENCOUNTER — TELEPHONE (OUTPATIENT)
Dept: SURGERY | Age: 82
End: 2022-03-09

## 2022-03-09 NOTE — TELEPHONE ENCOUNTER
Patient is currently in a rehabilitation facility. Family has cancelled all testing for now and will call back to reschedule at a later date.  Electronically signed by Chencho Browne on 3/9/22 at 11:56 AM EST

## 2022-03-18 ENCOUNTER — OFFICE VISIT (OUTPATIENT)
Dept: NEUROSURGERY | Age: 82
End: 2022-03-18
Payer: COMMERCIAL

## 2022-03-18 VITALS
HEART RATE: 83 BPM | RESPIRATION RATE: 16 BRPM | SYSTOLIC BLOOD PRESSURE: 149 MMHG | OXYGEN SATURATION: 96 % | DIASTOLIC BLOOD PRESSURE: 71 MMHG | TEMPERATURE: 98.1 F | WEIGHT: 78 LBS | HEIGHT: 57 IN | BODY MASS INDEX: 16.83 KG/M2

## 2022-03-18 DIAGNOSIS — M54.32 SCIATICA OF LEFT SIDE: Primary | ICD-10-CM

## 2022-03-18 PROCEDURE — 99203 OFFICE O/P NEW LOW 30 MIN: CPT | Performed by: NEUROLOGICAL SURGERY

## 2022-03-18 NOTE — PROGRESS NOTES
[address] [facility] NEUROSURGERY  [address]  [address] [facility] [address] [phone]       Chief Complaint:   Chief Complaint   Patient presents with    Back Pain     lumbar pain is constant and is getting worse, the pain goes down the left leg, pt had an epidural @ [id number] and PT @ [employer]  and [address]       HPI:     I had the pleasure of seeing Stefan Dawson today in neurosurgical clinic. As you know this delightful 80-year-old single childless woman was previously evaluated by Dr. Enamorado Staff. Patient was complaining of low back pain and now with left lower extremity pain. Apparently she was getting out of the tub and lost her balance. She had a CT scan of the lumbar spine and MRI of the lumbar spine that was performed that demonstrated degenerative changes with some spinal stenosis. Against his background she denies any bowel or bladder changes.     Past Medical History:   Diagnosis Date    Acute CVA (cerebrovascular accident) ([address] 5/25/2019    Arthritis     Back problem     CAD (coronary artery disease) 2010    PTCA with DAMON to prox LAD, PTCA DAMON to ostial lesion of RCA and BMS to prox lesion RCA DAMON to RCA ostial     Cerebral artery occlusion with cerebral infarction ([address] 05/2019    Hyperlipidemia     Hypertension     Nausea & vomiting     Reflux     RLD (ruptured lumbar disc)     Scoliosis     Spinal stenosis     Type 2 diabetes mellitus without complication, without long-term current use of insulin ([address] 10/14/2019     Past Surgical History:   Procedure Laterality Date    CATARACT REMOVAL      bilateral    CORONARY ANGIOPLASTY  2/25/2010    PTCA with DAMON in the proximal left anterior descending    CORONARY ANGIOPLASTY  3/11/2010    PTCA and deployment of 2 stents in the ostium and the proximal segment of RCA    CORONARY ANGIOPLASTY  5/19/2010    PTCA DAMON to ostial RCA patent LAD and RCA BMS  restenosis of RCA    CORONARY ANGIOPLASTY WITH STENT PLACEMENT      times 3    DIAGNOSTIC CARDIAC CATH LAB PROCEDURE  2/25/2010    Dr. Flaco Caro: Cardiac cath with angioplasty follow up    [address] CATH LAB PROCEDURE  3/11/2010    Cardiac cath and stent placement    DIAGNOSTIC CARDIAC CATH LAB PROCEDURE  5/19/2010    Cardiac cath heart lab and subsequent angiopilsaty    FRACTURE SURGERY      tight wrist    SHOULDER ARTHROSCOPY  08 25 2011    left shoulder arthroscopy ,acromioplasty,busectomy, release of adhesions    UPPER GASTROINTESTINAL ENDOSCOPY N/A 2/19/2021    EGD BIOPSY performed by Valeria Arteaga DO at [facility] ENDOSCOPY      Family History   Problem Relation Age of Onset    Heart Attack Mother 68    Heart Attack Father 72    Cancer Sister 79        pancreatic      Social History     Socioeconomic History    Marital status: Single     Spouse name: Not on file    Number of children: Not on file    Years of education: Not on file    Highest education level: Not on file   Occupational History    Not on file   Tobacco Use    Smoking status: Never Smoker    Smokeless tobacco: Never Used   Vaping Use    Vaping Use: Never used   Substance and Sexual Activity    Alcohol use: Never     Alcohol/week: 0.0 standard drinks     Comment: No caffeine    Drug use: Never    Sexual activity: Not on file   Other Topics Concern    Not on file   Social History Narrative    Denies caffeine. Social Determinants of Health     Financial Resource Strain:     Difficulty of Paying Living Expenses: Not on file   Food Insecurity:     Worried About Running Out of Food in the Last Year: Not on file    Micah of Food in the Last Year: Not on file   Transportation Needs:     Lack of Transportation (Medical): Not on file    Lack of Transportation (Non-Medical):  Not on file   Physical Activity:     Days of Exercise per Week: Not on file    Minutes of Exercise per Session: Not on file   Stress:     Feeling of Stress : Not on file   Social Connections:     Frequency of Communication with Friends and Family: Not on file    Frequency of Social Gatherings with Friends and Family: Not on file    Attends Yazdanism Services: Not on file    Active Member of Clubs or Organizations: Not on file    Attends Club or Organization Meetings: Not on file    Marital Status: Not on file   Intimate Partner Violence:     Fear of Current or Ex-Partner: Not on file    Emotionally Abused: Not on file    Physically Abused: Not on file    Sexually Abused: Not on file   Housing Stability:     Unable to Pay for Housing in the Last Year: Not on file    Number of [place] in the Last Year: Not on file    Unstable Housing in the Last Year: Not on file       Medications:   Current Outpatient Medications   Medication Sig Dispense Refill    acetaminophen (TYLENOL) 325 MG tablet Take 650 mg by mouth every 6 hours as needed for Pain      metoclopramide (REGLAN) 5 MG tablet Take 5 mg by mouth in the morning, at noon, and at bedtime      bisacodyl (DULCOLAX) 10 MG suppository Place 10 mg rectally daily      magnesium hydroxide (MILK OF MAGNESIA) 400 MG/5ML suspension Take 30 mLs by mouth daily as needed for Constipation      glucose (GLUTOSE) 40 % GEL Take 37.5 mLs by mouth as needed (hypoglycemia) 45 g 1    sennosides-docusate sodium (SENOKOT-S) 8.6-50 MG tablet Take 1 tablet by mouth 2 times daily      glimepiride (AMARYL) 2 MG tablet Take 1 tablet by mouth daily (with breakfast) 30 tablet 3    lisinopril (PRINIVIL;ZESTRIL) 30 MG tablet Take 1 tablet by mouth daily 30 tablet 3    pantoprazole (PROTONIX) 40 MG tablet Take 1 tablet by mouth 2 times daily (before meals) 60 tablet 1    carvedilol (COREG) 25 MG tablet Take 1 tablet by mouth 2 times daily (with meals) 60 tablet 3    ezetimibe-simvastatin (VYTORIN) 10-40 MG per tablet Take 1 tablet by mouth nightly Indications: patient no longer takes      [facility] butalbital-acetaminophen-caffeine (FIORICET, ESGIC) -40 MG per tablet Take 1 tablet by mouth every 4 hours as needed for Migraine (Patient not taking: Reported on 2/23/2022)       No current facility-administered medications for this visit. Allergies:    Aspirin and Lipitor       Review of Systems:    Denies any chest pain,  headache, dyspnea, recent weight loss, fevers, chills or night sweats. Physical Examination:    BP (!) 149/71   Pulse 83   Temp 98.1 °F (36.7 °C)   Resp 16   Ht 4' 9\" (1.448 m)   Wt 78 lb (35.4 kg)   SpO2 96%   BMI 16.88 kg/m²      On focused neurological examination, she  is awake alert oriented and rationally conversant. Speech is clear and fluent. Pupils are equal and reactive to light bilaterally, extraocular movements are intact, visual fields are full to confrontation. Her  face is symmetric and grossly intact to fine touch. Uvula and tongue are both midline. Shoulder shrug is symmetric and strong. Motor examination reveals preserved power in the upper and lower extremities at 5 out of 5 throughout. Reflexes are symmetric and brisk. Plantar responses are downgoing. There is no clonus. Patient is intact to fine touch in all dermatomes throughout. Has a significant kyphotic stoop. She is quite cachectic. ASSESSMENT:    I personally reviewed Phoebe Terrazas radiographic images, particularly her CT scan of the lumbar spine dated 27 February 2022 as well as the MRI of the bar spine earlier in the month which demonstrated spondylosis with spinal stenosis L2-L5. She does have significant left foraminal narrowing at L5-S1.      1. Sciatica of left side  -     Sophia Farrell MD, Pain Medicine, [address]:    Given her age and medical comorbidities she definitely would benefit from pain management consultation and possible epidural steroid injections as they see fit.   Should she fail to achieve benefit or develop worsened or new neurological deficit I be happy to reevaluate her again in clinic. Thank you so much for allowing us to participate in the care of this patient. No follow-ups on file. Electronically signed by Agnieszka Cao MD on 3/20/2022 at 2:14 PM       NOTE: This report was transcribed using voice recognition software.  Every effort was made to ensure accuracy; however, inadvertent computerized transcription errors may be present

## 2022-03-30 ENCOUNTER — OFFICE VISIT (OUTPATIENT)
Dept: PAIN MANAGEMENT | Age: 82
End: 2022-03-30
Payer: COMMERCIAL

## 2022-03-30 VITALS
RESPIRATION RATE: 16 BRPM | WEIGHT: 82 LBS | HEIGHT: 57 IN | DIASTOLIC BLOOD PRESSURE: 72 MMHG | HEART RATE: 73 BPM | TEMPERATURE: 96 F | BODY MASS INDEX: 17.69 KG/M2 | OXYGEN SATURATION: 97 % | SYSTOLIC BLOOD PRESSURE: 102 MMHG

## 2022-03-30 DIAGNOSIS — M51.9 LUMBAR DISC DISORDER: Primary | ICD-10-CM

## 2022-03-30 DIAGNOSIS — M47.816 LUMBAR SPONDYLOSIS: ICD-10-CM

## 2022-03-30 DIAGNOSIS — G89.4 CHRONIC PAIN SYNDROME: ICD-10-CM

## 2022-03-30 DIAGNOSIS — M48.061 SPINAL STENOSIS OF LUMBAR REGION WITHOUT NEUROGENIC CLAUDICATION: ICD-10-CM

## 2022-03-30 DIAGNOSIS — M47.816 LUMBAR FACET ARTHROPATHY: ICD-10-CM

## 2022-03-30 PROCEDURE — 99204 OFFICE O/P NEW MOD 45 MIN: CPT | Performed by: PAIN MEDICINE

## 2022-03-30 NOTE — PROGRESS NOTES
[facility]        [address] [facility], [address] [facility]      [phone]          Consult Note      Patient:  NGHIA Shin 1940    Date of Service:  3/30/22    Requesting Physician:  Neela Aceves MD    Reason for Consult:      Patient presents with complaints of low back pain that started a long time ago and has been progressively getting worse over the past two month. Pain is described as throbbing. Pain is aggravated by sitting for too long. Pain is relieved by pain medications. HISTORY OF PRESENT ILLNESS:      Pain does radiate to Left lower extremity down to her ankle. She  has numbness of the Left thigh and does not have bladder or bowel dysfunction. She has not been on anticoagulation medications to include none. The patient  has not been on herbal supplements. The patient is diabetic. Imaging:   Lumbar spine CT :  1. No acute abnormality involving lumbar spine. 2. Levoscoliosis with significant degenerative endplate changes along   concavity of the curvature. 3. Lumbar spondylosis as described above with multiple levels of central   canal stenosis.  MRI may be helpful for further evaluation of lumbar   spondylosis. Previous treatments: Physical Therapy, Epidural Steroid Injection and medications. .      Opioid Agreement:  Renewal date:N/A    Past Medical History:   Diagnosis Date    Acute CVA (cerebrovascular accident) ([address] 2019    Arthritis     Back problem     CAD (coronary artery disease)     PTCA with DAMON to prox LAD, PTCA DAMON to ostial lesion of RCA and BMS to prox lesion RCA DAMON to RCA ostial     Cerebral artery occlusion with cerebral infarction ([address] 2019    Hyperlipidemia     Hypertension     Nausea & vomiting     Reflux     RLD (ruptured lumbar disc)     Scoliosis     Spinal stenosis     Type 2 diabetes mellitus without complication, without long-term current use of insulin ([address] 10/14/2019     Past Surgical History:   Procedure Laterality Date    CATARACT REMOVAL      bilateral    CORONARY ANGIOPLASTY  2/25/2010    PTCA with DAMON in the proximal left anterior descending    CORONARY ANGIOPLASTY  3/11/2010    PTCA and deployment of 2 stents in the ostium and the proximal segment of RCA    CORONARY ANGIOPLASTY  5/19/2010    PTCA DAMON to ostial RCA patent LAD and RCA BMS  restenosis of RCA    CORONARY ANGIOPLASTY WITH STENT PLACEMENT      times 3    DIAGNOSTIC CARDIAC CATH LAB PROCEDURE  2/25/2010    Dr. Gil Sanchez: Cardiac cath with angioplasty follow up    [address] CATH LAB PROCEDURE  3/11/2010    Cardiac cath and stent placement    DIAGNOSTIC CARDIAC CATH LAB PROCEDURE  5/19/2010    Cardiac cath heart lab and subsequent angiopilsaty    FRACTURE SURGERY      tight wrist    SHOULDER ARTHROSCOPY  08 25 2011    left shoulder arthroscopy ,acromioplasty,busectomy, release of adhesions    UPPER GASTROINTESTINAL ENDOSCOPY N/A 2/19/2021    EGD BIOPSY performed by Giancarlo Patel DO at [facility]     Prior to Admission medications    Medication Sig Start Date End Date Taking?  Authorizing Provider   acetaminophen (TYLENOL) 325 MG tablet Take 650 mg by mouth every 6 hours as needed for Pain   Yes Historical Provider, MD   metoclopramide (REGLAN) 5 MG tablet Take 5 mg by mouth in the morning, at noon, and at bedtime   Yes Historical Provider, MD   bisacodyl (DULCOLAX) 10 MG suppository Place 10 mg rectally daily   Yes Historical Provider, MD   magnesium hydroxide (MILK OF MAGNESIA) 400 MG/5ML suspension Take 30 mLs by mouth daily as needed for Constipation   Yes Historical Provider, MD   glucose (GLUTOSE) 40 % GEL Take 37.5 mLs by mouth as needed (hypoglycemia) 2/15/22  Yes Epi Wheeler DO   sennosides-docusate sodium (SENOKOT-S) 8.6-50 MG tablet Take 1 tablet by mouth 2 times daily 2/15/22  Yes Epi Wheeler DO   glimepiride (AMARYL) 2 MG tablet Take 1 tablet by mouth daily (with breakfast) 2/22/21  Yes Basilio Gee, DO   lisinopril (PRINIVIL;ZESTRIL) 30 MG tablet Take 1 tablet by mouth daily 2/22/21  Yes Basilio Gee, DO   pantoprazole (PROTONIX) 40 MG tablet Take 1 tablet by mouth 2 times daily (before meals) 2/21/21  Yes Basilio Gee, DO   carvedilol (COREG) 25 MG tablet Take 1 tablet by mouth 2 times daily (with meals) 6/7/19  Yes Jaison Jacome MD   butalbital-acetaminophen-caffeine (FIORICET, ESGIC) -40 MG per tablet Take 1 tablet by mouth every 4 hours as needed for Migraine    Yes Historical Provider, MD   ezetimibe-simvastatin (VYTORIN) 10-40 MG per tablet Take 1 tablet by mouth nightly Indications: patient no longer takes    Yes Historical Provider, MD     Allergies   Allergen Reactions    Aspirin      Caused bleeding ulcers    Lipitor      Caused elevated liver enzymes     Social History     Socioeconomic History    Marital status: Single     Spouse name: Not on file    Number of children: Not on file    Years of education: Not on file    Highest education level: Not on file   Occupational History    Not on file   Tobacco Use    Smoking status: Never Smoker    Smokeless tobacco: Never Used   Vaping Use    Vaping Use: Never used   Substance and Sexual Activity    Alcohol use: Never     Alcohol/week: 0.0 standard drinks     Comment: No caffeine    Drug use: Never    Sexual activity: Not on file   Other Topics Concern    Not on file   Social History Narrative    Denies caffeine. Social Determinants of Health     Financial Resource Strain:     Difficulty of Paying Living Expenses: Not on file   Food Insecurity:     Worried About Running Out of Food in the Last Year: Not on file    Micah of Food in the Last Year: Not on file   Transportation Needs:     Lack of Transportation (Medical): Not on file    Lack of Transportation (Non-Medical):  Not on file   Physical Activity:     Days of Exercise per Week: Not on file    Minutes of Exercise per Session: Not on file   Stress:     Feeling of Stress : Not on file   Social Connections:     Frequency of Communication with Friends and Family: Not on file    Frequency of Social Gatherings with Friends and Family: Not on file    Attends Synagogue Services: Not on file    Active Member of [address] [place] [address] or Organizations: Not on file    Attends Club or Organization Meetings: Not on file    Marital Status: Not on file   Intimate Partner Violence:     Fear of Current or Ex-Partner: Not on file    Emotionally Abused: Not on file    Physically Abused: Not on file    Sexually Abused: Not on file   Housing Stability:     Unable to Pay for Housing in the Last Year: Not on file    Number of [place] in the Last Year: Not on file    Unstable Housing in the Last Year: Not on file     Family History   Problem Relation Age of Onset    Heart Attack Mother 68    Heart Attack Father 72    Cancer Sister 79        pancreatic     REVIEW OF SYSTEMS:     Patient specifically denies fever/chills, chest pain, shortness of breath, new bowel or bladder complaints. All other review of systems was negative. PHYSICAL EXAMINATION:      /72   Pulse 73   Temp 96 °F (35.6 °C) (Infrared)   Resp 16   Ht 4' 9\" (1.448 m)   Wt 82 lb (37.2 kg)   SpO2 97%   BMI 17.74 kg/m²     General:      General appearance:   frail, elderly, pleasant and well-hydrated. , in moderate discomfort and A & O x3  Build:Normal Weight    HEENT:    Head:normocephalic and atraumatic  Sclera: icterus absent,     Lungs:    Breathing:Breathing Pattern: regular, no distress    Abdomen:    Shape:non-distended and normal  Tenderness:none    Lumbar spine:    Spine inspection:normal   CVA tenderness:No   Palpation:tenderness paravertebral muscles, facet loading, left, right, positive and tenderness. Range of motion:abnormal moderately Lateral bending, flexion, extension rotation bilateral and is  painful.     Musculoskeletal:    Trigger points in Paraveteral:absent bilaterally  SI joint tenderness:negative right, negative left              GUIDO test:not done right, not done             left  Piriformis tenderness:negative right, negative left  Trochanteric bursa tenderness:negative right, negative left  SLR:negative right, negative left, sitting     Extremities:    Tremors:None bilaterally upper and lower  Range of motion:pain with internal rotation of hips negative. Intact:Yes  Edema:Normal    Neurological:    Sensory:normal to light touch bilateral lower extremities. Motor:                             Right Quadriceps4/5          Left Quadriceps4/5           Right Gastrocnemius4/5    Left Gastrocnemius4/5  Right Ant Tibialis4/5  Left Ant Tibialis4/5  Reflexes:    Right Quadriceps reflex2+  Left Quadriceps reflex2+  Right Achilles reflex0  Left Achilles reflex2+  Gait:antalgic with a cane    Dermatology:    Skin:no unusual rashes and no skin lesions    Impression:  Low back pain with radiation to the Left thigh. Lumbar spine CT multilevel degenerative spinal stenosis worst at L2-3/L3-4 and L4-5  Plan:  Reviewed imaging studies and discussed with the patient. Reviewed neurosurgery notes. Discussed pathology of spinal stenosis and treatment option including lumbar epidural patient agrees. Not a candidate for opioids(high risk of falls)  Cancel urine screen. OARRS report reviewed 03/2022. Patient encouraged to stay active. Treatment plan discussed with the patient including medications and procedure side effects. We discussed with the patient that combining opioids, benzodiazepines, alcohol, illicit drugs or sleep aids increases the risk of respiratory depression including death. We discussed that these medications may cause drowsiness, sedation or dizziness and have counseled the patient not to drive or operate machinery. We have discussed that these medications will be prescribed only by one provider.  We have discussed with the patient about age related risk factors and have thoroughly discussed the importance of taking these medications as prescribed. The patient verbalizes understanding. ccrefálvaroing virginia Rm M.D.

## 2022-04-25 ENCOUNTER — HOSPITAL ENCOUNTER (OUTPATIENT)
Dept: OPERATING ROOM | Age: 82
Setting detail: OUTPATIENT SURGERY
Discharge: HOME | End: 2022-04-25
Attending: PAIN MEDICINE
Payer: COMMERCIAL

## 2022-04-25 ENCOUNTER — HOSPITAL ENCOUNTER (OUTPATIENT)
Age: 82
Setting detail: OUTPATIENT SURGERY
Discharge: HOME | End: 2022-04-25
Attending: PAIN MEDICINE | Admitting: PAIN MEDICINE
Payer: COMMERCIAL

## 2022-04-25 VITALS
OXYGEN SATURATION: 98 % | HEART RATE: 74 BPM | SYSTOLIC BLOOD PRESSURE: 137 MMHG | DIASTOLIC BLOOD PRESSURE: 50 MMHG | BODY MASS INDEX: 17.69 KG/M2 | WEIGHT: 82 LBS | RESPIRATION RATE: 14 BRPM | TEMPERATURE: 97 F | HEIGHT: 57 IN

## 2022-04-25 DIAGNOSIS — M54.16 LUMBAR RADICULOPATHY: ICD-10-CM

## 2022-04-25 PROCEDURE — 2709999900 HC NON-CHARGEABLE SUPPLY: Performed by: PAIN MEDICINE

## 2022-04-25 PROCEDURE — 3600000005 HC SURGERY LEVEL 5 BASE: Performed by: PAIN MEDICINE

## 2022-04-25 PROCEDURE — 2500000003 HC RX 250 WO HCPCS: Performed by: PAIN MEDICINE

## 2022-04-25 PROCEDURE — 62323 NJX INTERLAMINAR LMBR/SAC: CPT | Performed by: PAIN MEDICINE

## 2022-04-25 PROCEDURE — 7100000011 HC PHASE II RECOVERY - ADDTL 15 MIN: Performed by: PAIN MEDICINE

## 2022-04-25 PROCEDURE — 6360000004 HC RX CONTRAST MEDICATION: Performed by: PAIN MEDICINE

## 2022-04-25 PROCEDURE — 3209999900 FLUORO FOR SURGICAL PROCEDURES

## 2022-04-25 PROCEDURE — 7100000010 HC PHASE II RECOVERY - FIRST 15 MIN: Performed by: PAIN MEDICINE

## 2022-04-25 PROCEDURE — 6360000002 HC RX W HCPCS: Performed by: PAIN MEDICINE

## 2022-04-25 RX ORDER — LIDOCAINE HYDROCHLORIDE 5 MG/ML
INJECTION, SOLUTION INFILTRATION; INTRAVENOUS PRN
Status: DISCONTINUED | OUTPATIENT
Start: 2022-04-25 | End: 2022-04-25 | Stop reason: ALTCHOICE

## 2022-04-25 ASSESSMENT — PAIN DESCRIPTION - DESCRIPTORS: DESCRIPTORS: ACHING

## 2022-04-25 ASSESSMENT — PAIN - FUNCTIONAL ASSESSMENT: PAIN_FUNCTIONAL_ASSESSMENT: 0-10

## 2022-04-25 NOTE — OP NOTE
2022    Patient: Chandrika Leslie  :  1940  Age:  80 y.o. Sex:  female     PRE-OPERATIVE DIAGNOSIS: Lumbar disc displacement, lumbar radiculopathy. POST-OPERATIVE DIAGNOSIS: Same. PROCEDURE: Fluoroscopic guided therapeutic lumbar epidural steroid injection at the L5-S1 level (# 1). SURGEON: HILDA Michelle M.D.. ANESTHESIA: Local    ESTIMATED BLOOD LOSS: None.  ______________________________________________________________________    BRIEF HISTORY:  Chandrika Leslie comes in today for first lumbar epidural injection at L5-S1 level. The potential complications of this procedure were discussed with her again today. She has elected to undergo the aforementioned procedure. Frank complete History & Physical examination were reviewed in depth, a copy of which is in the chart. DESCRIPTION OF PROCEDURE:    After confirming written and informed consent, a time-out was performed and Frank name and date of birth, the procedure to be performed as well as the plan for the location of the needle insertion were confirmed. The patient was brought into the procedure room and placed in the prone position on the fluoroscopy table. A pillow was placed under the patient's lower abdomen/upper pelvis to increase lumbar interlaminar space. Standard monitors were placed, and vital signs were observed throughout the procedure. The area of the lumbar spine was prepped with chloraprep and draped in a sterile manner. The L5-S1 interspace was identified and marked under AP fluoroscopy. The skin and subcutaneous tissues at the above level were anesthestized with 0.5% lidocaine. With intermittent fluoroscopy, an # 18 gauge 3 1/2 tuohy epidural needle was inserted and directed toward the interlaminar space. The needle was slowly advanced using loss of resistance technique and 5 cc glass syringe  until the tip of the epidural needle has passed through the ligamentum flavum and entered the epidural space.  AP and lateral fluoroscopic imaging is performed to verify that the epidural needle is properly placed. Negative aspiration of blood and CSF was confirmed. 0.5 ml of omnipaque 240 was used for confirmation of even epidural spread under both live and AP fluoroscopy. After negative aspiration, a solution of 0.5 % Lidocaine 0.5 ml and 60 mg DepoMedrol was easily injected. The needle was gently removed intact . The patient back was cleaned and a Band-Aid was placed over the needle insertion point. Disposition the patient tolerated the procedure well and there were no complications . Vital signs remained stable throughout the procedure. The patient was escorted to the recovery area where they remained until discharge and written discharge instructions for the procedure were given. Plan: Yessi Faulkner will return to our pain management center as scheduled.      Erin Ferro MD

## 2022-04-25 NOTE — H&P
[address]  [address] [facility], [address] [place] [address]  [phone]    Procedure History & Physical      Robi Kruse     HPI:    Patient  is here for low back and leg pain for LESI L3-4  Labs/imaging studies reviewed   All question and concerns addressed including R/B/A associated with the procedure    Past Medical History:   Diagnosis Date    Acute CVA (cerebrovascular accident) ([address] 5/25/2019    Arthritis     Back problem     CAD (coronary artery disease) 2010    PTCA with DAMON to prox LAD, PTCA DAMON to ostial lesion of RCA and BMS to prox lesion RCA DAMON to RCA ostial     Cerebral artery occlusion with cerebral infarction ([address] 05/2019    Hyperlipidemia     Hypertension     Nausea & vomiting     Reflux     RLD (ruptured lumbar disc)     Scoliosis     Spinal stenosis     Type 2 diabetes mellitus without complication, without long-term current use of insulin ([address] 10/14/2019       Past Surgical History:   Procedure Laterality Date    CATARACT REMOVAL      bilateral    CORONARY ANGIOPLASTY  2/25/2010    PTCA with DAMON in the proximal left anterior descending    CORONARY ANGIOPLASTY  3/11/2010    PTCA and deployment of 2 stents in the ostium and the proximal segment of RCA    CORONARY ANGIOPLASTY  5/19/2010    PTCA DAMON to ostial RCA patent LAD and RCA BMS  restenosis of RCA    CORONARY ANGIOPLASTY WITH STENT PLACEMENT      times 3    DIAGNOSTIC CARDIAC CATH LAB PROCEDURE  2/25/2010    Dr. Esperanza Boland: Cardiac cath with angioplasty follow up    Saira Watts CATH LAB PROCEDURE  3/11/2010    Cardiac cath and stent placement    DIAGNOSTIC CARDIAC CATH LAB PROCEDURE  5/19/2010    Cardiac cath heart lab and subsequent angiopilsaty    FRACTURE SURGERY      tight wrist    SHOULDER ARTHROSCOPY  08 25 2011    left shoulder arthroscopy ,acromioplasty,busectomy, release of adhesions    UPPER GASTROINTESTINAL ENDOSCOPY N/A 2/19/2021    EGD BIOPSY performed by Julisa Garcia DO at [facility]       Prior to Admission medications    Medication Sig Start Date End Date Taking?  Authorizing Provider   acetaminophen (TYLENOL) 325 MG tablet Take 650 mg by mouth every 6 hours as needed for Pain    Historical Provider, MD   metoclopramide (REGLAN) 5 MG tablet Take 5 mg by mouth in the morning, at noon, and at bedtime    Historical Provider, MD   bisacodyl (DULCOLAX) 10 MG suppository Place 10 mg rectally daily    Historical Provider, MD   magnesium hydroxide (MILK OF MAGNESIA) 400 MG/5ML suspension Take 30 mLs by mouth daily as needed for Constipation    Historical Provider, MD   glucose (GLUTOSE) 40 % GEL Take 37.5 mLs by mouth as needed (hypoglycemia) 2/15/22   Johanne Castillo DO   sennosides-docusate sodium (SENOKOT-S) 8.6-50 MG tablet Take 1 tablet by mouth 2 times daily 2/15/22   Johanne Castillo DO   glimepiride (AMARYL) 2 MG tablet Take 1 tablet by mouth daily (with breakfast) 2/22/21   Duana Fothergill,    lisinopril (PRINIVIL;ZESTRIL) 30 MG tablet Take 1 tablet by mouth daily 2/22/21   Duana Fothergill, DO   pantoprazole (PROTONIX) 40 MG tablet Take 1 tablet by mouth 2 times daily (before meals) 2/21/21   Duana Fothergill,    carvedilol (COREG) 25 MG tablet Take 1 tablet by mouth 2 times daily (with meals) 6/7/19   Mele Harrison, MD   butalbital-acetaminophen-caffeine (FIORICET, ESGIC) -40 MG per tablet Take 1 tablet by mouth every 4 hours as needed for Migraine     Historical Provider, MD   ezetimibe-simvastatin (VYTORIN) 10-40 MG per tablet Take 1 tablet by mouth nightly Indications: patient no longer takes     Historical Provider, MD       Allergies   Allergen Reactions    Aspirin      Caused bleeding ulcers    Lipitor      Caused elevated liver enzymes       Social History     Socioeconomic History    Marital status: Single     Spouse name: Not on file    Number of children: Not on file    Years of education: Not on file    Highest education level: fatigue    RESPIRATORY:  negative for  dry cough, cough with sputum, dyspnea, wheezing and chest pain    CARDIOVASCULAR:  negative for chest pain, dyspnea, palpitations, syncope    GASTROINTESTINAL:  negative for nausea, vomiting, change in bowel habits, diarrhea, constipation and abdominal pain    MUSCULOSKELETAL: negative for muscle weakness    SKIN: negative for itching or rashes. BEHAVIOR/PSYCH:  negative for poor appetite, increased appetite, decreased sleep and poor concentration    All other systems negative      PHYSICAL EXAM:    VITALS:  BP (!) 127/38   Pulse 82   Temp 97.3 °F (36.3 °C) (Skin)   Resp 19   Ht 4' 9\" (1.448 m)   Wt 82 lb (37.2 kg)   SpO2 98%   BMI 17.74 kg/m²     CONSTITUTIONAL:  awake, alert, cooperative, no apparent distress, and appears stated age    EYES: PERRLA, EOMI    LUNGS:  No increased work of breathing, no audible wheezing    CARDIOVASCULAR:  regular rate and rhythm    ABDOMEN:  Soft non tender non distended     EXTREMITIES: no signs of clubbing or cyanosis. MUSCULOSKELETAL: negative for flaccid muscle tone or spastic movements. SKIN: gross examination reveals no signs of rashes, or diaphoresis. NEURO: Cranial nerves II-XII grossly intact. No signs of agitated mood. Assessment/Plan:    Low back and leg pain for LESI L3-4.

## 2022-05-18 ENCOUNTER — OFFICE VISIT (OUTPATIENT)
Dept: PAIN MANAGEMENT | Age: 82
End: 2022-05-18
Payer: COMMERCIAL

## 2022-05-18 VITALS
HEIGHT: 56 IN | DIASTOLIC BLOOD PRESSURE: 70 MMHG | HEART RATE: 72 BPM | WEIGHT: 82 LBS | RESPIRATION RATE: 16 BRPM | TEMPERATURE: 97 F | BODY MASS INDEX: 18.44 KG/M2 | OXYGEN SATURATION: 92 % | SYSTOLIC BLOOD PRESSURE: 118 MMHG

## 2022-05-18 DIAGNOSIS — G89.4 CHRONIC PAIN SYNDROME: ICD-10-CM

## 2022-05-18 DIAGNOSIS — M47.816 LUMBAR FACET ARTHROPATHY: ICD-10-CM

## 2022-05-18 DIAGNOSIS — M47.816 LUMBAR SPONDYLOSIS: ICD-10-CM

## 2022-05-18 DIAGNOSIS — M51.9 LUMBAR DISC DISORDER: ICD-10-CM

## 2022-05-18 DIAGNOSIS — M48.061 SPINAL STENOSIS OF LUMBAR REGION WITHOUT NEUROGENIC CLAUDICATION: Primary | ICD-10-CM

## 2022-05-18 PROCEDURE — 99213 OFFICE O/P EST LOW 20 MIN: CPT | Performed by: PAIN MEDICINE

## 2022-05-18 PROCEDURE — 99213 OFFICE O/P EST LOW 20 MIN: CPT

## 2022-05-18 RX ORDER — PANTOPRAZOLE SODIUM 20 MG/1
TABLET, DELAYED RELEASE ORAL
COMMUNITY
Start: 2022-05-05

## 2022-05-18 RX ORDER — NEOMYCIN SULFATE, POLYMYXIN B SULFATE AND HYDROCORTISONE 10; 3.5; 1 MG/ML; MG/ML; [USP'U]/ML
SUSPENSION/ DROPS AURICULAR (OTIC)
COMMUNITY
Start: 2022-03-29 | End: 2022-05-18

## 2022-05-18 NOTE — PROGRESS NOTES
[address] [facility], [address] [place] [address] Jak  [phone]    Follow up Note      Dean Roxy     Date of Visit:  5/18/2022    CC:  Patient presents for follow up   Chief Complaint   Patient presents with    Follow Up After Procedure     Fluoroscopic guided therapeutic lumbar epidural steroid injection at the L5-S1 level (# 1). HPI:    Pain is unchanged. Appropriate analgesia with current medications regimen:N/A. Change in quality of symptoms:no. Medication side effects:not applicable . Recent diagnostic testing:none. Recent interventional procedures:Lumbar epidural steroid injection good relief in her thigh pain but not back pain. She has not been on anticoagulation medications to include none. The patient  has not been on herbal supplements. The patient is diabetic.     Imaging:   Lumbar spine CT 2022:  1. No acute abnormality involving lumbar spine. 2. Levoscoliosis with significant degenerative endplate changes along   concavity of the curvature. 3. Lumbar spondylosis as described above with multiple levels of central   canal stenosis.  MRI may be helpful for further evaluation of lumbar   spondylosis. Previous treatments: Physical Therapy, Epidural Steroid Injection and medications. .      Potential Aberrant Drug-Related Behavior:    No    Urine Drug Screening:  None    OARRS report:  05/2022 consistent     Opioid Agreement:  Renewal date:N/A    Past Medical History:   Diagnosis Date    Acute CVA (cerebrovascular accident) ([address] 5/25/2019    Arthritis     Back problem     CAD (coronary artery disease) 2010    PTCA with DAMON to prox LAD, PTCA DAMON to ostial lesion of RCA and BMS to prox lesion RCA DAMON to RCA ostial     Cerebral artery occlusion with cerebral infarction ([address] 05/2019    Hyperlipidemia     Hypertension     Nausea & vomiting     Reflux     RLD (ruptured lumbar disc)     Scoliosis     Spinal stenosis     Type 2 diabetes mellitus without complication, without long-term current use of insulin ([facility] 10/14/2019     Past Surgical History:   Procedure Laterality Date    CATARACT REMOVAL      bilateral    CORONARY ANGIOPLASTY  2/25/2010    PTCA with DAMON in the proximal left anterior descending    CORONARY ANGIOPLASTY  3/11/2010    PTCA and deployment of 2 stents in the ostium and the proximal segment of RCA    CORONARY ANGIOPLASTY  5/19/2010    PTCA DAMON to ostial RCA patent LAD and RCA BMS  restenosis of RCA    CORONARY ANGIOPLASTY WITH STENT PLACEMENT      times 3    DIAGNOSTIC CARDIAC CATH LAB PROCEDURE  2/25/2010    Dr. Lian Burt: Cardiac cath with angioplasty follow up    [address] CATH LAB PROCEDURE  3/11/2010    Cardiac cath and stent placement    DIAGNOSTIC CARDIAC CATH LAB PROCEDURE  5/19/2010    Cardiac cath heart lab and subsequent angiopilsaty    FRACTURE SURGERY      tight wrist    PAIN MANAGEMENT PROCEDURE N/A 4/25/2022    LUMBAR EPIDURAL STEROID INJECTION L3-4 WITH VERY LOW VOLUME AND 60 DEPO(NEEDS 10:15) performed by Luigi Ruffin MD at [address] [facility] ARTHROSCOPY  08 25 2011    left shoulder arthroscopy ,acromioplasty,busectomy, release of adhesions    UPPER GASTROINTESTINAL ENDOSCOPY N/A 2/19/2021    EGD BIOPSY performed by Chelsi Barragan DO at [facility]     Prior to Admission medications    Medication Sig Start Date End Date Taking?  Authorizing Provider   pantoprazole (PROTONIX) 20 MG tablet take 1 tablet by mouth once daily 5/5/22  Yes Historical Provider, MD [address] MG tablet take 1 tablet by mouth once daily 5/5/22  Yes Historical Provider, MD   acetaminophen (TYLENOL) 325 MG tablet Take 650 mg by mouth every 6 hours as needed for Pain   Yes Historical Provider, MD   metoclopramide (REGLAN) 5 MG tablet Take 5 mg by mouth in the morning, at noon, and at bedtime   Yes Historical Provider, MD   bisacodyl (DULCOLAX) 10 MG suppository Place 10 mg rectally daily Yes Historical Provider, MD   magnesium hydroxide (MILK OF MAGNESIA) 400 MG/5ML suspension Take 30 mLs by mouth daily as needed for Constipation   Yes Historical Provider, MD   glucose (GLUTOSE) 40 % GEL Take 37.5 mLs by mouth as needed (hypoglycemia) 2/15/22  Yes Epi Wheeler, DO   sennosides-docusate sodium (SENOKOT-S) 8.6-50 MG tablet Take 1 tablet by mouth 2 times daily 2/15/22  Yes Epi Wheeler, DO   glimepiride (AMARYL) 2 MG tablet Take 1 tablet by mouth daily (with breakfast) 2/22/21  Yes Kirill Leti, DO   lisinopril (PRINIVIL;ZESTRIL) 30 MG tablet Take 1 tablet by mouth daily 2/22/21  Yes Kirill Leti, DO   carvedilol (COREG) 25 MG tablet Take 1 tablet by mouth 2 times daily (with meals) 6/7/19  Yes Gordy Castaneda MD   butalbital-acetaminophen-caffeine (FIORICET, ESGIC) -40 MG per tablet Take 1 tablet by mouth every 4 hours as needed for Migraine    Yes Historical Provider, MD   ezetimibe-simvastatin (VYTORIN) 10-40 MG per tablet Take 1 tablet by mouth nightly Indications: patient no longer takes    Yes Historical Provider, MD     Allergies   Allergen Reactions    Aspirin      Caused bleeding ulcers    Lipitor      Caused elevated liver enzymes     Social History     Socioeconomic History    Marital status: Single     Spouse name: Not on file    Number of children: Not on file    Years of education: Not on file    Highest education level: Not on file   Occupational History    Not on file   Tobacco Use    Smoking status: Never Smoker    Smokeless tobacco: Never Used   Vaping Use    Vaping Use: Never used   Substance and Sexual Activity    Alcohol use: Never     Alcohol/week: 0.0 standard drinks     Comment: No caffeine    Drug use: Never    Sexual activity: Not on file   Other Topics Concern    Not on file   Social History Narrative    Denies caffeine.       Social Determinants of Health     Financial Resource Strain:     Difficulty of Paying Living Expenses: Not on file Food Insecurity:     Worried About Running Out of Food in the Last Year: Not on file    Micah of Food in the Last Year: Not on file   Transportation Needs:     Lack of Transportation (Medical): Not on file    Lack of Transportation (Non-Medical): Not on file   Physical Activity:     Days of Exercise per Week: Not on file    Minutes of Exercise per Session: Not on file   Stress:     Feeling of Stress : Not on file   Social Connections:     Frequency of Communication with Friends and Family: Not on file    Frequency of Social Gatherings with Friends and Family: Not on file    Attends [employer] Services: Not on file    Active Member of [address] [place] [address] [place] or Organizations: Not on file    Attends Club or Organization Meetings: Not on file    Marital Status: Not on file   Intimate Partner Violence:     Fear of Current or Ex-Partner: Not on file    Emotionally Abused: Not on file    Physically Abused: Not on file    Sexually Abused: Not on file   Housing Stability:     Unable to Pay for Housing in the Last Year: Not on file    Number of [place] in the Last Year: Not on file    Unstable Housing in the Last Year: Not on file     Family History   Problem Relation Age of Onset    Heart Attack Mother 68    Heart Attack Father 72    Cancer Sister 79        pancreatic     REVIEW OF SYSTEMS:     [place] denies fever/chills, chest pain, shortness of breath, new bowel or bladder complaints. All other review of systems was negative. PHYSICAL EXAMINATION:      /70   Pulse 72   Temp 97 °F (36.1 °C) (Infrared)   Resp 16   Ht 4' 8\" (1.422 m)   Wt 82 lb (37.2 kg)   SpO2 92%   BMI 18.38 kg/m²     General:       General appearance:   frail, elderly, pleasant and well-hydrated.    , in moderate discomfort and A & O x3  Build:Normal Weight     HEENT:     Head:normocephalic and atraumatic  Sclera: icterus absent,      Lungs:     Breathing:Breathing Pattern: regular, no distress     Abdomen:     Shape:non-distended and normal  Tenderness:none     Lumbar spine:     Spine inspection:normal   CVA tenderness:No   Palpation:tenderness paravertebral muscles, facet loading, left, right, positive and tenderness. Range of motion:abnormal moderately Lateral bending, flexion, extension rotation bilateral and is  painful.     Musculoskeletal:     Trigger points in [place]:absent bilaterally  SI joint tenderness:negative right, negative left  SLR:negative right, negative left, sitting      Extremities:     Tremors:None bilaterally upper and lower  Range of motion:pain with internal rotation of hips negative. Intact:Yes  Edema:Normal     Neurological:     Sensory:normal to light touch bilateral lower extremities. Motor:                             Right Quadriceps4/5          Left Quadriceps4/5           Right Gastrocnemius4/5    Left Gastrocnemius4/5  Right Ant Tibialis4/5  Left Ant Tibialis4/5  Gait:antalgic with a cane     Dermatology:     Skin:no unusual rashes and no skin lesions     Impression:  Low back pain with radiation to the Left thigh. Lumbar spine CT multilevel degenerative spinal stenosis worst at L2-3/L3-4 and L4-5  Plan:  Follow up on her low back pain with no acute issues. Patient is s/p LESI L5-S1 with good relief in her radicular pain but not her axial low back pain. Discussed treatment options for her axial low back pain including lumbar facet MBB, will hold off for now. Not a candidate for opioids(high risk of falls)  OARRS report reviewed 05/2022. Patient encouraged to stay active. Discussed aquatherapy, patient will think about. Treatment plan discussed with the patient. We discussed with the patient that combining opioids, benzodiazepines, alcohol, illicit drugs or sleep aids increases the risk of respiratory depression including death.  We discussed that these medications may cause drowsiness, sedation or dizziness and have counseled the patient not to drive or operate machinery. We have discussed that these medications will be prescribed only by one provider. We have discussed with the patient about age related risk factors and have thoroughly discussed the importance of taking these medications as prescribed. The patient verbalizes understanding. ccreflior Reyes M.D.

## 2022-05-18 NOTE — PROGRESS NOTES
Do you currently have any of the following:    Fever: No  Headache:  No  Cough: No  Shortness of breath: No  Exposed to anyone with these symptoms: No                                                                                                                Rolin Cough presents to the [facility] on 5/18/2022. Luis is complaining of pain from bra line down and across the lower back,. The pain is constant. The pain is described as aching. Pain is rated on her best day at a 7, on her worst day at a 10, and on average at a 8 on the VAS scale. She took her last dose of Tylenol . Luis does not have issues with constipation. Any procedures since your last visit: Yes, with 30 % relief. She did seem to be moving around a little better for about a week. She is  on NSAIDS and  is not on anticoagulation medications to include none and is managed by NA. Pacemaker or defibrillator: No Physician managing device is NA. Medication Contract and Consent for Opioid Use Documents Filed      No documents found                   /70   Pulse 72   Temp 97 °F (36.1 °C) (Infrared)   Resp 16   Ht 4' 8\" (1.422 m)   Wt 82 lb (37.2 kg)   SpO2 92%   BMI 18.38 kg/m²      No LMP recorded.  Patient is postmenopausal.

## 2022-07-08 NOTE — PROGRESS NOTES
Physical Therapy    Facility/Department: [address] [facility]  Daily Treatment Note    NAME: Yuliana Bundy  : 1940  MRN: [id number]    Date of Service: 2019    Evaluating Therapist: Kole Guadarrama DPT    ROOM: [address] [place] [address]  DIAGNOSIS: Ischemic CVA  PMH: To ED on 19 for chest pain/left sided weakness. MRI of the brain that a scattering of infarcts in the [facility] distribution along the distribution of a branch artery. Echo with bubble showed small septal aneurysm with no shunting. PMH includes cataract removal, hx of 3 cardiac stents in , CAD, HLD, HTN, scoliosis    PRECAUTIONS: falls,      Social:  Pt lives alone in a 1 floor apt with 3 steps and 2 rails to enter. Prior to admission pt walked with no AD, but uses quad cane PRN in community. Initial Evaluation  19   PM    Short Term Goals Long Term Goals    Was pt agreeable to Eval/treatment? yes  yes     Does pt have pain?  No pain   No c/o pain      Bed Mobility  Rolling: sba  Supine to sit: sba  Sit to supine: sba  Scooting: sba  Rolling: sba  Supine to sit: sba  Sit to supine: sba  Scooting: sba Sup Mod I   Transfers Sit to stand: sba  Stand to sit: sba  Stand pivot: cg/sbA with ww  Sit to stand: sba  Stand to sit: sba  Stand pivot: cg/sbA with ww Sup with AAD Mod I with AAD   Ambulation    200 feet with ww with cg/sba  300 feet with ww with sba    200' without AD cg/sba 300 feet with AAD with sup >500 feet with AAD with mod I   Walking 10 feet on uneven surface 10 feet with ww with cg/sba       Wheel Chair Mobility n/a       Car Transfers sba  sba Sup Mod I   Stair negotiation: ascended and descended  12 steps with bilateral rail with cg/sba  12 steps bilateral HR with cg/sba  12 steps with one rail with sup >12 steps with one rail with mod I   Curb Step:   ascended and descended 4 inch step with ww and cgA   4 in curb step with ww cgA 7.5 inch step with AAD and sup 7.5 inch step with AAD and mod I   Picking up object off the floor Picked up 2# with SBA        BLE ROM WFL       BLE Strength Right LE: 4+/5  Left LE: 4-/5 hip, 4-/5 knee, 3+/5 ankle       Balance  Sitting: sup  Standing: cgA with ww       Date Family Teach Completed TBA       Is additional Family Teaching Needed? Y or N Y       Hindering Progress Balance, safety, left LE weakness       PT recommended ELOS 10 days       Team's Discharge Plan        Therapist at Team Meeting          Therapeutic Exercise:   PM: 10x STS transfers from w/c, occasional cues for hand placement   Step ups left LE only 4 in curb step without UE support x15 reps  Alternating toe taps 4 in curb step without UE support x15 reps, multiple episodes of LOB due to ataxia with bilateral LE (L >R)  Trial ambulation with spc- unable to pattern properly    Patient education  Pt educated on smooth, controlled LE placement, hand placement with transfers, need for AD at this time, sequencing with stair negotiation. Patient response to education:   Pt verbalized understanding Pt demonstrated skill Pt requires further education in this area   yes Yes with occasional cues yes     Additional Comments: Reviewed all mobility. Verbal cues required t/o session for hand placement/safety with sitting. Ataxia noted with mobility, especially without AD. Trial spc however pt unable to pattern properly. Will continue to trial. Pt requiring verbal cues for sequencing with stair negotiation, completely reciprocally despite recommendation to complete with NR pattern. Will continue to challenge balance/safety as able. PM  Time in: 1430  Time out: 1515    Pt is making fair progress toward established Physical Therapy goals. Continue with physical therapy current plan of care.     Mila Freire DPT  [id number] Follow up with md engel if pain gets worse. take acetaminophen (Tylenol) 975mg as need for pain every 8hrs

## 2022-07-21 ENCOUNTER — OFFICE VISIT (OUTPATIENT)
Dept: CARDIOLOGY CLINIC | Age: 82
End: 2022-07-21
Payer: COMMERCIAL

## 2022-07-21 VITALS
SYSTOLIC BLOOD PRESSURE: 92 MMHG | DIASTOLIC BLOOD PRESSURE: 64 MMHG | BODY MASS INDEX: 17.69 KG/M2 | OXYGEN SATURATION: 96 % | RESPIRATION RATE: 18 BRPM | HEIGHT: 57 IN | HEART RATE: 77 BPM | WEIGHT: 82 LBS

## 2022-07-21 DIAGNOSIS — G89.4 CHRONIC PAIN SYNDROME: ICD-10-CM

## 2022-07-21 DIAGNOSIS — I25.3 ATRIAL SEPTAL ANEURYSM: ICD-10-CM

## 2022-07-21 DIAGNOSIS — E78.2 MIXED HYPERLIPIDEMIA: ICD-10-CM

## 2022-07-21 DIAGNOSIS — I25.10 CORONARY ARTERY DISEASE INVOLVING NATIVE CORONARY ARTERY OF NATIVE HEART WITHOUT ANGINA PECTORIS: Primary | ICD-10-CM

## 2022-07-21 DIAGNOSIS — E11.9 TYPE 2 DIABETES MELLITUS WITHOUT COMPLICATION, WITHOUT LONG-TERM CURRENT USE OF INSULIN (HCC): ICD-10-CM

## 2022-07-21 DIAGNOSIS — I10 PRIMARY HYPERTENSION: ICD-10-CM

## 2022-07-21 PROCEDURE — 3044F HG A1C LEVEL LT 7.0%: CPT | Performed by: INTERNAL MEDICINE

## 2022-07-21 PROCEDURE — 93000 ELECTROCARDIOGRAM COMPLETE: CPT | Performed by: INTERNAL MEDICINE

## 2022-07-21 PROCEDURE — 1123F ACP DISCUSS/DSCN MKR DOCD: CPT | Performed by: INTERNAL MEDICINE

## 2022-07-21 PROCEDURE — 99214 OFFICE O/P EST MOD 30 MIN: CPT | Performed by: INTERNAL MEDICINE

## 2022-07-21 NOTE — PROGRESS NOTES
OUTPATIENT CARDIOLOGY FOLLOW-UP    Name: Zarina Can    Age: 80 y.o. Primary Care Physician: Romina Hoang DO    Date of Service: 7/21/2022    Chief Complaint: Coronary atherosclerosis, hypertension, atrial septal aneurysm, chronic pain syndrome    Interim History: Since her most recent evaluation, the patient remains compensated for cardiovascular standpoint and denies symptoms of anginal-like chest comfort or other ischemic equivalents, decompensated left ventricular systolic dysfunction or volume overload nor arrhythmia related manifestations. As you have previously received in correspondence, interim perfusion imaging demonstrated no evidence of perfusion at base with normal left ventricular systolic function suggesting a low risk of acute ischemic events. She continues chronic pain predominately related to her lumbosacral arthritis with no benefit from attempted epidural injections. She has managed no weight gain and is presently relatively hypotensive without symptoms with acceptable control for diabetes and serum lipids. Review of Systems: The remainder of a complete multisystem review including consitutional, central nervous, respiratory, circulatory, gastrointestinal, genitourinary, endocrinologic, hematologic, musculoskeletal and psychiatric are negative.     Past Medical History:  Past Medical History:   Diagnosis Date    Acute CVA (cerebrovascular accident) ([address] 5/25/2019    Arthritis     Back problem     CAD (coronary artery disease) 2010    PTCA with DAMON to prox LAD, PTCA DAMON to ostial lesion of RCA and BMS to prox lesion RCA DAMON to RCA ostial     Cerebral artery occlusion with cerebral infarction ([address] 05/2019    Hyperlipidemia     Hypertension     Nausea & vomiting     Reflux     RLD (ruptured lumbar disc)     Scoliosis     Spinal stenosis     Type 2 diabetes mellitus without complication, without long-term current use of insulin ([address] 10/14/2019       Past Surgical History:  Past Surgical History:   Procedure Laterality Date    CATARACT REMOVAL      bilateral    CORONARY ANGIOPLASTY  2/25/2010    PTCA with DAMON in the proximal left anterior descending    CORONARY ANGIOPLASTY  3/11/2010    PTCA and deployment of 2 stents in the ostium and the proximal segment of RCA    CORONARY ANGIOPLASTY  5/19/2010    PTCA DAMON to ostial RCA patent LAD and RCA BMS  restenosis of RCA    CORONARY ANGIOPLASTY WITH STENT PLACEMENT      times 3    DIAGNOSTIC CARDIAC CATH LAB PROCEDURE  2/25/2010    Dr. Eli Tineo: Cardiac cath with angioplasty follow up     DIAGNOSTIC CARDIAC CATH LAB PROCEDURE  3/11/2010    Cardiac cath and stent placement    DIAGNOSTIC CARDIAC CATH LAB PROCEDURE  5/19/2010    Cardiac cath heart lab and subsequent angiopilsaty    FRACTURE SURGERY      tight wrist    PAIN MANAGEMENT PROCEDURE N/A 4/25/2022    LUMBAR EPIDURAL STEROID INJECTION L3-4 WITH VERY LOW VOLUME AND 60 DEPO(NEEDS 10:15) performed by Juan Alberto Katz MD at [address] [place] [address]  08 25 2011    left shoulder arthroscopy ,acromioplasty,busectomy, release of adhesions    UPPER GASTROINTESTINAL ENDOSCOPY N/A 2/19/2021    EGD BIOPSY performed by Susy Andrade DO at [facility] ENDOSCOPY       Family History:  Family History   Problem Relation Age of Onset    Heart Attack Mother 68    Heart Attack Father 72    Cancer Sister 79        pancreatic       Social History:  Social History     Socioeconomic History    Marital status: Single     Spouse name: Not on file    Number of children: Not on file    Years of education: Not on file    Highest education level: Not on file   Occupational History    Not on file   Tobacco Use    Smoking status: Never    Smokeless tobacco: Never   Vaping Use    Vaping Use: Never used   Substance and Sexual Activity    Alcohol use: Never     Alcohol/week: 0.0 standard drinks     Comment: No caffeine    Drug use: Never    Sexual activity: Not on file   Other Topics Concern    Not on file Social History Narrative    Denies caffeine. Social Determinants of Health     Financial Resource Strain: Not on file   Food Insecurity: Not on file   Transportation Needs: Not on file   Physical Activity: Not on file   Stress: Not on file   Social Connections: Not on file   Intimate Partner Violence: Not on file   Housing Stability: Not on file       Allergies: Allergies   Allergen Reactions    Aspirin      Caused bleeding ulcers    Lipitor      Caused elevated liver enzymes       Current Medications:  Current Outpatient Medications   Medication Sig Dispense Refill    pantoprazole (PROTONIX) 20 MG tablet take 1 tablet by mouth once daily      SITagliptin (JANUVIA) 100 MG tablet Take by mouth daily      glucose (GLUTOSE) 40 % GEL Take 37.5 mLs by mouth as needed (hypoglycemia) 45 g 1    glimepiride (AMARYL) 2 MG tablet Take 1 tablet by mouth daily (with breakfast) 30 tablet 3    lisinopril (PRINIVIL;ZESTRIL) 30 MG tablet Take 1 tablet by mouth daily 30 tablet 3    carvedilol (COREG) 25 MG tablet Take 1 tablet by mouth 2 times daily (with meals) 60 tablet 3    butalbital-acetaminophen-caffeine (FIORICET, ESGIC) -40 MG per tablet Take 1 tablet by mouth every 4 hours as needed for Migraine       ezetimibe-simvastatin (VYTORIN) 10-40 MG per tablet Take 1 tablet by mouth nightly Indications: patient no longer takes        No current facility-administered medications for this visit. Physical Exam:  BP 92/64   Pulse 77   Resp 18   Ht 4' 9\" (1.448 m)   Wt 82 lb (37.2 kg)   SpO2 96%   BMI 17.74 kg/m²   Wt Readings from Last 3 Encounters:   07/21/22 82 lb (37.2 kg)   05/18/22 82 lb (37.2 kg)   04/19/22 82 lb (37.2 kg)     The patient is awake, alert and in mild to moderate chronic lumbosacral discomfort with no distress. She appears frail, cachectic and chronically ill. No gross musculoskeletal deformity is present. No significant skin or nail changes are present.  Gross examination of head, eyes, nose and throat are negative. Jugular venous pressure is normal and no carotid bruits are present. Normal respiratory effort is noted with no accessory muscle usage present. Lung fields are clear to ascultation. Cardiac examination is notable for a regular rate and rhythm with no palpable thrill. No gallop rhythm or cardiac murmur are identified. A benign abdominal examination is present with no masses or organomegaly. Intact pulses are present throughout all extremities and no peripheral edema is present. No focal neurologic deficits are present. Laboratory Tests:  Lab Results   Component Value Date    CREATININE 0.6 02/28/2022    BUN 16 02/28/2022     02/28/2022    K 3.8 02/28/2022     02/28/2022    CO2 23 02/28/2022     No results found for: BNP  Lab Results   Component Value Date/Time    WBC 7.9 02/28/2022 04:36 AM    RBC 3.72 02/28/2022 04:36 AM    HGB 11.5 02/28/2022 04:36 AM    HCT 35.4 02/28/2022 04:36 AM    MCV 95.2 02/28/2022 04:36 AM    MCH 30.9 02/28/2022 04:36 AM    MCHC 32.5 02/28/2022 04:36 AM    RDW 12.3 02/28/2022 04:36 AM     02/28/2022 04:36 AM    MPV 9.3 02/28/2022 04:36 AM     No results for input(s): ALKPHOS, ALT, AST, PROT, BILITOT, BILIDIR, LABALBU in the last 72 hours.   Lab Results   Component Value Date/Time    MG 2.1 02/13/2022 04:52 AM     Lab Results   Component Value Date/Time    PROTIME 13.0 02/14/2022 05:04 AM    INR 1.1 02/14/2022 05:04 AM     No results found for: TSH  No components found for: CHLPL  Lab Results   Component Value Date    TRIG 105 02/08/2022    TRIG 179 (H) 02/19/2021    TRIG 265 (H) 05/25/2019     Lab Results   Component Value Date    HDL 62 02/08/2022    HDL 34 02/19/2021    HDL 41 05/25/2019     Lab Results   Component Value Date    LDLCALC 52 02/08/2022    LDLCALC 58 02/19/2021    [address] 05/25/2019       Cardiac Tests:  ECG: A resting electrocardiogram demonstrates evidence of sinus rhythm with nonspecific ST changes      ASSESSMENT / PLAN: On clinical basis, the patient remains compensated from cardiovascular standpoint with no interim cardiovascular symptoms and this basis, I have not presently altered her existing medical regimen with needs of careful monitoring of her blood pressure and potential reduction of doses of her angiotensin-converting enzyme inhibitor as appropriate. Continued nutrition will be essential to reducing risk of progressive debilitation. Ongoing aggressive risk factor modification of blood pressure, diabetes and serum lipids will remain essential to reducing risk of future atherosclerotic development. I plan to continue annual cardiovascular reassessment and would happily reevaluate her in the interim should additional cardiovascular difficulties or concerns arise. The patient's current medication list, allergies, problem list and results of all previously ordered testing were reviewed at today's visit. Follow-up office visit in 1 year      Note: This report was completed using computerized voice recognition software. Every effort has been made to ensure accuracy, however; inadvertent computerized transcription errors may be present. Dean Jimenez.  Guru Black, [address] [place] [address]    An electronic copy of this follow-up progress note was forwarded to Dr. Kory Galicia

## 2022-11-05 ENCOUNTER — APPOINTMENT (OUTPATIENT)
Dept: GENERAL RADIOLOGY | Age: 82
End: 2022-11-05
Payer: COMMERCIAL

## 2022-11-05 ENCOUNTER — HOSPITAL ENCOUNTER (EMERGENCY)
Age: 82
Discharge: HOME | End: 2022-11-05
Attending: EMERGENCY MEDICINE
Payer: COMMERCIAL

## 2022-11-05 VITALS
HEART RATE: 73 BPM | DIASTOLIC BLOOD PRESSURE: 59 MMHG | BODY MASS INDEX: 17.69 KG/M2 | WEIGHT: 82 LBS | RESPIRATION RATE: 20 BRPM | SYSTOLIC BLOOD PRESSURE: 161 MMHG | HEIGHT: 57 IN | OXYGEN SATURATION: 98 % | TEMPERATURE: 98.9 F

## 2022-11-05 DIAGNOSIS — J40 BRONCHITIS: Primary | ICD-10-CM

## 2022-11-05 LAB
INFLUENZA A BY PCR: NOT DETECTED
INFLUENZA B BY PCR: NOT DETECTED
SARS-COV-2, NAAT: NOT DETECTED

## 2022-11-05 PROCEDURE — 87502 INFLUENZA DNA AMP PROBE: CPT

## 2022-11-05 PROCEDURE — 71046 X-RAY EXAM CHEST 2 VIEWS: CPT

## 2022-11-05 PROCEDURE — 99284 EMERGENCY DEPT VISIT MOD MDM: CPT

## 2022-11-05 PROCEDURE — 87635 SARS-COV-2 COVID-19 AMP PRB: CPT

## 2022-11-05 RX ORDER — GUAIFENESIN 600 MG/1
600 TABLET, EXTENDED RELEASE ORAL 2 TIMES DAILY
Qty: 30 TABLET | Refills: 0 | Status: SHIPPED | OUTPATIENT
Start: 2022-11-05 | End: 2022-11-20

## 2022-11-05 RX ORDER — ALBUTEROL SULFATE 90 UG/1
2 AEROSOL, METERED RESPIRATORY (INHALATION) 4 TIMES DAILY PRN
Qty: 18 G | Refills: 0 | Status: SHIPPED | OUTPATIENT
Start: 2022-11-05

## 2022-11-05 ASSESSMENT — PAIN - FUNCTIONAL ASSESSMENT
PAIN_FUNCTIONAL_ASSESSMENT: NONE - DENIES PAIN
PAIN_FUNCTIONAL_ASSESSMENT: ACTIVITIES ARE NOT PREVENTED
PAIN_FUNCTIONAL_ASSESSMENT: 0-10
PAIN_FUNCTIONAL_ASSESSMENT: PREVENTS OR INTERFERES SOME ACTIVE ACTIVITIES AND ADLS

## 2022-11-05 ASSESSMENT — PAIN DESCRIPTION - DESCRIPTORS: DESCRIPTORS: ACHING

## 2022-11-05 ASSESSMENT — PAIN DESCRIPTION - ONSET: ONSET: ON-GOING

## 2022-11-05 ASSESSMENT — PAIN DESCRIPTION - FREQUENCY: FREQUENCY: INTERMITTENT

## 2022-11-05 ASSESSMENT — PAIN DESCRIPTION - PAIN TYPE: TYPE: ACUTE PAIN

## 2022-11-05 ASSESSMENT — PAIN DESCRIPTION - ORIENTATION: ORIENTATION: UPPER

## 2022-11-05 ASSESSMENT — PAIN DESCRIPTION - LOCATION: LOCATION: CHEST

## 2022-11-05 ASSESSMENT — PAIN SCALES - GENERAL
PAINLEVEL_OUTOF10: 6
PAINLEVEL_OUTOF10: 0

## 2022-11-05 NOTE — ED PROVIDER NOTES
HPI:  11/5/22,   Time: 1:06 AM EDT         Genesis Cat is a 80 y.o. female presenting to the ED for cough, beginning 1 week ago. The complaint has been persistent, moderate in severity, and worsened by nothing. Patient says that she followed up with her primary care physician 2 days ago. She was prescribed some medications but does not know what they are. She states that they are not helping. Says they're pills used for cough. She is still complaining of a cough. She says that she is coughing up some material but does not know what color. She states that the cough is causing her throat to be sore and causing an aching in her ribs. She denies any actual chest pain, shortness of breath. Says her body feels sore from coughing. She denies any lung problems. She denies any history of COPD, asthma, congestive heart failure. She says that she does not smoke. She has not been tested for flu. She says that she took a home COVID test that was negative. ROS:   Pertinent positives and negatives are stated within HPI, all other systems reviewed and are negative.  --------------------------------------------- PAST HISTORY ---------------------------------------------  Past Medical History:  has a past medical history of Acute CVA (cerebrovascular accident) ([address] Arthritis, Back problem, CAD (coronary artery disease), Cerebral artery occlusion with cerebral infarction ([address] Hyperlipidemia, Hypertension, Nausea & vomiting, Reflux, RLD (ruptured lumbar disc), Scoliosis, Spinal stenosis, and Type 2 diabetes mellitus without complication, without long-term current use of insulin ([address] Past Surgical History:  has a past surgical history that includes Coronary angioplasty with stent; Cataract removal; fracture surgery; Shoulder arthroscopy (08 25 2011); Diagnostic Cardiac Cath Lab Procedure (2/25/2010); Diagnostic Cardiac Cath Lab Procedure (3/11/2010); Diagnostic Cardiac Cath Lab Procedure (5/19/2010);  Coronary angioplasty (2/25/2010); Coronary angioplasty (3/11/2010); Coronary angioplasty (5/19/2010); Upper gastrointestinal endoscopy (N/A, 2/19/2021); and Pain management procedure (N/A, 4/25/2022). Social History:  reports that she has never smoked. She has never used smokeless tobacco. She reports that she does not drink alcohol and does not use drugs. Family History: family history includes Cancer (age of onset: 79) in her sister; Heart Attack (age of onset: 72) in her father; Heart Attack (age of onset: 68) in her mother. The patients home medications have been reviewed. Allergies: Aspirin and Lipitor    -------------------------------------------------- RESULTS -------------------------------------------------  All laboratory and radiology results have been personally reviewed by myself   LABS:  Results for orders placed or performed during the hospital encounter of 11/05/22   COVID-19, Rapid    Specimen: Nasopharyngeal Swab   Result Value Ref Range    SARS-CoV-2, NAAT Not Detected Not Detected   RAPID INFLUENZA A/B ANTIGENS    Specimen: Nasopharyngeal   Result Value Ref Range    Influenza A by PCR Not Detected Not Detected    Influenza B by PCR Not Detected Not Detected       RADIOLOGY:  Interpreted by Radiologist.  XR CHEST (2 VW)   Final Result   No acute disease. RECOMMENDATION:   Careful clinical correlation and follow up recommended. ------------------------- NURSING NOTES AND VITALS REVIEWED ---------------------------   The nursing notes within the ED encounter and vital signs as below have been reviewed.    BP (!) 161/59   Pulse 73   Temp 98.9 °F (37.2 °C) (Oral)   Resp 20   Ht 4' 9\" (1.448 m)   Wt 82 lb (37.2 kg)   SpO2 98%   BMI 17.74 kg/m²   Oxygen Saturation Interpretation: Normal      ---------------------------------------------------PHYSICAL EXAM--------------------------------------      Constitutional/General: Alert and oriented x3, well appearing, non toxic in NAD  Head: NC/AT  Eyes: PERRL, EOMI  Mouth: Oropharynx clear, handling secretions, no trismus  Neck: Supple, full ROM, no meningeal signs  Pulmonary: Lungs clear to auscultation bilaterally, no wheezes, rales, or rhonchi. Not in respiratory distress  Cardiovascular:  Regular rate and rhythm, no murmurs, gallops, or rubs. 2+ distal pulses  Abdomen: Soft, non tender, non distended,   Extremities: Moves all extremities x 4. Warm and well perfused  Skin: warm and dry without rash  Neurologic: GCS 15,  Psych: Normal Affect      ------------------------------ ED COURSE/MEDICAL DECISION MAKING----------------------  Medications - No data to display      Medical Decision Making:    Physical exam is unremarkable. Lungs are clear to auscultation bilaterally. COVID, influenza, chest x-ray did not show an acute process. Patient is satting at 98% on room air. I will discharge her home. I will give her a prescription for an albuterol inhaler as well as Mucinex. I told her to continue taking her Tessalon Perles. I instructed her to follow-up with her primary care physician for reevaluation this week and to return for any worsening symptoms. She is agreeable with plan of care. Counseling: The emergency provider has spoken with the patient and discussed todays results, in addition to providing specific details for the plan of care and counseling regarding the diagnosis and prognosis. Questions are answered at this time and they are agreeable with the plan.      --------------------------------- IMPRESSION AND DISPOSITION ---------------------------------    IMPRESSION  1.  Bronchitis        DISPOSITION  Disposition: Discharge to home  Patient condition is stable                  Wyatt Rinne, MD  11/05/22 0221

## 2023-09-26 ENCOUNTER — HOSPITAL ENCOUNTER (OUTPATIENT)
Dept: GENERAL RADIOLOGY | Age: 83
Discharge: HOME | End: 2023-09-28
Payer: COMMERCIAL

## 2023-09-26 ENCOUNTER — HOSPITAL ENCOUNTER (OUTPATIENT)
Age: 83
Discharge: HOME | End: 2023-09-28
Payer: COMMERCIAL

## 2023-09-26 DIAGNOSIS — M54.17 LEFT LUMBOSACRAL RADICULOPATHY: ICD-10-CM

## 2023-09-26 DIAGNOSIS — M19.90 SENILE ARTHRITIS: ICD-10-CM

## 2023-09-26 PROCEDURE — 73502 X-RAY EXAM HIP UNI 2-3 VIEWS: CPT

## 2023-09-26 PROCEDURE — 72100 X-RAY EXAM L-S SPINE 2/3 VWS: CPT

## 2023-10-05 ENCOUNTER — TELEPHONE (OUTPATIENT)
Dept: NEUROSURGERY | Age: 83
End: 2023-10-05

## 2023-10-05 NOTE — TELEPHONE ENCOUNTER
Spoke with patients Brother and his wife who are POA who advised that patient is approximately 87# and too fragile for surgery only needs to have another epidural gave Dr. Susan Kidd telephone number so that patient can get scheduled again and referral is closed out.

## 2023-10-17 ENCOUNTER — OFFICE VISIT (OUTPATIENT)
Dept: CARDIOLOGY CLINIC | Age: 83
End: 2023-10-17
Payer: COMMERCIAL

## 2023-10-17 VITALS
HEART RATE: 88 BPM | HEIGHT: 57 IN | DIASTOLIC BLOOD PRESSURE: 54 MMHG | SYSTOLIC BLOOD PRESSURE: 130 MMHG | RESPIRATION RATE: 16 BRPM | BODY MASS INDEX: 16.18 KG/M2 | WEIGHT: 75 LBS

## 2023-10-17 DIAGNOSIS — E78.2 MIXED HYPERLIPIDEMIA: ICD-10-CM

## 2023-10-17 DIAGNOSIS — E11.9 TYPE 2 DIABETES MELLITUS WITHOUT COMPLICATION, WITHOUT LONG-TERM CURRENT USE OF INSULIN (HCC): ICD-10-CM

## 2023-10-17 DIAGNOSIS — I25.10 CORONARY ARTERY DISEASE INVOLVING NATIVE CORONARY ARTERY OF NATIVE HEART WITHOUT ANGINA PECTORIS: Primary | ICD-10-CM

## 2023-10-17 DIAGNOSIS — I10 PRIMARY HYPERTENSION: ICD-10-CM

## 2023-10-17 DIAGNOSIS — G89.4 CHRONIC PAIN SYNDROME: ICD-10-CM

## 2023-10-17 PROCEDURE — 99214 OFFICE O/P EST MOD 30 MIN: CPT | Performed by: INTERNAL MEDICINE

## 2023-10-17 PROCEDURE — 3075F SYST BP GE 130 - 139MM HG: CPT | Performed by: INTERNAL MEDICINE

## 2023-10-17 PROCEDURE — 1123F ACP DISCUSS/DSCN MKR DOCD: CPT | Performed by: INTERNAL MEDICINE

## 2023-10-17 PROCEDURE — 3078F DIAST BP <80 MM HG: CPT | Performed by: INTERNAL MEDICINE

## 2023-10-17 PROCEDURE — 93000 ELECTROCARDIOGRAM COMPLETE: CPT | Performed by: INTERNAL MEDICINE

## 2023-10-17 RX ORDER — TRAMADOL HYDROCHLORIDE 50 MG/1
50 TABLET ORAL EVERY 6 HOURS PRN
COMMUNITY
Start: 2023-09-29

## 2023-10-17 RX ORDER — METHOCARBAMOL 500 MG/1
500 TABLET, FILM COATED ORAL 3 TIMES DAILY
COMMUNITY
Start: 2023-09-26

## 2023-10-17 NOTE — PROGRESS NOTES
OUTPATIENT CARDIOLOGY FOLLOW-UP    Name: Harvey Ramirez    Age: 80 y.o. Primary Care Physician: Topher Torres DO    Date of Service: 10/17/2023    Chief Complaint: Coronary atherosclerosis, hypertension, chronic pain syndrome    Interim History: Since her most recent evaluation, the patient remains compensated from a cardiovascular point with no active cardiovascular symptoms and her most significant limiting factor is that of neuromuscular pain largely within her lumbosacral spine. At time of present assessment her weight is estimated as opposed to that accurately obtained on the basis of her above related chronic pain and in discussion with her brother-in-law who is her power of  continued poor enteral intake based on her fears of exacerbating her diabetes. She continues to undergo pain management of her chronic pain syndrome. Review of Systems: The remainder of a complete multisystem review including consitutional, central nervous, respiratory, circulatory, gastrointestinal, genitourinary, endocrinologic, hematologic, musculoskeletal and psychiatric are negative.     Past Medical History:  Past Medical History:   Diagnosis Date    Acute CVA (cerebrovascular accident) ([address]) 5/25/2019    Arthritis     Back problem     CAD (coronary artery disease) 2010    PTCA with DAMON to prox LAD, PTCA DAMON to ostial lesion of RCA and BMS to prox lesion RCA DAMON to RCA ostial     Cerebral artery occlusion with cerebral infarction ([address]) 05/2019    Hyperlipidemia     Hypertension     Nausea & vomiting     Reflux     RLD (ruptured lumbar disc)     Scoliosis     Spinal stenosis     Type 2 diabetes mellitus without complication, without long-term current use of insulin ([address]) 10/14/2019       Past Surgical History:  Past Surgical History:   Procedure Laterality Date    CATARACT REMOVAL      bilateral    CORONARY ANGIOPLASTY  2/25/2010    PTCA with DAMON in the proximal left anterior descending    CORONARY ANGIOPLASTY

## 2023-10-23 NOTE — PROGRESS NOTES
[address], [address] [place] [address]  [phone]    Follow up Note      Laurence Campbell     Date of Visit:  10/24/2023    CC:  Patient presents for follow up   Chief Complaint   Patient presents with    Back Pain     HPI:  Office extension for worsening low back pain with radiation to the Left lower extremity, last seen 05/2022. Pain is described as sharp/intermittent. Pain is aggravated by turning to the side/standing/walking. Pain is relieved  Appropriate analgesia with current medications regimen:N/A. Change in quality of symptoms:no. Medication side effects:not applicable . Recent diagnostic testing:none. Recent interventional procedures:none    She has not been on anticoagulation medications to include none. The patient  has not been on herbal supplements. The patient is diabetic. Imaging:   Lumbar spine CT 2022:  1. No acute abnormality involving lumbar spine. 2. Levoscoliosis with significant degenerative endplate changes along   concavity of the curvature. 3. Lumbar spondylosis as described above with multiple levels of central   canal stenosis. MRI may be helpful for further evaluation of lumbar   spondylosis. Lumbar spine Xray:  There is levoscoliosis of the lumbar spine. There is advanced multilevel  degenerative disc disease and facet arthropathy. Vertebral body heights are  within normal limits. No focal soft tissue abnormality. Bilateral hip Xray   No acute abnormality of the hip. Degenerative changes lumbar spine and SI joints. Previous treatments: Physical Therapy, Epidural Steroid Injection and medications. .      Potential Aberrant Drug-Related Behavior:    No    Urine Drug Screening:  None    OARRS report:  10/2023 consistent     Opioid Agreement:  Renewal date:N/A    Past Medical History:   Diagnosis Date    Acute CVA (cerebrovascular accident) ([address]) 5/25/2019    Arthritis     Back problem     CAD (coronary

## 2023-10-24 ENCOUNTER — OFFICE VISIT (OUTPATIENT)
Dept: PAIN MANAGEMENT | Age: 83
End: 2023-10-24
Payer: COMMERCIAL

## 2023-10-24 VITALS
TEMPERATURE: 96.9 F | SYSTOLIC BLOOD PRESSURE: 104 MMHG | DIASTOLIC BLOOD PRESSURE: 60 MMHG | OXYGEN SATURATION: 96 % | HEART RATE: 96 BPM | RESPIRATION RATE: 16 BRPM | WEIGHT: 75 LBS | HEIGHT: 57 IN | BODY MASS INDEX: 16.18 KG/M2

## 2023-10-24 DIAGNOSIS — M48.061 SPINAL STENOSIS OF LUMBAR REGION WITHOUT NEUROGENIC CLAUDICATION: ICD-10-CM

## 2023-10-24 DIAGNOSIS — M47.816 LUMBAR SPONDYLOSIS: ICD-10-CM

## 2023-10-24 DIAGNOSIS — M47.816 LUMBAR FACET ARTHROPATHY: ICD-10-CM

## 2023-10-24 DIAGNOSIS — M51.9 LUMBAR DISC DISORDER: ICD-10-CM

## 2023-10-24 DIAGNOSIS — G89.4 CHRONIC PAIN SYNDROME: Primary | ICD-10-CM

## 2023-10-24 PROCEDURE — 99214 OFFICE O/P EST MOD 30 MIN: CPT | Performed by: PAIN MEDICINE

## 2023-10-24 PROCEDURE — 3078F DIAST BP <80 MM HG: CPT | Performed by: PAIN MEDICINE

## 2023-10-24 PROCEDURE — 3074F SYST BP LT 130 MM HG: CPT | Performed by: PAIN MEDICINE

## 2023-10-24 PROCEDURE — 99213 OFFICE O/P EST LOW 20 MIN: CPT | Performed by: PAIN MEDICINE

## 2023-10-24 PROCEDURE — 1123F ACP DISCUSS/DSCN MKR DOCD: CPT | Performed by: PAIN MEDICINE

## 2023-10-24 RX ORDER — SODIUM CHLORIDE 0.9 % (FLUSH) 0.9 %
5-40 SYRINGE (ML) INJECTION PRN
OUTPATIENT
Start: 2023-10-24

## 2023-10-24 RX ORDER — SODIUM CHLORIDE 0.9 % (FLUSH) 0.9 %
5-40 SYRINGE (ML) INJECTION EVERY 12 HOURS SCHEDULED
OUTPATIENT
Start: 2023-10-24

## 2023-10-24 RX ORDER — SODIUM CHLORIDE 9 MG/ML
INJECTION, SOLUTION INTRAVENOUS PRN
OUTPATIENT
Start: 2023-10-24

## 2023-10-30 ENCOUNTER — TELEPHONE (OUTPATIENT)
Dept: PAIN MANAGEMENT | Age: 83
End: 2023-10-30

## 2023-10-30 DIAGNOSIS — M54.16 LUMBAR RADICULOPATHY: ICD-10-CM

## 2023-11-28 ENCOUNTER — PREP FOR PROCEDURE (OUTPATIENT)
Dept: PAIN MANAGEMENT | Age: 83
End: 2023-11-28

## 2023-11-28 ENCOUNTER — TELEPHONE (OUTPATIENT)
Dept: PAIN MANAGEMENT | Age: 83
End: 2023-11-28

## 2023-11-28 NOTE — TELEPHONE ENCOUNTER
Call to Laurence Campbell that procedure was approved for 12/11/2023 and that [facility] should call her a few days before for the pre op call and after 3:00 PM the business day before with the arrival time. Instructed Ne to hold ibuprofen for 24 hours, naprosyn for 4 days and any aspirin containing products or fish oil for 7 days. Instructed to call office back if any questions. Miguel Munguia verbalized understanding.     Electronically signed by Felix Libman, RN on 11/28/2023 at 9:27 AM

## 2023-12-11 ENCOUNTER — HOSPITAL ENCOUNTER (OUTPATIENT)
Dept: OPERATING ROOM | Age: 83
Setting detail: OUTPATIENT SURGERY
Discharge: HOME | End: 2023-12-11
Attending: PAIN MEDICINE
Payer: COMMERCIAL

## 2023-12-11 ENCOUNTER — HOSPITAL ENCOUNTER (OUTPATIENT)
Age: 83
Setting detail: OUTPATIENT SURGERY
Discharge: HOME | End: 2023-12-11
Attending: PAIN MEDICINE | Admitting: PAIN MEDICINE
Payer: COMMERCIAL

## 2023-12-11 VITALS
WEIGHT: 75 LBS | RESPIRATION RATE: 16 BRPM | OXYGEN SATURATION: 98 % | DIASTOLIC BLOOD PRESSURE: 78 MMHG | SYSTOLIC BLOOD PRESSURE: 163 MMHG | HEART RATE: 86 BPM | HEIGHT: 57 IN | TEMPERATURE: 98.4 F | BODY MASS INDEX: 16.18 KG/M2

## 2023-12-11 DIAGNOSIS — M54.16 LUMBAR RADICULOPATHY: ICD-10-CM

## 2023-12-11 PROCEDURE — 2709999900 HC NON-CHARGEABLE SUPPLY: Performed by: PAIN MEDICINE

## 2023-12-11 PROCEDURE — A9579 GAD-BASE MR CONTRAST NOS,1ML: HCPCS | Performed by: PAIN MEDICINE

## 2023-12-11 PROCEDURE — 6360000002 HC RX W HCPCS: Performed by: PAIN MEDICINE

## 2023-12-11 PROCEDURE — 3600000005 HC SURGERY LEVEL 5 BASE: Performed by: PAIN MEDICINE

## 2023-12-11 PROCEDURE — 62323 NJX INTERLAMINAR LMBR/SAC: CPT | Performed by: PAIN MEDICINE

## 2023-12-11 PROCEDURE — 7100000011 HC PHASE II RECOVERY - ADDTL 15 MIN: Performed by: PAIN MEDICINE

## 2023-12-11 PROCEDURE — 6360000004 HC RX CONTRAST MEDICATION: Performed by: PAIN MEDICINE

## 2023-12-11 PROCEDURE — 3600000015 HC SURGERY LEVEL 5 ADDTL 15MIN: Performed by: PAIN MEDICINE

## 2023-12-11 PROCEDURE — 7100000010 HC PHASE II RECOVERY - FIRST 15 MIN: Performed by: PAIN MEDICINE

## 2023-12-11 PROCEDURE — 2500000003 HC RX 250 WO HCPCS: Performed by: PAIN MEDICINE

## 2023-12-11 RX ORDER — LIDOCAINE HYDROCHLORIDE 5 MG/ML
INJECTION, SOLUTION INFILTRATION; INTRAVENOUS PRN
Status: DISCONTINUED | OUTPATIENT
Start: 2023-12-11 | End: 2023-12-11 | Stop reason: ALTCHOICE

## 2023-12-11 ASSESSMENT — PAIN - FUNCTIONAL ASSESSMENT: PAIN_FUNCTIONAL_ASSESSMENT: 0-10

## 2023-12-11 ASSESSMENT — PAIN SCALES - GENERAL
PAINLEVEL_OUTOF10: 0
PAINLEVEL_OUTOF10: 0

## 2023-12-11 ASSESSMENT — PAIN DESCRIPTION - DESCRIPTORS: DESCRIPTORS: ACHING

## 2023-12-11 NOTE — DISCHARGE INSTRUCTIONS
[facility]) Pain Management Department  [phone]   Post-Pain Block/ Radiofrequency Home Going Instructions    1-Go home, rest for the remainder of the day  2-Please do not lift over 20 pounds the day of the injection  3-If you received sedation No: alcohol, driving, operating lawn mowers, plows, tractors or other dangerous equipment until next morning. Do not make important decisions or sign legal documents for 24 hours. You may experience light headedness, dizziness, nausea or sleepiness after sedation. Do not stay alone. A responsible adult must be with you for 24 hours. You could be nauseated from the medications you have received. Your IV site may be sore and bruised. 4-No dietary restrictions     5-Resume all medications the same day, blood thinners to be resumed 24 hours after injection    6-Keep the surgical site clean and dry, you may shower the next morning and remove the      dressing. 7- No sitz baths, tub baths or hot tubs/swimming for 24 hours. 8- If you have any pain at the injection site(s), application of an ice pack to the area should be       helpful, 20 minutes on/20 minutes off for next 48 hours. 9- [facility] immediately at if you develop. Fever greater than 100.4 F  Have bleeding or drainage from the puncture site  Have progressive Leg/arm numbness and or weakness  Loss of control of bowel and or bladder (wet/soil yourself)  Severe headache with inability to lift head  10-You may return to work the next day     Infection After Surgery: Care Instructions  Overview  After surgery, an infection is always possible. It doesn't mean that the surgery didn't go well. Because an infection can be serious, your doctor has taken steps to manage it. Your doctor checked the infection and cleaned it if necessary. Your doctor may have made an opening in the area so that the pus can drain out. You may have gauze in the cut so that the area will stay open and keep draining.

## 2023-12-11 NOTE — OP NOTE
2023    Patient: Helen Ramos  :  1940  Age:  80 y.o. Sex:  female     PRE-OPERATIVE DIAGNOSIS: Lumbar disc displacement, lumbar radiculopathy. POST-OPERATIVE DIAGNOSIS: Same. PROCEDURE: Fluoroscopic guided therapeutic lumbar epidural steroid injection at the L4-5 level (# 1). SURGEON: HILDA Funes M.D.. ANESTHESIA: Local    ESTIMATED BLOOD LOSS: None.  ______________________________________________________________________    BRIEF HISTORY:  Helen Ramos comes in today for first lumbar epidural injection at L4-5 level. The potential complications of this procedure were discussed with her again today. She has elected to undergo the aforementioned procedure. Jenaro complete History & Physical examination were reviewed in depth, a copy of which is in the chart. DESCRIPTION OF PROCEDURE:    After confirming written and informed consent, a time-out was performed and Jenaro name and date of birth, the procedure to be performed as well as the plan for the location of the needle insertion were confirmed. The patient was brought into the procedure room and placed in the prone position on the fluoroscopy table. A pillow was placed under the patient's lower abdomen/upper pelvis to increase lumbar interlaminar space. Standard monitors were placed, and vital signs were observed throughout the procedure. The area of the lumbar spine was prepped with chloraprep and draped in a sterile manner. The L4-5 interspace was identified and marked under AP fluoroscopy. The skin and subcutaneous tissues at the above level were anesthestized with 0.5% lidocaine. With intermittent fluoroscopy, an # 18 gauge 3 1/2 tuohy epidural needle was inserted and directed toward the interlaminar space. The needle was slowly advanced using loss of resistance technique and 5 cc glass syringe  until the tip of the epidural needle has passed through the ligamentum flavum and entered the epidural space.  AP and lateral

## 2023-12-11 NOTE — H&P
[address], [facility], [address] [place] [address]  [phone]    Procedure History & Physical      [employer]     HPI:    Patient  is here for low back and leg pain for LESI L4-5.     Labs/imaging studies reviewed   All question and concerns addressed including R/B/A associated with the procedure    Past Medical History:   Diagnosis Date    Acute CVA (cerebrovascular accident) ([address]) 5/25/2019    Arthritis     Back problem     CAD (coronary artery disease) 2010    PTCA with DAMON to prox LAD, PTCA DAMON to ostial lesion of RCA and BMS to prox lesion RCA DAMON to RCA ostial     Cerebral artery occlusion with cerebral infarction ([address]) 05/2019    Hyperlipidemia     Hypertension     Nausea & vomiting     Reflux     RLD (ruptured lumbar disc)     Scoliosis     Spinal stenosis     Type 2 diabetes mellitus without complication, without long-term current use of insulin ([address]) 10/14/2019       Past Surgical History:   Procedure Laterality Date    CATARACT REMOVAL      bilateral    CORONARY ANGIOPLASTY  2/25/2010    PTCA with DAMON in the proximal left anterior descending    CORONARY ANGIOPLASTY  3/11/2010    PTCA and deployment of 2 stents in the ostium and the proximal segment of RCA    CORONARY ANGIOPLASTY  5/19/2010    PTCA DAMON to ostial RCA patent LAD and RCA BMS  restenosis of RCA    CORONARY ANGIOPLASTY WITH STENT PLACEMENT      times 3    DIAGNOSTIC CARDIAC CATH LAB PROCEDURE  2/25/2010    Dr. Toshia Miranda: Cardiac cath with angioplasty follow up     DIAGNOSTIC CARDIAC CATH LAB PROCEDURE  3/11/2010    Cardiac cath and stent placement    DIAGNOSTIC CARDIAC CATH LAB PROCEDURE  5/19/2010    Cardiac cath heart lab and subsequent angiopilsaty    FRACTURE SURGERY      tight wrist    PAIN MANAGEMENT PROCEDURE N/A 4/25/2022    LUMBAR EPIDURAL STEROID INJECTION L3-4 WITH VERY LOW VOLUME AND 60 DEPO(NEEDS 10:15) performed by Diasy Johnson MD at [address] [place] [address]   08 25

## 2024-03-11 ENCOUNTER — HOSPITAL ENCOUNTER (OUTPATIENT)
Age: 84
Discharge: HOME | End: 2024-03-13
Payer: COMMERCIAL

## 2024-03-11 ENCOUNTER — HOSPITAL ENCOUNTER (OUTPATIENT)
Dept: GENERAL RADIOLOGY | Age: 84
Discharge: HOME | End: 2024-03-13

## 2024-03-11 ENCOUNTER — HOSPITAL ENCOUNTER (OUTPATIENT)
Dept: GENERAL RADIOLOGY | Age: 84
Discharge: HOME | End: 2024-03-13
Payer: COMMERCIAL

## 2024-03-11 DIAGNOSIS — M79.671 RIGHT FOOT PAIN: ICD-10-CM

## 2024-03-11 PROCEDURE — 73630 X-RAY EXAM OF FOOT: CPT

## 2024-03-11 PROCEDURE — 73610 X-RAY EXAM OF ANKLE: CPT

## 2024-04-03 NOTE — PROGRESS NOTES
Occupational Therapy  Therapeutic Recreation Assessment     LEISURE INTEREST SURVEY               Key: P = Past Interest C = Current Interest F = Future Interest     Arts/Crafts:  ___Mechanical  ___Woodworking    ___ Painting   ___Floral arranging ___ Ceramics         _ __ Knitting                                                     ___ Crocheting        ___Other: ___________      Cooking:  C_ _Baking _C__Cooking    ___   Other: _______   Comments:       Games:  ___Cards  ___Darts  ___Board games    ___Word games  ___Crossword puzzles    _C__Seek-n-find                  ___Other: _________      Horticulture:  ___Vegetables  _C__Flowers  ___House plants                                                     ___Other: ___________      House/Yard Care:  ___Ironing  ___Laundry  _C__Cleaning                                                      ___Repairs              ___Lawn care                                                     ___Other: ___________      Music Listening/Playing:  ___Classical       ___Big Band       ___Rock-n-Roll                                                     ___Country          ___R&B             ___Gospel/Hymns                                                     ___Other: ___________      Outdoor Activities:  ___Hunting          ___Fishing          ___Camping                                                     ___Other: ___________      Pets/Animals:  _P__Dogs             ___Cats                ___Fish                                                     ___Birds             ___Livestock         ___Other: _________    Reading:   ___Newspapers  ___Magazines ___Other:stories                                                     _C__Other: ____A variety_______      Yarsanism Activities:  Spiritism: ___________   Duey Deacon Activities: ___________      Shopping:   ___Grocery  ___Clothing  ___Flea market     _C__Other: ___A variety________      Socialization: C ___Family  _C__Friends  _C__Neighbors ___Church  ___Volunteer ___Club/Organization                                                     ___Other: ___________    Sports/Exercises:  _C__Walking  ___Football  ___Basketball     ___Golf  ___Baseball  ___Tennis       ___Bowling  ___Other: ___________      Traveling:   ___Global  _P__Stateside  P___Local       ___Other: ___________      TV/Radio:   ___Mysteries  ___Comedies ___Romance                                                     ___Game show       ___Sports       _C__News                                                      ___Talk show          _C__Other: Uli Bustillos stories / Forensic Files__________      Other:     Personal Leisure Profile:   Question Response   Attributes Identify a positive quality: []Ambitious  []Appreciative  []Caring  []Courteous  []Considerate  []Compassionate  []Creative  []Dependable   []Generous  []Honest  []Hard working  [x]Helpful  []Kind  [][place]   [][place]  []Mannerly  []Patient  []Polite  []Respectful  []Sociable  []Sense of humor  []Thoughtful  []Other: ___________    You are very good at Helping others   Awareness Why do you participate in your leisure interest: []Competition  []Creativity  []Concentration  []Exercise  []Enjoyment  []Fun  []Hand/eye Coordination  []Increase confidence  []Learning a new skill  []Relaxation  []Social Interaction  [x]Supporting one another  []Thinking  []Other: ___________    Are your leisure activities limited at this time? [x]Yes  []No  Comments: _________    How often do you participate in your leisure interest? Most Days   Resources Name a restaurant, store or business you frequent? [address] [place] [address] When are you most happy?  The other ran is happy     Barriers to Leisure Participation:  []Visual   [][place]  []Cognition/Communication  []Motivation  []Financial  []Transportation  []Time Constraints  [x]Physical Ability  []Leisure Awareness  []Drug/Alcohol  []Other: ___________    Recommendations:    [x]Group Session  [x]Individual Session  []Client Refused Services  []No Therapy  []Other: ___________    Objectives:  []Establish adaptations for leisure involvement   []Increase Self worth/self esteem  []Community reintegration training   [x]Social interaction  [x]Leisure participation  []Other: ___________    Goals for Therapeutic Recreation Services:   Social Interaction:   Admission Functional [place] Measure: ___6________  Discharge Functional [place] Measure: ___7________   Other:________________________________      Leisure Choice/ Increase Melanie Quality of Life: To participate in 1 premorbid leisure activities of choice by discharge to increase leisure choice and independence thus increasing the overall quality of his/her life. Socialization/Social Interaction: To increase social interaction skills by introducing self 1 x per week at the beginning of TR session Socialization/Social Interaction: To initiate conversation 1 x per week during the TR session    Adaptations: To increase knowledge of adaptations to leisure involvement by participating in 1 modified leisure activities by dc. Leisure Activity Tolerance: To increase leisure tolerance by attending 1 leisure activities per week for 20 minutes with active participation. Self Expression: To participate in 1 leisure activity(s) of choice, identifying 1 benefits to leisure participation. Leah Harvey yes...

## 2024-06-11 NOTE — PROGRESS NOTES
Physical Therapy    Facility/Department: [facility]  Initial Assessment    NAME: Catalina Mohan  : 1940  MRN: [id number]    Date of Service: 2019    Evaluating Therapist: Elisabeth Paris DPT    ROOM: [address] [place], [place] [address]-O  DIAGNOSIS: Ischemic CVA  PMH: To ED on 19 for chest pain/left sided weakness. MRI of the brain that a scattering of infarcts in the [facility] distribution along the distribution of a branch artery. Echo with bubble showed small septal aneurysm with no shunting. PMH includes cataract removal, hx of 3 cardiac stents in , CAD, HLD, HTN, scoliosis    PRECAUTIONS: falls,      Social:  Pt lives alone in a 1 floor apt with 3 steps and 2 rails to enter. Prior to admission pt walked with no AD, but uses quad cane PRN in community. Initial Evaluation  19       Short Term Goals Long Term Goals    Was pt agreeable to Eval/treatment? yes       Does pt have pain? No pain        Bed Mobility  Rolling: sba  Supine to sit: sba  Sit to supine: sba  Scooting: sba   Sup Mod I   Transfers Sit to stand: sba  Stand to sit: sba  Stand pivot: cg/sbA with ww   Sup with AAD Mod I with AAD   Ambulation    200 feet with ww with cg/sba   300 feet with AAD with sup >500 feet with AAD with mod I   Walking 10 feet on uneven surface 10 feet with ww with cg/sba       Wheel Chair Mobility n/a       Car Transfers sba   Sup Mod I   Stair negotiation: ascended and descended  12 steps with bilateral rail with cg/sba   12 steps with one rail with sup >12 steps with one rail with mod I   Curb Step:   ascended and descended 4 inch step with ww and cgA    7.5 inch step with AAD and sup 7.5 inch step with AAD and mod I   Picking up object off the floor Picked up 2# with SBA        BLE ROM WFL       BLE Strength Right LE: 4+/5  Left LE: 4-/5 hip, 4-/5 knee, 3+/5 ankle       Balance  Sitting: sup  Standing: cgA with ww       Date Family Teach Completed TBA       Is additional Family Teaching Needed?   Y or N Y       Hindering Progress Balance, safety, left LE weakness       PT recommended ELOS 10 days       Team's Discharge Plan        Therapist at Team Meeting          Pt is alert and Oriented x4  Sensation: intact to LT  Coordination: JAVI/FTN impaired L UE (ataxia noted), HTS slight ataxia left LE  Vision: intact/equal   Reflex/tone/spasticity: none noted  Edema: none noted  Endurance: fair  Skin was inspected: grossly intact  Chair alarm: n/a     ASSESSMENT  Pt displays functional ability as noted in the objective portion of this evaluation. Comments:  Pt sitting in w/c upon arrival, agreeable to PT evaluation and treatment. Slight weakness noted at left LE, as well as ataxia to left hemibody (more so noted in UE). Pt requiring initial verbal cues for hand placement with occasional cues t/o session for safety when sitting. Pt ambulation with slight ER at bilateral LE with forward flexed posture, with occasional recurvatum noted in late stance with L LE. Pt requiring verbal cues for ww approximation t/o session. Pt requiring verbal cues for sequencing with stair/curb negotiation with carryover noted and returned demonstration with no buckling noted. Trial ambulation without AD with occasional stepping outside JOSÉ MANUEL to recover balance noted, as well as tending to increase gait velocity when she began to lose balance requiring cgA to correct. Pt unable to pattern quad cane properly, attempting to perform reciprocal patterning and \"roll over\" on quad cane. Will trial spc at later time as necessary. Pt presents with decreased L hemibody strength limiting transfers, requiring increased assistance for transfer/ambulatino/stair/curb negotiation per PLOF, and deficits with balance as noted by need for ww at this time.  Pt will benefit from intensive skilled PT services to increase above deficits, as well as trial various devices to progress to LRAD and increase [place] with mobility as pt has limited support and plans to return home alone at d/c. Patient education  Pt educated on hand placement with transfers, safety with mobility, ww approximation, sequencing with various devices (WW vs quad cane vs no AD), sequencing with stair negotiation, role of PT, PT POC, call bell use, need for assistance,     Patient response to education:   Pt verbalized understanding Pt demonstrated skill Pt requires further education in this area   yes yes reinforcement     Rehab potential is Good for reaching above PT goals. Pts/ family goals   1. To get stronger    Patient and or family understand(s) diagnosis, prognosis, and plan of care. PLAN  PT care will be provided in accordance with the objectives noted above. Whenever appropriate, clear delegation orders will be provided for nursing staff. Exercises and functional mobility practice will be used as well as appropriate assistive devices or modalities to obtain goals. Patient and family education will also be administered as needed. Frequency of treatments will be 2x/day M-F and 1x/day Sat or Sun x  10 days.     Time in: 1000  Time out: Dony Arndt   [id number] What Type Of Note Output Would You Prefer (Optional)?: Standard Output How Severe Are Your Spot(S)?: moderate Have Your Spot(S) Been Treated In The Past?: has not been treated Hpi Title: Evaluation of Skin Lesions Additional History: Rash/bumps on leg

## 2025-07-24 ENCOUNTER — HOSPITAL ENCOUNTER (OUTPATIENT)
Dept: CT IMAGING | Age: 85
Discharge: HOME | End: 2025-07-24
Attending: FAMILY MEDICINE
Payer: COMMERCIAL

## 2025-07-24 ENCOUNTER — HOSPITAL ENCOUNTER (OUTPATIENT)
Dept: GENERAL RADIOLOGY | Age: 85
Discharge: HOME | End: 2025-07-26
Attending: FAMILY MEDICINE
Payer: COMMERCIAL

## 2025-07-24 DIAGNOSIS — R55 SYNCOPE AND COLLAPSE: ICD-10-CM

## 2025-07-24 DIAGNOSIS — S00.93XA CONTUSION OF HEAD, UNSPECIFIED PART OF HEAD, INITIAL ENCOUNTER: ICD-10-CM

## 2025-07-24 DIAGNOSIS — R55 SYNCOPE, UNSPECIFIED SYNCOPE TYPE: ICD-10-CM

## 2025-07-24 DIAGNOSIS — M25.561 ARTHRALGIA OF RIGHT LOWER LEG: ICD-10-CM

## 2025-07-24 PROCEDURE — 73560 X-RAY EXAM OF KNEE 1 OR 2: CPT

## 2025-07-24 PROCEDURE — 70486 CT MAXILLOFACIAL W/O DYE: CPT

## 2025-07-24 PROCEDURE — 70450 CT HEAD/BRAIN W/O DYE: CPT

## (undated) DEVICE — Device: Brand: DEFENDO VALVE AND CONNECTOR KIT

## (undated) DEVICE — BANDAGE ADH W1XL3IN NAT FAB WVN FLX DURABLE N ADH PD SEAL

## (undated) DEVICE — TOWEL,OR,DSP,ST,BLUE,STD,6/PK,12PK/CS: Brand: MEDLINE

## (undated) DEVICE — 3 ML SYRINGE LUER-LOCK TIP: Brand: MONOJECT

## (undated) DEVICE — GOWN ISOLATN REG YEL M WT MULTIPLY SIDETIE LEV 2

## (undated) DEVICE — CONTAINER SPEC COLL 960ML POLYPR TRIANG GRAD INTAKE/OUTPUT

## (undated) DEVICE — YANKAUER,BULB TIP,W/O VENT,RIGID,STERILE: Brand: MEDLINE

## (undated) DEVICE — LUBRICANT SURG JELLY ST BACTER TUBE 4.25OZ

## (undated) DEVICE — 12 ML SYRINGE,LUER-LOCK TIP: Brand: MONOJECT

## (undated) DEVICE — COVER,LIGHT HANDLE,FLX,1/PK: Brand: MEDLINE INDUSTRIES, INC.

## (undated) DEVICE — FORCEPS BX L240CM JAW DIA2.8MM L CAP W/ NDL MIC MESH TOOTH

## (undated) DEVICE — GAUZE,SPONGE,4"X4",12PLY,STERILE,LF,2'S: Brand: MEDLINE

## (undated) DEVICE — NEEDLE HYPO 27GA L1.25IN GRY POLYPR HUB S STL REG BVL STR

## (undated) DEVICE — 3M™ RED DOT™ MONITORING ELECTRODE WITH FOAM TAPE AND STICKY GEL 2560, 50/BAG, 20/CASE, 72/PLT: Brand: RED DOT™

## (undated) DEVICE — SPONGE GZ 4IN 4IN 4 PLY N WVN AVANT

## (undated) DEVICE — APPLICATOR MEDICATED 10.5 CC SOLUTION HI LT ORNG CHLORAPREP

## (undated) DEVICE — TRAY EPI SGL DOSE 18GA NDL CUST AULTMAN HOSP

## (undated) DEVICE — Z DISCONTINUED USE 2132709 NEEDLE HYPO 18GA L1.5IN PNK POLYPR HUB S STL THN WALL FILL

## (undated) DEVICE — 6 ML SYRINGE LUER-LOCK TIP: Brand: MONOJECT

## (undated) DEVICE — GLOVE ORANGE PI 7 1/2   MSG9075

## (undated) DEVICE — NEEDLE HYPO 18GA L1.5IN PNK POLYPR HUB S STL THN WALL FILL

## (undated) DEVICE — 6 X 9  1.75MIL 4-WALL LABGUARD: Brand: MINIGRIP COMMERCIAL LLC

## (undated) DEVICE — BLOCK BITE 60FR CAREGUARD

## (undated) DEVICE — TUBING, SUCTION, 1/4" X 10', STRAIGHT: Brand: MEDLINE

## (undated) DEVICE — KENDALL 450 SERIES MONITORING FOAM ELECTRODE - RECTANGULAR SHAPE ( 3/PK): Brand: KENDALL

## (undated) DEVICE — MASK,FACE,MAXFLUIDPROTECT,SHIELD/ERLPS: Brand: MEDLINE

## (undated) DEVICE — KIT BEDSIDE REVITAL OX 500ML